# Patient Record
Sex: FEMALE | Race: WHITE | NOT HISPANIC OR LATINO | Employment: FULL TIME | URBAN - METROPOLITAN AREA
[De-identification: names, ages, dates, MRNs, and addresses within clinical notes are randomized per-mention and may not be internally consistent; named-entity substitution may affect disease eponyms.]

---

## 2017-02-03 ENCOUNTER — OFFICE VISIT (OUTPATIENT)
Dept: HEMATOLOGY/ONCOLOGY | Facility: CLINIC | Age: 67
End: 2017-02-03
Payer: COMMERCIAL

## 2017-02-03 VITALS
RESPIRATION RATE: 18 BRPM | SYSTOLIC BLOOD PRESSURE: 162 MMHG | HEART RATE: 84 BPM | WEIGHT: 180.31 LBS | HEIGHT: 62 IN | DIASTOLIC BLOOD PRESSURE: 84 MMHG | BODY MASS INDEX: 33.18 KG/M2

## 2017-02-03 DIAGNOSIS — C50.012 MALIGNANT NEOPLASM OF NIPPLE OF LEFT BREAST IN FEMALE: Primary | ICD-10-CM

## 2017-02-03 DIAGNOSIS — M94.9 BONE/CARTILAGE DISORDER: ICD-10-CM

## 2017-02-03 DIAGNOSIS — M89.9 BONE/CARTILAGE DISORDER: ICD-10-CM

## 2017-02-03 PROCEDURE — 99999 PR PBB SHADOW E&M-EST. PATIENT-LVL III: CPT | Mod: PBBFAC,,, | Performed by: INTERNAL MEDICINE

## 2017-02-03 PROCEDURE — 3077F SYST BP >= 140 MM HG: CPT | Mod: S$GLB,,, | Performed by: INTERNAL MEDICINE

## 2017-02-03 PROCEDURE — 3079F DIAST BP 80-89 MM HG: CPT | Mod: S$GLB,,, | Performed by: INTERNAL MEDICINE

## 2017-02-03 PROCEDURE — 99213 OFFICE O/P EST LOW 20 MIN: CPT | Mod: S$GLB,,, | Performed by: INTERNAL MEDICINE

## 2017-02-03 NOTE — MR AVS SNAPSHOT
[facility]  [address]  [facility] [address]  Phone: [phone]  Fax: [phone]                  Zayra Rodgers   2/3/2017 1:20 PM   Office Visit    Description:  Female : 1950   Provider:  Michael Bartlett MD   Department:  [facility]           Diagnoses this Visit        Comments    Malignant neoplasm of nipple of left breast in female    -  Primary     Bone/cartilage disorder                To Do List           Future Appointments        Provider Department Dept Phone    5/3/2017 12:45 PM LAB, [facility] [phone]    5/3/2017 1:40 PM Michael Bartlett MD [facility] [phone]    2017 11:00 AM LAB, [place] [facility] [phone]    2017 2:00 PM Michael Bartlett MD [facility] [phone]    2017 3:30 PM CHAIR 03, [facility] [phone]      Goals (5 Years of Data)     None      [facility] On Call     [facility] Nurse Care Line - 24/7 Assistance  Registered nurses in the [facility] provide clinical advisement, health education, appointment booking, and other advisory services.  Call for this free service at [phone].             Medications           Message regarding Medications     Verify the changes and/or additions to your medication regime listed below are the same as discussed with your clinician today.  If any of these changes or additions are incorrect, please notify your healthcare provider.        STOP taking these medications     pantoprazole (PROTONIX) 40 MG tablet Take 1 tablet (40 mg total) by mouth once daily.           Verify that the below list of medications is an accurate representation of the medications you are currently taking.  If none reported, the list may be blank. If incorrect, please contact your healthcare provider. Carry this list with you in case of emergency.          "  Current Medications     alprazolam (XANAX) 0.25 MG tablet Take 1 tablet (0.25 mg total) by mouth 3 (three) times daily as needed for Anxiety.    anastrozole (ARIMIDEX) 1 mg Tab Take 1 tablet (1 mg total) by mouth once daily.    B-complex with vitamin C (Z-BEC OR EQUIV) tablet Take 1 tablet by mouth once daily.    calcium carbonate-vitamin D3 (CALTRATE 600 + D) 600 mg (1,500 mg)-800 unit Chew Take 2 tablets by mouth once daily.    fish oil-omega-3 fatty acids 300-1,000 mg capsule Take 2 g by mouth once daily.    hydrochlorothiazide (HYDRODIURIL) 25 MG tablet Take 12.5 mg by mouth as needed.     multivitamin capsule Take 1 capsule by mouth once daily.    ranitidine (ZANTAC) 75 MG tablet Take 75 mg by mouth nightly.           Clinical Reference Information           Your Vitals Were     BP Pulse Resp Height Weight BMI    162/84 84 18 5' 2" (1.575 m) 81.8 kg (180 lb 5.4 oz) 32.98 kg/m2      Blood Pressure          Most Recent Value    BP  (!)  162/84      Allergies as of 2/3/2017     Sulfa (Sulfonamide Antibiotics)      Immunizations Administered on Date of Encounter - 2/3/2017     None      Orders Placed During Today's Visit     Future Labs/Procedures Expected by Expires    BMP  2/3/2017 4/4/2018      Language Assistance Services     ATTENTION: Language assistance services are available, free of charge. Please call [phone].      ATENCIÓN: Si habla español, tiene a beckwith disposición servicios gratuitos de asistencia lingüística. Llame al [phone].     [facility] Ý: N?u b?n nói Ti?ng Vi?t, có các d?ch v? h? tr? ngôn ng? mi?n phí dành cho b?n. G?i s? [phone].         [facility] complies with applicable Federal civil rights laws and does not discriminate on the basis of race, color, national origin, age, disability, or sex.        "

## 2017-02-03 NOTE — PROGRESS NOTES
HISTORY OF PRESENT ILLNESS:  The patient is a 66-year-old white female well   known to me for locally advanced left breast carcinoma, who completed   neoadjuvant therapy consisting of four cycles of Adriamycin/Cytoxan, 12 weekly   doses of Taxol.  She underwent lumpectomy, axillary lymph node sampling,   post-lumpectomy radiation.  She had a small residual focus of infiltrating   ductal carcinoma within the lumpectomy specimen.  One sentinel lymph node had   residual 0.3 cm focus of metastatic tumor.  The patient is currently receiving   adjuvant Arimidex 1 mg daily and biannual Prolia for prevention of aromatase   inhibitor-induced bone loss.  She is in clinic for three-month post-therapy   reevaluation.  She has minimal reddening of the skin overlying the left breast.    Otherwise, she has no complaints or pertinent findings on 10-point review of   systems.    PHYSICAL EXAMINATION:  GENERAL:  Well-developed, well-nourished white female in no acute distress.  VITAL SIGNS:  Weight 180.5 pounds (decreased by 1 pound).  Documented in EMR and   reviewed.  HEENT:  Normocephalic, atraumatic.  Oral mucosa pink and moist.  Lips without   lesions.  Tongue midline.  Oropharynx clear.  Nonicteric sclerae.   NECK:  Supple, no adenopathy.  No carotid bruits, thyromegaly or thyroid nodule.                                                                        HEART:  Regular rate and rhythm without murmur, gallop or rub.                LUNGS:  Clear to auscultation bilaterally.  Normal respiratory effort.       ABDOMEN:  Soft, nontender, nondistended with positive normoactive bowel sounds,   no hepatosplenomegaly.    EXTREMITIES:  No cyanosis, clubbing or edema.  Distal pulses are intact.                                              AXILLAE AND GROIN:  No palpable pathologic lymphadenopathy is appreciated.        SKIN:  Intact/turgor normal.  NEUROLOGIC:  Cranial nerves II-XII grossly intact.  Motor:  Good muscle bulk and    tone.  Strength/sensory 5/5 throughout.  Gait stable.  BREASTS:  The patient's right breast is free from masses, tenderness or nipple   discharge.  The patient's left breast has a well approximated and healed   lumpectomy and sentinel lymph node sampling scars.  There are no signs of local   recurrence.  There is minimal skin change consistent with recent radiation   therapy.    IMPRESSION:  Locally advanced left breast carcinoma with excellent response to   neoadjuvant therapy.    PLAN:  1.  Continue calcium with D.  2.  Continue Arimidex 1 mg daily.  3.  Return to clinic in three months with interval BMP for reexamination and   next dose of Prolia.      ALYSSIA/HN  dd: 02/03/2017 13:47:30 (CST)  td: 02/03/2017 19:10:26 (CST)  Doc ID   #[id number]  Job ID #[id number]    CC:

## 2017-04-24 ENCOUNTER — PATIENT OUTREACH (OUTPATIENT)
Dept: ADMINISTRATIVE | Facility: HOSPITAL | Age: 67
End: 2017-04-24

## 2017-05-09 ENCOUNTER — TELEPHONE (OUTPATIENT)
Dept: HEMATOLOGY/ONCOLOGY | Facility: CLINIC | Age: 67
End: 2017-05-09

## 2017-05-09 NOTE — TELEPHONE ENCOUNTER
----- Message from Diana Nogueira sent at 5/9/2017  4:16 PM CDT -----  Patient is calling to reschedule her appointment on 5/12/17. Please call back to reschedule at [phone].

## 2017-05-10 DIAGNOSIS — C50.919 MALIGNANT NEOPLASM OF FEMALE BREAST, UNSPECIFIED LATERALITY, UNSPECIFIED SITE OF BREAST: Primary | ICD-10-CM

## 2017-05-12 ENCOUNTER — TELEPHONE (OUTPATIENT)
Dept: HEMATOLOGY/ONCOLOGY | Facility: CLINIC | Age: 67
End: 2017-05-12

## 2017-05-12 NOTE — TELEPHONE ENCOUNTER
----- Message from Debi Marino sent at 5/12/2017  9:58 AM CDT -----  Please call patient to reschedule appt [phone] (home)

## 2017-05-12 NOTE — TELEPHONE ENCOUNTER
Returned call to patient who wanted to reschedule lab and follow up. Rescheduled lab to 5/15/17 and follow up to Friday 5/19/17 at 11:20. Patient voiced understanding and appreciation.

## 2017-05-15 ENCOUNTER — LAB VISIT (OUTPATIENT)
Dept: LAB | Facility: HOSPITAL | Age: 67
End: 2017-05-15
Attending: INTERNAL MEDICINE
Payer: COMMERCIAL

## 2017-05-15 DIAGNOSIS — C50.912 MALIGNANT NEOPLASM OF LEFT FEMALE BREAST, UNSPECIFIED SITE OF BREAST: ICD-10-CM

## 2017-05-15 DIAGNOSIS — C50.919 MALIGNANT NEOPLASM OF FEMALE BREAST, UNSPECIFIED LATERALITY, UNSPECIFIED SITE OF BREAST: ICD-10-CM

## 2017-05-15 DIAGNOSIS — C77.3 MALIGNANT NEOPLASM METASTATIC TO LYMPH NODE OF AXILLA: ICD-10-CM

## 2017-05-15 LAB
ALBUMIN SERPL BCP-MCNC: 4 G/DL
ALP SERPL-CCNC: 41 U/L
ALT SERPL W/O P-5'-P-CCNC: 16 U/L
ANION GAP SERPL CALC-SCNC: 12 MMOL/L
AST SERPL-CCNC: 15 U/L
BASOPHILS # BLD AUTO: 0.02 K/UL
BASOPHILS NFR BLD: 0.3 %
BILIRUB SERPL-MCNC: 0.3 MG/DL
BUN SERPL-MCNC: 20 MG/DL
CALCIUM SERPL-MCNC: 9.9 MG/DL
CHLORIDE SERPL-SCNC: 100 MMOL/L
CO2 SERPL-SCNC: 29 MMOL/L
CREAT SERPL-MCNC: 0.7 MG/DL
DIFFERENTIAL METHOD: NORMAL
EOSINOPHIL # BLD AUTO: 0.1 K/UL
EOSINOPHIL NFR BLD: 0.7 %
ERYTHROCYTE [DISTWIDTH] IN BLOOD BY AUTOMATED COUNT: 12.9 %
EST. GFR  (AFRICAN AMERICAN): >60 ML/MIN/1.73 M^2
EST. GFR  (NON AFRICAN AMERICAN): >60 ML/MIN/1.73 M^2
GLUCOSE SERPL-MCNC: 103 MG/DL
HCT VFR BLD AUTO: 41.6 %
HGB BLD-MCNC: 14.4 G/DL
LYMPHOCYTES # BLD AUTO: 1.7 K/UL
LYMPHOCYTES NFR BLD: 24.1 %
MAGNESIUM SERPL-MCNC: 2.4 MG/DL
MCH RBC QN AUTO: 29.9 PG
MCHC RBC AUTO-ENTMCNC: 34.6 %
MCV RBC AUTO: 87 FL
MONOCYTES # BLD AUTO: 0.5 K/UL
MONOCYTES NFR BLD: 6.8 %
NEUTROPHILS # BLD AUTO: 4.9 K/UL
NEUTROPHILS NFR BLD: 68.1 %
PLATELET # BLD AUTO: 247 K/UL
PMV BLD AUTO: 10.4 FL
POTASSIUM SERPL-SCNC: 4.1 MMOL/L
PROT SERPL-MCNC: 7.5 G/DL
RBC # BLD AUTO: 4.81 M/UL
SODIUM SERPL-SCNC: 141 MMOL/L
WBC # BLD AUTO: 7.22 K/UL

## 2017-05-15 PROCEDURE — 80053 COMPREHEN METABOLIC PANEL: CPT | Mod: PO

## 2017-05-15 PROCEDURE — 36415 COLL VENOUS BLD VENIPUNCTURE: CPT | Mod: PO

## 2017-05-15 PROCEDURE — 82465 ASSAY BLD/SERUM CHOLESTEROL: CPT

## 2017-05-15 PROCEDURE — 85025 COMPLETE CBC W/AUTO DIFF WBC: CPT | Mod: PO

## 2017-05-15 PROCEDURE — 83718 ASSAY OF LIPOPROTEIN: CPT

## 2017-05-15 PROCEDURE — 83735 ASSAY OF MAGNESIUM: CPT | Mod: PO

## 2017-05-15 PROCEDURE — 83701 LIPOPROTEIN BLD HR FRACTION: CPT

## 2017-05-16 LAB
HDLC SERPL-MCNC: 274 MG/DL
HDLC SERPL-MCNC: 52 MG/DL

## 2017-05-18 ENCOUNTER — PATIENT OUTREACH (OUTPATIENT)
Dept: ADMINISTRATIVE | Facility: HOSPITAL | Age: 67
End: 2017-05-18

## 2017-05-18 LAB — LDLC SERPL-MCNC: 197 MG/DL

## 2017-05-18 NOTE — LETTER
May 18, 2017    Zayra Rodgers  [address] [address]             [facility]  [address]  [facility] [address]  Phone: [phone] Dear Ms. Rodgers:    We have tried to reach you by [employer] unsuccessfully.      [facility] is committed to your overall health.  Our records indicate that you are due for an annual checkup with your primary care provider,  Dr. Barros.  Please call [phone] to schedule a routine physical exam. You may also be due for the following test and/or procedures:     One-time Hepatitis C Screening lab test(a viral condition that can harm the liver)   Cholesterol check (Lipid Panel) h   Tetanus immunization   colonoscopy   Shingles immunization   Pneumonia immunization     If you have had any of the above done at another facility, please let us know by calling [phone] so that we can update your record.  We will add these results to your chart if you fax them to the fax number listed below.  If you have any questions, please call [phone].     If you have any questions or concerns, please don't hesitate to call.    Sincerely,  Nancy Mckeon  Clinical Care Coordinator  [facility] [address] [facility] [address]  [place] [address]  Phone: [phone]   Fax: [phone]

## 2017-05-19 ENCOUNTER — OFFICE VISIT (OUTPATIENT)
Dept: HEMATOLOGY/ONCOLOGY | Facility: CLINIC | Age: 67
End: 2017-05-19
Payer: COMMERCIAL

## 2017-05-19 VITALS
DIASTOLIC BLOOD PRESSURE: 95 MMHG | BODY MASS INDEX: 31.36 KG/M2 | RESPIRATION RATE: 17 BRPM | SYSTOLIC BLOOD PRESSURE: 144 MMHG | WEIGHT: 170.44 LBS | HEIGHT: 62 IN | HEART RATE: 84 BPM

## 2017-05-19 DIAGNOSIS — M89.9 BONE/CARTILAGE DISORDER: ICD-10-CM

## 2017-05-19 DIAGNOSIS — C50.012 MALIGNANT NEOPLASM OF NIPPLE OF LEFT BREAST IN FEMALE: ICD-10-CM

## 2017-05-19 DIAGNOSIS — M94.9 BONE/CARTILAGE DISORDER: ICD-10-CM

## 2017-05-19 DIAGNOSIS — C50.919 MALIGNANT NEOPLASM OF FEMALE BREAST, UNSPECIFIED LATERALITY, UNSPECIFIED SITE OF BREAST: Primary | ICD-10-CM

## 2017-05-19 PROCEDURE — 99999 PR PBB SHADOW E&M-EST. PATIENT-LVL III: CPT | Mod: PBBFAC,,, | Performed by: INTERNAL MEDICINE

## 2017-05-19 PROCEDURE — 99214 OFFICE O/P EST MOD 30 MIN: CPT | Mod: S$GLB,,, | Performed by: INTERNAL MEDICINE

## 2017-05-19 PROCEDURE — 3080F DIAST BP >= 90 MM HG: CPT | Mod: S$GLB,,, | Performed by: INTERNAL MEDICINE

## 2017-05-19 PROCEDURE — 3077F SYST BP >= 140 MM HG: CPT | Mod: S$GLB,,, | Performed by: INTERNAL MEDICINE

## 2017-05-19 PROCEDURE — 1160F RVW MEDS BY RX/DR IN RCRD: CPT | Mod: S$GLB,,, | Performed by: INTERNAL MEDICINE

## 2017-05-19 RX ORDER — ANASTROZOLE 1 MG/1
1 TABLET ORAL DAILY
Qty: 90 TABLET | Refills: 3 | Status: SHIPPED | OUTPATIENT
Start: 2017-05-19 | End: 2018-02-17 | Stop reason: SDUPTHER

## 2017-05-19 NOTE — MR AVS SNAPSHOT
[place] [address] [facility] [address]  [facility] [address]  Phone: [phone]  Fax: [phone]                  Zayra Rodgers   2017 11:20 AM   Office Visit    Description:  Female : 1950   Provider:  Michael Bartlett MD   Department:  [facility]                To Do List           Future Appointments        Provider Department Dept Phone    2017 11:20 AM Michael Bartlett MD [facility] [phone]    2017 3:30 PM CHAIR 03, [facility] [phone]      Goals (5 Years of Data)     None      [facility] On Call     [facility] Nurse Care Line -  Assistance  Unless otherwise directed by your provider, please contact [facility], our nurse care line that is available for  assistance.     Registered nurses in the [facility] provide: appointment scheduling, clinical advisement, health education, and other advisory services.  Call: [phone] (toll free)               Medications           Message regarding Medications     Verify the changes and/or additions to your medication regime listed below are the same as discussed with your clinician today.  If any of these changes or additions are incorrect, please notify your healthcare provider.             Verify that the below list of medications is an accurate representation of the medications you are currently taking.  If none reported, the list may be blank. If incorrect, please contact your healthcare provider. Carry this list with you in case of emergency.           Current Medications     alprazolam (XANAX) 0.25 MG tablet Take 1 tablet (0.25 mg total) by mouth 3 (three) times daily as needed for Anxiety.    anastrozole (ARIMIDEX) 1 mg Tab Take 1 tablet (1 mg total) by mouth once daily.    B-complex with vitamin C (Z-BEC OR EQUIV) tablet Take 1 tablet by mouth once daily.    calcium carbonate-vitamin D3 (CALTRATE 600 + D) 600 mg (1,500  "mg)-800 unit Chew Take 2 tablets by mouth once daily.    fish oil-omega-3 fatty acids 300-1,000 mg capsule Take 2 g by mouth once daily.    hydrochlorothiazide (HYDRODIURIL) 25 MG tablet Take 12.5 mg by mouth as needed.     multivitamin capsule Take 1 capsule by mouth once daily.    ranitidine (ZANTAC) 75 MG tablet Take 75 mg by mouth nightly.           Clinical Reference Information           Your Vitals Were     BP Pulse Resp Height Weight BMI    144/95 84 17 5' 2" (1.575 m) 77.3 kg (170 lb 6.7 oz) 31.17 kg/m2      Blood Pressure          Most Recent Value    BP  (!)  144/95      Allergies as of 5/19/2017     Sulfa (Sulfonamide Antibiotics)      Immunizations Administered on Date of Encounter - 5/19/2017     None      Language Assistance Services     ATTENTION: Language assistance services are available, free of charge. Please call [phone].      ATENCIÓN: Si habla shereenañol, tiene a beckwith disposición servicios gratuitos de asistencia lingüística. Llame al [phone].     [facility] Ý: N?u b?n nói Ti?ng Vi?t, có các d?ch v? h? tr? ngôn ng? mi?n phí ashantih cho b?n. G?i s? [phone].         [facility] complies with applicable Federal civil rights laws and does not discriminate on the basis of race, color, national origin, age, disability, or sex.        "

## 2017-05-19 NOTE — PROGRESS NOTES
HISTORY OF PRESENT ILLNESS:  The patient is a 67-year-old white female well   known to me for locally advanced left breast carcinoma, completed neoadjuvant   therapy consisting of four cycles of Adriamycin/Cytoxan, 12 weekly doses of   Taxol.  Following this, she underwent lumpectomy, axillary lymph node sampling   and post-lumpectomy irradiation.  She had a small residual focus of infiltrating   ductal carcinoma in the lumpectomy specimen in a single sentinel lymph node,   which had a 0.3 cm focus of metastatic tumor.  Postoperatively, the patient   remains on adjuvant Arimidex 1 mg daily, as well as calcium supplementation with   vitamin D and biannual Prolia for prevention of aromatase inhibitor-induced   bone loss.  The patient has had some minimal soreness of the left breast with   physical exertion.  Otherwise, her review of systems is unremarkable.    PHYSICAL EXAMINATION:  GENERAL:  Well-developed, well-nourished white female in no acute distress.  VITAL SIGNS:  Weight 170-1/2 pounds (decreased by 10 pounds).  HEENT:  Normocephalic, atraumatic.  Oral mucosa pink and moist.  Lips without   lesions.  Tongue midline.  Oropharynx clear.  Nonicteric sclerae.  NECK:  Supple, no adenopathy.  No carotid bruits, thyromegaly or thyroid nodule.  HEART:  Regular rate and rhythm without murmur, gallop or rub.  LUNGS:  Clear to auscultation bilaterally.  Normal respiratory effort.  ABDOMEN:  Soft, nontender, nondistended with positive normoactive bowel sounds,   no hepatosplenomegaly.  EXTREMITIES:  No cyanosis, clubbing or edema.  Distal pulses are intact.  AXILLAE AND GROIN:  No palpable pathologic lymphadenopathy is appreciated.  SKIN:  Intact/turgor normal.  NEUROLOGIC:  Cranial nerves II-XII grossly intact.  Motor:  Good muscle bulk and   tone.  Strength/sensory 5/5 throughout.  Gait stable.  BREASTS:  The patient's right breast is free from masses, tenderness or nipple   discharge.  The patient's left breast has  healed lumpectomy and sentinel lymph   node sampling scars, which are well approximated and free from signs of local   recurrence.    LABORATORY:  White count 7.2, H and H 14.4 and 41.6, platelet count 247.    Chemistry:  Sodium 141, potassium 4.1, chloride 100, CO2 of 29, BUN 20,   creatinine 0.7, glucose 103, calcium 9.9, mag 2.4.  Liver function tests are   within normal limits.    IMPRESSION:  Locally advanced left breast carcinoma with excellent response to   neoadjuvant therapy with no evidence of disease now six months out.    PLAN:  1.  Continue calcium supplementation with vitamin D.  2.  Continue Arimidex 1 mg daily.  3.  Repeat Prolia 60 mg subq.  4.  The patient is cleared for routine dental cleaning.  5.  Return to clinic in three months without interval study.      ALYSSIA/MARTHA  dd: 05/19/2017 12:36:46 (CDT)  td: 05/19/2017 20:39:11 (CDT)  Doc ID   #[id number]  Job ID #[id number]    CC:

## 2017-05-22 ENCOUNTER — TELEPHONE (OUTPATIENT)
Dept: HEMATOLOGY/ONCOLOGY | Facility: CLINIC | Age: 67
End: 2017-05-22

## 2017-05-22 NOTE — TELEPHONE ENCOUNTER
Medication was sent to wrong pharmacy, per patient pharmacist at [facility] is transferring Arimidex to patients correct pharmacy.

## 2017-05-22 NOTE — TELEPHONE ENCOUNTER
----- Message from Tanika Flower sent at 5/22/2017 10:19 AM CDT -----  Contact: pt   States Rx called in wrong pharmacy   Call back [phone]  .  [facility] [address] [place] [address] [address] [address]  Phone: [phone] Fax: [phone]    No other informaiton

## 2017-05-26 ENCOUNTER — TELEPHONE (OUTPATIENT)
Dept: HEMATOLOGY/ONCOLOGY | Facility: CLINIC | Age: 67
End: 2017-05-26

## 2017-05-26 NOTE — TELEPHONE ENCOUNTER
----- Message from Debi Marino sent at 5/26/2017  9:26 AM CDT -----  Please call patient in regards to changing appt, please call [phone] (home)

## 2017-06-01 ENCOUNTER — INFUSION (OUTPATIENT)
Dept: INFUSION THERAPY | Facility: HOSPITAL | Age: 67
End: 2017-06-01
Attending: INTERNAL MEDICINE
Payer: COMMERCIAL

## 2017-06-01 VITALS
HEIGHT: 62 IN | HEART RATE: 81 BPM | TEMPERATURE: 99 F | DIASTOLIC BLOOD PRESSURE: 79 MMHG | RESPIRATION RATE: 17 BRPM | SYSTOLIC BLOOD PRESSURE: 129 MMHG | WEIGHT: 170.44 LBS | BODY MASS INDEX: 31.36 KG/M2

## 2017-06-01 DIAGNOSIS — M89.9 BONE/CARTILAGE DISORDER: Primary | ICD-10-CM

## 2017-06-01 DIAGNOSIS — M94.9 BONE/CARTILAGE DISORDER: Primary | ICD-10-CM

## 2017-06-01 PROCEDURE — 63600175 PHARM REV CODE 636 W HCPCS: Mod: PN | Performed by: INTERNAL MEDICINE

## 2017-06-01 PROCEDURE — 96401 CHEMO ANTI-NEOPL SQ/IM: CPT | Mod: PN

## 2017-06-01 RX ORDER — SODIUM CHLORIDE 0.9 % (FLUSH) 0.9 %
10 SYRINGE (ML) INJECTION
Status: CANCELLED | OUTPATIENT
Start: 2017-06-01

## 2017-06-01 RX ORDER — HEPARIN 100 UNIT/ML
500 SYRINGE INTRAVENOUS
Status: CANCELLED | OUTPATIENT
Start: 2017-06-01

## 2017-06-01 RX ADMIN — DENOSUMAB 60 MG: 60 INJECTION SUBCUTANEOUS at 08:06

## 2017-06-01 NOTE — PLAN OF CARE
Problem: Patient Care Overview  Goal: Plan of Care Review  Outcome: Ongoing (interventions implemented as appropriate)  Pt tolerated injection well without complaints. Pt teaching done and handouts given on Prolia. Pt verbalized understanding.

## 2017-06-01 NOTE — PATIENT INSTRUCTIONS
Denosumab injection  What is this medicine?  DENOSUMAB (den oh rashel mab) slows bone breakdown. Prolia is used to treat osteoporosis in women after menopause and in men. Xgeva is used to prevent bone fractures and other bone problems caused by cancer bone metastases. Xgeva is also used to treat giant cell tumor of the bone.  How should I use this medicine?  This medicine is for injection under the skin. It is given by a health care professional in a hospital or clinic setting.  If you are getting Prolia, a special MedGuide will be given to you by the pharmacist with each prescription and refill. Be sure to read this information carefully each time.  For Prolia, talk to your pediatrician regarding the use of this medicine in children. Special care may be needed. For Xgeva, talk to your pediatrician regarding the use of this medicine in children. While this drug may be prescribed for children as young as 13 years for selected conditions, precautions do apply.  What side effects may I notice from receiving this medicine?  Side effects that you should report to your doctor or health care professional as soon as possible:  · allergic reactions like skin rash, itching or hives, swelling of the face, lips, or tongue  · breathing problems  · chest pain  · fast, irregular heartbeat  · feeling faint or lightheaded, falls  · fever, chills, or any other sign of infection  · muscle spasms, tightening, or twitches  · numbness or tingling  · skin blisters or bumps, or is dry, peels, or red  · slow healing or unexplained pain in the mouth or jaw  · unusual bleeding or bruising  Side effects that usually do not require medical attention (Report these to your doctor or health care professional if they continue or are bothersome.):  · muscle pain  · stomach upset, gas  What may interact with this medicine?  Do not take this medicine with any of the following medications:  · other medicines containing denosumab  This medicine may also  interact with the following medications:  · medicines that suppress the immune system  · medicines that treat cancer  · steroid medicines like prednisone or cortisone  What if I miss a dose?  It is important not to miss your dose. Call your doctor or health care professional if you are unable to keep an appointment.  Where should I keep my medicine?  This medicine is only given in a clinic, doctor's office, or other health care setting and will not be stored at home.  What should I tell my health care provider before I take this medicine?  They need to know if you have any of these conditions:  · dental disease  · eczema  · infection or history of infections  · kidney disease or on dialysis  · low blood calcium or vitamin D  · malabsorption syndrome  · scheduled to have surgery or tooth extraction  · taking medicine that contains denosumab  · thyroid or parathyroid disease  · an unusual reaction to denosumab, other medicines, foods, dyes, or preservatives  · pregnant or trying to get pregnant  · breast-feeding  What should I watch for while using this medicine?  Visit your doctor or health care professional for regular checks on your progress. Your doctor or health care professional may order blood tests and other tests to see how you are doing.  Call your doctor or health care professional if you get a cold or other infection while receiving this medicine. Do not treat yourself. This medicine may decrease your body's ability to fight infection.  You should make sure you get enough calcium and vitamin D while you are taking this medicine, unless your doctor tells you not to. Discuss the foods you eat and the vitamins you take with your health care professional.  See your dentist regularly. Brush and floss your teeth as directed. Before you have any dental work done, tell your dentist you are receiving this medicine.  Do not become pregnant while taking this medicine or for 5 months after stopping it. Women should  inform their doctor if they wish to become pregnant or think they might be pregnant. There is a potential for serious side effects to an unborn child. Talk to your health care professional or pharmacist for more information.  Date Last Reviewed:   NOTE:This sheet is a summary. It may not cover all possible information. If you have questions about this medicine, talk to your doctor, pharmacist, or health care provider. Copyright© 2016 [employer]

## 2017-06-02 DIAGNOSIS — F41.9 ANXIETY: ICD-10-CM

## 2017-06-02 RX ORDER — ALPRAZOLAM 0.25 MG/1
0.25 TABLET ORAL 3 TIMES DAILY PRN
Qty: 60 TABLET | Refills: 0 | Status: SHIPPED | OUTPATIENT
Start: 2017-06-02 | End: 2018-02-08

## 2017-06-02 NOTE — TELEPHONE ENCOUNTER
----- Message from Debi Vences sent at 6/2/2017 11:46 AM CDT -----  Contact: self  Patient called regarding medication refill. Please contact [phone] (xwlf)

## 2017-08-17 ENCOUNTER — TELEPHONE (OUTPATIENT)
Dept: HEMATOLOGY/ONCOLOGY | Facility: CLINIC | Age: 67
End: 2017-08-17

## 2017-08-17 ENCOUNTER — PATIENT MESSAGE (OUTPATIENT)
Dept: HEMATOLOGY/ONCOLOGY | Facility: CLINIC | Age: 67
End: 2017-08-17

## 2017-08-17 NOTE — TELEPHONE ENCOUNTER
----- Message from Annemarie Davis sent at 8/17/2017  1:10 PM CDT -----  Contact: pt [phone]  Patient is calling and asking for some labs to be ordered for her appointment.    Please call back.        Spoke with Patient , informed per last note, no labs required for visit

## 2017-08-18 DIAGNOSIS — Z12.11 COLON CANCER SCREENING: ICD-10-CM

## 2017-08-28 ENCOUNTER — OFFICE VISIT (OUTPATIENT)
Dept: HEMATOLOGY/ONCOLOGY | Facility: CLINIC | Age: 67
End: 2017-08-28
Payer: COMMERCIAL

## 2017-08-28 VITALS
HEART RATE: 81 BPM | SYSTOLIC BLOOD PRESSURE: 136 MMHG | RESPIRATION RATE: 16 BRPM | DIASTOLIC BLOOD PRESSURE: 91 MMHG | WEIGHT: 172.38 LBS | BODY MASS INDEX: 31.72 KG/M2 | TEMPERATURE: 98 F | HEIGHT: 62 IN

## 2017-08-28 DIAGNOSIS — Z17.0 MALIGNANT NEOPLASM OF NIPPLE OF LEFT BREAST IN FEMALE, ESTROGEN RECEPTOR POSITIVE: Primary | ICD-10-CM

## 2017-08-28 DIAGNOSIS — M94.9 BONE/CARTILAGE DISORDER: ICD-10-CM

## 2017-08-28 DIAGNOSIS — C77.3 MALIGNANT NEOPLASM METASTATIC TO LYMPH NODE OF AXILLA: ICD-10-CM

## 2017-08-28 DIAGNOSIS — C50.012 MALIGNANT NEOPLASM OF NIPPLE OF LEFT BREAST IN FEMALE, ESTROGEN RECEPTOR POSITIVE: Primary | ICD-10-CM

## 2017-08-28 DIAGNOSIS — M89.9 BONE/CARTILAGE DISORDER: ICD-10-CM

## 2017-08-28 PROCEDURE — 99999 PR PBB SHADOW E&M-EST. PATIENT-LVL III: CPT | Mod: PBBFAC,,, | Performed by: INTERNAL MEDICINE

## 2017-08-28 PROCEDURE — 3008F BODY MASS INDEX DOCD: CPT | Mod: S$GLB,,, | Performed by: INTERNAL MEDICINE

## 2017-08-28 PROCEDURE — 3075F SYST BP GE 130 - 139MM HG: CPT | Mod: S$GLB,,, | Performed by: INTERNAL MEDICINE

## 2017-08-28 PROCEDURE — 3080F DIAST BP >= 90 MM HG: CPT | Mod: S$GLB,,, | Performed by: INTERNAL MEDICINE

## 2017-08-28 PROCEDURE — 99213 OFFICE O/P EST LOW 20 MIN: CPT | Mod: S$GLB,,, | Performed by: INTERNAL MEDICINE

## 2017-08-28 NOTE — PROGRESS NOTES
HISTORY OF PRESENT ILLNESS:  The patient is a 67-year-old white female well   known to me for diagnosis of locally advanced left breast carcinoma, who   underwent neoadjuvant therapy consisting of four cycles of Adriamycin/Cytoxan,   12 weekly doses of Taxol, lumpectomy with axillary lymph node sampling,   post-lumpectomy irradiation.  Surgical pathology reveals small residual focus of   infiltrating carcinoma of the lumpectomy specimen and a 0.3 cm focus of   metastatic tumor involving a single lymph node.  Postoperatively, the patient is   receiving adjuvant Arimidex and biannual Prolia for prevention of aromatase   inhibitor-induced bone loss.  The patient is in clinic today for nine-month   post-therapy reevaluation and has no new complaints or pertinent findings on a   10-point review of systems.    PHYSICAL EXAMINATION:  GENERAL:  Well-developed, well-nourished white female in no acute distress.  VITAL SIGNS:  Weight of 172-1/2 pounds (increased by 2 pounds).  HEENT:  Normocephalic, atraumatic.  Oral mucosa pink and moist.  Lips without   lesions.  Tongue midline.  Oropharynx clear.  Nonicteric sclerae.  NECK:  Supple.  No adenopathy.  HEART:  Regular rate and rhythm without murmur, gallop or rub.  LUNGS:  Clear to auscultation bilaterally.  ABDOMEN:  Soft, nontender and nondistended with positive normoactive bowel   sounds.  No hepatosplenomegaly.  EXTREMITIES:  No cyanosis, clubbing or edema.  Distal pulses are intact.  BREASTS:  The patient's right breast is free from masses, tenderness or nipple   discharge.  The patient's left breast has healed lumpectomy scars free from   signs of local recurrence.    IMPRESSION:  Locally advanced left breast carcinoma - VISHAL.    PLAN:  1.  Continue calcium supplementation with D.  2.  Continue Arimidex 1 mg daily.  3.  Return to clinic in three months with interval CBC, CMP, LDH, chest x-ray   and mammography.      ALYSSIA/PN  dd: 08/28/2017 12:09:05 (CDT)  td: 08/28/2017  13:58:06 (CDT)  Doc ID   #[id number]  Job ID #[id number]    CC:

## 2017-09-14 ENCOUNTER — TELEPHONE (OUTPATIENT)
Dept: HEMATOLOGY/ONCOLOGY | Facility: CLINIC | Age: 67
End: 2017-09-14

## 2017-09-14 NOTE — TELEPHONE ENCOUNTER
Called and spoke with patient she would like to reschedule to [facility]'s [place] to have mammogram done. Patient is going to schedule appointment and request results be sent to dr. Bartlett.

## 2017-09-14 NOTE — TELEPHONE ENCOUNTER
----- Message from Anel Gracia sent at 9/14/2017  9:20 AM CDT -----  Contact: self  Needs to speak to Beena regarding mammogram that is scheduled for tomorrow.  Wants to reschedule it to the [facility], but would like to discuss it with you first.  Please call back at  [phone].

## 2017-09-18 ENCOUNTER — PATIENT MESSAGE (OUTPATIENT)
Dept: HEMATOLOGY/ONCOLOGY | Facility: CLINIC | Age: 67
End: 2017-09-18

## 2017-10-13 ENCOUNTER — TELEPHONE (OUTPATIENT)
Dept: HEMATOLOGY/ONCOLOGY | Facility: CLINIC | Age: 67
End: 2017-10-13

## 2017-10-13 NOTE — TELEPHONE ENCOUNTER
----- Message from Noe Ying sent at 10/13/2017  1:30 PM CDT -----  Contact: same  Unsuccessful call placed to office.  Patient called in and stated she was returning a call to [facility] and patients call back number is [phone]

## 2017-10-13 NOTE — TELEPHONE ENCOUNTER
----- Message from Olga Winn sent at 10/13/2017 12:10 PM CDT -----  needs to know if she shoud take flu shot..[phone] (home)

## 2017-10-16 ENCOUNTER — TELEPHONE (OUTPATIENT)
Dept: HEMATOLOGY/ONCOLOGY | Facility: CLINIC | Age: 67
End: 2017-10-16

## 2017-10-16 NOTE — TELEPHONE ENCOUNTER
----- Message from Pravin Gordillo sent at 10/16/2017 12:00 PM CDT -----  Contact: Patient  Patient is calling to see if she can get a referral to see Dr. Wasserman because he is not accepting new patients.  Call Back#[phone]  Thanks

## 2017-10-16 NOTE — TELEPHONE ENCOUNTER
Called patient back she no longer needed assistance and was able to book appointment with dr. odonnell

## 2017-11-10 ENCOUNTER — TELEPHONE (OUTPATIENT)
Dept: HEMATOLOGY/ONCOLOGY | Facility: CLINIC | Age: 67
End: 2017-11-10

## 2017-11-10 NOTE — TELEPHONE ENCOUNTER
Spoke with patient will have dr. thakur sign off on dental clearance once back in office on the 15th

## 2017-11-10 NOTE — TELEPHONE ENCOUNTER
----- Message from Tanika Flower sent at 11/10/2017  8:31 AM CST -----  Contact: sagrario   Needs dental clearance, for cleaning   Call back     Will  at    Today if possible

## 2017-11-27 ENCOUNTER — HOSPITAL ENCOUNTER (OUTPATIENT)
Dept: RADIOLOGY | Facility: HOSPITAL | Age: 67
Discharge: HOME | End: 2017-11-27
Attending: INTERNAL MEDICINE
Payer: COMMERCIAL

## 2017-11-27 DIAGNOSIS — C50.012 MALIGNANT NEOPLASM OF NIPPLE OF LEFT BREAST IN FEMALE, ESTROGEN RECEPTOR POSITIVE: ICD-10-CM

## 2017-11-27 DIAGNOSIS — M94.9 BONE/CARTILAGE DISORDER: ICD-10-CM

## 2017-11-27 DIAGNOSIS — C77.3 MALIGNANT NEOPLASM METASTATIC TO LYMPH NODE OF AXILLA: ICD-10-CM

## 2017-11-27 DIAGNOSIS — Z17.0 MALIGNANT NEOPLASM OF NIPPLE OF LEFT BREAST IN FEMALE, ESTROGEN RECEPTOR POSITIVE: ICD-10-CM

## 2017-11-27 DIAGNOSIS — M89.9 BONE/CARTILAGE DISORDER: ICD-10-CM

## 2017-11-27 PROCEDURE — 71020 XR CHEST PA AND LATERAL: CPT | Mod: 26,,, | Performed by: RADIOLOGY

## 2017-11-27 PROCEDURE — 71020 XR CHEST PA AND LATERAL: CPT | Mod: TC,PO

## 2017-11-28 ENCOUNTER — OFFICE VISIT (OUTPATIENT)
Dept: HEMATOLOGY/ONCOLOGY | Facility: CLINIC | Age: 67
End: 2017-11-28
Payer: COMMERCIAL

## 2017-11-28 VITALS
HEIGHT: 62 IN | RESPIRATION RATE: 20 BRPM | HEART RATE: 81 BPM | DIASTOLIC BLOOD PRESSURE: 96 MMHG | SYSTOLIC BLOOD PRESSURE: 167 MMHG | BODY MASS INDEX: 32.05 KG/M2 | TEMPERATURE: 99 F | WEIGHT: 174.19 LBS

## 2017-11-28 DIAGNOSIS — C77.3 MALIGNANT NEOPLASM METASTATIC TO LYMPH NODE OF AXILLA: ICD-10-CM

## 2017-11-28 DIAGNOSIS — M89.9 BONE/CARTILAGE DISORDER: ICD-10-CM

## 2017-11-28 DIAGNOSIS — C50.012 MALIGNANT NEOPLASM OF NIPPLE OF LEFT BREAST IN FEMALE, ESTROGEN RECEPTOR POSITIVE: Primary | ICD-10-CM

## 2017-11-28 DIAGNOSIS — Z17.0 MALIGNANT NEOPLASM OF NIPPLE OF LEFT BREAST IN FEMALE, ESTROGEN RECEPTOR POSITIVE: Primary | ICD-10-CM

## 2017-11-28 DIAGNOSIS — M94.9 BONE/CARTILAGE DISORDER: ICD-10-CM

## 2017-11-28 PROCEDURE — 99999 PR PBB SHADOW E&M-EST. PATIENT-LVL III: CPT | Mod: PBBFAC,,, | Performed by: INTERNAL MEDICINE

## 2017-11-28 PROCEDURE — 99214 OFFICE O/P EST MOD 30 MIN: CPT | Mod: S$GLB,,, | Performed by: INTERNAL MEDICINE

## 2017-11-28 NOTE — PROGRESS NOTES
Date of Service: 11/28/2017  The patient is a 67-year-old white female well known to me for locally advanced   left breast carcinoma, underwent neoadjuvant therapy consisting of four cycles   of Adriamycin/Cytoxan and 12 weekly doses of Taxol.  She went on to have   lumpectomy with axillary lymph node sampling, post-lumpectomy irradiation, and   is now on adjuvant Arimidex/biannual Prolia for prevention of aromatase   inhibitor-induced bone loss.  The patient had 0.3 cm focus of metastatic   carcinoma residual involving a single lymph node at the time of surgery.  The   patient is doing well and has no complaints or pertinent findings on a 14-point   review of systems with the exception of some bone pain, which has been made   worse by change in weather.    PHYSICAL EXAMINATION:  GENERAL:  Well-developed, well-nourished white female in no acute distress.  VITAL SIGNS:  Weight 174 pounds.  HEENT:  Normocephalic, atraumatic.  Oral mucosa pink and moist.  Lips without   lesions.  Tongue midline.  Oropharynx clear.  Nonicteric sclerae.   NECK:  Supple, no adenopathy.  No carotid bruits, thyromegaly or thyroid nodule.                                                                        HEART:  Regular rate and rhythm without murmur, gallop or rub.                LUNGS:  Clear to auscultation bilaterally.  Normal respiratory effort.       ABDOMEN:  Soft, nontender, nondistended with positive normoactive bowel sounds,   no hepatosplenomegaly.    EXTREMITIES:  No cyanosis, clubbing or edema.  Distal pulses are intact.                                              AXILLAE AND GROIN:  No palpable pathologic lymphadenopathy is appreciated.        SKIN:  Intact/turgor normal                                                              NEUROLOGIC:  Cranial nerves II-XII grossly intact.  Motor:  Good muscle bulk and   tone.  Strength/sensory 5/5 throughout.  Gait stable.   BREASTS:  The patient's left breast has a healed  lumpectomy scar, is free from   signs of local recurrence.  Right breast is free from masses, tenderness, or   nipple discharge.    LABORATORY DATA:  Sodium 140, potassium 4.4, chloride 103, CO2 of 27, BUN 18,   creatinine 0.7, glucose 104, and calcium 9.8.  Liver function tests are within   normal limits.  LDH is 159.  CBC reveal white count of 6.8, H and H 13.9 and   40.7, and platelet count 239.    CHEST X-RAY:  No evidence of occult pulmonary metastatic disease.    MAMMOGRAPHY:  BI-RADS category 2 with annual followup recommended.    IMPRESSION:  Locally advanced left breast carcinoma with no evidence of active   disease.    PLAN:  1.  Continue calcium supplementation with D.  2.  Continue Arimidex 1 mg daily.  3.  Repeat Prolia 60 mg subQ.  4.  Reevaluate in six months with interval BMP.      ALYSSIA/HN  dd: 11/28/2017 09:50:16 (CST)  td: 11/28/2017 13:46:52 (CST)  Doc ID   #[id number]  Job ID #[id number]    CC:

## 2017-12-07 ENCOUNTER — INFUSION (OUTPATIENT)
Dept: INFUSION THERAPY | Facility: HOSPITAL | Age: 67
End: 2017-12-07
Attending: INTERNAL MEDICINE
Payer: COMMERCIAL

## 2017-12-07 VITALS
WEIGHT: 177.5 LBS | HEART RATE: 80 BPM | OXYGEN SATURATION: 99 % | TEMPERATURE: 99 F | BODY MASS INDEX: 32.66 KG/M2 | DIASTOLIC BLOOD PRESSURE: 95 MMHG | HEIGHT: 62 IN | SYSTOLIC BLOOD PRESSURE: 156 MMHG | RESPIRATION RATE: 18 BRPM

## 2017-12-07 DIAGNOSIS — M94.9 BONE/CARTILAGE DISORDER: Primary | ICD-10-CM

## 2017-12-07 DIAGNOSIS — C77.3 MALIGNANT NEOPLASM METASTATIC TO LYMPH NODE OF AXILLA: ICD-10-CM

## 2017-12-07 DIAGNOSIS — M89.9 BONE/CARTILAGE DISORDER: Primary | ICD-10-CM

## 2017-12-07 PROCEDURE — 63600175 PHARM REV CODE 636 W HCPCS: Mod: PN | Performed by: INTERNAL MEDICINE

## 2017-12-07 PROCEDURE — 96372 THER/PROPH/DIAG INJ SC/IM: CPT | Mod: PN

## 2017-12-07 RX ADMIN — DENOSUMAB 60 MG: 60 INJECTION SUBCUTANEOUS at 09:12

## 2017-12-07 NOTE — PLAN OF CARE
Problem: Patient Care Overview  Goal: Plan of Care Review  Outcome: Ongoing (interventions implemented as appropriate)  Pt tolerated treatment well

## 2018-01-19 ENCOUNTER — TELEPHONE (OUTPATIENT)
Dept: FAMILY MEDICINE | Facility: CLINIC | Age: 68
End: 2018-01-19

## 2018-01-19 ENCOUNTER — OFFICE VISIT (OUTPATIENT)
Dept: PRIMARY CARE CLINIC | Facility: CLINIC | Age: 68
End: 2018-01-19
Payer: COMMERCIAL

## 2018-01-19 VITALS
HEART RATE: 83 BPM | BODY MASS INDEX: 33.1 KG/M2 | DIASTOLIC BLOOD PRESSURE: 98 MMHG | OXYGEN SATURATION: 97 % | WEIGHT: 179.88 LBS | SYSTOLIC BLOOD PRESSURE: 146 MMHG | TEMPERATURE: 98 F | HEIGHT: 62 IN | RESPIRATION RATE: 18 BRPM

## 2018-01-19 DIAGNOSIS — R05.8 POST-VIRAL COUGH SYNDROME: Primary | ICD-10-CM

## 2018-01-19 PROCEDURE — 99213 OFFICE O/P EST LOW 20 MIN: CPT | Mod: 25,S$GLB,, | Performed by: NURSE PRACTITIONER

## 2018-01-19 PROCEDURE — 99999 PR PBB SHADOW E&M-EST. PATIENT-LVL IV: CPT | Mod: PBBFAC,,, | Performed by: NURSE PRACTITIONER

## 2018-01-19 PROCEDURE — 96372 THER/PROPH/DIAG INJ SC/IM: CPT | Mod: S$GLB,,, | Performed by: FAMILY MEDICINE

## 2018-01-19 RX ORDER — PROMETHAZINE HYDROCHLORIDE AND CODEINE PHOSPHATE 6.25; 1 MG/5ML; MG/5ML
5 SOLUTION ORAL EVERY 12 HOURS PRN
Qty: 120 ML | Refills: 0 | Status: SHIPPED | OUTPATIENT
Start: 2018-01-19 | End: 2018-01-29

## 2018-01-19 RX ORDER — ALBUTEROL SULFATE 90 UG/1
2 AEROSOL, METERED RESPIRATORY (INHALATION) EVERY 6 HOURS PRN
Qty: 18 G | Refills: 0 | Status: SHIPPED | OUTPATIENT
Start: 2018-01-19 | End: 2018-02-15 | Stop reason: SDUPTHER

## 2018-01-19 RX ORDER — PREDNISONE 10 MG/1
TABLET ORAL
Qty: 12 TABLET | Refills: 0 | Status: SHIPPED | OUTPATIENT
Start: 2018-01-19 | End: 2018-02-08

## 2018-01-19 NOTE — PROGRESS NOTES
Zayra Rodgers is a 67 y.o. female patient of Serg López Jr, MD who presents to the clinic today for   Chief Complaint   Patient presents with    Cough    Ear Fullness   .    HPI    Pt, who is not known to me, reports a new problem to me:   Coughing, ears itchy, no fever, no RN, zyrtec is helping nasal congestion.  Delsym helped the 1st night she took it but not last night.  Rare sputum production, clear.      These symptoms began 2 weeks ago and status is worsening. Started with sinusitis/cold sxs.    Symptoms are + acute exac.    Pt denies the following symptoms:  Fever, h/o chronic lung problems.    Aggravating factors include nothing--coughing all day long  now .    Relieving factors include Delsym helped 1 night but no more.  Has used an inhaler in the past for cough. .    OTC Medications tried are Delsym.    Prescription medications taken for symptoms are none.    Pertinent medical history:  Used to be under Dr. Potter's office.  Hasn't used her inhaler or steroids.  Had breast cancer in 2016.  Had segmental removal and chemo.  Now clear of cancer.    Smoking status:  never    ROS    Constitutional:   No  fever, no fatigue, no change in appetite.    Head:   + headache from coughing, on the top of the head  Ears:   No pain.  Eyes:  No sxs  Nose:   No sinus pain, at night congestion, some clear runny nose.    Throat:  No ST pain.    Heart:  No palpitations, chest pain.    Lungs:  No difficulty breathing, + coughing, rare clear sputum production, no wheezing.              Symptoms are community acquired.       GI/:  No sxs    MS:  No new bone, joint or muscle problems.    Skin:  No rashes, itching.      PAST MEDICAL HISTORY:  Past Medical History:   Diagnosis Date    Allergy     Breast cancer     Bronchitis     HLD (hyperlipidemia)     Hypertension     Skin cancer     resovled       PAST SURGICAL HISTORY:  Past Surgical History:   Procedure Laterality Date    ADENOIDECTOMY      BREAST BIOPSY       BREAST CYST EXCISION      BREAST LUMPECTOMY      CYSTOCELE REPAIR      EXCISIONAL HEMORRHOIDECTOMY      HYSTERECTOMY      ORBITAL RECONSTRUCTION      RHINOPLASTY TIP      SIGMOIDOSCOPY         SOCIAL HISTORY:  Social History     Social History    Marital status:      Spouse name: N/A    Number of children: N/A    Years of education: N/A     Occupational History    Not on file.     Social History Main Topics    Smoking status: Never Smoker    Smokeless tobacco: Never Used    Alcohol use 1.2 oz/week     2 Cans of beer per week      Comment: per  week    Drug use: No    Sexual activity: Yes     Partners: Male     Other Topics Concern    Not on file     Social History Narrative    No narrative on file       FAMILY HISTORY:  Family History   Problem Relation Age of Onset    Breast cancer Mother 42    COPD Father     Colon cancer Sister     Breast cancer Sister 40    Breast cancer Sister        ALLERGIES AND MEDICATIONS: updated and reviewed.  Review of patient's allergies indicates:   Allergen Reactions    Sulfa (sulfonamide antibiotics)      Current Outpatient Prescriptions   Medication Sig Dispense Refill    anastrozole (ARIMIDEX) 1 mg Tab Take 1 tablet (1 mg total) by mouth once daily. 90 tablet 3    calcium carbonate-vitamin D3 (CALTRATE 600 + D) 600 mg (1,500 mg)-800 unit Chew Take 2 tablets by mouth once daily. 180 tablet 3    fish oil-omega-3 fatty acids 300-1,000 mg capsule Take 2 g by mouth once daily.      multivitamin capsule Take 1 capsule by mouth once daily.      ranitidine (ZANTAC) 75 MG tablet Take 75 mg by mouth nightly.      alprazolam (XANAX) 0.25 MG tablet Take 1 tablet (0.25 mg total) by mouth 3 (three) times daily as needed for Anxiety. 60 tablet 0    B-complex with vitamin C (Z-BEC OR EQUIV) tablet Take 1 tablet by mouth once daily.      hydrochlorothiazide (HYDRODIURIL) 25 MG tablet Take 12.5 mg by mouth as needed.        No current facility-administered  medications for this visit.              PHYSICAL EXAM    Alert, coop 67 y.o. female patient in no acute distress.    Vitals:    01/19/18 1606   BP: (!) 146/98   Pulse: 83   Resp: 18   Temp: 98 °F (36.7 °C)     VS reviewed.  VS DBP elevated.  CC, nursing note, medications & PMH all reviewed today.    Head:  Normocephalic, atraumatic.    EENT:  EACs patent.  TMs no erythema, normal LR, no effusions, no TM perforation.                              Eye lids normal, no discharge present.       Sinus tenderness to palp--none.               Nares--minimal edema, no d/c present.    Pharynx not injected.                Tonsils not erythematous , not enlarged, no exudate present.    No anterior, no posterior cervical lymph nodes palpable.    No submental, submandibular or supraclavicular lymph nodes palp.             Resp:  Respirations even, unlabored with freq coughing, harsh-sounding   Lungs CTA bilat.  No wheezing.  No crackles.  Moves air to bases bilat.    Heart:  RRR, no MRG.                MS:  Ambulates normally.             NEURO:  Alert and oriented x 4.  Responds appropriately during interaction.    Skin:  Warm, dry, color good.    Post-viral cough syndrome  -     predniSONE (DELTASONE) 10 MG tablet; 3 tabs daily for 2 days, 2 tabs daily for 2 days then 1 tab daily for 2 days.  Dispense: 12 tablet; Refill: 0  -     promethazine-codeine 6.25-10 mg/5 ml (PHENERGAN WITH CODEINE) 6.25-10 mg/5 mL syrup; Take 5 mLs by mouth every 12 (twelve) hours as needed for Cough.  Dispense: 120 mL; Refill: 0  -     albuterol 90 mcg/actuation inhaler; Inhale 2 puffs into the lungs every 6 (six) hours as needed for Wheezing. Rescue  Dispense: 18 g; Refill: 0  -     methylPREDNISolone sod suc(PF) injection 125 mg; Inject 125 mg into the muscle one time.      Pt today presents with 2 weeks of sxs that started with resp cough/cold.  Then 1 day she seemed to get better before this harsh, nonproductive cough started.  Exam shows  coughing..    This is a new problem to me.  No work up is planned.        Pt advised to perform comfort measures recommended on patient instruction sheet .    If not better in 3-5 days, the patient is advised to call us.  If worse or concerns, the patient is advised to call us.  Explained exam findings, diagnosis and treatment plan to patient.  Questions answered and patient states understanding.

## 2018-01-19 NOTE — Clinical Note
Serg López Jr, MD,  I saw your patient today in the [facility].  If you have any questions, please do not hesitate to contact me.  Thank you!  PANFILO Abarca

## 2018-01-19 NOTE — TELEPHONE ENCOUNTER
----- Message from RT Marta sent at 1/19/2018 11:07 AM CST -----  Contact: pt    pt , requesting and appt to be worked in as soon as possible preferably [facility] location, for possible bronchitis, thanks.

## 2018-01-19 NOTE — PATIENT INSTRUCTIONS
The cough seems more due to post viral inflammation.    We'll give you a steroid injection.  Start the steroid tablets tomorrow.  Use the codeine cough medicine at night as needed for coughing.    Use the inhaler as directed, trying to breathe in the puff of medication, avoiding spraying the medication into the mouth.    Rest  Fluids  Vaporizer  Sleep on 2 or 3 pillows to reduce the urge to cough.    If you are not better in 3-5 days, if worse or you have concerns or questions, please do not hesitate to call.  You can reach us at [phone] Monday through Thursday (except holidays) 10 a.m. To 6 p.m.    Thank you for using the [facility]!    PARKER Abarca, CNP, FNP-BC    [facility]

## 2018-01-25 ENCOUNTER — OFFICE VISIT (OUTPATIENT)
Dept: PRIMARY CARE CLINIC | Facility: CLINIC | Age: 68
End: 2018-01-25
Payer: COMMERCIAL

## 2018-01-25 VITALS
HEIGHT: 62 IN | WEIGHT: 176.81 LBS | SYSTOLIC BLOOD PRESSURE: 148 MMHG | HEART RATE: 83 BPM | DIASTOLIC BLOOD PRESSURE: 88 MMHG | BODY MASS INDEX: 32.54 KG/M2 | TEMPERATURE: 98 F

## 2018-01-25 DIAGNOSIS — J01.90 ACUTE BACTERIAL SINUSITIS: Primary | ICD-10-CM

## 2018-01-25 DIAGNOSIS — B96.89 ACUTE BACTERIAL SINUSITIS: Primary | ICD-10-CM

## 2018-01-25 DIAGNOSIS — R05.9 COUGH: ICD-10-CM

## 2018-01-25 PROCEDURE — 99999 PR PBB SHADOW E&M-EST. PATIENT-LVL IV: CPT | Mod: PBBFAC,,, | Performed by: NURSE PRACTITIONER

## 2018-01-25 PROCEDURE — 99213 OFFICE O/P EST LOW 20 MIN: CPT | Mod: S$GLB,,, | Performed by: NURSE PRACTITIONER

## 2018-01-25 RX ORDER — BENZONATATE 200 MG/1
200 CAPSULE ORAL 3 TIMES DAILY PRN
Qty: 30 CAPSULE | Refills: 0 | Status: SHIPPED | OUTPATIENT
Start: 2018-01-25 | End: 2018-02-04

## 2018-01-25 RX ORDER — AZELASTINE 1 MG/ML
1 SPRAY, METERED NASAL 2 TIMES DAILY
Qty: 30 ML | Refills: 0 | Status: SHIPPED | OUTPATIENT
Start: 2018-01-25 | End: 2018-02-08

## 2018-01-25 RX ORDER — AMOXICILLIN AND CLAVULANATE POTASSIUM 875; 125 MG/1; MG/1
1 TABLET, FILM COATED ORAL 2 TIMES DAILY
Qty: 20 TABLET | Refills: 0 | Status: SHIPPED | OUTPATIENT
Start: 2018-01-25 | End: 2018-02-04

## 2018-01-25 NOTE — Clinical Note
Serg López Jr, MD,  I saw your patient today in the [facility]  If you have any questions, please do not hesitate to contact me.  Thank you!  PANFILO Abarca

## 2018-01-25 NOTE — PATIENT INSTRUCTIONS
Sinusitis (Antibiotic Treatment)    The sinuses are air-filled spaces within the bones of the face. They connect to the inside of the nose. Sinusitis is an inflammation of the tissue lining the sinus cavity. Sinus inflammation can occur during a cold. It can also be due to allergies to pollens and other particles in the air. Sinusitis can cause symptoms of sinus congestion and fullness. A sinus infection causes fever, headache and facial pain. There is often green or yellow drainage from the nose or into the back of the throat (post-nasal drip). You have been given antibiotics to treat this condition.  Home care:  · Take the full course of antibiotics as instructed. Do not stop taking them, even if you feel better.  · Drink plenty of water, hot tea, and other liquids. This may help thin mucus. It also may promote sinus drainage.  · Heat may help soothe painful areas of the face. Use a towel soaked in hot water. Or,  the shower and direct the hot spray onto your face. Using a vaporizer along with a menthol rub at night may also help.   · An expectorant containing guaifenesin may help thin the mucus and promote drainage from the sinuses.  · Over-the-counter decongestants may be used unless a similar medicine was prescribed. Nasal sprays work the fastest. Use one that contains phenylephrine or oxymetazoline. First blow the nose gently. Then use the spray. Do not use these medicines more often than directed on the label or symptoms may get worse. You may also use tablets containing pseudoephedrine. Avoid products that combine ingredients, because side effects may be increased. Read labels. You can also ask the pharmacist for help. (NOTE: Persons with high blood pressure should not use decongestants. They can raise blood pressure.)  · Over-the-counter antihistamines may help if allergies contributed to your sinusitis.    · Do not use nasal rinses or irrigation during an acute sinus infection, unless told to by  your health care provider. Rinsing may spread the infection to other sinuses.  · Use acetaminophen or ibuprofen to control pain, unless another pain medicine was prescribed. (If you have chronic liver or kidney disease or ever had a stomach ulcer, talk with your doctor before using these medicines. Aspirin should never be used in anyone under 18 years of age who is ill with a fever. It may cause severe liver damage.)  · Don't smoke. This can worsen symptoms.  Follow-up care  Follow up with your healthcare provider or our staff if you are not improving within the next week.  When to seek medical advice  Call your healthcare provider if any of these occur:  · Facial pain or headache becoming more severe  · Stiff neck  · Unusual drowsiness or confusion  · Swelling of the forehead or eyelids  · Vision problems, including blurred or double vision  · Fever of 100.4ºF (38ºC) or higher, or as directed by your healthcare provider  · Seizure  · Breathing problems  · Symptoms not resolving within 10 days  Date Last Reviewed: 4/13/2015 © 2000-2017 [employer]. [address] [place] [address]. All rights reserved. This information is not intended as a substitute for professional medical care. Always follow your healthcare professional's instructions.    Use the Astelin nasal spray to dry up the drip.  Benzonatate and the inhaler for the cough.  Augmentin for the infection.  Use with probiotic may prevent stomach problems like diarrhea.    If you are not better in 3-5 days, if worse or you have concerns or questions, please do not hesitate to call.  You can reach us at [phone] Monday through Thursday (except holidays) 10 a.m. To 6 p.m.    Thank you for using the [facility]!    Beth Fried, PARKER, CNP, FNP-BC  [facility]

## 2018-01-25 NOTE — PROGRESS NOTES
Zayra Rodgers is a 67 y.o. female patient of Serg López Jr, MD who presents to the clinic today for        Chief Complaint   Patient presents with    Cough       follow up   .     HPI     Pt, who is not known to me, reports continuous coughing, now green- yellow drainage when blows nose, feeling worn out, tightness in chest from cough, ear pressure at times.     These symptoms began monday evening started feeling bad again and status is active. Has not been able to tolerate promethazine/ codeine r/t side effects..     Symptoms are acute.     Pt denies the following symptoms:  SOB, fever     Aggravating factors include laying down, talking, taking a deep breath .     Relieving factors include Inhaler .     OTC Medications tried are Zyrtec which was working at a point in time, now not feeling relief as she once was.     Prescription medications taken for symptoms are Inhaler.     Pertinent medical history: On steroids for coughing.  No h/o lung dz or allergies.    Smoking status:  never     ROS     Constitutional:   no  fever, some fatigue (broken sleep), no change in appetite.     Head:   pressure headache  Ears:   slight pain.  Eyes:  No sxs  Nose:   some sinus pressure, yes congestion, yes runny nose greater amount than last week, no post nasal drip.  Throat:  some ST pain from coughing, no exudate, no difficulty swallowing.     Heart:  No palpitations, chest pain.     Lungs:  no difficulty breathing, yes coughing, yes minimal sputum production- now green yellow, wheezing when lies down at night.     GI/:  No sxs     MS:  No new bone, joint or muscle problems.     Skin:  No rashes, itching.        PAST MEDICAL HISTORY:       Past Medical History:   Diagnosis Date    Allergy      Breast cancer      Bronchitis      HLD (hyperlipidemia)      Hypertension      Skin cancer       resovled         PAST SURGICAL HISTORY:        Past Surgical History:   Procedure Laterality Date    ADENOIDECTOMY         BREAST BIOPSY        BREAST CYST EXCISION        BREAST LUMPECTOMY        CYSTOCELE REPAIR        EXCISIONAL HEMORRHOIDECTOMY        HYSTERECTOMY        ORBITAL RECONSTRUCTION        RHINOPLASTY TIP        SIGMOIDOSCOPY             SOCIAL HISTORY:  Social History   Social History            Social History    Marital status:        Spouse name: N/A    Number of children: N/A    Years of education: N/A          Occupational History    Not on file.             Social History Main Topics    Smoking status: Never Smoker    Smokeless tobacco: Never Used    Alcohol use 1.2 oz/week       2 Cans of beer per week         Comment: per  week    Drug use: No    Sexual activity: Yes       Partners: Male           Other Topics Concern    Not on file          Social History Narrative    No narrative on file            FAMILY HISTORY:        Family History   Problem Relation Age of Onset    Breast cancer Mother 42    COPD Father      Colon cancer Sister      Breast cancer Sister 40    Breast cancer Sister           ALLERGIES AND MEDICATIONS: updated and reviewed.       Review of patient's allergies indicates:   Allergen Reactions    Sulfa (sulfonamide antibiotics)        Current Medications          Current Outpatient Prescriptions   Medication Sig Dispense Refill    albuterol 90 mcg/actuation inhaler Inhale 2 puffs into the lungs every 6 (six) hours as needed for Wheezing. Rescue 18 g 0    anastrozole (ARIMIDEX) 1 mg Tab Take 1 tablet (1 mg total) by mouth once daily. 90 tablet 3    calcium carbonate-vitamin D3 (CALTRATE 600 + D) 600 mg (1,500 mg)-800 unit Chew Take 2 tablets by mouth once daily. 180 tablet 3    fish oil-omega-3 fatty acids 300-1,000 mg capsule Take 2 g by mouth once daily.        hydrochlorothiazide (HYDRODIURIL) 25 MG tablet Take 12.5 mg by mouth as needed.         multivitamin capsule Take 1 capsule by mouth once daily.        predniSONE (DELTASONE) 10 MG tablet 3 tabs  daily for 2 days, 2 tabs daily for 2 days then 1 tab daily for 2 days. 12 tablet 0    ranitidine (ZANTAC) 75 MG tablet Take 75 mg by mouth nightly.        alprazolam (XANAX) 0.25 MG tablet Take 1 tablet (0.25 mg total) by mouth 3 (three) times daily as needed for Anxiety. 60 tablet 0    B-complex with vitamin C (Z-BEC OR EQUIV) tablet Take 1 tablet by mouth once daily.        promethazine-codeine 6.25-10 mg/5 ml (PHENERGAN WITH CODEINE) 6.25-10 mg/5 mL syrup Take 5 mLs by mouth every 12 (twelve) hours as needed for Cough. 120 mL 0      No current facility-administered medications for this visit.              PHYSICAL EXAM    Alert, coop 67 y.o. female patient in no acute distress with frequent coughig.         Vitals:     01/25/18 1120   BP: (!) 148/88   Pulse: 83   Temp: 97.8 °F (36.6 °C)      VS reviewed.  VS stable.  CC, nursing note, medications & PMH all reviewed today.     Head:  Normocephalic, atraumatic.     EENT:  EACs clear.  TMs visible landmarks, no erythema, yes LR, no effusions (suppurative; nonsupurative), no TM perforation.                              Eye lids normal, no discharge present.                 Sinus tenderness to palp--none.               Nares--no edema, no d/c present.               Pharynx not injected.                Tonsils present, not erythematous , not enlarged, no exudate present.               No anterior, no posterior cervical lymph nodes palpable.               No submental, submandibular or supraclavicular lymph nodes palp.             Resp:  Respirations even, unlabored. Coughs often during the exam.              Lungs clear bilat.  No wheezing.  No crackles.  Auscultated air to bases bilat.     Heart:  RRR, no MRG.        MS:  Ambulates easily, without assistance.              NEURO:  Alert and oriented x 4.  Responds appropriately during interaction.     Skin:  Warm, dry, color good.     Acute bacterial sinusitis  -     amoxicillin-clavulanate 875-125mg (AUGMENTIN)  875-125 mg per tablet; Take 1 tablet by mouth 2 (two) times daily.  Dispense: 20 tablet; Refill: 0     Cough  Comments:  with post nasal drip  Orders:  -     benzonatate (TESSALON) 200 MG capsule; Take 1 capsule (200 mg total) by mouth 3 (three) times daily as needed.  Dispense: 30 capsule; Refill: 0  -     azelastine (ASTELIN) 137 mcg (0.1 %) nasal spray; 1 spray (137 mcg total) by Nasal route 2 (two) times daily.  Dispense: 30 mL; Refill: 0        Begin Augmentin twice a day for 10 days  Continue inhaler for cough, wheeze  Begin Astelin for congestion twice a day  Begin benzonatate 1 capsule as needed for cough  Continue with increasing fluids  Call office with any questions or concerns.    Explained exam findings, diagnosis and treatment plan to patient.  Questions answered and patient states understanding.

## 2018-01-25 NOTE — MEDICAL/APP STUDENT
Zayra Rodgers is a 67 y.o. female patient of Serg López Jr, MD who presents to the clinic today for   Chief Complaint   Patient presents with    Cough     follow up   .    HPI    Pt, who is not known to me, reports continuous coughing, now green- yellow drainage when blows nose, feeling worn out, tightness in chest from cough, ear pressure at times.    These symptoms began monday evening started feeling bad again and status is active. Has not been able to tolerate promethazine/ codeine as prescribed.    Symptoms are acute.    Pt denies the following symptoms:  SOB, fever    Aggravating factors include laying down, talking, taking a deep breath .    Relieving factors include Inhaler .    OTC Medications tried are Zyrtec which was working at a point in time, now not feeling relief as she once was.    Prescription medications taken for symptoms are Inhaler.    Pertinent medical history:Finish steroids today  Smoking status:  never    ROS    Constitutional:   no  fever, some fatigue (broken sleep), no change in appetite.    Head:   pressure headache  Ears:   slight pain.  Eyes:  No sxs  Nose:   some sinus pressure, yes congestion, yes runny nose greater amount than last week, no post nasal drip.  Throat:  some ST pain from coughing, no exudate, no difficulty swallowing.    Heart:  No palpitations, chest pain.    Lungs:  no difficulty breathing, yes coughing, yes minimal sputum production- now green yellow, wheezing when lies down at night.      GI/:  No sxs    MS:  No new bone, joint or muscle problems.    Skin:  No rashes, itching.      PAST MEDICAL HISTORY:  Past Medical History:   Diagnosis Date    Allergy     Breast cancer     Bronchitis     HLD (hyperlipidemia)     Hypertension     Skin cancer     resovled       PAST SURGICAL HISTORY:  Past Surgical History:   Procedure Laterality Date    ADENOIDECTOMY      BREAST BIOPSY      BREAST CYST EXCISION      BREAST LUMPECTOMY      CYSTOCELE REPAIR       EXCISIONAL HEMORRHOIDECTOMY      HYSTERECTOMY      ORBITAL RECONSTRUCTION      RHINOPLASTY TIP      SIGMOIDOSCOPY         SOCIAL HISTORY:  Social History     Social History    Marital status:      Spouse name: N/A    Number of children: N/A    Years of education: N/A     Occupational History    Not on file.     Social History Main Topics    Smoking status: Never Smoker    Smokeless tobacco: Never Used    Alcohol use 1.2 oz/week     2 Cans of beer per week      Comment: per  week    Drug use: No    Sexual activity: Yes     Partners: Male     Other Topics Concern    Not on file     Social History Narrative    No narrative on file       FAMILY HISTORY:  Family History   Problem Relation Age of Onset    Breast cancer Mother 42    COPD Father     Colon cancer Sister     Breast cancer Sister 40    Breast cancer Sister        ALLERGIES AND MEDICATIONS: updated and reviewed.  Review of patient's allergies indicates:   Allergen Reactions    Sulfa (sulfonamide antibiotics)      Current Outpatient Prescriptions   Medication Sig Dispense Refill    albuterol 90 mcg/actuation inhaler Inhale 2 puffs into the lungs every 6 (six) hours as needed for Wheezing. Rescue 18 g 0    anastrozole (ARIMIDEX) 1 mg Tab Take 1 tablet (1 mg total) by mouth once daily. 90 tablet 3    calcium carbonate-vitamin D3 (CALTRATE 600 + D) 600 mg (1,500 mg)-800 unit Chew Take 2 tablets by mouth once daily. 180 tablet 3    fish oil-omega-3 fatty acids 300-1,000 mg capsule Take 2 g by mouth once daily.      hydrochlorothiazide (HYDRODIURIL) 25 MG tablet Take 12.5 mg by mouth as needed.       multivitamin capsule Take 1 capsule by mouth once daily.      predniSONE (DELTASONE) 10 MG tablet 3 tabs daily for 2 days, 2 tabs daily for 2 days then 1 tab daily for 2 days. 12 tablet 0    ranitidine (ZANTAC) 75 MG tablet Take 75 mg by mouth nightly.      alprazolam (XANAX) 0.25 MG tablet Take 1 tablet (0.25 mg total) by mouth 3  (three) times daily as needed for Anxiety. 60 tablet 0    B-complex with vitamin C (Z-BEC OR EQUIV) tablet Take 1 tablet by mouth once daily.      promethazine-codeine 6.25-10 mg/5 ml (PHENERGAN WITH CODEINE) 6.25-10 mg/5 mL syrup Take 5 mLs by mouth every 12 (twelve) hours as needed for Cough. 120 mL 0     No current facility-administered medications for this visit.              PHYSICAL EXAM  2  Alert, coop 67 y.o. female patient in no acute distress with frequent coughig.    Vitals:    01/25/18 1120   BP: (!) 148/88   Pulse: 83   Temp: 97.8 °F (36.6 °C)     VS reviewed.  VS stable.  CC, nursing note, medications & PMH all reviewed today.    Head:  Normocephalic, atraumatic.    EENT:  EACs clear.  TMs visible landmarks, no erythema, yes LR, no effusions, no TM perforation.                              Eye lids normal, no discharge present.       Sinus tenderness to palp--no.               Nares--no edema, no d/c present.    Pharynx not injected.                Tonsils present, not erythematous , not enlarged, no exudate present.    no anterior, no posterior cervical lymph nodes palpable.    no submental, submandibular or supraclavicular lymph nodes palp.             Resp:  Respirations even, unlabored   Lungs clear bilat.  no wheezing.  no crackles.  auscultated air to bases bilat.    Heart:  RRR, no MRG.      MS:  Ambulates easily, without assistance.             NEURO:  Alert and oriented x 4.  Responds appropriately during interaction.    Skin:  Warm, dry, color good.    Acute bacterial sinusitis  -     amoxicillin-clavulanate 875-125mg (AUGMENTIN) 875-125 mg per tablet; Take 1 tablet by mouth 2 (two) times daily.  Dispense: 20 tablet; Refill: 0    Cough  Comments:  with post nasal drip  Orders:  -     benzonatate (TESSALON) 200 MG capsule; Take 1 capsule (200 mg total) by mouth 3 (three) times daily as needed.  Dispense: 30 capsule; Refill: 0  -     azelastine (ASTELIN) 137 mcg (0.1 %) nasal spray; 1 spray  (137 mcg total) by Nasal route 2 (two) times daily.  Dispense: 30 mL; Refill: 0      Begin Augmentin twice a day for 10 days  Continue inhaler for cough, wheeze  Begin Astelin for congestion twice a day  Begin benzonatate 1 capsule as needed for cough  Continue with increasing fluids  Call office with any questions or concerns.

## 2018-02-08 ENCOUNTER — OFFICE VISIT (OUTPATIENT)
Dept: FAMILY MEDICINE | Facility: CLINIC | Age: 68
End: 2018-02-08
Payer: COMMERCIAL

## 2018-02-08 VITALS
RESPIRATION RATE: 18 BRPM | HEART RATE: 92 BPM | TEMPERATURE: 99 F | BODY MASS INDEX: 32.92 KG/M2 | DIASTOLIC BLOOD PRESSURE: 92 MMHG | OXYGEN SATURATION: 98 % | WEIGHT: 178.88 LBS | HEIGHT: 62 IN | SYSTOLIC BLOOD PRESSURE: 136 MMHG

## 2018-02-08 DIAGNOSIS — I10 HYPERTENSION, UNSPECIFIED TYPE: ICD-10-CM

## 2018-02-08 DIAGNOSIS — J40 BRONCHITIS: ICD-10-CM

## 2018-02-08 DIAGNOSIS — Z00.00 ROUTINE GENERAL MEDICAL EXAMINATION AT A HEALTH CARE FACILITY: Primary | ICD-10-CM

## 2018-02-08 DIAGNOSIS — Z12.11 SPECIAL SCREENING FOR MALIGNANT NEOPLASMS, COLON: ICD-10-CM

## 2018-02-08 PROCEDURE — 3008F BODY MASS INDEX DOCD: CPT | Mod: S$GLB,,, | Performed by: FAMILY MEDICINE

## 2018-02-08 PROCEDURE — 99214 OFFICE O/P EST MOD 30 MIN: CPT | Mod: PN | Performed by: FAMILY MEDICINE

## 2018-02-08 PROCEDURE — 99215 OFFICE O/P EST HI 40 MIN: CPT | Mod: 25,S$GLB,, | Performed by: FAMILY MEDICINE

## 2018-02-08 PROCEDURE — 99999 PR PBB SHADOW E&M-EST. PATIENT-LVL IV: CPT | Mod: PBBFAC,,, | Performed by: FAMILY MEDICINE

## 2018-02-08 PROCEDURE — 94640 AIRWAY INHALATION TREATMENT: CPT | Mod: S$GLB,,, | Performed by: FAMILY MEDICINE

## 2018-02-08 RX ORDER — GUAIFENESIN 1200 MG/1
TABLET, EXTENDED RELEASE ORAL DAILY PRN
COMMUNITY
End: 2022-04-18 | Stop reason: CLARIF

## 2018-02-08 RX ORDER — HYDROCODONE POLISTIREX AND CHLORPHENIRAMINE POLISTIREX 10; 8 MG/5ML; MG/5ML
5 SUSPENSION, EXTENDED RELEASE ORAL EVERY 12 HOURS PRN
Qty: 115 ML | Refills: 0 | Status: SHIPPED | OUTPATIENT
Start: 2018-02-08 | End: 2018-09-27

## 2018-02-08 RX ORDER — CETIRIZINE HYDROCHLORIDE 10 MG/1
10 TABLET ORAL DAILY PRN
COMMUNITY

## 2018-02-08 RX ORDER — HYDROCHLOROTHIAZIDE 25 MG/1
25 TABLET ORAL DAILY PRN
Qty: 90 TABLET | Refills: 1 | Status: SHIPPED | OUTPATIENT
Start: 2018-02-08 | End: 2018-08-08 | Stop reason: SDUPTHER

## 2018-02-08 RX ORDER — DOXYCYCLINE 100 MG/1
100 CAPSULE ORAL EVERY 12 HOURS
Qty: 20 CAPSULE | Refills: 0 | Status: SHIPPED | OUTPATIENT
Start: 2018-02-08 | End: 2018-02-19

## 2018-02-08 RX ORDER — FLUTICASONE PROPIONATE 50 MCG
1 SPRAY, SUSPENSION (ML) NASAL DAILY PRN
COMMUNITY
End: 2021-07-09 | Stop reason: SDUPTHER

## 2018-02-08 RX ORDER — ALBUTEROL SULFATE 0.83 MG/ML
2.5 SOLUTION RESPIRATORY (INHALATION)
Status: COMPLETED | OUTPATIENT
Start: 2018-02-08 | End: 2018-02-08

## 2018-02-08 RX ORDER — FLUTICASONE PROPIONATE 110 UG/1
1 AEROSOL, METERED RESPIRATORY (INHALATION) 2 TIMES DAILY
Qty: 12 G | Refills: 1 | Status: SHIPPED | OUTPATIENT
Start: 2018-02-08 | End: 2018-11-08 | Stop reason: SDUPTHER

## 2018-02-08 RX ADMIN — ALBUTEROL SULFATE 2.5 MG: 0.83 SOLUTION RESPIRATORY (INHALATION) at 02:02

## 2018-02-08 NOTE — PROGRESS NOTES
Subjective:       Patient ID: Zayra Rodgers is a 67 y.o. female.    Chief Complaint: Bronchitis (x 4 weeks) and Establish Care    HPI   Patient in the office to establish care.  PMH sig for malignant breast ca, chemo then completed radtx 1/2017 (stage 3). mmg up to date. Currently f/u with onc/[facility] q6mos. On arimidex and prolia.   HTN, HLD. BP variable, currently on hctz 12.5. Previously has been able to adjust dose as needed.   BRCA neg.  The 10-year ASCVD risk score (Riley DC Jr., et al., 2013) is: 10.1%. Discussed need to update lab, add low-dose asa.    Current bronchial sx for several weeks. Recently completed augmentin, steroids without relief. Added flonase, albuterol.     Review of Systems   Constitutional: Positive for activity change. Negative for fatigue and unexpected weight change.   HENT: Positive for congestion, postnasal drip, rhinorrhea, sinus pressure and sore throat. Negative for ear pain, hearing loss, sinus pain, sneezing and trouble swallowing.    Eyes: Negative for discharge and visual disturbance.   Respiratory: Positive for cough (productive with light-green/yellow), chest tightness, shortness of breath and wheezing.    Cardiovascular: Negative for chest pain, palpitations and leg swelling.   Gastrointestinal: Negative for blood in stool, constipation, diarrhea and vomiting.        Occ gerd, using zantac.   Endocrine: Negative for polydipsia and polyuria.   Genitourinary: Negative for difficulty urinating, dysuria, hematuria and menstrual problem.   Musculoskeletal: Negative for arthralgias, joint swelling and neck pain.        Hx of lower R buttock discomfort at arimidex onset, resolved.   Skin: Negative for color change and rash.   Neurological: Negative for dizziness, weakness, light-headedness and headaches.   Psychiatric/Behavioral: Negative for confusion, dysphoric mood and sleep disturbance. The patient is not nervous/anxious.        Objective:      Physical Exam    Constitutional: She is oriented to person, place, and time. She appears well-developed and well-nourished. No distress.   HENT:   Head: Normocephalic and atraumatic.   Right Ear: External ear normal. Tympanic membrane is not erythematous. A middle ear effusion is present.   Left Ear: External ear normal. Tympanic membrane is not erythematous. A middle ear effusion is present.   Nose: Nose normal.   Mouth/Throat: Oropharynx is clear and moist. No oropharyngeal exudate.   Eyes: Conjunctivae and EOM are normal. Pupils are equal, round, and reactive to light. Right eye exhibits no discharge. Left eye exhibits no discharge.   Neck: Normal range of motion. Neck supple. No thyromegaly present.   Cardiovascular: Normal rate and regular rhythm.    Pulmonary/Chest: Effort normal. No respiratory distress. She has decreased breath sounds. She has no wheezes. She has no rhonchi. She has no rales. She exhibits no tenderness.   Bronchial breath sounds   Abdominal: Soft. Bowel sounds are normal. She exhibits no distension and no mass. There is no tenderness. There is no rebound and no guarding.   Musculoskeletal: Normal range of motion. She exhibits no edema.   Lymphadenopathy:     She has no cervical adenopathy.   Neurological: She is alert and oriented to person, place, and time.   Skin: Skin is warm and dry.   Psychiatric: She has a normal mood and affect. Her behavior is normal.   Nursing note and vitals reviewed.         Improvement in ease of respiration, decrease in cough, noted post-neb.     Routine general medical examination at a health care facility  -     Ambulatory referral to Obstetrics / Gynecology  -     Basic metabolic panel; Future; Expected date: 02/08/2018  -     Hemoglobin A1c; Future; Expected date: 02/08/2018  -     Hepatitis C antibody; Future; Expected date: 02/08/2018  -     Lipid panel; Future; Expected date: 02/08/2018  -     TSH; Future; Expected date: 02/08/2018  -     Vitamin D; Future; Expected  date: 02/08/2018  Anticipatory guidance reviewed. Update routine.  Special screening for malignant neoplasms, colon  -     Case request GI: COLONOSCOPY  Hypertension, unspecified type  -     hydroCHLOROthiazide (HYDRODIURIL) 25 MG tablet; Take 1 tablet (25 mg total) by mouth daily as needed.  Dispense: 90 tablet; Refill: 1  -     IN OFFICE EKG 12-LEAD (to Muse)  Dose incr to 25mg daily. Recommend home BP checks to goal <140/<90. Brief discussion encouraging lifestyle modifications to improve.  Bronchitis  -     POCT Influenza A/B  -     POCT Rapid Strep A  Side effects and precautions of medication use reviewed with patient, expressed understanding. No questions or concerns. Expected course of illness and sx tx incl otc med use reviewed. Notify MD if sx persist or worsen.  Add probiotic, mucinex, flonase.  -     doxycycline (VIBRAMYCIN) 100 MG Cap; Take 1 capsule (100 mg total) by mouth every 12 (twelve) hours.  Dispense: 20 capsule; Refill: 0  -     albuterol nebulizer solution 2.5 mg; Take 3 mLs (2.5 mg total) by nebulization one time.  -     fluticasone (FLOVENT HFA) 110 mcg/actuation inhaler; Inhale 1 puff into the lungs 2 (two) times daily. Controller  Dispense: 12 g; Refill: 1  -     hydrocodone-chlorpheniramine (TUSSIONEX) 10-8 mg/5 mL suspension; Take 5 mLs by mouth every 12 (twelve) hours as needed.  Dispense: 115 mL; Refill: 0 do not mix with etoh or other sedating medications.    Time spent in room on counseling, coordination of care 45mins, >50% counseling.

## 2018-02-09 ENCOUNTER — TELEPHONE (OUTPATIENT)
Dept: FAMILY MEDICINE | Facility: CLINIC | Age: 68
End: 2018-02-09

## 2018-02-09 DIAGNOSIS — C50.012 MALIGNANT NEOPLASM OF NIPPLE OF LEFT BREAST IN FEMALE: ICD-10-CM

## 2018-02-09 DIAGNOSIS — M89.9 BONE/CARTILAGE DISORDER: ICD-10-CM

## 2018-02-09 DIAGNOSIS — M94.9 BONE/CARTILAGE DISORDER: ICD-10-CM

## 2018-02-09 RX ORDER — CALCIUM CARBONATE/VITAMIN D3 600 MG-20
2 TABLET ORAL DAILY
Qty: 200 TABLET | Refills: 0 | Status: SHIPPED | OUTPATIENT
Start: 2018-02-09 | End: 2018-05-15 | Stop reason: SDUPTHER

## 2018-02-09 NOTE — TELEPHONE ENCOUNTER
----- Message from Nell Mcdonough MD sent at 2/8/2018  4:39 PM CST -----  Please let pt know to rinse mouth after using flovent.

## 2018-02-09 NOTE — TELEPHONE ENCOUNTER
----- Message from Chandra Davis sent at 2/9/2018 11:45 AM CST -----  Contact: patient  Patient returning call. Requesting to speak with christina call back at [phone]. Thanks,

## 2018-02-09 NOTE — TELEPHONE ENCOUNTER
Spoke to pt and advised of recommendations to rinse mouth after flovent. Pt verbalized understanding.

## 2018-02-15 DIAGNOSIS — R05.8 POST-VIRAL COUGH SYNDROME: ICD-10-CM

## 2018-02-15 NOTE — TELEPHONE ENCOUNTER
Please ask Dr. Mcdonough to consider this request for Albuterol RF.  She has no documented h/o asthma and has used an albuterol inhaler in 1 month. Thank you!

## 2018-02-16 ENCOUNTER — TELEPHONE (OUTPATIENT)
Dept: FAMILY MEDICINE | Facility: CLINIC | Age: 68
End: 2018-02-16

## 2018-02-16 RX ORDER — ALBUTEROL SULFATE 90 UG/1
2 AEROSOL, METERED RESPIRATORY (INHALATION) EVERY 6 HOURS PRN
Qty: 18 G | Refills: 0 | Status: SHIPPED | OUTPATIENT
Start: 2018-02-16 | End: 2018-11-08 | Stop reason: SDUPTHER

## 2018-02-16 NOTE — TELEPHONE ENCOUNTER
----- Message from Tracey Ferguson sent at 2/16/2018 12:37 PM CST -----  Contact: self  Patient needs to spek to the nurse   Patient states she has specific  instructions to see Dr Mcdonough in 2 weeks if she was not feeling better   No appts till March   Please call back [phone]

## 2018-02-16 NOTE — TELEPHONE ENCOUNTER
Pt continues with cough and fatigue. Denies fever and SOB/chest pains.  Would like to come back in to see Dr Mcdonough.  Appt scheduled 02/19/2018 at 9am.

## 2018-02-17 DIAGNOSIS — M89.9 BONE/CARTILAGE DISORDER: ICD-10-CM

## 2018-02-17 DIAGNOSIS — M94.9 BONE/CARTILAGE DISORDER: ICD-10-CM

## 2018-02-17 DIAGNOSIS — C50.012 MALIGNANT NEOPLASM OF NIPPLE OF LEFT BREAST IN FEMALE: ICD-10-CM

## 2018-02-17 RX ORDER — ANASTROZOLE 1 MG/1
TABLET ORAL
Qty: 90 TABLET | Refills: 0 | Status: SHIPPED | OUTPATIENT
Start: 2018-02-17 | End: 2018-05-29 | Stop reason: SDUPTHER

## 2018-02-19 ENCOUNTER — OFFICE VISIT (OUTPATIENT)
Dept: FAMILY MEDICINE | Facility: CLINIC | Age: 68
End: 2018-02-19
Payer: COMMERCIAL

## 2018-02-19 VITALS
DIASTOLIC BLOOD PRESSURE: 86 MMHG | HEIGHT: 62 IN | RESPIRATION RATE: 16 BRPM | BODY MASS INDEX: 32.57 KG/M2 | TEMPERATURE: 99 F | OXYGEN SATURATION: 97 % | HEART RATE: 79 BPM | WEIGHT: 177 LBS | SYSTOLIC BLOOD PRESSURE: 128 MMHG

## 2018-02-19 DIAGNOSIS — J40 BRONCHITIS: Primary | ICD-10-CM

## 2018-02-19 DIAGNOSIS — I10 HYPERTENSION, UNSPECIFIED TYPE: ICD-10-CM

## 2018-02-19 PROCEDURE — 99213 OFFICE O/P EST LOW 20 MIN: CPT | Mod: S$GLB,,, | Performed by: FAMILY MEDICINE

## 2018-02-19 PROCEDURE — 3008F BODY MASS INDEX DOCD: CPT | Mod: S$GLB,,, | Performed by: FAMILY MEDICINE

## 2018-02-19 PROCEDURE — 99999 PR PBB SHADOW E&M-EST. PATIENT-LVL III: CPT | Mod: PBBFAC,,, | Performed by: FAMILY MEDICINE

## 2018-02-19 PROCEDURE — 1126F AMNT PAIN NOTED NONE PRSNT: CPT | Mod: S$GLB,,, | Performed by: FAMILY MEDICINE

## 2018-02-19 PROCEDURE — 1159F MED LIST DOCD IN RCRD: CPT | Mod: S$GLB,,, | Performed by: FAMILY MEDICINE

## 2018-02-19 NOTE — PROGRESS NOTES
Subjective:       Patient ID: Zayra Rodgers is a 68 y.o. female.    Chief Complaint: Cough (continues with cough, productive clear mucous); Fatigue (pt continues with fatigue); and Follow-up (believes her tongue is irritated from inhaler )    HPI   Patient in the office for cough f/u.  Completed doxy rx over the weekend. Still having some fatigue, wiped out after activity.   No night sweats. Current cough is slightly productive, clear mucous. Still taking mucinex.   +sinus production, post-nasal drip.   Using the flovent, but noticing some gum irritation despite rinsing.     Review of Systems   Constitutional: Negative for activity change and unexpected weight change.   HENT: Positive for rhinorrhea. Negative for hearing loss and trouble swallowing.    Eyes: Negative for discharge and visual disturbance.   Respiratory: Positive for cough and wheezing. Negative for chest tightness and shortness of breath.    Cardiovascular: Negative for chest pain and palpitations.   Gastrointestinal: Negative for blood in stool, constipation, diarrhea and vomiting.   Endocrine: Negative for polydipsia and polyuria.   Genitourinary: Negative for difficulty urinating, dysuria, hematuria and menstrual problem.   Musculoskeletal: Negative for arthralgias, joint swelling and neck pain.   Neurological: Negative for weakness and headaches.   Psychiatric/Behavioral: Negative for confusion and dysphoric mood.       Objective:      Physical Exam   Constitutional: She is oriented to person, place, and time. She appears well-developed and well-nourished. No distress.   HENT:   Head: Normocephalic and atraumatic.   Mouth/Throat: Oropharynx is clear and moist. No oropharyngeal exudate.   Eyes: Conjunctivae are normal. Right eye exhibits no discharge. Left eye exhibits no discharge. No scleral icterus.   Neck: Normal range of motion. Neck supple.   Cardiovascular: Normal rate and regular rhythm.    Pulmonary/Chest: Effort normal and breath  sounds normal. No respiratory distress. She has no wheezes. She has no rales.   Clear on exam.   Abdominal: Soft. She exhibits no distension.   Musculoskeletal: Normal range of motion. She exhibits no edema.   Neurological: She is alert and oriented to person, place, and time. No cranial nerve deficit.   Skin: Skin is warm and dry. No rash noted.   Psychiatric: She has a normal mood and affect. Her behavior is normal.   Nursing note and vitals reviewed.        Bronchitis  Improving. Cont zyrtec, flonase, flovent (once daily), albuterol prn.   Hypertension, unspecified type  Controlled, cont regimen.

## 2018-03-01 ENCOUNTER — OFFICE VISIT (OUTPATIENT)
Dept: OBSTETRICS AND GYNECOLOGY | Facility: CLINIC | Age: 68
End: 2018-03-01
Payer: COMMERCIAL

## 2018-03-01 VITALS
DIASTOLIC BLOOD PRESSURE: 88 MMHG | SYSTOLIC BLOOD PRESSURE: 126 MMHG | BODY MASS INDEX: 32.34 KG/M2 | WEIGHT: 176.81 LBS

## 2018-03-01 DIAGNOSIS — C50.919 MALIGNANT NEOPLASM OF FEMALE BREAST, UNSPECIFIED ESTROGEN RECEPTOR STATUS, UNSPECIFIED LATERALITY, UNSPECIFIED SITE OF BREAST: ICD-10-CM

## 2018-03-01 DIAGNOSIS — Z01.419 ENCOUNTER FOR GYNECOLOGICAL EXAMINATION WITHOUT ABNORMAL FINDING: Primary | ICD-10-CM

## 2018-03-01 DIAGNOSIS — R35.0 URINARY FREQUENCY: ICD-10-CM

## 2018-03-01 DIAGNOSIS — Z90.710 S/P HYSTERECTOMY: ICD-10-CM

## 2018-03-01 PROCEDURE — 99999 PR PBB SHADOW E&M-EST. PATIENT-LVL III: CPT | Mod: PBBFAC,,, | Performed by: OBSTETRICS & GYNECOLOGY

## 2018-03-01 PROCEDURE — 99387 INIT PM E/M NEW PAT 65+ YRS: CPT | Mod: S$GLB,,, | Performed by: OBSTETRICS & GYNECOLOGY

## 2018-03-01 NOTE — PROGRESS NOTES
Chief Complaint   Patient presents with    Well Woman     History of Present Illness: Zayra Rodgers is a 68 y.o. female that presents today 3/1/2018 for well gyn visit.    Past Medical History:   Diagnosis Date    Allergy     Breast cancer     Bronchitis     HLD (hyperlipidemia)     Hypertension     Skin cancer     resovled       Past Surgical History:   Procedure Laterality Date    ADENOIDECTOMY      BREAST BIOPSY      BREAST CYST EXCISION      BREAST LUMPECTOMY      CYSTOCELE REPAIR      EXCISIONAL HEMORRHOIDECTOMY      HYSTERECTOMY      partial, due to prolapse    ORBITAL RECONSTRUCTION      RHINOPLASTY TIP      SIGMOIDOSCOPY         Current Outpatient Prescriptions   Medication Sig Dispense Refill    anastrozole (ARIMIDEX) 1 mg Tab TAKE ONE TABLET BY MOUTH EVERY DAY 90 tablet 0    B-complex with vitamin C (Z-BEC OR EQUIV) tablet Take 1 tablet by mouth once daily.      CALTRATE WITH VITAMIN D3 600 mg(1,500mg) -800 unit Tab TAKE 2 TABLETS BY MOUTH ONCE DAILY 200 tablet 0    fish oil-omega-3 fatty acids 300-1,000 mg capsule Take 2 g by mouth once daily.      hydroCHLOROthiazide (HYDRODIURIL) 25 MG tablet Take 1 tablet (25 mg total) by mouth daily as needed. 90 tablet 1    multivitamin capsule Take 1 capsule by mouth once daily.      ranitidine (ZANTAC) 75 MG tablet Take 75 mg by mouth nightly.      albuterol 90 mcg/actuation inhaler Inhale 2 puffs into the lungs every 6 (six) hours as needed for Wheezing. Rescue 18 g 0    cetirizine (ZYRTEC) 10 MG tablet Take 10 mg by mouth once daily.      fluticasone (FLONASE) 50 mcg/actuation nasal spray 1 spray by Each Nare route once daily.      fluticasone (FLOVENT HFA) 110 mcg/actuation inhaler Inhale 1 puff into the lungs 2 (two) times daily. Controller 12 g 1    guaiFENesin (MUCINEX) 1,200 mg Ta12 Take by mouth.      hydrocodone-chlorpheniramine (TUSSIONEX) 10-8 mg/5 mL suspension Take 5 mLs by mouth every 12 (twelve) hours as needed. 115  mL 0     No current facility-administered medications for this visit.        Review of patient's allergies indicates:   Allergen Reactions    Sulfa (sulfonamide antibiotics)        Family History   Problem Relation Age of Onset    Breast cancer Mother 42    COPD Father     Colon cancer Sister     Breast cancer Sister 40    Breast cancer Sister        Social History     Social History    Marital status:      Spouse name: N/A    Number of children: N/A    Years of education: N/A     Social History Main Topics    Smoking status: Never Smoker    Smokeless tobacco: Never Used    Alcohol use 1.2 oz/week     2 Cans of beer per week      Comment: per  week    Drug use: No    Sexual activity: Yes     Partners: Male     Other Topics Concern    None     Social History Narrative    None       OB History    Para Term  AB Living   1 1       1   SAB TAB Ectopic Multiple Live Births                  # Outcome Date GA Lbr Félix/2nd Weight Sex Delivery Anes PTL Lv   1 Para      Vag-Spont             Review of Symptoms:  GENERAL: Denies weight gain or weight loss. Feeling well overall.   SKIN: Denies rash or lesions.   HEAD: Denies head injury or headache.   NODES: Denies enlarged lymph nodes.   CHEST: Denies chest pain or shortness of breath.   CARDIOVASCULAR: Denies palpitations or left sided chest pain.   ABDOMEN: No abdominal pain, constipation, diarrhea, nausea, vomiting or rectal bleeding.   URINARY: No frequency, dysuria, hematuria, or burning on urination.  HEMATOLOGIC: No easy bruisability or excessive bleeding.   MUSCULOSKELETAL: Denies joint pain or swelling.     /88   Wt 80.2 kg (176 lb 12.9 oz)   Physical Exam:  APPEARANCE: Well nourished, well developed, in no acute distress.  SKIN: Normal skin turgor, no lesions.  NECK: Neck symmetric without masses   RESPIRATORY: Normal respiratory effort with no retractions or use of accessory muscles  CARDIOVASCULAR: Peripheral vascular  system with no swelling no varicosities and palpation of pulses normal  LYMPHATIC: No enlargements of the lymph nodes noted in the neck, axillae, or groin  ABDOMEN: Soft. No tenderness or masses. No hepatosplenomegaly. No hernias.  BREASTS: Symmetrical, no skin changes or visible lesions. No palpable masses, nipple discharge or adenopathy bilaterally.  PELVIC: Normal external female genitalia without lesions. Normal hair distribution. Adequate perineal body, normal urethral meatus. Urethra with no masses.  Bladder nontender. Vagina moist and well rugated without lesions or discharge. No significant cystocele or rectocele.  Adnexa without masses or tenderness. Urethra and bladder normal.   EXTREMITIES: No clubbing cyanosis or edema.    ASSESSMENT/PLAN:  Encounter for gynecological examination without abnormal finding    S/P hysterectomy    Malignant neoplasm of female breast, unspecified estrogen receptor status, unspecified laterality, unspecified site of breast    Urinary frequency      Mammogram 9/17  Colonoscopy with Wenceslao 2018  No pap needed  Pelvic every 24 months.     Patient was counseled today on Pap guidelines, recommendation for yearly exams, mammograms every other year after the age of 40 and annually after the age of 50, Colonoscopy after the age of 50, Dexa Bone Scan and calcium and vitamin D supplementation in menopause and to see her PCP for other health maintenance.   FOLLOW-UP:prn

## 2018-03-01 NOTE — LETTER
March 1, 2018      Nell Mcdonough MD  [address]  [facility] [address]           [facility] at [address], [address]  [facility] [address]  Phone: [phone]  Fax: [phone]          Patient: Zayra Rodgers   MR Number: [id number]   YOB: 1950   Date of Visit: 3/1/2018       Dear Dr. Nell Mcdonough:    Thank you for referring Zayra Rodgers to me for evaluation. Attached you will find relevant portions of my assessment and plan of care.    If you have questions, please do not hesitate to call me. I look forward to following Zayra Rodgers along with you.    Sincerely,    Celia Calderon MD    Enclosure  CC:  No Recipients    If you would like to receive this communication electronically, please contact [email or web address] or [phone] to request more information on [email or web address] access.    For providers and/or their staff who would like to refer a patient to [facility], please contact us through our one-stop-shop provider referral line, [facility], at [phone].    If you feel you have received this communication in error or would no longer like to receive these types of communications, please e-mail [email or web address]

## 2018-04-16 ENCOUNTER — TELEPHONE (OUTPATIENT)
Dept: GASTROENTEROLOGY | Facility: CLINIC | Age: 68
End: 2018-04-16

## 2018-04-16 ENCOUNTER — PATIENT MESSAGE (OUTPATIENT)
Dept: GASTROENTEROLOGY | Facility: CLINIC | Age: 68
End: 2018-04-16

## 2018-04-16 NOTE — TELEPHONE ENCOUNTER
----- Message from Samantha Bryan sent at 4/16/2018  9:45 AM CDT -----  Contact: Pt  Pt Zayra Rodgers calling to reschedule her Colonoscopy appt that was set for 4/26/18. Please call back and advise.    Call back# [phone]  Thanks

## 2018-04-17 NOTE — TELEPHONE ENCOUNTER
Spoke with pt. Informed that she can have colonoscopy after receiving prolia injection per Francisco Johnson NP. Pt verbalized understanding. Informed that Dr. Billy's office would be notified of this as well, pt verbalized understanding.

## 2018-04-17 NOTE — TELEPHONE ENCOUNTER
----- Message from Radha Malhotra sent at 4/16/2018  4:17 PM CDT -----  Contact: self   Patient want to speak with a nurse regarding questions scheduling a colonoscopy during the week of prolia injection please call back at [phone]

## 2018-05-03 ENCOUNTER — LAB VISIT (OUTPATIENT)
Dept: LAB | Facility: HOSPITAL | Age: 68
End: 2018-05-03
Attending: FAMILY MEDICINE
Payer: COMMERCIAL

## 2018-05-03 ENCOUNTER — OFFICE VISIT (OUTPATIENT)
Dept: FAMILY MEDICINE | Facility: CLINIC | Age: 68
End: 2018-05-03
Payer: COMMERCIAL

## 2018-05-03 VITALS
BODY MASS INDEX: 32.54 KG/M2 | TEMPERATURE: 99 F | RESPIRATION RATE: 16 BRPM | DIASTOLIC BLOOD PRESSURE: 80 MMHG | HEIGHT: 62 IN | OXYGEN SATURATION: 97 % | SYSTOLIC BLOOD PRESSURE: 124 MMHG | HEART RATE: 85 BPM | WEIGHT: 176.81 LBS

## 2018-05-03 DIAGNOSIS — Z00.00 ROUTINE GENERAL MEDICAL EXAMINATION AT A HEALTH CARE FACILITY: ICD-10-CM

## 2018-05-03 DIAGNOSIS — J40 BRONCHITIS: Primary | ICD-10-CM

## 2018-05-03 LAB
25(OH)D3+25(OH)D2 SERPL-MCNC: 35 NG/ML
ANION GAP SERPL CALC-SCNC: 9 MMOL/L
BUN SERPL-MCNC: 26 MG/DL
CALCIUM SERPL-MCNC: 9.8 MG/DL
CHLORIDE SERPL-SCNC: 104 MMOL/L
CHOLEST SERPL-MCNC: 257 MG/DL
CHOLEST/HDLC SERPL: 4.6 {RATIO}
CO2 SERPL-SCNC: 27 MMOL/L
CREAT SERPL-MCNC: 0.8 MG/DL
EST. GFR  (AFRICAN AMERICAN): >60 ML/MIN/1.73 M^2
EST. GFR  (NON AFRICAN AMERICAN): >60 ML/MIN/1.73 M^2
ESTIMATED AVG GLUCOSE: 108 MG/DL
GLUCOSE SERPL-MCNC: 98 MG/DL
HBA1C MFR BLD HPLC: 5.4 %
HCV AB SERPL QL IA: NEGATIVE
HDLC SERPL-MCNC: 56 MG/DL
HDLC SERPL: 21.8 %
LDLC SERPL CALC-MCNC: 184 MG/DL
NONHDLC SERPL-MCNC: 201 MG/DL
POTASSIUM SERPL-SCNC: 4 MMOL/L
SODIUM SERPL-SCNC: 140 MMOL/L
TRIGL SERPL-MCNC: 85 MG/DL
TSH SERPL DL<=0.005 MIU/L-ACNC: 2.19 UIU/ML

## 2018-05-03 PROCEDURE — 83036 HEMOGLOBIN GLYCOSYLATED A1C: CPT

## 2018-05-03 PROCEDURE — 80061 LIPID PANEL: CPT

## 2018-05-03 PROCEDURE — 99213 OFFICE O/P EST LOW 20 MIN: CPT | Mod: S$GLB,,, | Performed by: FAMILY MEDICINE

## 2018-05-03 PROCEDURE — 36415 COLL VENOUS BLD VENIPUNCTURE: CPT | Mod: PN

## 2018-05-03 PROCEDURE — 84443 ASSAY THYROID STIM HORMONE: CPT

## 2018-05-03 PROCEDURE — 82306 VITAMIN D 25 HYDROXY: CPT

## 2018-05-03 PROCEDURE — 86803 HEPATITIS C AB TEST: CPT

## 2018-05-03 PROCEDURE — 99999 PR PBB SHADOW E&M-EST. PATIENT-LVL III: CPT | Mod: PBBFAC,,, | Performed by: FAMILY MEDICINE

## 2018-05-03 PROCEDURE — 80048 BASIC METABOLIC PNL TOTAL CA: CPT

## 2018-05-03 NOTE — PROGRESS NOTES
Subjective:       Patient ID: Zayra Rodgers is a 68 y.o. female.    Chief Complaint: Follow-up (bronchitis)    HPI  Patient in the office for f/u bronchitis.   Seen in 2/2018 for bronchitis, tx with doxy, flovent, albuterol.  Reports sx fully resolved. Was able to stop flovent, particularly due to gum irritation.   No cough or chest congestion. Physically active without resp sx. Has not needed AR regimen yet this season as well.     c-scope in June. Gyn up to date.    Review of Systems   Constitutional: Negative for activity change and unexpected weight change.   HENT: Negative for hearing loss, rhinorrhea and trouble swallowing.    Eyes: Negative for discharge and visual disturbance.   Respiratory: Negative for cough, chest tightness, shortness of breath and wheezing.    Cardiovascular: Negative for chest pain and palpitations.   Gastrointestinal: Negative for blood in stool, constipation, diarrhea and vomiting.   Endocrine: Negative for polydipsia and polyuria.   Genitourinary: Negative for difficulty urinating, dysuria, hematuria and menstrual problem.   Musculoskeletal: Negative for arthralgias, joint swelling and neck pain.   Neurological: Negative for weakness and headaches.   Psychiatric/Behavioral: Negative for confusion and dysphoric mood.       Objective:      Physical Exam   Constitutional: She is oriented to person, place, and time. She appears well-developed and well-nourished. No distress.   HENT:   Head: Normocephalic and atraumatic.   Mouth/Throat: Oropharynx is clear and moist.   Eyes: Conjunctivae are normal. Right eye exhibits no discharge. Left eye exhibits no discharge. No scleral icterus.   Neck: Normal range of motion. Neck supple.   Cardiovascular: Normal rate and regular rhythm.    Pulmonary/Chest: Effort normal and breath sounds normal. No respiratory distress.   Abdominal: Soft. She exhibits no distension.   Musculoskeletal: Normal range of motion. She exhibits no edema.    Neurological: She is alert and oriented to person, place, and time.   Skin: Skin is warm and dry. No rash noted.   Psychiatric: She has a normal mood and affect. Her behavior is normal.   Nursing note and vitals reviewed.        Bronchitis  Comments:  resolved, now off all associated medications; asymptomatic with activity

## 2018-05-08 ENCOUNTER — TELEPHONE (OUTPATIENT)
Dept: HEMATOLOGY/ONCOLOGY | Facility: CLINIC | Age: 68
End: 2018-05-08

## 2018-05-08 NOTE — TELEPHONE ENCOUNTER
Rescheduled appt to am as per pt request.  Labs and infusion visit to remain as originally scheduled.  Pt verbalized understanding.

## 2018-05-15 DIAGNOSIS — M89.9 BONE/CARTILAGE DISORDER: ICD-10-CM

## 2018-05-15 DIAGNOSIS — C50.012 MALIGNANT NEOPLASM OF NIPPLE OF LEFT BREAST IN FEMALE: ICD-10-CM

## 2018-05-15 DIAGNOSIS — M94.9 BONE/CARTILAGE DISORDER: ICD-10-CM

## 2018-05-15 RX ORDER — CALCIUM CARBONATE/VITAMIN D3 600 MG-20
2 TABLET ORAL DAILY
Qty: 200 TABLET | Refills: 0 | Status: SHIPPED | OUTPATIENT
Start: 2018-05-15 | End: 2018-11-18 | Stop reason: SDUPTHER

## 2018-05-29 DIAGNOSIS — M94.9 BONE/CARTILAGE DISORDER: ICD-10-CM

## 2018-05-29 DIAGNOSIS — M89.9 BONE/CARTILAGE DISORDER: ICD-10-CM

## 2018-05-29 RX ORDER — ANASTROZOLE 1 MG/1
1 TABLET ORAL DAILY
Qty: 90 TABLET | Refills: 3 | OUTPATIENT
Start: 2018-05-29 | End: 2019-06-13 | Stop reason: SDUPTHER

## 2018-05-30 ENCOUNTER — TELEPHONE (OUTPATIENT)
Dept: HEMATOLOGY/ONCOLOGY | Facility: CLINIC | Age: 68
End: 2018-05-30

## 2018-05-30 NOTE — TELEPHONE ENCOUNTER
Spoke with Kathy from [facility] pharmacy. Pt's medication was called in for her Armidex 1 mg po daily/disp 90 with 3 refills. Kathy verbalized understanding.

## 2018-06-04 ENCOUNTER — LAB VISIT (OUTPATIENT)
Dept: LAB | Facility: HOSPITAL | Age: 68
End: 2018-06-04
Attending: INTERNAL MEDICINE
Payer: COMMERCIAL

## 2018-06-04 DIAGNOSIS — C50.012 MALIGNANT NEOPLASM OF NIPPLE OF LEFT BREAST IN FEMALE, ESTROGEN RECEPTOR POSITIVE: ICD-10-CM

## 2018-06-04 DIAGNOSIS — Z17.0 MALIGNANT NEOPLASM OF NIPPLE OF LEFT BREAST IN FEMALE, ESTROGEN RECEPTOR POSITIVE: ICD-10-CM

## 2018-06-04 DIAGNOSIS — M89.9 BONE/CARTILAGE DISORDER: ICD-10-CM

## 2018-06-04 DIAGNOSIS — M94.9 BONE/CARTILAGE DISORDER: ICD-10-CM

## 2018-06-04 DIAGNOSIS — C77.3 MALIGNANT NEOPLASM METASTATIC TO LYMPH NODE OF AXILLA: ICD-10-CM

## 2018-06-04 LAB
ANION GAP SERPL CALC-SCNC: 8 MMOL/L
BUN SERPL-MCNC: 25 MG/DL
CALCIUM SERPL-MCNC: 9.5 MG/DL
CHLORIDE SERPL-SCNC: 101 MMOL/L
CO2 SERPL-SCNC: 30 MMOL/L
CREAT SERPL-MCNC: 0.61 MG/DL
EST. GFR  (AFRICAN AMERICAN): >60 ML/MIN/1.73 M^2
EST. GFR  (NON AFRICAN AMERICAN): >60 ML/MIN/1.73 M^2
GLUCOSE SERPL-MCNC: 107 MG/DL
POTASSIUM SERPL-SCNC: 4.3 MMOL/L
SODIUM SERPL-SCNC: 139 MMOL/L

## 2018-06-04 PROCEDURE — 36415 COLL VENOUS BLD VENIPUNCTURE: CPT | Mod: PN

## 2018-06-04 PROCEDURE — 80048 BASIC METABOLIC PNL TOTAL CA: CPT

## 2018-06-04 PROCEDURE — 80048 BASIC METABOLIC PNL TOTAL CA: CPT | Mod: PN

## 2018-06-06 ENCOUNTER — TELEPHONE (OUTPATIENT)
Dept: INFUSION THERAPY | Facility: HOSPITAL | Age: 68
End: 2018-06-06

## 2018-06-06 ENCOUNTER — OFFICE VISIT (OUTPATIENT)
Dept: HEMATOLOGY/ONCOLOGY | Facility: CLINIC | Age: 68
End: 2018-06-06
Payer: COMMERCIAL

## 2018-06-06 ENCOUNTER — INFUSION (OUTPATIENT)
Dept: INFUSION THERAPY | Facility: HOSPITAL | Age: 68
End: 2018-06-06
Attending: INTERNAL MEDICINE
Payer: COMMERCIAL

## 2018-06-06 VITALS
TEMPERATURE: 98 F | WEIGHT: 177 LBS | HEIGHT: 62 IN | BODY MASS INDEX: 32.57 KG/M2 | DIASTOLIC BLOOD PRESSURE: 85 MMHG | SYSTOLIC BLOOD PRESSURE: 140 MMHG | HEART RATE: 80 BPM | RESPIRATION RATE: 20 BRPM

## 2018-06-06 VITALS
TEMPERATURE: 98 F | RESPIRATION RATE: 20 BRPM | DIASTOLIC BLOOD PRESSURE: 85 MMHG | HEART RATE: 80 BPM | HEIGHT: 62 IN | WEIGHT: 177 LBS | SYSTOLIC BLOOD PRESSURE: 140 MMHG | BODY MASS INDEX: 32.57 KG/M2

## 2018-06-06 DIAGNOSIS — M89.9 BONE/CARTILAGE DISORDER: Primary | ICD-10-CM

## 2018-06-06 DIAGNOSIS — Z79.811 AROMATASE INHIBITOR USE: ICD-10-CM

## 2018-06-06 DIAGNOSIS — Z17.0 MALIGNANT NEOPLASM OF NIPPLE OF LEFT BREAST IN FEMALE, ESTROGEN RECEPTOR POSITIVE: Primary | ICD-10-CM

## 2018-06-06 DIAGNOSIS — M94.9 BONE/CARTILAGE DISORDER: ICD-10-CM

## 2018-06-06 DIAGNOSIS — M94.9 BONE/CARTILAGE DISORDER: Primary | ICD-10-CM

## 2018-06-06 DIAGNOSIS — C77.3 MALIGNANT NEOPLASM METASTATIC TO LYMPH NODE OF AXILLA: ICD-10-CM

## 2018-06-06 DIAGNOSIS — M89.9 BONE/CARTILAGE DISORDER: ICD-10-CM

## 2018-06-06 DIAGNOSIS — C50.012 MALIGNANT NEOPLASM OF NIPPLE OF LEFT BREAST IN FEMALE, ESTROGEN RECEPTOR POSITIVE: Primary | ICD-10-CM

## 2018-06-06 PROCEDURE — 96372 THER/PROPH/DIAG INJ SC/IM: CPT | Mod: PN

## 2018-06-06 PROCEDURE — 63600175 PHARM REV CODE 636 W HCPCS: Mod: TB,PN | Performed by: INTERNAL MEDICINE

## 2018-06-06 PROCEDURE — 99999 PR PBB SHADOW E&M-EST. PATIENT-LVL III: CPT | Mod: PBBFAC,,, | Performed by: INTERNAL MEDICINE

## 2018-06-06 PROCEDURE — 99214 OFFICE O/P EST MOD 30 MIN: CPT | Mod: S$GLB,,, | Performed by: INTERNAL MEDICINE

## 2018-06-06 RX ADMIN — DENOSUMAB 60 MG: 60 INJECTION SUBCUTANEOUS at 02:06

## 2018-06-06 NOTE — PATIENT INSTRUCTIONS
"  Preventing Falls: Are You At Risk of Falling?     Ask for help to reduce risk of falling in your home.     As you get older, you're not as steady on your feet as you once were. And you may have health problems you didn't have when you were younger. So, it's not surprising that older people are more likely to trip and fall. Falling can be very serious. It can change your overall health and quality of life. That's why it's important to be aware of your own risk of falling.  The dangers of falling  Falls are one of the main causes of injury in people over age 65. An older person who falls may take longer to get better than a younger person. And, after a fall, an older person is more likely to have problems that don't go away. So, preventing falls can help you avoid serious health problems.  Are you at risk of falling?  Answer these questions to rate your level of risk.  · Are you a woman?  · Have you fallen or stumbled in the last year?  · Are you over age 65?  · Are you ever dizzy or lightheaded with standing?  · Do you have a hard time getting in and out of the bathtub or on and off the toilet?  · Do you lean on objects to help you get around? Or do you use a cane or walker?  · Do you have vision or hearing problems? For example, do you need new glasses or hearing aids?  · Do you have 2 or more long-lasting (chronic) medical conditions?  · Do you take 3 or more medicines?  · Have you felt depressed recently?  · Have you had more trouble with your memory in recent months?  · Are there hazards in your home that might cause you to fall, such as loose rugs or poor lighting?  · Do you have a pet that jumps on you or might trip you?  · Have you stopped getting regular exercise?  · Do you have diabetes?   · Do you have a neurologic disease, such as Parkinson or Alzheimer disease?   · Do you drink alcohol?  · Do you wear athletic shoes or slippers, or go barefoot at home?  You can help prevent falls  If you answered "yes" " to any of the above questions, you should take steps to reduce your risk of a fall. Monitoring health conditions and keeping walkways in your home free of clutter are just 2 ways. Changing is sometimes easier said than done. But keep in mind that even small changes can make you less likely to fall.  The fear of falling  It's normal to be scared of falling, especially if you've fallen before. But being afraid can actually make you more likely to fall. This is because:  · Fear might cause you to become less active. Being less active can lead to a loss of strength and balance.  · Fear can lead to isolation from others, depression, or the use of more medicines or alcohol. And all these things make falling even more likely.  To break the cycle, learn more about ways to avoid falling. As you take control, you may find yourself feeling less afraid.   Date Last Reviewed: 6/12/2015  © 4663-5478 [employer]. [address] [place] [address]. All rights reserved. This information is not intended as a substitute for professional medical care. Always follow your healthcare professional's instructions.        Denosumab injection  What is this medicine?  DENOSUMAB (den oh rashel mab) slows bone breakdown. Prolia is used to treat osteoporosis in women after menopause and in men. Xgeva is used to prevent bone fractures and other bone problems caused by cancer bone metastases. Xgeva is also used to treat giant cell tumor of the bone.  How should I use this medicine?  This medicine is for injection under the skin. It is given by a health care professional in a hospital or clinic setting.  If you are getting Prolia, a special MedGuide will be given to you by the pharmacist with each prescription and refill. Be sure to read this information carefully each time.  For Prolia, talk to your pediatrician regarding the use of this medicine in children. Special care may be needed. For Xgeva, talk to your pediatrician  regarding the use of this medicine in children. While this drug may be prescribed for children as young as 13 years for selected conditions, precautions do apply.  What side effects may I notice from receiving this medicine?  Side effects that you should report to your doctor or health care professional as soon as possible:  · allergic reactions like skin rash, itching or hives, swelling of the face, lips, or tongue  · breathing problems  · chest pain  · fast, irregular heartbeat  · feeling faint or lightheaded, falls  · fever, chills, or any other sign of infection  · muscle spasms, tightening, or twitches  · numbness or tingling  · skin blisters or bumps, or is dry, peels, or red  · slow healing or unexplained pain in the mouth or jaw  · unusual bleeding or bruising  Side effects that usually do not require medical attention (Report these to your doctor or health care professional if they continue or are bothersome.):  · muscle pain  · stomach upset, gas  What may interact with this medicine?  Do not take this medicine with any of the following medications:  · other medicines containing denosumab  This medicine may also interact with the following medications:  · medicines that suppress the immune system  · medicines that treat cancer  · steroid medicines like prednisone or cortisone  What if I miss a dose?  It is important not to miss your dose. Call your doctor or health care professional if you are unable to keep an appointment.  Where should I keep my medicine?  This medicine is only given in a clinic, doctor's office, or other health care setting and will not be stored at home.  What should I tell my health care provider before I take this medicine?  They need to know if you have any of these conditions:  · dental disease  · eczema  · infection or history of infections  · kidney disease or on dialysis  · low blood calcium or vitamin D  · malabsorption syndrome  · scheduled to have surgery or tooth  extraction  · taking medicine that contains denosumab  · thyroid or parathyroid disease  · an unusual reaction to denosumab, other medicines, foods, dyes, or preservatives  · pregnant or trying to get pregnant  · breast-feeding  What should I watch for while using this medicine?  Visit your doctor or health care professional for regular checks on your progress. Your doctor or health care professional may order blood tests and other tests to see how you are doing.  Call your doctor or health care professional if you get a cold or other infection while receiving this medicine. Do not treat yourself. This medicine may decrease your body's ability to fight infection.  You should make sure you get enough calcium and vitamin D while you are taking this medicine, unless your doctor tells you not to. Discuss the foods you eat and the vitamins you take with your health care professional.  See your dentist regularly. Brush and floss your teeth as directed. Before you have any dental work done, tell your dentist you are receiving this medicine.  Do not become pregnant while taking this medicine or for 5 months after stopping it. Women should inform their doctor if they wish to become pregnant or think they might be pregnant. There is a potential for serious side effects to an unborn child. Talk to your health care professional or pharmacist for more information.  NOTE:This sheet is a summary. It may not cover all possible information. If you have questions about this medicine, talk to your doctor, pharmacist, or health care provider. Copyright© 2017 [employer]

## 2018-06-06 NOTE — TELEPHONE ENCOUNTER
[6/6/2018 11:29 AM] Magaly Burgos:   sagrario vallejo mrn [phone] Was she suspose to get her prolia today she never came down??  [6/6/2018 11:30 AM] Nani May:   well she was supposed to  do you want to call her or me?  [6/6/2018 11:31 AM] Magaly Burgos:   I will call her I was just making sure he didn't hold it for any reason   [6/6/2018 11:32 AM] Nani May:   ok, let me know  [6/6/2018 11:33 AM] Magaly Burgos:   Just left her a message to call us

## 2018-06-06 NOTE — TELEPHONE ENCOUNTER
Spoke with kylah with Dr Bartlett's office due to patient didn't show for her prolia injection per kylah she was to get injection today I left message on patient's phone to call us. Kylah is aware of above    Yes

## 2018-06-06 NOTE — PROGRESS NOTES
HISTORY OF PRESENT ILLNESS:  A 68-year-old white female well known to me for   locally advanced left breast carcinoma, underwent neoadjuvant therapy consisting   four cycles Adriamycin/Cytoxan followed by 12 weekly dose Taxol.  Following   this, she underwent lumpectomy and post-lumpectomy irradiation.  She had a   residual 0.3 cm focus of metastatic carcinoma in one lymph node.  She is on   adjuvant Arimidex/biannual Prolia for prevention of aromatase inhibitor-induced   bone loss and returns for biannual followup.  No new breast complaints, chest   pain, abdominal pain, bone pain, fevers, chills, postmenopausal bleeding,   unexplained weight loss, etc.  No other pertinent findings on a 14-point review   of systems.    PHYSICAL EXAMINATION:  GENERAL:  Well-developed, obese white female in no acute distress.  VITAL SIGNS:  Weight 177 pounds (increased by 3 pounds).  Documented in EMR and   reviewed.  HEENT:  Normocephalic, atraumatic.  Oral mucosa pink and moist.  Lips without   lesions.  Tongue midline.  Oropharynx clear.  Nonicteric sclerae.   NECK:  Supple, no adenopathy.  No carotid bruits, thyromegaly or thyroid nodule.  HEART:  Regular rate and rhythm without murmur, gallop or rub.                LUNGS:  Clear to auscultation bilaterally.  Normal respiratory effort.       ABDOMEN:  Soft, nontender, nondistended with positive normoactive bowel sounds,   no hepatosplenomegaly.    EXTREMITIES:  No cyanosis, clubbing or edema.  Distal pulses are intact.                                              AXILLAE AND GROIN:  No palpable pathologic lymphadenopathy is appreciated.        SKIN:  Intact/turgor normal.  NEUROLOGIC:  Cranial nerves II-XII grossly intact.  Motor:  Good muscle bulk and   tone.  Strength/sensory 5/5 throughout.  Gait stable.  BREASTS:  The patient's left breast has healed lumpectomy scar.  It is free from   signs of local recurrence.  The patient's right breast is without masses,   tenderness or  nipple discharge.    LABORATORY:  Sodium 139, potassium 4.3, chloride 101, CO2 30, BUN 25, creatinine   0.6, glucose 107, calcium 9.5.  GFR greater than 60.    IMPRESSION:  Locally advanced left breast carcinoma with no evidence of active   disease.    PLAN:  1.  Continue calcium supplementation with vitamin D.  2.  Continue Arimidex 1 mg daily.  3.  Repeat Prolia 60 mg subQ.  4.  Reevaluate in six months from now with interval CBC, CMP, LDH, chest x-ray,   mammography and bone mineral density.      ALYSSIA/MORTEZA  dd: 06/06/2018 09:12:02 (CDT)  td: 06/07/2018 00:46:01 (CDT)  Doc ID   #[id number]  Job ID #[id number]    CC:

## 2018-06-12 ENCOUNTER — TELEPHONE (OUTPATIENT)
Dept: GASTROENTEROLOGY | Facility: CLINIC | Age: 68
End: 2018-06-12

## 2018-06-12 NOTE — TELEPHONE ENCOUNTER
----- Message from Maki Lubin sent at 6/12/2018  1:19 PM CDT -----  Contact: Self  Pt calling to speak to cleo regarding health. [phone].

## 2018-06-18 ENCOUNTER — TELEPHONE (OUTPATIENT)
Dept: GASTROENTEROLOGY | Facility: CLINIC | Age: 68
End: 2018-06-18

## 2018-06-18 ENCOUNTER — PATIENT MESSAGE (OUTPATIENT)
Dept: GASTROENTEROLOGY | Facility: CLINIC | Age: 68
End: 2018-06-18

## 2018-06-18 NOTE — TELEPHONE ENCOUNTER
----- Message from Ting Hagen sent at 6/18/2018  8:09 AM CDT -----  Type: Needs Medical Advice    Who Called:  Patient   Symptoms (please be specific):  n/a  How long has patient had these symptoms:  n/a  Pharmacy name and phone #:  n/a  Best Call Back Number: [phone]  Additional Information: contact patient regarding scheduling a colonoscopy

## 2018-06-18 NOTE — TELEPHONE ENCOUNTER
----- Message from Mindy Anderson sent at 6/18/2018 12:59 PM CDT -----  Contact: Patient  Patient is calling to speak with Sasha regarding her colonoscopy scheduled for this Thursday.  Call Back#[phone]  Thanks

## 2018-06-21 ENCOUNTER — HOSPITAL ENCOUNTER (OUTPATIENT)
Facility: HOSPITAL | Age: 68
Discharge: HOME | End: 2018-06-21
Attending: INTERNAL MEDICINE | Admitting: INTERNAL MEDICINE
Payer: COMMERCIAL

## 2018-06-21 ENCOUNTER — ANESTHESIA (OUTPATIENT)
Dept: ENDOSCOPY | Facility: HOSPITAL | Age: 68
End: 2018-06-21
Payer: COMMERCIAL

## 2018-06-21 ENCOUNTER — SURGERY (OUTPATIENT)
Age: 68
End: 2018-06-21

## 2018-06-21 ENCOUNTER — ANESTHESIA EVENT (OUTPATIENT)
Dept: ENDOSCOPY | Facility: HOSPITAL | Age: 68
End: 2018-06-21
Payer: COMMERCIAL

## 2018-06-21 VITALS
TEMPERATURE: 98 F | RESPIRATION RATE: 16 BRPM | HEART RATE: 72 BPM | DIASTOLIC BLOOD PRESSURE: 75 MMHG | OXYGEN SATURATION: 100 % | WEIGHT: 170 LBS | BODY MASS INDEX: 31.28 KG/M2 | HEIGHT: 62 IN | SYSTOLIC BLOOD PRESSURE: 130 MMHG

## 2018-06-21 DIAGNOSIS — Z12.11 COLON CANCER SCREENING: ICD-10-CM

## 2018-06-21 PROCEDURE — 27201012 HC FORCEPS, HOT/COLD, DISP: Mod: PO | Performed by: INTERNAL MEDICINE

## 2018-06-21 PROCEDURE — 63600175 PHARM REV CODE 636 W HCPCS: Mod: PO | Performed by: NURSE ANESTHETIST, CERTIFIED REGISTERED

## 2018-06-21 PROCEDURE — 37000009 HC ANESTHESIA EA ADD 15 MINS: Mod: PO | Performed by: INTERNAL MEDICINE

## 2018-06-21 PROCEDURE — 45380 COLONOSCOPY AND BIOPSY: CPT | Mod: 33,,, | Performed by: INTERNAL MEDICINE

## 2018-06-21 PROCEDURE — D9220A PRA ANESTHESIA: Mod: 33,ANES,, | Performed by: ANESTHESIOLOGY

## 2018-06-21 PROCEDURE — 45380 COLONOSCOPY AND BIOPSY: CPT | Mod: PO | Performed by: INTERNAL MEDICINE

## 2018-06-21 PROCEDURE — D9220A PRA ANESTHESIA: Mod: 33,CRNA,, | Performed by: NURSE ANESTHETIST, CERTIFIED REGISTERED

## 2018-06-21 PROCEDURE — 88305 TISSUE EXAM BY PATHOLOGIST: CPT | Mod: 26,,, | Performed by: PATHOLOGY

## 2018-06-21 PROCEDURE — 88305 TISSUE EXAM BY PATHOLOGIST: CPT | Performed by: PATHOLOGY

## 2018-06-21 PROCEDURE — 25000003 PHARM REV CODE 250: Mod: PO | Performed by: INTERNAL MEDICINE

## 2018-06-21 PROCEDURE — 37000008 HC ANESTHESIA 1ST 15 MINUTES: Mod: PO | Performed by: INTERNAL MEDICINE

## 2018-06-21 RX ORDER — SODIUM CHLORIDE, SODIUM LACTATE, POTASSIUM CHLORIDE, CALCIUM CHLORIDE 600; 310; 30; 20 MG/100ML; MG/100ML; MG/100ML; MG/100ML
INJECTION, SOLUTION INTRAVENOUS CONTINUOUS
Status: DISCONTINUED | OUTPATIENT
Start: 2018-06-22 | End: 2018-06-21 | Stop reason: HOSPADM

## 2018-06-21 RX ORDER — LIDOCAINE HCL/PF 100 MG/5ML
SYRINGE (ML) INTRAVENOUS
Status: DISCONTINUED | OUTPATIENT
Start: 2018-06-21 | End: 2018-06-21

## 2018-06-21 RX ORDER — PROPOFOL 10 MG/ML
VIAL (ML) INTRAVENOUS
Status: DISCONTINUED | OUTPATIENT
Start: 2018-06-21 | End: 2018-06-21

## 2018-06-21 RX ADMIN — PROPOFOL 100 MG: 10 INJECTION, EMULSION INTRAVENOUS at 12:06

## 2018-06-21 RX ADMIN — PROPOFOL 50 MG: 10 INJECTION, EMULSION INTRAVENOUS at 12:06

## 2018-06-21 RX ADMIN — PROPOFOL 20 MG: 10 INJECTION, EMULSION INTRAVENOUS at 12:06

## 2018-06-21 RX ADMIN — SODIUM CHLORIDE, SODIUM LACTATE, POTASSIUM CHLORIDE, AND CALCIUM CHLORIDE: .6; .31; .03; .02 INJECTION, SOLUTION INTRAVENOUS at 11:06

## 2018-06-21 RX ADMIN — LIDOCAINE HYDROCHLORIDE 100 MG: 20 INJECTION, SOLUTION INTRAVENOUS at 12:06

## 2018-06-21 RX ADMIN — PROPOFOL 30 MG: 10 INJECTION, EMULSION INTRAVENOUS at 12:06

## 2018-06-21 NOTE — DISCHARGE INSTRUCTIONS
Recovery After Procedural Sedation (Adult)  You have been given medicine by vein to make you sleep during your surgery. This may have included both a pain medicine and sleeping medicine. Most of the effects have worn off. But you may still have some drowsiness for the next 6 to 8 hours.  Home care  Follow these guidelines when you get home:  · For the next 8 hours, you should be watched by a responsible adult. This person should make sure your condition is not getting worse.  · Don't drink any alcohol for the next 24 hours.  · Don't drive, operate dangerous machinery, or make important business or personal decisions during the next 24 hours.  Note: Your healthcare provider may tell you not to take any medicine by mouth for pain or sleep in the next 4 hours. These medicines may react with the medicines you were given in the hospital. This could cause a much stronger response than usual.  Follow-up care  Follow up with your healthcare provider if you are not alert and back to your usual level of activity within 12 hours.  When to seek medical advice  Call your healthcare provider right away if any of these occur:  · Drowsiness gets worse  · Weakness or dizziness gets worse  · Repeated vomiting  · You can't be awakened   Date Last Reviewed: 10/18/2016  © 0029-2705 [employer]. [address] [place] [address], [place] [address]. All rights reserved. This information is not intended as a substitute for professional medical care. Always follow your healthcare professional's instructions.      PROBIOTICS:  Now that your colon is so cleaned out, now is a good time for a round of PROBIOTICS.  Eat a container of Greek Yogurt, such as OIKOS or CHOBANI,  Or Activia or Dannon    Greek Yogurt.    Or Take a similar Probiotic product such as Align or Culturelle or Aislinn-Q, every day for a month.                  (The products listed are non-prescription, but you may need to ask the pharmacist for their location.)  Repeat  this at least 3-4 times a year.        High-Fiber Diet  Fiber is in fruits, vegetables, cereals, and grains. Fiber passes through your body undigested. A high-fiber diet helps food move through your intestinal tract. The added bulk is helpful in preventing constipation. In people with diverticulosis, fiber helps clean out the pouches along the colon wall. It also prevents new pouches from forming. A high-fiber diet reduces the risk of colon cancer. It also lowers blood cholesterol and prevents high blood sugar in people with diabetes.    The fiber-rich foods listed below should be part of your diet. If you are not used to high-fiber foods, start with 1 or 2 foods from this list. Every 3 to 4 days add a new one to your diet. Do this until you are eating 4 high-fiber foods per day. This should give you 20 to 35 grams of fiber a day. It is also important to drink a lot of water when you are on this diet. You should have 6 to 8 glasses of water a day. Water makes the fiber swell and increases the benefit.  Foods high in dietary fiber  The following foods are high in dietary fiber:  · Breads. Breads made with 100% whole-wheat flour; wan, wheat, or rye crackers; whole-grain tortillas, bran muffins.  · Cereals. Whole-grain and bran cereals with bran (shredded wheat, wheat flakes, raisin bran, corn bran); oatmeal, rolled oats, granola, and brown rice.  · Fruits. Fresh fruits and their edible skins (pears, prunes, raisins, berries, apples, and apricots); bananas, citrus fruit, mangoes, pineapple; and prune juice.  · Nuts. Any nuts and seeds.  · Vegetables. Best served raw or lightly cooked. All types, especially: green peas, celery, eggplant, potatoes, spinach, broccoli, [place] sprouts, winter squash, carrots, cauliflower, soybeans, lentils, and fresh and dried beans of all kinds.  · Other. Popcorn, any spices.  Date Last Reviewed: 8/1/2016  © 5003-1692 [employer]. [address] [place] [address], [place]  [address]. All rights reserved. This information is not intended as a substitute for professional medical care. Always follow your healthcare professional's instructions.

## 2018-06-21 NOTE — ANESTHESIA PREPROCEDURE EVALUATION
06/21/2018  Zayra Rodgers is a 68 y.o., female.    Anesthesia Evaluation      I have reviewed the Medications.     Review of Systems  Anesthesia Hx:  No problems with previous Anesthesia   Social:  Non-Smoker, No Alcohol Use    Hematology/Oncology:         -- Cancer in past history (2017): Breast chemotherapy, radiation and surgery    Cardiovascular:   Hypertension hyperlipidemia    Pulmonary:  Pulmonary Normal    Renal/:  Renal/ Normal     Hepatic/GI:  Hepatic/GI Normal    Neurological:  Neurology Normal    Endocrine:  Endocrine Normal        Physical Exam  General:  Obesity    Airway/Jaw/Neck:  Airway Findings: Mouth Opening: Normal General Airway Assessment: Adult  Mallampati: II  Jaw/Neck Findings:  Neck ROM: Extension Decreased, Mild       Chest/Lungs:  Chest/Lungs Findings: Clear to auscultation, Normal Respiratory Rate     Heart/Vascular:  Heart Findings: Rate: Normal  Rhythm: Regular Rhythm  Sounds: Normal  Heart murmur: negative Vascular Findings: Normal (No carotid bruits.)       Mental Status:  Mental Status Findings:  Cooperative, Alert and Oriented         Anesthesia Plan  Type of Anesthesia, risks & benefits discussed:  Anesthesia Type:  general  Patient's Preference:   Intra-op Monitoring Plan:   Intra-op Monitoring Plan Comments:   Post Op Pain Control Plan:   Post Op Pain Control Plan Comments:   Induction:   IV  Beta Blocker:  Patient is not currently on a Beta-Blocker (No further documentation required).       Informed Consent: Patient understands risks and agrees with Anesthesia plan.  Questions answered. Anesthesia consent signed with patient.  ASA Score: 3     Day of Surgery Review of History & Physical:        Anesthesia Plan Notes: Propofol general.        Ready For Surgery From Anesthesia Perspective.

## 2018-06-21 NOTE — BRIEF OP NOTE
Discharge Note  Short Stay      SUMMARY     Admit Date: 6/21/2018    Attending Physician: Hiro Billy Jr., MD     Discharge Physician: Hiro Billy Jr., MD    Discharge Date: 6/21/2018 12:48 PM    Final Diagnosis: Special screening for malignant neoplasms, colon [Z12.11]  Impression:          - One 1 mm polyp in the rectum, removed with a cold                        biopsy forceps. Resected and retrieved.                       - One 1 mm polyp in the cecum, removed with a cold                        biopsy forceps. Resected and retrieved.                       - Diverticulosis in the sigmoid colon and in the                        distal descending colon.                       - Non-bleeding internal hemorrhoids.                       - Non-thrombosed external hemorrhoids and perianal                        skin tags found on perianal exam.                       - The examination was otherwise normal.                       - The examined portion of the ileum was normal.  Recommendation:      - Discharge patient to home.                       - Await pathology results.                       - High fiber diet.                       - Use fiber, for example Citrucel, Fibercon, Konsyl                        or Metamucil.                       - Take a PROBIOTIC, such as a carton of GREEK YOGURT                        (Chobani or Oikos, or Activia or Dannon); or tablets                        of ALIGN or CULTURELLE or BRIAN-Q (all                        non-prescription), every day for a month.                       - Preparation H cream: Apply externally as necessary.                       - Repeat colonoscopy in 5 years for screening                        purposes.                       - Call the [facility] clinic in 2 weeks for reports (if                        you haven't heard from us sooner) [phone].                       - Continue present medications.                       - Patient has a contact number  available for                        emergencies. The signs and symptoms of potential                        delayed complications were discussed with the                        patient. Return to normal activities tomorrow.                        Written discharge instructions were provided to the                        patient.                       - Return to normal activities tomorrow.  Hiro Billy MD  6/21/2018  Disposition: HOME OR SELF CARE    Patient Instructions:   Current Discharge Medication List      CONTINUE these medications which have NOT CHANGED    Details   anastrozole (ARIMIDEX) 1 mg Tab Take 1 tablet (1 mg total) by mouth once daily.  Qty: 90 tablet, Refills: 3    Associated Diagnoses: Bone/cartilage disorder      B-complex with vitamin C (Z-BEC OR EQUIV) tablet Take 1 tablet by mouth once daily.      CALTRATE WITH VITAMIN D3 600 mg(1,500mg) -800 unit Tab TAKE 2 TABLETS BY MOUTH ONCE DAILY  Qty: 200 tablet, Refills: 0    Associated Diagnoses: Malignant neoplasm of nipple of left breast in female; Bone/cartilage disorder      hydroCHLOROthiazide (HYDRODIURIL) 25 MG tablet Take 1 tablet (25 mg total) by mouth daily as needed.  Qty: 90 tablet, Refills: 1    Associated Diagnoses: Hypertension, unspecified type      multivitamin capsule Take 1 capsule by mouth once daily.      ranitidine (ZANTAC) 75 MG tablet Take 150 mg by mouth nightly.       sennosides (SENOKOT ORAL) Take by mouth.      albuterol 90 mcg/actuation inhaler Inhale 2 puffs into the lungs every 6 (six) hours as needed for Wheezing. Rescue  Qty: 18 g, Refills: 0    Associated Diagnoses: Post-viral cough syndrome      cetirizine (ZYRTEC) 10 MG tablet Take 10 mg by mouth once daily.      fish oil-omega-3 fatty acids 300-1,000 mg capsule Take 2 g by mouth once daily.      fluticasone (FLONASE) 50 mcg/actuation nasal spray 1 spray by Each Nare route once daily.      fluticasone (FLOVENT HFA) 110 mcg/actuation inhaler Inhale 1 puff  into the lungs 2 (two) times daily. Controller  Qty: 12 g, Refills: 1      guaiFENesin (MUCINEX) 1,200 mg Ta12 Take by mouth.      hydrocodone-chlorpheniramine (TUSSIONEX) 10-8 mg/5 mL suspension Take 5 mLs by mouth every 12 (twelve) hours as needed.  Qty: 115 mL, Refills: 0             Discharge Procedure Orders (must include Diet, Follow-up, Activity)    Follow Up:  Follow up with PCP as per your routine.  Please follow a high fiber diet.  Activity as tolerated.    No driving day of procedure.    PROBIOTICS:  Now that your colon is so cleaned out, now is a good time for a round of PROBIOTICS.  Eat a container of Greek Yogurt, such as OIKOS or CHOBANI,  Or Activia or Dannon    Greek Yogurt.    Or Take a similar Probiotic product such as Align or Culturelle or Aislinn-Q, every day for a month.                  (The products listed are non-prescription, but you may need to ask the pharmacist for their location.)  Repeat this at least 3-4 times a year.

## 2018-06-21 NOTE — [employer] PATIENT INSTRUCTIONS
Discharge Summary/Instructions for after Colonoscopy with   Biopsy/Polypectomy  Zayra Rodgers    Thursday, June 21, 2018  Hiro Billy MD  RESTRICTIONS ON ACTIVITY:  - Do not drive a car or operate machinery until the day after the procedure.      - The following day: return to full activity including work.  - For  3 days: No heavy lifting, straining or running.  - Diet: You can have solid foods, but no gassy foods (i.e. beans, broccoli,   cabbage, etc).  TREATMENT FOR COMMON SIDE EFFECTS:  - Mild abdominal pain and bloating or excessive gas: rest, eat lightly and   use a heating pad.  SYMPTOMS TO WATCH FOR AND REPORT TO YOUR PHYSICIAN:  1. Severe abdominal pain.  2. Fever within 24 hours after a procedure.  3. A large amount of rectal bleeding. (A small amount of blood from the   rectum is not serious, especially if hemorrhoids are present.  3.  Because air was put into your colon during the procedure, expelling   large amounts of air from your rectum is normal.  4.  You may not have a bowel movement for 1-3 days because of the   colonoscopy prep.  This is normal.  5.  Call immediately if you notice any of the following:   Chills and/or fever over 101   Persistent vomiting   Severe abdominal pain, other than gas cramps   Severe chest pain   Black, tarry stools   Any bleeding - exceeding one tablespoon  Your doctor recommends these additional instructions:  We are waiting for your pathology results.   Eat a high fiber diet.   Take a fiber supplement, for example Citrucel, Fibercon, Konsyl or   Metamucil.   Take a PROBIOTIC, such as a carton of GREEK YOGURT (Chobani or Oikos,  or   Activia or Dannon);  or tablets of ALIGN or CULTURELLE or BRIAN-Q (all   non-prescription), every day for a month.   And repeat this 3-4 times a   year.  Use Preparation H cream: Apply externally as necessary.   Your physician has recommended a repeat colonoscopy in five years for   screening purposes.  None  If you have any  questions or problems, please call your physician.  EMERGENCY PHONE NUMBER: [phone]  LAB RESULTS: Call in two (2) weeks for lab results, [phone]  ___________________________________________  Nurse Signature  ___________________________________________  Patient/Designated Responsible Party Signature  Hiro Billy MD  6/21/2018 12:47:04 PM  This report has been verified and signed electronically.  [employer]

## 2018-06-21 NOTE — ANESTHESIA POSTPROCEDURE EVALUATION
"Anesthesia Post Evaluation    Patient: Zayra Rodgers    Procedure(s) Performed: Procedure(s) (LRB):  COLONOSCOPY (N/A)    Final Anesthesia Type: general  Patient location during evaluation: PACU  Patient participation: Yes- Able to Participate  Level of consciousness: awake and alert  Post-procedure vital signs: reviewed and stable  Pain management: adequate  Airway patency: patent  PONV status at discharge: No PONV  Anesthetic complications: no      Cardiovascular status: hemodynamically stable and blood pressure returned to baseline  Respiratory status: unassisted, spontaneous ventilation and room air  Hydration status: euvolemic  Follow-up not needed.        Visit Vitals  /70 (BP Location: Left arm, Patient Position: Lying)   Pulse 75   Temp 36.6 °C (97.8 °F) (Skin)   Resp 14   Ht 5' 2" (1.575 m)   Wt 77.1 kg (170 lb)   SpO2 100%   Breastfeeding? No   BMI 31.09 kg/m²       Pain/Margarita Score: Pain Assessment Performed: Yes (6/21/2018 12:40 PM)  Presence of Pain: denies (6/21/2018 12:40 PM)  Margarita Score: 10 (6/21/2018 12:40 PM)      "

## 2018-06-21 NOTE — PLAN OF CARE
1310: Awake, tolerating po intake. Ambulated to bathroom without problems. Dr. Billy to bedside to discuss with pt.

## 2018-06-21 NOTE — H&P
History & Physical - Short Stay  Gastroenterology      SUBJECTIVE:     Procedure: Colonoscopy    Chief Complaint/Indication for Procedure: Screening    History of Present Illness:  Asymptomatic  Office Visit     2/8/2018  [facility]      Nell Mcdonough MD   Family Medicine   Routine general medical examination at a health care facility +3 more   Dx   Bronchitis , Establish Care; Referred by Aaareferral Self   Reason for Visit    Progress Notes        Subjective:       Patient ID: Zayra Rodgers is a 67 y.o. female.     Chief Complaint: Bronchitis (x 4 weeks) and Establish Care     HPI   Patient in the office to establish care.  PMH sig for malignant breast ca, chemo then completed radtx 1/2017 (stage 3). mmg up to date. Currently f/u with onc/B[facility] q6mos. On arimidex and prolia.   HTN, HLD. BP variable, currently on hctz 12.5. Previously has been able to adjust dose as needed.              BRCA neg.  The 10-year ASCVD risk score (Den[place] PIERRE Jr., et al., 2013) is: 10.1%. Discussed need to update lab, add low-dose asa.     Current bronchial sx for several weeks. Recently completed augmentin, steroids without relief. Added flonase, albuterol.      Improvement in ease of respiration, decrease in cough, noted post-neb.      Routine general medical examination at a health care facility  -     Ambulatory referral to Obstetrics / Gynecology  -     Basic metabolic panel; Future; Expected date: 02/08/2018  -     Hemoglobin A1c; Future; Expected date: 02/08/2018  -     Hepatitis C antibody; Future; Expected date: 02/08/2018  -     Lipid panel; Future; Expected date: 02/08/2018  -     TSH; Future; Expected date: 02/08/2018  -     Vitamin D; Future; Expected date: 02/08/2018  Anticipatory guidance reviewed. Update routine.    Special screening for malignant neoplasms, colon  -     Case request GI: COLONOSCOPY    Hypertension, unspecified type  -     hydroCHLOROthiazide (HYDRODIURIL) 25 MG  tablet; Take 1 tablet (25 mg total) by mouth daily as needed.  Dispense: 90 tablet; Refill: 1  -     IN OFFICE EKG 12-LEAD (to Muse)  Dose incr to 25mg daily. Recommend home BP checks to goal <140/<90. Brief discussion encouraging lifestyle modifications to improve.  Bronchitis  -     POCT Influenza A/B  -     POCT Rapid Strep A  Side effects and precautions of medication use reviewed with patient, expressed understanding. No questions or concerns. Expected course of illness and sx tx incl otc med use reviewed. Notify MD if sx persist or worsen.  Add probiotic, mucinex, flonase.  -     doxycycline (VIBRAMYCIN) 100 MG Cap; Take 1 capsule (100 mg total) by mouth every 12 (twelve) hours.  Dispense: 20 capsule; Refill: 0  -     albuterol nebulizer solution 2.5 mg; Take 3 mLs (2.5 mg total) by nebulization one time.  -     fluticasone (FLOVENT HFA) 110 mcg/actuation inhaler; Inhale 1 puff into the lungs 2 (two) times daily. Controller  Dispense: 12 g; Refill: 1  -     hydrocodone-chlorpheniramine (TUSSIONEX) 10-8 mg/5 mL suspension; Take 5 mLs by mouth every 12 (twelve) hours as needed.  Dispense: 115 mL; Refill: 0 do not mix with etoh or other sedating medications.          PTA Medications   Medication Sig    anastrozole (ARIMIDEX) 1 mg Tab Take 1 tablet (1 mg total) by mouth once daily.    B-complex with vitamin C (Z-BEC OR EQUIV) tablet Take 1 tablet by mouth once daily.    CALTRATE WITH VITAMIN D3 600 mg(1,500mg) -800 unit Tab TAKE 2 TABLETS BY MOUTH ONCE DAILY    hydroCHLOROthiazide (HYDRODIURIL) 25 MG tablet Take 1 tablet (25 mg total) by mouth daily as needed.    multivitamin capsule Take 1 capsule by mouth once daily.    ranitidine (ZANTAC) 75 MG tablet Take 150 mg by mouth nightly.     sennosides (SENOKOT ORAL) Take by mouth.    albuterol 90 mcg/actuation inhaler Inhale 2 puffs into the lungs every 6 (six) hours as needed for Wheezing. Rescue    cetirizine (ZYRTEC) 10 MG tablet Take 10 mg by mouth once  "daily.    fish oil-omega-3 fatty acids 300-1,000 mg capsule Take 2 g by mouth once daily.    fluticasone (FLONASE) 50 mcg/actuation nasal spray 1 spray by Each Nare route once daily.    fluticasone (FLOVENT HFA) 110 mcg/actuation inhaler Inhale 1 puff into the lungs 2 (two) times daily. Controller    guaiFENesin (MUCINEX) 1,200 mg Ta12 Take by mouth.    hydrocodone-chlorpheniramine (TUSSIONEX) 10-8 mg/5 mL suspension Take 5 mLs by mouth every 12 (twelve) hours as needed.       Review of patient's allergies indicates:   Allergen Reactions    Sulfa (sulfonamide antibiotics)         Past Medical History:   Diagnosis Date    Allergy     Breast cancer     Bronchitis     HLD (hyperlipidemia)     Hypertension     S/P chemotherapy, time since greater than 12 weeks     Skin cancer     resovled     Past Surgical History:   Procedure Laterality Date    ADENOIDECTOMY      BREAST BIOPSY      BREAST CYST EXCISION      BREAST LUMPECTOMY      CYSTOCELE REPAIR      EXCISIONAL HEMORRHOIDECTOMY      HYSTERECTOMY      partial, due to prolapse    ORBITAL RECONSTRUCTION      PORTACATH PLACEMENT      since removed    RHINOPLASTY TIP      SIGMOIDOSCOPY       Family History   Problem Relation Age of Onset    Breast cancer Mother 42    COPD Father     Colon cancer Sister     Breast cancer Sister 40    Breast cancer Sister      Social History   Substance Use Topics    Smoking status: Never Smoker    Smokeless tobacco: Never Used    Alcohol use 1.2 oz/week     2 Cans of beer per week      Comment: per  week         OBJECTIVE:     Vital Signs (Most Recent)  Temp: 98.4 °F (36.9 °C) (06/21/18 1130)  Pulse: 75 (06/21/18 1130)  Resp: 18 (06/21/18 1130)  BP: 121/74 (06/21/18 1130)  SpO2: 96 % (06/21/18 1130)    Physical Exam:          :  Ht 5' 2" (1.575 m)   Wt 81.1 kg (178 lb 14.5 oz)   BMI 32.72 kg/m²                          GENERAL:  Comfortable, in no acute distress.                                 HEENT EXAM:  " Nonicteric.  No adenopathy.  Oropharynx is clear.               NECK:  Supple.                                                               LUNGS:  Clear.                                                               CARDIAC:  Regular rate and rhythm.  S1, S2.  No murmur.                      ABDOMEN:  Soft, positive bowel sounds, nontender.  No hepatosplenomegaly or masses.  No rebound or guarding.                                             EXTREMITIES:  No edema.     MENTAL STATUS:  Alert and oriented.    ASSESSMENT/PLAN:     Assessment: Colorectal cancer screening    Plan: Colonoscopy    Anesthesia Plan:   MAC / General Anaesthesia    ASA Grade: ASA 2 - Patient with mild systemic disease with no functional limitations    MALLAMPATI SCORE: II (hard and soft palate, upper portion of tonsils anduvula visible)

## 2018-06-21 NOTE — TRANSFER OF CARE
"Anesthesia Transfer of Care Note    Patient: Zayra Rodgers    Procedure(s) Performed: Procedure(s) (LRB):  COLONOSCOPY (N/A)    Patient location: PACU    Anesthesia Type: general    Transport from OR: Transported from OR on room air with adequate spontaneous ventilation    Post pain: adequate analgesia    Post assessment: no apparent anesthetic complications    Post vital signs: stable    Level of consciousness: awake, alert and oriented    Nausea/Vomiting: no nausea/vomiting    Complications: none    Transfer of care protocol was followed      Last vitals:   Visit Vitals  /74 (BP Location: Right arm, Patient Position: Sitting)   Pulse 75   Temp 36.9 °C (98.4 °F) (Skin)   Resp 18   Ht 5' 2" (1.575 m)   Wt 77.1 kg (170 lb)   SpO2 96%   Breastfeeding? No   BMI 31.09 kg/m²     "

## 2018-06-21 NOTE — PLAN OF CARE
VSS. Questions answered to patient satisfaction. H&P to be completed by MD. Patient ready for procedure.

## 2018-08-08 DIAGNOSIS — I10 HYPERTENSION, UNSPECIFIED TYPE: ICD-10-CM

## 2018-08-09 RX ORDER — HYDROCHLOROTHIAZIDE 25 MG/1
25 TABLET ORAL DAILY PRN
Qty: 90 TABLET | Refills: 1 | Status: SHIPPED | OUTPATIENT
Start: 2018-08-09 | End: 2019-10-07

## 2018-08-20 ENCOUNTER — TELEPHONE (OUTPATIENT)
Dept: HEMATOLOGY/ONCOLOGY | Facility: CLINIC | Age: 68
End: 2018-08-20

## 2018-08-21 ENCOUNTER — PATIENT MESSAGE (OUTPATIENT)
Dept: HEMATOLOGY/ONCOLOGY | Facility: CLINIC | Age: 68
End: 2018-08-21

## 2018-08-22 ENCOUNTER — TELEPHONE (OUTPATIENT)
Dept: HEMATOLOGY/ONCOLOGY | Facility: CLINIC | Age: 68
End: 2018-08-22

## 2018-08-22 NOTE — TELEPHONE ENCOUNTER
Order scheduled at [facility] [[email or web address], dx mammo    ----- Message from Melly Quevedo sent at 8/21/2018  8:37 AM CDT -----  Contact: self  Patient need to speak with nurse regarding an order,patient stating she need to physical talk to nurse per patient       Please call to advice [phone] (home)

## 2018-09-24 ENCOUNTER — TELEPHONE (OUTPATIENT)
Dept: FAMILY MEDICINE | Facility: CLINIC | Age: 68
End: 2018-09-24

## 2018-09-24 DIAGNOSIS — R32 URINARY INCONTINENCE, UNSPECIFIED TYPE: Primary | ICD-10-CM

## 2018-09-24 NOTE — TELEPHONE ENCOUNTER
----- Message from Alan Haile sent at 9/24/2018 11:53 AM CDT -----  Contact: patient  Type:  Sooner Apoointment Request    Caller is requesting a sooner appointment.  Caller declined first available appointment listed below.  Caller will not accept being placed on the waitlist and is requesting a message be sent to doctor.    Name of Caller:  Zayra  When is the first available appointment?  10/30  Symptoms:  Discuss concerns  Best Call Back Number:  [phone]  Additional Information:  n/a

## 2018-09-24 NOTE — TELEPHONE ENCOUNTER
States that she is having some urinary incontinence. States that it is progressing and would like to see a urologist. Advised that she does not need a referral but wanted your opinion.

## 2018-09-27 ENCOUNTER — OFFICE VISIT (OUTPATIENT)
Dept: UROLOGY | Facility: CLINIC | Age: 68
End: 2018-09-27
Payer: COMMERCIAL

## 2018-09-27 VITALS
HEIGHT: 62 IN | WEIGHT: 175.25 LBS | HEART RATE: 78 BPM | SYSTOLIC BLOOD PRESSURE: 140 MMHG | BODY MASS INDEX: 32.25 KG/M2 | DIASTOLIC BLOOD PRESSURE: 72 MMHG

## 2018-09-27 DIAGNOSIS — N39.41 URGE INCONTINENCE: ICD-10-CM

## 2018-09-27 DIAGNOSIS — R39.89 BLADDER PAIN: ICD-10-CM

## 2018-09-27 DIAGNOSIS — R35.1 NOCTURIA: Primary | ICD-10-CM

## 2018-09-27 DIAGNOSIS — R35.0 INCREASED URINARY FREQUENCY: ICD-10-CM

## 2018-09-27 DIAGNOSIS — R39.15 URINARY URGENCY: ICD-10-CM

## 2018-09-27 LAB
BILIRUB SERPL-MCNC: NORMAL MG/DL
BLOOD URINE, POC: NORMAL
COLOR, POC UA: YELLOW
GLUCOSE UR QL STRIP: NORMAL
KETONES UR QL STRIP: NORMAL
LEUKOCYTE ESTERASE URINE, POC: NORMAL
NITRITE, POC UA: NORMAL
PH, POC UA: 7.5
PROTEIN, POC: NORMAL
SPECIFIC GRAVITY, POC UA: 1.01
UROBILINOGEN, POC UA: NORMAL

## 2018-09-27 PROCEDURE — 81002 URINALYSIS NONAUTO W/O SCOPE: CPT | Mod: S$GLB,,, | Performed by: UROLOGY

## 2018-09-27 PROCEDURE — 99204 OFFICE O/P NEW MOD 45 MIN: CPT | Mod: 25,S$GLB,, | Performed by: UROLOGY

## 2018-09-27 PROCEDURE — 99999 PR PBB SHADOW E&M-EST. PATIENT-LVL III: CPT | Mod: PBBFAC,,, | Performed by: UROLOGY

## 2018-09-27 RX ORDER — OXYBUTYNIN CHLORIDE 5 MG/1
5 TABLET, EXTENDED RELEASE ORAL DAILY
Qty: 30 TABLET | Refills: 11 | Status: SHIPPED | OUTPATIENT
Start: 2018-09-27 | End: 2019-06-13 | Stop reason: SDUPTHER

## 2018-09-27 RX ORDER — OXYBUTYNIN CHLORIDE 5 MG/1
5 TABLET, EXTENDED RELEASE ORAL DAILY
Qty: 30 TABLET | Refills: 11 | Status: SHIPPED | OUTPATIENT
Start: 2018-09-27 | End: 2018-09-27 | Stop reason: SDUPTHER

## 2018-09-27 NOTE — PROGRESS NOTES
Subjective:       Patient ID: Zayra Rodgers is a 68 y.o. female.    Chief Complaint: Urinary Frequency and Urinary Urgency    HPI     68 year old urinary frequency and urgency and urge incontinence.  She says her bladder is tender when full.   Symptoms have started several months ago.   She had similar problems years ago but they spontaneously resolved.  No previous OAB medications.  She denies hematuria and dysuria.  She gets rare urinary tract infections.   She was recently treated for Breast cancer.   She has normal BM.  She denies constipation.  Urine dipstick shows negative for nitrites, leukocytes, glucose, protein, positive for trace blood  PVR 66 ml      Past Medical History:   Diagnosis Date    Allergy     Breast cancer 02/2016    left breast, neoadjuvant chemo, lumpectomy, and radiation    Bronchitis     HLD (hyperlipidemia)     Hypertension     S/P chemotherapy, time since greater than 12 weeks     Skin cancer     resovled     Past Surgical History:   Procedure Laterality Date    ADENOIDECTOMY      BREAST BIOPSY Left 2016    BREAST CYST EXCISION      BREAST LUMPECTOMY Left 2016    COLONOSCOPY N/A 6/21/2018    Procedure: COLONOSCOPY;  Surgeon: Hiro Billy Jr., MD;  Location: [facility] ENDO;  Service: Endoscopy;  Laterality: N/A;    COLONOSCOPY N/A 6/21/2018    Performed by Hiro Billy Jr., MD at [facility] ENDO    CYSTOCELE REPAIR      EXCISIONAL HEMORRHOIDECTOMY      HYSTERECTOMY      due to prolapse    [id number]-I-CATH Right 3/14/2016    Performed by Serg Todd MD at [facility] OR    ORBITAL RECONSTRUCTION      PORTACATH PLACEMENT      since removed    RHINOPLASTY TIP      SIGMOIDOSCOPY         Current Outpatient Medications:     albuterol 90 mcg/actuation inhaler, Inhale 2 puffs into the lungs every 6 (six) hours as needed for Wheezing. Rescue, Disp: 18 g, Rfl: 0    anastrozole (ARIMIDEX) 1 mg Tab, Take 1 tablet (1 mg total) by mouth once daily., Disp: 90 tablet, Rfl:  3    B-complex with vitamin C (Z-BEC OR EQUIV) tablet, Take 1 tablet by mouth once daily., Disp: , Rfl:     CALTRATE WITH VITAMIN D3 600 mg(1,500mg) -800 unit Tab, TAKE 2 TABLETS BY MOUTH ONCE DAILY, Disp: 200 tablet, Rfl: 0    cetirizine (ZYRTEC) 10 MG tablet, Take 10 mg by mouth once daily., Disp: , Rfl:     fish oil-omega-3 fatty acids 300-1,000 mg capsule, Take 2 g by mouth once daily., Disp: , Rfl:     fluticasone (FLONASE) 50 mcg/actuation nasal spray, 1 spray by Each Nare route once daily., Disp: , Rfl:     fluticasone (FLOVENT HFA) 110 mcg/actuation inhaler, Inhale 1 puff into the lungs 2 (two) times daily. Controller, Disp: 12 g, Rfl: 1    guaiFENesin (MUCINEX) 1,200 mg Ta12, Take by mouth., Disp: , Rfl:     hydroCHLOROthiazide (HYDRODIURIL) 25 MG tablet, Take 1 tablet (25 mg total) by mouth daily as needed., Disp: 90 tablet, Rfl: 1    multivitamin capsule, Take 1 capsule by mouth once daily., Disp: , Rfl:     ranitidine (ZANTAC) 75 MG tablet, Take 150 mg by mouth nightly. , Disp: , Rfl:     sennosides (SENOKOT ORAL), Take by mouth., Disp: , Rfl:     oxybutynin (DITROPAN-XL) 5 MG TR24, Take 1 tablet (5 mg total) by mouth once daily., Disp: 30 tablet, Rfl: 11      Review of Systems   Constitutional: Negative for fever.   Eyes: Negative for visual disturbance.   Respiratory: Negative for shortness of breath.    Cardiovascular: Negative for chest pain.   Gastrointestinal: Negative for abdominal pain.   Genitourinary: Positive for frequency and urgency. Negative for dysuria, flank pain and hematuria.   Musculoskeletal: Negative for gait problem.   Skin: Negative for rash.   Neurological: Negative for seizures.   Psychiatric/Behavioral: Negative for confusion.       Objective:      Physical Exam   Constitutional: She is oriented to person, place, and time. She appears well-developed and well-nourished.   HENT:   Head: Normocephalic.   Eyes: Conjunctivae and EOM are normal.   Neck: Normal range of  motion.   Cardiovascular: Normal rate.   Pulmonary/Chest: Effort normal.   Abdominal: Soft. She exhibits no distension and no mass. There is no tenderness.   Genitourinary:   Genitourinary Comments: Bladder non-tender and nondistended  No CVA tenderness   Musculoskeletal: She exhibits no edema.   Neurological: She is alert and oriented to person, place, and time.   Skin: Skin is warm and dry. No rash noted. No erythema.   Psychiatric: She has a normal mood and affect. Her behavior is normal.   Vitals reviewed.      Assessment:       1. Nocturia    2. Increased urinary frequency    3. Urinary urgency    4. Urge incontinence    5. Bladder pain        Plan:       Nocturia  -     POCT URINE DIPSTICK WITHOUT MICROSCOPE    Increased urinary frequency    Urinary urgency    Urge incontinence    Bladder pain  -     Cystoscopy; Future  -     US Retroperitoneal Complete (Kidney and; Future; Expected date: 09/27/2018    Other orders  -     Discontinue: oxybutynin (DITROPAN-XL) 5 MG TR24; Take 1 tablet (5 mg total) by mouth once daily.  Dispense: 30 tablet; Refill: 11  -     oxybutynin (DITROPAN-XL) 5 MG TR24; Take 1 tablet (5 mg total) by mouth once daily.  Dispense: 30 tablet; Refill: 11      Rec trial of oxybutynin.  NEIDA and cystoscopy.

## 2018-10-01 ENCOUNTER — TELEPHONE (OUTPATIENT)
Dept: UROLOGY | Facility: CLINIC | Age: 68
End: 2018-10-01

## 2018-10-01 NOTE — TELEPHONE ENCOUNTER
----- Message from Mindy Anderson sent at 10/1/2018 11:20 AM CDT -----  Contact: Patient  Patient would like to speak with Ananya regarding her ultrasound scheduled for tomorrow morning.  Call Back#[phone]  Thanks

## 2018-10-01 NOTE — TELEPHONE ENCOUNTER
Spoke to pt and answered her question about being NPO but drinking a lot of water for to fill her bladder right before her ultrasound tomorrow morning.

## 2018-10-02 ENCOUNTER — HOSPITAL ENCOUNTER (OUTPATIENT)
Dept: RADIOLOGY | Facility: HOSPITAL | Age: 68
Discharge: HOME | End: 2018-10-02
Attending: UROLOGY
Payer: COMMERCIAL

## 2018-10-02 DIAGNOSIS — R39.89 BLADDER PAIN: ICD-10-CM

## 2018-10-02 PROCEDURE — 76770 US EXAM ABDO BACK WALL COMP: CPT | Mod: TC,PO

## 2018-10-02 PROCEDURE — 76770 US EXAM ABDO BACK WALL COMP: CPT | Mod: 26,,, | Performed by: RADIOLOGY

## 2018-10-24 ENCOUNTER — IMMUNIZATION (OUTPATIENT)
Dept: PHARMACY | Facility: CLINIC | Age: 68
End: 2018-10-24
Payer: COMMERCIAL

## 2018-10-29 ENCOUNTER — PROCEDURE VISIT (OUTPATIENT)
Dept: UROLOGY | Facility: CLINIC | Age: 68
End: 2018-10-29
Payer: COMMERCIAL

## 2018-10-29 VITALS
SYSTOLIC BLOOD PRESSURE: 147 MMHG | HEART RATE: 79 BPM | WEIGHT: 176.13 LBS | HEIGHT: 62 IN | BODY MASS INDEX: 32.41 KG/M2 | DIASTOLIC BLOOD PRESSURE: 90 MMHG

## 2018-10-29 DIAGNOSIS — R39.89 BLADDER PAIN: Primary | ICD-10-CM

## 2018-10-29 LAB
BILIRUB SERPL-MCNC: ABNORMAL MG/DL
BLOOD URINE, POC: ABNORMAL
COLOR, POC UA: ABNORMAL
GLUCOSE UR QL STRIP: ABNORMAL
KETONES UR QL STRIP: ABNORMAL
LEUKOCYTE ESTERASE URINE, POC: ABNORMAL
NITRITE, POC UA: ABNORMAL
PH, POC UA: 7
PROTEIN, POC: ABNORMAL
SPECIFIC GRAVITY, POC UA: 1.02
UROBILINOGEN, POC UA: 0.2

## 2018-10-29 PROCEDURE — 52000 CYSTOURETHROSCOPY: CPT | Mod: S$GLB,,, | Performed by: UROLOGY

## 2018-10-29 PROCEDURE — 81002 URINALYSIS NONAUTO W/O SCOPE: CPT | Mod: S$GLB,,, | Performed by: UROLOGY

## 2018-10-29 NOTE — PROCEDURES
"Cystoscopy  Date/Time: 10/29/2018 2:24 PM  Performed by: NICK Rendon MD  Authorized by: NICK Rendon MD     Consent Done?:  Yes (Written)  Time out: Immediately prior to procedure a "time out" was called to verify the correct patient, procedure, equipment, support staff and site/side marked as required.    Indications: dysuria and hematuria    Position:  Other  Patient sedated?: No    Preparation: Patient was prepped and draped in usual sterile fashion      Scope type:  Flexible cystoscope    Blood Loss:  None    The patients clinic history and physical were reviewed and there are no changes.    The patient was placed in the frog-leg position.  The genitals were prepped and draped in a sterile fashion.  Using a flexible scope, a careful cystoscopic exam was then performed.  The entire bladder mucosa was systematically visualized.  The mucosa appeared normal.  There were no lesions, masses foreign bodies or stones.   Each ureteral orifices were visualized and both had clear efflux of urine.  I filled the bladder with approximately 300 cc of sterile water.   The cystoscope was then removed and I examined the entire length of the urethra.  The urethra appeared normal.  She tolerated the procedure well.  There were no complications.  On pelvic exam there was adequate pelvic floor support.  No urethral mass or tenderness.      Impression:  Normal cystoscopy.    Symptoms improved on oxybutynin.    RTC 6 months      "

## 2018-11-08 ENCOUNTER — OFFICE VISIT (OUTPATIENT)
Dept: FAMILY MEDICINE | Facility: CLINIC | Age: 68
End: 2018-11-08
Payer: COMMERCIAL

## 2018-11-08 VITALS
OXYGEN SATURATION: 98 % | SYSTOLIC BLOOD PRESSURE: 126 MMHG | HEART RATE: 81 BPM | BODY MASS INDEX: 32.3 KG/M2 | WEIGHT: 175.5 LBS | TEMPERATURE: 99 F | DIASTOLIC BLOOD PRESSURE: 78 MMHG | HEIGHT: 62 IN | RESPIRATION RATE: 18 BRPM

## 2018-11-08 DIAGNOSIS — E78.5 HYPERLIPIDEMIA, UNSPECIFIED HYPERLIPIDEMIA TYPE: ICD-10-CM

## 2018-11-08 DIAGNOSIS — R05.8 POST-VIRAL COUGH SYNDROME: ICD-10-CM

## 2018-11-08 DIAGNOSIS — R01.1 HEART MURMUR: ICD-10-CM

## 2018-11-08 DIAGNOSIS — Z00.00 ROUTINE HEALTH MAINTENANCE: Primary | ICD-10-CM

## 2018-11-08 PROCEDURE — 99397 PER PM REEVAL EST PAT 65+ YR: CPT | Mod: S$GLB,,, | Performed by: FAMILY MEDICINE

## 2018-11-08 PROCEDURE — 99999 PR PBB SHADOW E&M-EST. PATIENT-LVL III: CPT | Mod: PBBFAC,,, | Performed by: FAMILY MEDICINE

## 2018-11-08 RX ORDER — ALBUTEROL SULFATE 90 UG/1
2 AEROSOL, METERED RESPIRATORY (INHALATION) EVERY 6 HOURS PRN
Qty: 18 G | Refills: 2 | Status: SHIPPED | OUTPATIENT
Start: 2018-11-08 | End: 2018-11-09

## 2018-11-08 RX ORDER — ASPIRIN 81 MG/1
81 TABLET ORAL DAILY
COMMUNITY
End: 2022-09-16 | Stop reason: ALTCHOICE

## 2018-11-08 RX ORDER — FLUTICASONE PROPIONATE 110 UG/1
1 AEROSOL, METERED RESPIRATORY (INHALATION) 2 TIMES DAILY
Qty: 12 G | Refills: 2 | Status: SHIPPED | OUTPATIENT
Start: 2018-11-08 | End: 2019-11-08

## 2018-11-08 NOTE — PROGRESS NOTES
Subjective:       Patient ID: Zayra Rodgers is a 68 y.o. female.    Chief Complaint: Annual Exam      Zayra Rodgers is in the office for annual exam.    HPI  No updates to medical hx.  Past Medical History:   Diagnosis Date    Allergy     Breast cancer 02/2016    left breast, neoadjuvant chemo, lumpectomy, and radiation    Bronchitis     HLD (hyperlipidemia)     Hypertension     S/P chemotherapy, time since greater than 12 weeks     Skin cancer     resovled     Had gyn update, urology eval with cystoscope.  Notes that she had the start of a croupy cough. Has not been on flovent or albuterol for a while.    Current Outpatient Medications:     albuterol 90 mcg/actuation inhaler, Inhale 2 puffs into the lungs every 6 (six) hours as needed for Wheezing. Rescue, Disp: 18 g, Rfl: 0    anastrozole (ARIMIDEX) 1 mg Tab, Take 1 tablet (1 mg total) by mouth once daily., Disp: 90 tablet, Rfl: 3    B-complex with vitamin C (Z-BEC OR EQUIV) tablet, Take 1 tablet by mouth once daily., Disp: , Rfl:     CALTRATE WITH VITAMIN D3 600 mg(1,500mg) -800 unit Tab, TAKE 2 TABLETS BY MOUTH ONCE DAILY, Disp: 200 tablet, Rfl: 0    cetirizine (ZYRTEC) 10 MG tablet, Take 10 mg by mouth once daily., Disp: , Rfl:     fish oil-omega-3 fatty acids 300-1,000 mg capsule, Take 2 g by mouth once daily., Disp: , Rfl:     fluticasone (FLONASE) 50 mcg/actuation nasal spray, 1 spray by Each Nare route once daily., Disp: , Rfl:     fluticasone (FLOVENT HFA) 110 mcg/actuation inhaler, Inhale 1 puff into the lungs 2 (two) times daily. Controller, Disp: 12 g, Rfl: 1    guaiFENesin (MUCINEX) 1,200 mg Ta12, Take by mouth., Disp: , Rfl:     hydroCHLOROthiazide (HYDRODIURIL) 25 MG tablet, Take 1 tablet (25 mg total) by mouth daily as needed., Disp: 90 tablet, Rfl: 1    multivitamin capsule, Take 1 capsule by mouth once daily., Disp: , Rfl:     oxybutynin (DITROPAN-XL) 5 MG TR24, Take 1 tablet (5 mg total) by mouth once daily., Disp: 30  tablet, Rfl: 11    ranitidine (ZANTAC) 75 MG tablet, Take 150 mg by mouth nightly. , Disp: , Rfl:     sennosides (SENOKOT ORAL), Take by mouth., Disp: , Rfl:     The 10-year ASCVD risk score (Toriejuanito JAY Jr., et al., 2013) is: 9%    Values used to calculate the score:      Age: 68 years      Sex: Female      Is Non- : No      Diabetic: No      Tobacco smoker: No      Systolic Blood Pressure: 126 mmHg      Is BP treated: Yes      HDL Cholesterol: 56 mg/dL      Total Cholesterol: 257 mg/dL   Aspirin 81/day reviewed.    Lab Results   Component Value Date    HGBA1C 5.4 05/03/2018     Lab Results   Component Value Date    LDLCALC 184.0 (H) 05/03/2018    CREATININE 0.61 06/04/2018   labs 2018 rev.    Review of Systems   Constitutional: Positive for fatigue. Negative for activity change and unexpected weight change.   HENT: Negative for congestion, ear pain, hearing loss, rhinorrhea, sore throat and trouble swallowing.    Eyes: Negative for discharge and visual disturbance.   Respiratory: Positive for cough. Negative for apnea (snoring), shortness of breath and wheezing.    Cardiovascular: Negative for chest pain, palpitations and leg swelling.   Gastrointestinal: Negative for blood in stool, constipation and diarrhea.   Endocrine: Negative for polydipsia and polyuria.   Genitourinary: Negative for difficulty urinating, dysuria, hematuria and menstrual problem.   Musculoskeletal: Negative for arthralgias, joint swelling and neck pain.        No current exercise.   Neurological: Negative for dizziness, weakness and headaches.   Psychiatric/Behavioral: Positive for sleep disturbance (urinary frequency, new puppy). Negative for confusion and dysphoric mood.       Objective:      Physical Exam   Constitutional: She is oriented to person, place, and time. She appears well-developed and well-nourished. No distress.   HENT:   Head: Normocephalic and atraumatic.   Right Ear: External ear normal.   Left Ear:  External ear normal.   Nose: Nose normal.   Mouth/Throat: Oropharynx is clear and moist. No oropharyngeal exudate.   Eyes: Conjunctivae and EOM are normal. Pupils are equal, round, and reactive to light.   Neck: Normal range of motion. Neck supple. No thyromegaly present.   Cardiovascular: Normal rate and regular rhythm.   Murmur heard.  Pulmonary/Chest: Effort normal and breath sounds normal. No respiratory distress. She has no wheezes.   Abdominal: Soft. Bowel sounds are normal. She exhibits no distension and no mass. There is no tenderness. There is no rebound and no guarding.   Musculoskeletal: Normal range of motion. She exhibits no edema.   Lymphadenopathy:     She has no cervical adenopathy.   Neurological: She is alert and oriented to person, place, and time.   Skin: Skin is warm and dry.   Psychiatric: She has a normal mood and affect. Her behavior is normal.   Nursing note and vitals reviewed.      Screening recommendations appropriate to age and health status were reviewed.    Assessment & Plan:    Routine health maintenance  -     Lipid panel; Future; Expected date: 11/08/2018  Anticipatory guidance reviewed.  Hyperlipidemia, unspecified hyperlipidemia type  Asa 81/day. Discussed ascvd score with re: to statin. Recheck with lab draw.  Heart murmur  Prev echo in 2015 with some regurg, will update. asymp.  Post-viral cough syndrome  -     albuterol (PROVENTIL/VENTOLIN HFA) 90 mcg/actuation inhaler; Inhale 2 puffs into the lungs every 6 (six) hours as needed for Wheezing. Rescue  Dispense: 18 g; Refill: 2  -     fluticasone (FLOVENT HFA) 110 mcg/actuation inhaler; Inhale 1 puff into the lungs 2 (two) times daily. Controller  Dispense: 12 g; Refill: 2  Side effects and precautions of medication use reviewed with patient, expressed understanding. No questions or concerns. Expected course of illness and sx tx incl otc med use reviewed. Notify MD if sx persist or worsen. Add mucinex, zyrtec.

## 2018-11-09 ENCOUNTER — TELEPHONE (OUTPATIENT)
Dept: FAMILY MEDICINE | Facility: CLINIC | Age: 68
End: 2018-11-09

## 2018-11-09 RX ORDER — ALBUTEROL SULFATE 90 UG/1
2 AEROSOL, METERED RESPIRATORY (INHALATION) EVERY 6 HOURS PRN
Qty: 18 G | Refills: 11 | Status: SHIPPED | OUTPATIENT
Start: 2018-11-09 | End: 2020-08-06 | Stop reason: SDUPTHER

## 2018-11-09 NOTE — TELEPHONE ENCOUNTER
Received drug change request from [facility]. Insurance will no longer cover Albuterol (Proventil/Ventolin Hfa) as per pharm alternatives are Levalbuterolaeract ,Proairhfaaer, proairrespiaer.  Please review and advise.      Thanks

## 2018-11-18 DIAGNOSIS — M89.9 BONE/CARTILAGE DISORDER: ICD-10-CM

## 2018-11-18 DIAGNOSIS — C50.012 MALIGNANT NEOPLASM OF NIPPLE OF LEFT BREAST IN FEMALE: ICD-10-CM

## 2018-11-18 DIAGNOSIS — M94.9 BONE/CARTILAGE DISORDER: ICD-10-CM

## 2018-11-20 RX ORDER — ACETAMINOPHEN 500 MG
2 TABLET ORAL DAILY
Qty: 200 TABLET | Refills: 0 | Status: SHIPPED | OUTPATIENT
Start: 2018-11-20 | End: 2019-05-27

## 2018-12-03 ENCOUNTER — HOSPITAL ENCOUNTER (OUTPATIENT)
Dept: RADIOLOGY | Facility: HOSPITAL | Age: 68
Discharge: HOME | End: 2018-12-03
Attending: INTERNAL MEDICINE
Payer: COMMERCIAL

## 2018-12-03 DIAGNOSIS — M94.9 BONE/CARTILAGE DISORDER: ICD-10-CM

## 2018-12-03 DIAGNOSIS — Z79.811 AROMATASE INHIBITOR USE: ICD-10-CM

## 2018-12-03 DIAGNOSIS — Z17.0 MALIGNANT NEOPLASM OF NIPPLE OF LEFT BREAST IN FEMALE, ESTROGEN RECEPTOR POSITIVE: ICD-10-CM

## 2018-12-03 DIAGNOSIS — M89.9 BONE/CARTILAGE DISORDER: ICD-10-CM

## 2018-12-03 DIAGNOSIS — C50.012 MALIGNANT NEOPLASM OF NIPPLE OF LEFT BREAST IN FEMALE, ESTROGEN RECEPTOR POSITIVE: ICD-10-CM

## 2018-12-03 PROCEDURE — 71046 X-RAY EXAM CHEST 2 VIEWS: CPT | Mod: 26,,, | Performed by: RADIOLOGY

## 2018-12-03 PROCEDURE — 77080 DXA BONE DENSITY AXIAL: CPT | Mod: 26,,, | Performed by: RADIOLOGY

## 2018-12-03 PROCEDURE — 77080 DXA BONE DENSITY AXIAL: CPT | Mod: TC,PO

## 2018-12-03 PROCEDURE — 71046 X-RAY EXAM CHEST 2 VIEWS: CPT | Mod: TC,FY,PO

## 2018-12-07 ENCOUNTER — INFUSION (OUTPATIENT)
Dept: INFUSION THERAPY | Facility: HOSPITAL | Age: 68
End: 2018-12-07
Attending: INTERNAL MEDICINE
Payer: COMMERCIAL

## 2018-12-07 ENCOUNTER — OFFICE VISIT (OUTPATIENT)
Dept: HEMATOLOGY/ONCOLOGY | Facility: CLINIC | Age: 68
End: 2018-12-07
Payer: COMMERCIAL

## 2018-12-07 VITALS
BODY MASS INDEX: 32.37 KG/M2 | DIASTOLIC BLOOD PRESSURE: 85 MMHG | HEART RATE: 77 BPM | HEIGHT: 62 IN | SYSTOLIC BLOOD PRESSURE: 162 MMHG | TEMPERATURE: 99 F | WEIGHT: 175.94 LBS | RESPIRATION RATE: 16 BRPM

## 2018-12-07 DIAGNOSIS — C77.3 MALIGNANT NEOPLASM METASTATIC TO LYMPH NODE OF AXILLA: ICD-10-CM

## 2018-12-07 DIAGNOSIS — M89.9 BONE/CARTILAGE DISORDER: ICD-10-CM

## 2018-12-07 DIAGNOSIS — M94.9 BONE/CARTILAGE DISORDER: Primary | ICD-10-CM

## 2018-12-07 DIAGNOSIS — M89.9 BONE/CARTILAGE DISORDER: Primary | ICD-10-CM

## 2018-12-07 DIAGNOSIS — M94.9 BONE/CARTILAGE DISORDER: ICD-10-CM

## 2018-12-07 DIAGNOSIS — C50.012 MALIGNANT NEOPLASM OF NIPPLE OF LEFT BREAST IN FEMALE, UNSPECIFIED ESTROGEN RECEPTOR STATUS: Primary | ICD-10-CM

## 2018-12-07 DIAGNOSIS — C50.012 MALIGNANT NEOPLASM OF NIPPLE OF LEFT BREAST IN FEMALE: ICD-10-CM

## 2018-12-07 PROCEDURE — 63600175 PHARM REV CODE 636 W HCPCS: Mod: TB,PN | Performed by: INTERNAL MEDICINE

## 2018-12-07 PROCEDURE — 99214 OFFICE O/P EST MOD 30 MIN: CPT | Mod: S$GLB,,, | Performed by: INTERNAL MEDICINE

## 2018-12-07 PROCEDURE — 96372 THER/PROPH/DIAG INJ SC/IM: CPT | Mod: PN

## 2018-12-07 PROCEDURE — 99999 PR PBB SHADOW E&M-EST. PATIENT-LVL III: CPT | Mod: PBBFAC,,, | Performed by: INTERNAL MEDICINE

## 2018-12-07 RX ORDER — ACETAMINOPHEN 500 MG
TABLET ORAL
Qty: 200 TABLET | Refills: 0 | Status: SHIPPED | OUTPATIENT
Start: 2018-12-07 | End: 2019-06-13

## 2018-12-07 RX ADMIN — DENOSUMAB 60 MG: 60 INJECTION SUBCUTANEOUS at 10:12

## 2018-12-07 NOTE — PROGRESS NOTES
HISTORY OF PRESENT ILLNESS:  The patient is a 68-year-old white female well   known to me for locally advanced left breast carcinoma, who underwent   neoadjuvant therapy consisting of four cycles of Adriamycin/Cytoxan followed by   12 weekly doses of Taxol.  She had a residual 0.3 mm focus of metastatic   carcinoma in a single lymph node.  She is currently managed with adjuvant   Arimidex/biannual Prolia and returns to clinic for 30-month post-therapy   reevaluation.  No new breast complaints, chest pain, abdominal pain, bone pain,   postmenopausal bleeding, unexplained weight loss, etc.  No other complaints or   pertinent findings on a 14-point review of system.    PHYSICAL EXAMINATION:  GENERAL:  Well-developed, well-nourished white female in no acute distress, with   a weight of 175-1/2 pounds (decreased by 1-1/2 pounds).  VITAL SIGNS:  Documented in EMR and reviewed.  HEENT:  Normocephalic, atraumatic.  Oral mucosa pink and moist.  Lips without   lesions.  Tongue midline.  Oropharynx clear.  Nonicteric sclerae.   NECK:  Supple, no adenopathy.  No carotid bruits, thyromegaly or thyroid nodule.  HEART:  Regular rate and rhythm without murmur, gallop or rub.                LUNGS:  Clear to auscultation bilaterally.  Normal respiratory effort.       ABDOMEN:  Soft, nontender, nondistended with positive normoactive bowel sounds,   no hepatosplenomegaly.    EXTREMITIES:  No cyanosis, clubbing or edema.  Distal pulses are intact.                                              AXILLAE AND GROIN:  No palpable pathologic lymphadenopathy is appreciated.        SKIN:  Intact/turgor normal.  NEUROLOGIC:  Cranial nerves II-XII grossly intact.  Motor:  Good muscle bulk and   tone.  Strength/sensory 5/5 throughout.  Gait stable.  BREASTS:  The patient's right breast is free from masses, tenderness or nipple   discharge.  The patient's left breast has healed lumpectomy scar and is free   from signs of local  recurrence.    LABORATORY:  White count 7.8, H and H 13.8 and 41.0, platelet count of 230.    Sodium 141, potassium 4.7, chloride 104, CO2 28, BUN 24, creatinine 0.7, glucose   92, calcium 10.4.  Liver function tests are within normal limits.  GFR is   greater than 60.  .    CHEST X-RAY:  No acute cardiopulmonary process is appreciated.  There is no   evidence of occult metastatic disease.    Bone mineral density is remarkable for only mild osteopenia.    IMPRESSION:  Locally advanced left breast carcinoma without evidence of   recurrent disease.    PLAN:  1.  Continue calcium supplementation with vitamin D.  2.  Continue Arimidex 1 mg daily.  3.  Repeat Prolia 60 mg subQ.  4.  Return to clinic in six months from now with interval CBC, CMP, LDH with   mammography.      ALYSSIA/HN  dd: 12/07/2018 10:05:48 (CST)  td: 12/08/2018 02:34:56 (CST)  Doc ID   #[id number]  Job ID #[id number]    CC:

## 2019-05-27 ENCOUNTER — PATIENT MESSAGE (OUTPATIENT)
Dept: HEMATOLOGY/ONCOLOGY | Facility: CLINIC | Age: 69
End: 2019-05-27

## 2019-05-27 DIAGNOSIS — M89.9 BONE/CARTILAGE DISORDER: ICD-10-CM

## 2019-05-27 DIAGNOSIS — C50.012 MALIGNANT NEOPLASM OF NIPPLE OF LEFT BREAST IN FEMALE: ICD-10-CM

## 2019-05-27 DIAGNOSIS — M94.9 BONE/CARTILAGE DISORDER: ICD-10-CM

## 2019-05-28 ENCOUNTER — PATIENT MESSAGE (OUTPATIENT)
Dept: HEMATOLOGY/ONCOLOGY | Facility: CLINIC | Age: 69
End: 2019-05-28

## 2019-05-28 DIAGNOSIS — E78.00 HYPERCHOLESTEROLEMIA: Primary | ICD-10-CM

## 2019-05-28 RX ORDER — ANASTROZOLE 1 MG/1
1 TABLET ORAL DAILY
Qty: 90 TABLET | Refills: 1 | Status: SHIPPED | OUTPATIENT
Start: 2019-05-28 | End: 2019-11-21

## 2019-05-28 RX ORDER — ACETAMINOPHEN 500 MG
2 TABLET ORAL DAILY
Qty: 200 TABLET | Refills: 0 | Status: SHIPPED | OUTPATIENT
Start: 2019-05-28 | End: 2019-08-23

## 2019-05-30 ENCOUNTER — PATIENT MESSAGE (OUTPATIENT)
Dept: UROLOGY | Facility: CLINIC | Age: 69
End: 2019-05-30

## 2019-05-30 ENCOUNTER — CLINICAL SUPPORT (OUTPATIENT)
Dept: UROLOGY | Facility: CLINIC | Age: 69
End: 2019-05-30
Payer: COMMERCIAL

## 2019-05-30 DIAGNOSIS — N39.41 URGE INCONTINENCE: Primary | ICD-10-CM

## 2019-05-30 NOTE — TELEPHONE ENCOUNTER
Spoke to patient, advised that she is on the lowest dose oxybutynin, to increase her dosage to 10mg/day and let us know if that works for her. Pt expressed understanding.

## 2019-06-03 ENCOUNTER — LAB VISIT (OUTPATIENT)
Dept: LAB | Facility: HOSPITAL | Age: 69
End: 2019-06-03
Attending: INTERNAL MEDICINE
Payer: COMMERCIAL

## 2019-06-03 DIAGNOSIS — E78.00 HYPERCHOLESTEROLEMIA: ICD-10-CM

## 2019-06-03 DIAGNOSIS — C50.012 MALIGNANT NEOPLASM OF NIPPLE OF LEFT BREAST IN FEMALE, UNSPECIFIED ESTROGEN RECEPTOR STATUS: ICD-10-CM

## 2019-06-03 LAB
ALBUMIN SERPL BCP-MCNC: 4.3 G/DL (ref 3.5–5.2)
ALP SERPL-CCNC: 37 U/L (ref 38–145)
ALT SERPL W/O P-5'-P-CCNC: 42 U/L (ref 10–44)
ANION GAP SERPL CALC-SCNC: 10 MMOL/L (ref 8–16)
AST SERPL-CCNC: 26 U/L (ref 14–36)
BASOPHILS # BLD AUTO: 0.02 K/UL (ref 0–0.2)
BASOPHILS NFR BLD: 0.3 % (ref 0–1.9)
BILIRUB SERPL-MCNC: 0.4 MG/DL (ref 0.2–1.3)
BUN SERPL-MCNC: 19 MG/DL (ref 7–18)
CALCIUM SERPL-MCNC: 10 MG/DL (ref 8.4–10.2)
CHLORIDE SERPL-SCNC: 101 MMOL/L (ref 95–110)
CHOLEST SERPL-MCNC: 282 MG/DL (ref 120–199)
CHOLEST/HDLC SERPL: 5.1 {RATIO} (ref 2–5)
CO2 SERPL-SCNC: 30 MMOL/L (ref 22–31)
CREAT SERPL-MCNC: 0.67 MG/DL (ref 0.5–1.4)
DIFFERENTIAL METHOD: ABNORMAL
EOSINOPHIL # BLD AUTO: 0 K/UL (ref 0–0.5)
EOSINOPHIL NFR BLD: 0.6 % (ref 0–8)
ERYTHROCYTE [DISTWIDTH] IN BLOOD BY AUTOMATED COUNT: 13.2 % (ref 11.5–14.5)
EST. GFR  (AFRICAN AMERICAN): >60 ML/MIN/1.73 M^2
EST. GFR  (NON AFRICAN AMERICAN): >60 ML/MIN/1.73 M^2
GLUCOSE SERPL-MCNC: 96 MG/DL (ref 70–110)
HCT VFR BLD AUTO: 41.7 % (ref 37–48.5)
HDLC SERPL-MCNC: 55 MG/DL (ref 40–75)
HDLC SERPL: 19.5 % (ref 20–50)
HGB BLD-MCNC: 13.8 G/DL (ref 12–16)
IMM GRANULOCYTES # BLD AUTO: 0.01 K/UL (ref 0–0.04)
IMM GRANULOCYTES NFR BLD AUTO: 0.1 % (ref 0–0.5)
LDH SERPL L TO P-CCNC: 416 U/L (ref 313–618)
LDLC SERPL CALC-MCNC: 212.6 MG/DL (ref 63–159)
LYMPHOCYTES # BLD AUTO: 1.1 K/UL (ref 1–4.8)
LYMPHOCYTES NFR BLD: 16.1 % (ref 18–48)
MAGNESIUM SERPL-MCNC: 2.3 MG/DL (ref 1.6–2.6)
MCH RBC QN AUTO: 30.4 PG (ref 27–31)
MCHC RBC AUTO-ENTMCNC: 33.1 G/DL (ref 32–36)
MCV RBC AUTO: 92 FL (ref 82–98)
MONOCYTES # BLD AUTO: 0.4 K/UL (ref 0.3–1)
MONOCYTES NFR BLD: 6.3 % (ref 4–15)
NEUTROPHILS # BLD AUTO: 5.1 K/UL (ref 1.8–7.7)
NEUTROPHILS NFR BLD: 76.6 % (ref 38–73)
NONHDLC SERPL-MCNC: 227 MG/DL
NRBC BLD-RTO: 0 /100 WBC
PLATELET # BLD AUTO: 187 K/UL (ref 150–350)
PMV BLD AUTO: 11 FL (ref 9.2–12.9)
POTASSIUM SERPL-SCNC: 4.9 MMOL/L (ref 3.5–5.1)
PROT SERPL-MCNC: 7.3 G/DL (ref 6–8.4)
RBC # BLD AUTO: 4.54 M/UL (ref 4–5.4)
SODIUM SERPL-SCNC: 141 MMOL/L (ref 136–145)
TRIGL SERPL-MCNC: 72 MG/DL (ref 30–150)
WBC # BLD AUTO: 6.69 K/UL (ref 3.9–12.7)

## 2019-06-03 PROCEDURE — 83735 ASSAY OF MAGNESIUM: CPT | Mod: PN

## 2019-06-03 PROCEDURE — 83615 LACTATE (LD) (LDH) ENZYME: CPT

## 2019-06-03 PROCEDURE — 85025 COMPLETE CBC W/AUTO DIFF WBC: CPT | Mod: PN

## 2019-06-03 PROCEDURE — 83615 LACTATE (LD) (LDH) ENZYME: CPT | Mod: PN

## 2019-06-03 PROCEDURE — 80061 LIPID PANEL: CPT | Mod: PN

## 2019-06-03 PROCEDURE — 83735 ASSAY OF MAGNESIUM: CPT

## 2019-06-03 PROCEDURE — 80053 COMPREHEN METABOLIC PANEL: CPT

## 2019-06-03 PROCEDURE — 80061 LIPID PANEL: CPT

## 2019-06-03 PROCEDURE — 80053 COMPREHEN METABOLIC PANEL: CPT | Mod: PN

## 2019-06-03 PROCEDURE — 36415 COLL VENOUS BLD VENIPUNCTURE: CPT | Mod: PN

## 2019-06-03 PROCEDURE — 85025 COMPLETE CBC W/AUTO DIFF WBC: CPT

## 2019-06-07 ENCOUNTER — INFUSION (OUTPATIENT)
Dept: INFUSION THERAPY | Facility: HOSPITAL | Age: 69
End: 2019-06-07
Attending: INTERNAL MEDICINE
Payer: COMMERCIAL

## 2019-06-07 ENCOUNTER — OFFICE VISIT (OUTPATIENT)
Dept: HEMATOLOGY/ONCOLOGY | Facility: CLINIC | Age: 69
End: 2019-06-07
Payer: COMMERCIAL

## 2019-06-07 VITALS
DIASTOLIC BLOOD PRESSURE: 72 MMHG | SYSTOLIC BLOOD PRESSURE: 118 MMHG | WEIGHT: 138.44 LBS | HEART RATE: 78 BPM | TEMPERATURE: 98 F | HEIGHT: 62 IN | RESPIRATION RATE: 18 BRPM | BODY MASS INDEX: 25.48 KG/M2

## 2019-06-07 VITALS
HEIGHT: 62 IN | TEMPERATURE: 98 F | BODY MASS INDEX: 25.48 KG/M2 | HEART RATE: 78 BPM | SYSTOLIC BLOOD PRESSURE: 118 MMHG | WEIGHT: 138.44 LBS | RESPIRATION RATE: 18 BRPM | DIASTOLIC BLOOD PRESSURE: 72 MMHG

## 2019-06-07 DIAGNOSIS — C50.012 MALIGNANT NEOPLASM OF NIPPLE OF LEFT BREAST IN FEMALE, UNSPECIFIED ESTROGEN RECEPTOR STATUS: Primary | ICD-10-CM

## 2019-06-07 DIAGNOSIS — M94.9 BONE/CARTILAGE DISORDER: Primary | ICD-10-CM

## 2019-06-07 DIAGNOSIS — M89.9 BONE/CARTILAGE DISORDER: Primary | ICD-10-CM

## 2019-06-07 DIAGNOSIS — C77.3 MALIGNANT NEOPLASM METASTATIC TO LYMPH NODE OF AXILLA: ICD-10-CM

## 2019-06-07 DIAGNOSIS — Z79.811 AROMATASE INHIBITOR USE: ICD-10-CM

## 2019-06-07 PROCEDURE — 3078F PR MOST RECENT DIASTOLIC BLOOD PRESSURE < 80 MM HG: ICD-10-PCS | Mod: CPTII,S$GLB,, | Performed by: INTERNAL MEDICINE

## 2019-06-07 PROCEDURE — 63600175 PHARM REV CODE 636 W HCPCS: Mod: JG,PN | Performed by: INTERNAL MEDICINE

## 2019-06-07 PROCEDURE — 99214 OFFICE O/P EST MOD 30 MIN: CPT | Mod: S$GLB,,, | Performed by: INTERNAL MEDICINE

## 2019-06-07 PROCEDURE — 3074F SYST BP LT 130 MM HG: CPT | Mod: CPTII,S$GLB,, | Performed by: INTERNAL MEDICINE

## 2019-06-07 PROCEDURE — 1101F PR PT FALLS ASSESS DOC 0-1 FALLS W/OUT INJ PAST YR: ICD-10-PCS | Mod: CPTII,S$GLB,, | Performed by: INTERNAL MEDICINE

## 2019-06-07 PROCEDURE — 3074F PR MOST RECENT SYSTOLIC BLOOD PRESSURE < 130 MM HG: ICD-10-PCS | Mod: CPTII,S$GLB,, | Performed by: INTERNAL MEDICINE

## 2019-06-07 PROCEDURE — 99999 PR PBB SHADOW E&M-EST. PATIENT-LVL III: CPT | Mod: PBBFAC,,, | Performed by: INTERNAL MEDICINE

## 2019-06-07 PROCEDURE — 1101F PT FALLS ASSESS-DOCD LE1/YR: CPT | Mod: CPTII,S$GLB,, | Performed by: INTERNAL MEDICINE

## 2019-06-07 PROCEDURE — 99999 PR PBB SHADOW E&M-EST. PATIENT-LVL III: ICD-10-PCS | Mod: PBBFAC,,, | Performed by: INTERNAL MEDICINE

## 2019-06-07 PROCEDURE — 3078F DIAST BP <80 MM HG: CPT | Mod: CPTII,S$GLB,, | Performed by: INTERNAL MEDICINE

## 2019-06-07 PROCEDURE — 96372 THER/PROPH/DIAG INJ SC/IM: CPT | Mod: PN

## 2019-06-07 PROCEDURE — 99214 PR OFFICE/OUTPT VISIT, EST, LEVL IV, 30-39 MIN: ICD-10-PCS | Mod: S$GLB,,, | Performed by: INTERNAL MEDICINE

## 2019-06-07 RX ADMIN — DENOSUMAB 60 MG: 60 INJECTION SUBCUTANEOUS at 10:06

## 2019-06-07 NOTE — PROGRESS NOTES
HISTORY OF PRESENT ILLNESS:  The patient is a 68-year-old white female well   known to me for locally advanced left breast carcinoma, who underwent   neoadjuvant therapy consisting of four cycles of Adriamycin/Cytoxan followed by   12 weekly doses of Taxol.  She had a residual 0.3 mm focus of metastatic   carcinoma in a single lymph node.  She is currently managed with adjuvant   Arimidex/biannual Prolia and returns to clinic for 36-month post-therapy   reevaluation.  No new breast complaints, chest pain, abdominal pain, bone pain,   postmenopausal bleeding, unexplained weight loss, etc.  Patient has been actively dieting and attempting to lose weight.  She is down to 138 lb.  She is frustrated by her persistent elevation in total cholesterol and plans to follow with Dr. Mcdonough in regard to this.  No other complaints or   pertinent findings on a 14-point review of system.    PHYSICAL EXAMINATION:  GENERAL:  Well-developed, well-nourished white female in no acute distress, with   a weight of 138 pounds (decreased by 37-1/2 pounds).  VITAL SIGNS:  Documented in EMR and reviewed.  HEENT:  Normocephalic, atraumatic.  Oral mucosa pink and moist.  Lips without   lesions.  Tongue midline.  Oropharynx clear.  Nonicteric sclerae.   NECK:  Supple, no adenopathy.  No carotid bruits, thyromegaly or thyroid nodule.  HEART:  Regular rate and rhythm without murmur, gallop or rub.                LUNGS:  Clear to auscultation bilaterally.  Normal respiratory effort.       ABDOMEN:  Soft, nontender, nondistended with positive normoactive bowel sounds,   no hepatosplenomegaly.    EXTREMITIES:  No cyanosis, clubbing or edema.  Distal pulses are intact.                                              AXILLAE AND GROIN:  No palpable pathologic lymphadenopathy is appreciated.        SKIN:  Intact/turgor normal.  NEUROLOGIC:  Cranial nerves II-XII grossly intact.  Motor:  Good muscle bulk and   tone.  Strength/sensory 5/5 throughout.  Gait  stable.  BREASTS:  The patient's right breast is free from masses, tenderness or nipple   discharge.  The patient's left breast has healed lumpectomy scar and is free   from signs of local recurrence.    LABORATORY:    White count 6.7, hemoglobin 13.8, hematocrit 41.7, platelets 187, absolute neutrophil count 5100.  Sodium 141, potassium 4.9, chloride 101, CO2 30, BUN 18, creatinine 0.7, glucose 96, calcium 10, magnesium 2.3, liver function tests are within normal limits, GFR is greater than 60.  .  Total cholesterol 282, HDL 55, .6, triglyceride 72.    IMPRESSION:    1.  Locally advanced left breast carcinoma without evidence of recurrent disease.    PLAN:  1.  Continue calcium supplementation with vitamin D.  2.  Continue Arimidex 1 mg daily.  3.  Repeat Prolia 60 mg subQ.  4.  Review results of mammogram which is scheduled for September.  5.  Return to clinic in six months from now with interval CBC, CMP, LDH and CXR.      THIS NOTE WAS CREATED USING VOICE RECOGNITION SOFTWARE AND MAY CONTAIN GRAMMATICAL ERRORS.

## 2019-06-07 NOTE — PLAN OF CARE
Problem: Adult Inpatient Plan of Care  Goal: Plan of Care Review  Outcome: Ongoing (interventions implemented as appropriate)  Pt educated to continue calcium and vit D supplement. Pt also educated to refrain from invasive oral procedures for 3 months. Pt verbalized understanding

## 2019-06-12 ENCOUNTER — OFFICE VISIT (OUTPATIENT)
Dept: FAMILY MEDICINE | Facility: CLINIC | Age: 69
End: 2019-06-12
Payer: COMMERCIAL

## 2019-06-12 VITALS
RESPIRATION RATE: 18 BRPM | DIASTOLIC BLOOD PRESSURE: 72 MMHG | SYSTOLIC BLOOD PRESSURE: 124 MMHG | TEMPERATURE: 98 F | BODY MASS INDEX: 27.71 KG/M2 | HEART RATE: 63 BPM | OXYGEN SATURATION: 99 % | HEIGHT: 59 IN | WEIGHT: 137.44 LBS

## 2019-06-12 DIAGNOSIS — Z23 IMMUNIZATION DUE: ICD-10-CM

## 2019-06-12 DIAGNOSIS — E78.5 HYPERLIPIDEMIA, UNSPECIFIED HYPERLIPIDEMIA TYPE: Primary | ICD-10-CM

## 2019-06-12 DIAGNOSIS — E66.3 OVERWEIGHT: ICD-10-CM

## 2019-06-12 PROCEDURE — 3074F PR MOST RECENT SYSTOLIC BLOOD PRESSURE < 130 MM HG: ICD-10-PCS | Mod: CPTII,S$GLB,, | Performed by: FAMILY MEDICINE

## 2019-06-12 PROCEDURE — 99214 PR OFFICE/OUTPT VISIT, EST, LEVL IV, 30-39 MIN: ICD-10-PCS | Mod: 25,S$GLB,, | Performed by: FAMILY MEDICINE

## 2019-06-12 PROCEDURE — 99999 PR PBB SHADOW E&M-EST. PATIENT-LVL III: CPT | Mod: PBBFAC,,, | Performed by: FAMILY MEDICINE

## 2019-06-12 PROCEDURE — 90471 PNEUMOCOCCAL POLYSACCHARIDE VACCINE 23-VALENT =>2YO SQ IM: ICD-10-PCS | Mod: S$GLB,,, | Performed by: FAMILY MEDICINE

## 2019-06-12 PROCEDURE — 1101F PR PT FALLS ASSESS DOC 0-1 FALLS W/OUT INJ PAST YR: ICD-10-PCS | Mod: CPTII,S$GLB,, | Performed by: FAMILY MEDICINE

## 2019-06-12 PROCEDURE — 90471 IMMUNIZATION ADMIN: CPT | Mod: S$GLB,,, | Performed by: FAMILY MEDICINE

## 2019-06-12 PROCEDURE — 3074F SYST BP LT 130 MM HG: CPT | Mod: CPTII,S$GLB,, | Performed by: FAMILY MEDICINE

## 2019-06-12 PROCEDURE — 1101F PT FALLS ASSESS-DOCD LE1/YR: CPT | Mod: CPTII,S$GLB,, | Performed by: FAMILY MEDICINE

## 2019-06-12 PROCEDURE — 99214 OFFICE O/P EST MOD 30 MIN: CPT | Mod: 25,S$GLB,, | Performed by: FAMILY MEDICINE

## 2019-06-12 PROCEDURE — 3078F PR MOST RECENT DIASTOLIC BLOOD PRESSURE < 80 MM HG: ICD-10-PCS | Mod: CPTII,S$GLB,, | Performed by: FAMILY MEDICINE

## 2019-06-12 PROCEDURE — 90732 PNEUMOCOCCAL POLYSACCHARIDE VACCINE 23-VALENT =>2YO SQ IM: ICD-10-PCS | Mod: S$GLB,,, | Performed by: FAMILY MEDICINE

## 2019-06-12 PROCEDURE — 3078F DIAST BP <80 MM HG: CPT | Mod: CPTII,S$GLB,, | Performed by: FAMILY MEDICINE

## 2019-06-12 PROCEDURE — 99999 PR PBB SHADOW E&M-EST. PATIENT-LVL III: ICD-10-PCS | Mod: PBBFAC,,, | Performed by: FAMILY MEDICINE

## 2019-06-12 PROCEDURE — 90732 PPSV23 VACC 2 YRS+ SUBQ/IM: CPT | Mod: S$GLB,,, | Performed by: FAMILY MEDICINE

## 2019-06-12 RX ORDER — ATORVASTATIN CALCIUM 40 MG/1
40 TABLET, FILM COATED ORAL DAILY
Qty: 90 TABLET | Refills: 0 | Status: SHIPPED | OUTPATIENT
Start: 2019-06-12 | End: 2019-09-12 | Stop reason: SDUPTHER

## 2019-06-12 NOTE — PROGRESS NOTES
Subjective:       Patient ID: Zayra Rodgers is a 69 y.o. female.    Chief Complaint: Follow-up    HPI  Patient in the office for f/u and review.  She has lost 40# since LOV with [employer]. She find that she feels much better. Has been walking for exercise and recently joined [employer]. Planning to do yoga and pilates.   She is up to date on routine care.   Has seen uro/cazayoux re: leakage, urgency. They are working with medication, and she has f/u tomorrow.     The 10-year ASCVD risk score (Friendlyjuanito JAY Jr., et al., 2013) is: 10%    Values used to calculate the score:      Age: 69 years      Sex: Female      Is Non- : No      Diabetic: No      Tobacco smoker: No      Systolic Blood Pressure: 124 mmHg      Is BP treated: Yes      HDL Cholesterol: 55 mg/dL      Total Cholesterol: 282 mg/dL   On asa daily. She doesn't know why she stopped previous coq10 and fish oil.   Review of Systems:  Review of Systems   Constitutional: Negative for activity change and unexpected weight change.   HENT: Negative for hearing loss, rhinorrhea and trouble swallowing.    Eyes: Negative for discharge and visual disturbance.   Respiratory: Negative for chest tightness and wheezing.    Cardiovascular: Negative for chest pain and palpitations.   Gastrointestinal: Negative for blood in stool, constipation, diarrhea and vomiting.   Endocrine: Negative for polydipsia and polyuria.   Genitourinary: Negative for difficulty urinating, dysuria, hematuria and menstrual problem.   Musculoskeletal: Negative for arthralgias, joint swelling and neck pain.   Neurological: Negative for weakness and headaches.   Psychiatric/Behavioral: Negative for confusion and dysphoric mood.       Objective:     Vitals:    06/12/19 1127   BP: 124/72   BP Location: Right arm   Patient Position: Sitting   BP Method: Large (Manual)   Pulse: 63   Resp: 18   Temp: 98.4 °F (36.9 °C)   TempSrc: Oral   SpO2: 99%   Weight: 62.4 kg (137 lb 9.1 oz)  "  Height: 5' 3" (1.6 m)          Physical Exam   Constitutional: She is oriented to person, place, and time. She appears well-developed and well-nourished. No distress.   HENT:   Head: Normocephalic and atraumatic.   Eyes: Conjunctivae are normal. Right eye exhibits no discharge. Left eye exhibits no discharge. No scleral icterus.   Neck: Normal range of motion. Neck supple.   Cardiovascular: Normal rate and regular rhythm.   Pulmonary/Chest: Effort normal and breath sounds normal. No respiratory distress.   Abdominal: Soft. She exhibits no distension.   Musculoskeletal: Normal range of motion. She exhibits no edema.   Neurological: She is alert and oriented to person, place, and time.   Skin: Skin is warm and dry. No rash noted.   Psychiatric: She has a normal mood and affect. Her behavior is normal.   Nursing note and vitals reviewed.        Assessment & Plan:  Hyperlipidemia, unspecified hyperlipidemia type  -     atorvastatin (LIPITOR) 40 MG tablet; Take 1 tablet (40 mg total) by mouth once daily.  Dispense: 90 tablet; Refill: 0  -     Comprehensive metabolic panel; Future; Expected date: 06/12/2019  -     Lipid panel; Future; Expected date: 06/12/2019  -     CK; Future; Expected date: 06/12/2019  Side effects and precautions of medication use reviewed with patient, expressed understanding. No questions or concerns.   Cont asa, resume coq10. Repeat lab 2-3mos for monitoring.   Immunization due  -     Pneumococcal Polysaccharide Vaccine (23 Valent) (SQ/IM)  Overweight  Goal weight reviewed as well as need for lifestyle modifications to incl caloric restriction, high protein/low carb, increased water intake, muscle-building exercises as well as cardio.    Congratulated her on weight loss to date.  "

## 2019-06-13 ENCOUNTER — OFFICE VISIT (OUTPATIENT)
Dept: UROLOGY | Facility: CLINIC | Age: 69
End: 2019-06-13
Payer: COMMERCIAL

## 2019-06-13 VITALS
HEIGHT: 59 IN | SYSTOLIC BLOOD PRESSURE: 111 MMHG | WEIGHT: 138.69 LBS | BODY MASS INDEX: 27.96 KG/M2 | DIASTOLIC BLOOD PRESSURE: 74 MMHG | HEART RATE: 71 BPM

## 2019-06-13 DIAGNOSIS — N39.41 URGE INCONTINENCE: Primary | ICD-10-CM

## 2019-06-13 DIAGNOSIS — R35.0 INCREASED URINARY FREQUENCY: ICD-10-CM

## 2019-06-13 DIAGNOSIS — R39.15 URINARY URGENCY: ICD-10-CM

## 2019-06-13 LAB
BILIRUB SERPL-MCNC: NORMAL MG/DL
BLOOD URINE, POC: NORMAL
COLOR, POC UA: YELLOW
GLUCOSE UR QL STRIP: NORMAL
KETONES UR QL STRIP: NORMAL
LEUKOCYTE ESTERASE URINE, POC: NORMAL
NITRITE, POC UA: NORMAL
PH, POC UA: 6
PROTEIN, POC: NORMAL
SPECIFIC GRAVITY, POC UA: 1.01
UROBILINOGEN, POC UA: NORMAL

## 2019-06-13 PROCEDURE — 3078F DIAST BP <80 MM HG: CPT | Mod: CPTII,S$GLB,, | Performed by: UROLOGY

## 2019-06-13 PROCEDURE — 81002 URINALYSIS NONAUTO W/O SCOPE: CPT | Mod: S$GLB,,, | Performed by: UROLOGY

## 2019-06-13 PROCEDURE — 81002 POCT URINE DIPSTICK WITHOUT MICROSCOPE: ICD-10-PCS | Mod: S$GLB,,, | Performed by: UROLOGY

## 2019-06-13 PROCEDURE — 99213 OFFICE O/P EST LOW 20 MIN: CPT | Mod: 25,S$GLB,, | Performed by: UROLOGY

## 2019-06-13 PROCEDURE — 1101F PR PT FALLS ASSESS DOC 0-1 FALLS W/OUT INJ PAST YR: ICD-10-PCS | Mod: CPTII,S$GLB,, | Performed by: UROLOGY

## 2019-06-13 PROCEDURE — 99999 PR PBB SHADOW E&M-EST. PATIENT-LVL III: CPT | Mod: PBBFAC,,, | Performed by: UROLOGY

## 2019-06-13 PROCEDURE — 99213 PR OFFICE/OUTPT VISIT, EST, LEVL III, 20-29 MIN: ICD-10-PCS | Mod: 25,S$GLB,, | Performed by: UROLOGY

## 2019-06-13 PROCEDURE — 99999 PR PBB SHADOW E&M-EST. PATIENT-LVL III: ICD-10-PCS | Mod: PBBFAC,,, | Performed by: UROLOGY

## 2019-06-13 PROCEDURE — 1101F PT FALLS ASSESS-DOCD LE1/YR: CPT | Mod: CPTII,S$GLB,, | Performed by: UROLOGY

## 2019-06-13 PROCEDURE — 3074F SYST BP LT 130 MM HG: CPT | Mod: CPTII,S$GLB,, | Performed by: UROLOGY

## 2019-06-13 PROCEDURE — 3074F PR MOST RECENT SYSTOLIC BLOOD PRESSURE < 130 MM HG: ICD-10-PCS | Mod: CPTII,S$GLB,, | Performed by: UROLOGY

## 2019-06-13 PROCEDURE — 3078F PR MOST RECENT DIASTOLIC BLOOD PRESSURE < 80 MM HG: ICD-10-PCS | Mod: CPTII,S$GLB,, | Performed by: UROLOGY

## 2019-06-13 NOTE — PROGRESS NOTES
Subjective:       Patient ID: Zayra Rodgers is a 69 y.o. female.    Chief Complaint: Urinary Incontinence and Urinary Frequency    HPI     69 year old urinary frequency and urgency and urge incontinence.  She says her bladder is tender when full.  She was given oxybutynin last year and her symptoms improved.  She now feels like her urinary frequency and nocturia have returned and that the oxybutynin is no longer effective.  She denies constipation.  She denies hematuria and dysuria.  Cystoscopy last year was unremarkable as was kidney ultrasound.     Urine dipstick shows negative for nitrites, leukocytes, glucose, protein, positive for 2+ blood    Review of Systems   Constitutional: Negative for fever.   Genitourinary: Negative for dysuria and hematuria.       Objective:      Physical Exam   Constitutional: She is oriented to person, place, and time. She appears well-developed and well-nourished.   Pulmonary/Chest: Effort normal. No respiratory distress.   Abdominal: Soft. Normal appearance. There is no tenderness. There is no CVA tenderness.   Neurological: She is alert and oriented to person, place, and time.   Skin: Skin is warm.   Psychiatric: She has a normal mood and affect. Her behavior is normal.   Vitals reviewed.      Assessment:       1. Urge incontinence    2. Increased urinary frequency    3. Urinary urgency        Plan:       Urge incontinence  -     POCT URINE DIPSTICK WITHOUT MICROSCOPE    Increased urinary frequency    Urinary urgency    Other orders  -     mirabegron (MYRBETRIQ) 50 mg Tb24; Take 1 tablet (50 mg total) by mouth once daily.  Dispense: 30 tablet; Refill: 11      recommend trial Myrbetriq.

## 2019-08-23 DIAGNOSIS — M94.9 BONE/CARTILAGE DISORDER: ICD-10-CM

## 2019-08-23 DIAGNOSIS — M89.9 BONE/CARTILAGE DISORDER: ICD-10-CM

## 2019-08-23 DIAGNOSIS — C50.012 MALIGNANT NEOPLASM OF NIPPLE OF LEFT BREAST IN FEMALE: ICD-10-CM

## 2019-08-25 RX ORDER — ACETAMINOPHEN 500 MG
2 TABLET ORAL DAILY
Qty: 200 TABLET | Refills: 0 | Status: SHIPPED | OUTPATIENT
Start: 2019-08-25 | End: 2020-01-02 | Stop reason: SDUPTHER

## 2019-09-12 DIAGNOSIS — E78.5 HYPERLIPIDEMIA, UNSPECIFIED HYPERLIPIDEMIA TYPE: ICD-10-CM

## 2019-09-12 RX ORDER — ATORVASTATIN CALCIUM 40 MG/1
40 TABLET, FILM COATED ORAL DAILY
Qty: 90 TABLET | Refills: 1 | Status: SHIPPED | OUTPATIENT
Start: 2019-09-12 | End: 2020-01-02 | Stop reason: SDUPTHER

## 2019-09-13 ENCOUNTER — LAB VISIT (OUTPATIENT)
Dept: LAB | Facility: HOSPITAL | Age: 69
End: 2019-09-13
Attending: FAMILY MEDICINE
Payer: COMMERCIAL

## 2019-09-13 DIAGNOSIS — E78.5 HYPERLIPIDEMIA, UNSPECIFIED HYPERLIPIDEMIA TYPE: ICD-10-CM

## 2019-09-13 LAB
ALBUMIN SERPL BCP-MCNC: 4.1 G/DL (ref 3.5–5.2)
ALP SERPL-CCNC: 35 U/L (ref 55–135)
ALT SERPL W/O P-5'-P-CCNC: 12 U/L (ref 10–44)
ANION GAP SERPL CALC-SCNC: 8 MMOL/L (ref 8–16)
AST SERPL-CCNC: 19 U/L (ref 10–40)
BILIRUB SERPL-MCNC: 0.3 MG/DL (ref 0.1–1)
BUN SERPL-MCNC: 23 MG/DL (ref 8–23)
CALCIUM SERPL-MCNC: 10 MG/DL (ref 8.7–10.5)
CHLORIDE SERPL-SCNC: 100 MMOL/L (ref 95–110)
CHOLEST SERPL-MCNC: 187 MG/DL (ref 120–199)
CHOLEST/HDLC SERPL: 2.8 {RATIO} (ref 2–5)
CK SERPL-CCNC: 119 U/L (ref 20–180)
CO2 SERPL-SCNC: 31 MMOL/L (ref 23–29)
CREAT SERPL-MCNC: 0.8 MG/DL (ref 0.5–1.4)
EST. GFR  (AFRICAN AMERICAN): >60 ML/MIN/1.73 M^2
EST. GFR  (NON AFRICAN AMERICAN): >60 ML/MIN/1.73 M^2
GLUCOSE SERPL-MCNC: 82 MG/DL (ref 70–110)
HDLC SERPL-MCNC: 68 MG/DL (ref 40–75)
HDLC SERPL: 36.4 % (ref 20–50)
LDLC SERPL CALC-MCNC: 108.6 MG/DL (ref 63–159)
NONHDLC SERPL-MCNC: 119 MG/DL
POTASSIUM SERPL-SCNC: 4.9 MMOL/L (ref 3.5–5.1)
PROT SERPL-MCNC: 7.3 G/DL (ref 6–8.4)
SODIUM SERPL-SCNC: 139 MMOL/L (ref 136–145)
TRIGL SERPL-MCNC: 52 MG/DL (ref 30–150)

## 2019-09-13 PROCEDURE — 82550 ASSAY OF CK (CPK): CPT

## 2019-09-13 PROCEDURE — 36415 COLL VENOUS BLD VENIPUNCTURE: CPT | Mod: PN

## 2019-09-13 PROCEDURE — 80061 LIPID PANEL: CPT

## 2019-09-13 PROCEDURE — 80053 COMPREHEN METABOLIC PANEL: CPT

## 2019-09-16 ENCOUNTER — TELEPHONE (OUTPATIENT)
Dept: HEMATOLOGY/ONCOLOGY | Facility: CLINIC | Age: 69
End: 2019-09-16

## 2019-09-16 NOTE — TELEPHONE ENCOUNTER
----- Message from Chandra Davis sent at 9/16/2019  3:13 PM CDT -----  Contact: patient  Type: Needs Medical Advice    Who Called:  patient  Best Call Back Number: [phone]  Additional Information: requesting a call back regarding upcoming tests

## 2019-10-07 ENCOUNTER — HOSPITAL ENCOUNTER (OUTPATIENT)
Dept: RADIOLOGY | Facility: HOSPITAL | Age: 69
Discharge: HOME | End: 2019-10-07
Attending: INTERNAL MEDICINE
Payer: COMMERCIAL

## 2019-10-07 DIAGNOSIS — I10 HYPERTENSION, UNSPECIFIED TYPE: ICD-10-CM

## 2019-10-07 DIAGNOSIS — C50.012 MALIGNANT NEOPLASM OF NIPPLE OF LEFT BREAST IN FEMALE, UNSPECIFIED ESTROGEN RECEPTOR STATUS: ICD-10-CM

## 2019-10-07 DIAGNOSIS — Z79.811 AROMATASE INHIBITOR USE: ICD-10-CM

## 2019-10-07 PROCEDURE — 77066 DX MAMMO INCL CAD BI: CPT | Mod: 26,,, | Performed by: RADIOLOGY

## 2019-10-07 PROCEDURE — 71046 X-RAY EXAM CHEST 2 VIEWS: CPT | Mod: TC,FY,PO

## 2019-10-07 PROCEDURE — 77062 MAMMO DIGITAL DIAGNOSTIC BILAT WITH TOMOSYNTHESIS_CAD: ICD-10-PCS | Mod: 26,,, | Performed by: RADIOLOGY

## 2019-10-07 PROCEDURE — 77066 DX MAMMO INCL CAD BI: CPT | Mod: TC,PO

## 2019-10-07 PROCEDURE — 71046 X-RAY EXAM CHEST 2 VIEWS: CPT | Mod: 26,,, | Performed by: RADIOLOGY

## 2019-10-07 PROCEDURE — 77066 MAMMO DIGITAL DIAGNOSTIC BILAT WITH TOMOSYNTHESIS_CAD: ICD-10-PCS | Mod: 26,,, | Performed by: RADIOLOGY

## 2019-10-07 PROCEDURE — 71046 XR CHEST PA AND LATERAL: ICD-10-PCS | Mod: 26,,, | Performed by: RADIOLOGY

## 2019-10-07 PROCEDURE — 77062 BREAST TOMOSYNTHESIS BI: CPT | Mod: 26,,, | Performed by: RADIOLOGY

## 2019-10-07 RX ORDER — HYDROCHLOROTHIAZIDE 25 MG/1
25 TABLET ORAL DAILY PRN
Qty: 90 TABLET | Refills: 1 | Status: CANCELLED | OUTPATIENT
Start: 2019-10-07

## 2019-10-08 RX ORDER — HYDROCHLOROTHIAZIDE 25 MG/1
25 TABLET ORAL DAILY PRN
Qty: 90 TABLET | Refills: 1 | Status: SHIPPED | OUTPATIENT
Start: 2019-10-08 | End: 2019-12-06 | Stop reason: SDUPTHER

## 2019-11-21 DIAGNOSIS — M89.9 BONE/CARTILAGE DISORDER: ICD-10-CM

## 2019-11-21 DIAGNOSIS — C50.012 MALIGNANT NEOPLASM OF NIPPLE OF LEFT BREAST IN FEMALE: ICD-10-CM

## 2019-11-21 DIAGNOSIS — M94.9 BONE/CARTILAGE DISORDER: ICD-10-CM

## 2019-11-21 RX ORDER — ANASTROZOLE 1 MG/1
1 TABLET ORAL DAILY
Qty: 90 TABLET | Refills: 1 | Status: SHIPPED | OUTPATIENT
Start: 2019-11-21 | End: 2020-05-24 | Stop reason: SDUPTHER

## 2019-11-25 ENCOUNTER — PATIENT MESSAGE (OUTPATIENT)
Dept: HEMATOLOGY/ONCOLOGY | Facility: CLINIC | Age: 69
End: 2019-11-25

## 2019-11-25 DIAGNOSIS — E78.00 HYPERCHOLESTEROLEMIA: Primary | ICD-10-CM

## 2019-12-02 ENCOUNTER — LAB VISIT (OUTPATIENT)
Dept: LAB | Facility: HOSPITAL | Age: 69
End: 2019-12-02
Attending: INTERNAL MEDICINE
Payer: COMMERCIAL

## 2019-12-02 DIAGNOSIS — E78.00 HYPERCHOLESTEROLEMIA: ICD-10-CM

## 2019-12-02 DIAGNOSIS — Z79.811 AROMATASE INHIBITOR USE: ICD-10-CM

## 2019-12-02 DIAGNOSIS — C50.012 MALIGNANT NEOPLASM OF NIPPLE OF LEFT BREAST IN FEMALE, UNSPECIFIED ESTROGEN RECEPTOR STATUS: ICD-10-CM

## 2019-12-02 LAB
ANION GAP SERPL CALC-SCNC: 9 MMOL/L (ref 8–16)
BUN SERPL-MCNC: 18 MG/DL (ref 7–18)
CALCIUM SERPL-MCNC: 9.2 MG/DL (ref 8.4–10.2)
CHLORIDE SERPL-SCNC: 101 MMOL/L (ref 95–110)
CHOLEST SERPL-MCNC: 186 MG/DL (ref 120–199)
CHOLEST/HDLC SERPL: 3.2 {RATIO} (ref 2–5)
CO2 SERPL-SCNC: 30 MMOL/L (ref 22–31)
CREAT SERPL-MCNC: 0.62 MG/DL (ref 0.5–1.4)
EST. GFR  (AFRICAN AMERICAN): >60 ML/MIN/1.73 M^2
EST. GFR  (NON AFRICAN AMERICAN): >60 ML/MIN/1.73 M^2
GLUCOSE SERPL-MCNC: 95 MG/DL (ref 70–110)
HDLC SERPL-MCNC: 58 MG/DL (ref 40–75)
HDLC SERPL: 31.2 % (ref 20–50)
LDLC SERPL CALC-MCNC: 106 MG/DL (ref 63–159)
NONHDLC SERPL-MCNC: 128 MG/DL
POTASSIUM SERPL-SCNC: 4.3 MMOL/L (ref 3.5–5.1)
SODIUM SERPL-SCNC: 140 MMOL/L (ref 136–145)
TRIGL SERPL-MCNC: 110 MG/DL (ref 30–150)

## 2019-12-02 PROCEDURE — 80061 LIPID PANEL: CPT

## 2019-12-02 PROCEDURE — 80048 BASIC METABOLIC PNL TOTAL CA: CPT | Mod: PN

## 2019-12-02 PROCEDURE — 80061 LIPID PANEL: CPT | Mod: PN

## 2019-12-02 PROCEDURE — 36415 COLL VENOUS BLD VENIPUNCTURE: CPT | Mod: PN

## 2019-12-02 PROCEDURE — 80048 BASIC METABOLIC PNL TOTAL CA: CPT

## 2019-12-05 DIAGNOSIS — C50.012 MALIGNANT NEOPLASM OF NIPPLE OF LEFT BREAST IN FEMALE, UNSPECIFIED ESTROGEN RECEPTOR STATUS: Primary | ICD-10-CM

## 2019-12-06 ENCOUNTER — OFFICE VISIT (OUTPATIENT)
Dept: HEMATOLOGY/ONCOLOGY | Facility: CLINIC | Age: 69
End: 2019-12-06
Payer: COMMERCIAL

## 2019-12-06 ENCOUNTER — INFUSION (OUTPATIENT)
Dept: INFUSION THERAPY | Facility: HOSPITAL | Age: 69
End: 2019-12-06
Attending: INTERNAL MEDICINE
Payer: COMMERCIAL

## 2019-12-06 VITALS
HEART RATE: 76 BPM | WEIGHT: 145.94 LBS | SYSTOLIC BLOOD PRESSURE: 167 MMHG | DIASTOLIC BLOOD PRESSURE: 95 MMHG | BODY MASS INDEX: 29.42 KG/M2 | RESPIRATION RATE: 16 BRPM | TEMPERATURE: 98 F | HEIGHT: 59 IN

## 2019-12-06 VITALS
TEMPERATURE: 98 F | RESPIRATION RATE: 16 BRPM | SYSTOLIC BLOOD PRESSURE: 167 MMHG | DIASTOLIC BLOOD PRESSURE: 95 MMHG | HEART RATE: 76 BPM

## 2019-12-06 DIAGNOSIS — M94.9 BONE/CARTILAGE DISORDER: Primary | ICD-10-CM

## 2019-12-06 DIAGNOSIS — M89.9 BONE/CARTILAGE DISORDER: Primary | ICD-10-CM

## 2019-12-06 DIAGNOSIS — C50.012 MALIGNANT NEOPLASM OF NIPPLE OF LEFT BREAST IN FEMALE, UNSPECIFIED ESTROGEN RECEPTOR STATUS: Primary | ICD-10-CM

## 2019-12-06 DIAGNOSIS — C77.3 MALIGNANT NEOPLASM METASTATIC TO LYMPH NODE OF AXILLA: ICD-10-CM

## 2019-12-06 DIAGNOSIS — I10 HYPERTENSION, UNSPECIFIED TYPE: ICD-10-CM

## 2019-12-06 DIAGNOSIS — Z79.811 AROMATASE INHIBITOR USE: ICD-10-CM

## 2019-12-06 PROCEDURE — 3080F DIAST BP >= 90 MM HG: CPT | Mod: CPTII,S$GLB,, | Performed by: INTERNAL MEDICINE

## 2019-12-06 PROCEDURE — 99999 PR PBB SHADOW E&M-EST. PATIENT-LVL IV: ICD-10-PCS | Mod: PBBFAC,,, | Performed by: INTERNAL MEDICINE

## 2019-12-06 PROCEDURE — 1126F AMNT PAIN NOTED NONE PRSNT: CPT | Mod: S$GLB,,, | Performed by: INTERNAL MEDICINE

## 2019-12-06 PROCEDURE — 1159F MED LIST DOCD IN RCRD: CPT | Mod: S$GLB,,, | Performed by: INTERNAL MEDICINE

## 2019-12-06 PROCEDURE — 63600175 PHARM REV CODE 636 W HCPCS: Mod: JG,PN | Performed by: INTERNAL MEDICINE

## 2019-12-06 PROCEDURE — 96372 THER/PROPH/DIAG INJ SC/IM: CPT | Mod: PN

## 2019-12-06 PROCEDURE — 99999 PR PBB SHADOW E&M-EST. PATIENT-LVL IV: CPT | Mod: PBBFAC,,, | Performed by: INTERNAL MEDICINE

## 2019-12-06 PROCEDURE — 1159F PR MEDICATION LIST DOCUMENTED IN MEDICAL RECORD: ICD-10-PCS | Mod: S$GLB,,, | Performed by: INTERNAL MEDICINE

## 2019-12-06 PROCEDURE — 1126F PR PAIN SEVERITY QUANTIFIED, NO PAIN PRESENT: ICD-10-PCS | Mod: S$GLB,,, | Performed by: INTERNAL MEDICINE

## 2019-12-06 PROCEDURE — 3080F PR MOST RECENT DIASTOLIC BLOOD PRESSURE >= 90 MM HG: ICD-10-PCS | Mod: CPTII,S$GLB,, | Performed by: INTERNAL MEDICINE

## 2019-12-06 PROCEDURE — 1101F PT FALLS ASSESS-DOCD LE1/YR: CPT | Mod: CPTII,S$GLB,, | Performed by: INTERNAL MEDICINE

## 2019-12-06 PROCEDURE — 3077F PR MOST RECENT SYSTOLIC BLOOD PRESSURE >= 140 MM HG: ICD-10-PCS | Mod: CPTII,S$GLB,, | Performed by: INTERNAL MEDICINE

## 2019-12-06 PROCEDURE — 99214 OFFICE O/P EST MOD 30 MIN: CPT | Mod: S$GLB,,, | Performed by: INTERNAL MEDICINE

## 2019-12-06 PROCEDURE — 3077F SYST BP >= 140 MM HG: CPT | Mod: CPTII,S$GLB,, | Performed by: INTERNAL MEDICINE

## 2019-12-06 PROCEDURE — 1101F PR PT FALLS ASSESS DOC 0-1 FALLS W/OUT INJ PAST YR: ICD-10-PCS | Mod: CPTII,S$GLB,, | Performed by: INTERNAL MEDICINE

## 2019-12-06 PROCEDURE — 99214 PR OFFICE/OUTPT VISIT, EST, LEVL IV, 30-39 MIN: ICD-10-PCS | Mod: S$GLB,,, | Performed by: INTERNAL MEDICINE

## 2019-12-06 RX ORDER — HYDROCHLOROTHIAZIDE 25 MG/1
25 TABLET ORAL DAILY PRN
Qty: 90 TABLET | Refills: 1 | Status: SHIPPED | OUTPATIENT
Start: 2019-12-06 | End: 2021-11-09 | Stop reason: SDUPTHER

## 2019-12-06 RX ADMIN — DENOSUMAB 60 MG: 60 INJECTION SUBCUTANEOUS at 01:12

## 2019-12-06 NOTE — PROGRESS NOTES
HISTORY OF PRESENT ILLNESS:  The patient is a 68-year-old white female well   known to me for locally advanced left breast carcinoma, who underwent   neoadjuvant therapy consisting of four cycles of Adriamycin/Cytoxan followed by   12 weekly doses of Taxol.  She had a residual 0.3 mm focus of metastatic   carcinoma in a single lymph node.  She is currently managed with adjuvant   Arimidex/biannual Prolia and returns to clinic for 42-month post-therapy   reevaluation.  No new breast complaints, chest pain, abdominal pain, bone pain,   postmenopausal bleeding, unexplained weight loss, etc.  Patient is doing well with the exception of occasional calf cramps which she attributes to use of statins.  No other complaints or   pertinent findings on a 14-point review of system.    PHYSICAL EXAMINATION:  GENERAL:  Well-developed, well-nourished white female in no acute distress, with   a weight of 146 pounds (increased by 8 pounds).  VITAL SIGNS:  Documented in EMR and reviewed.  HEENT:  Normocephalic, atraumatic.  Oral mucosa pink and moist.  Lips without   lesions.  Tongue midline.  Oropharynx clear.  Nonicteric sclerae.   NECK:  Supple, no adenopathy.  No carotid bruits, thyromegaly or thyroid nodule.  HEART:  Regular rate and rhythm without murmur, gallop or rub.                LUNGS:  Clear to auscultation bilaterally.  Normal respiratory effort.       ABDOMEN:  Soft, nontender, nondistended with positive normoactive bowel sounds,   no hepatosplenomegaly.    EXTREMITIES:  No cyanosis, clubbing or edema.  Distal pulses are intact.                                              AXILLAE AND GROIN:  No palpable pathologic lymphadenopathy is appreciated.        SKIN:  Intact/turgor normal.  NEUROLOGIC:  Cranial nerves II-XII grossly intact.  Motor:  Good muscle bulk and   tone.  Strength/sensory 5/5 throughout.  Gait stable.  BREASTS:  The patient's right breast is free from masses, tenderness or nipple   discharge.  The  patient's left breast has healed lumpectomy scar and is free   from signs of local recurrence.    LABORATORY:    White count 6.3, hemoglobin 13.2, hematocrit 40.6, platelets 212, absolute neutrophil count is 4300.  Sodium 140, potassium 4.5, chloride 103, CO2 30, BUN 21, creatinine 0.6, glucose 93, calcium 9.5, liver function test within normal limits, GFR is greater than 60.    Mammography:  BI-RADS category 1 with annual follow-up recommended.    Chest x-ray:  No acute cardiopulmonary process is appreciated.    IMPRESSION:    1.  Locally advanced left breast carcinoma without evidence of recurrent disease.    PLAN:  1.  Continue calcium supplementation with vitamin D.  2.  Continue Arimidex 1 mg daily.  3.  Repeat Prolia 60 mg subQ.  4.  Return to clinic in six months from now with interval BMP in anticipation of next dose Prolia.    THIS NOTE WAS CREATED USING VOICE RECOGNITION SOFTWARE AND MAY CONTAIN GRAMMATICAL ERRORS.

## 2019-12-09 ENCOUNTER — OFFICE VISIT (OUTPATIENT)
Dept: FAMILY MEDICINE | Facility: CLINIC | Age: 69
End: 2019-12-09
Payer: COMMERCIAL

## 2019-12-09 ENCOUNTER — LAB VISIT (OUTPATIENT)
Dept: LAB | Facility: HOSPITAL | Age: 69
End: 2019-12-09
Attending: FAMILY MEDICINE
Payer: COMMERCIAL

## 2019-12-09 VITALS
SYSTOLIC BLOOD PRESSURE: 126 MMHG | BODY MASS INDEX: 29.36 KG/M2 | WEIGHT: 145.63 LBS | OXYGEN SATURATION: 97 % | TEMPERATURE: 99 F | HEART RATE: 77 BPM | HEIGHT: 59 IN | DIASTOLIC BLOOD PRESSURE: 82 MMHG

## 2019-12-09 DIAGNOSIS — R53.83 FATIGUE, UNSPECIFIED TYPE: Primary | ICD-10-CM

## 2019-12-09 DIAGNOSIS — R53.83 FATIGUE, UNSPECIFIED TYPE: ICD-10-CM

## 2019-12-09 PROCEDURE — 99999 PR PBB SHADOW E&M-EST. PATIENT-LVL III: CPT | Mod: PBBFAC,,, | Performed by: FAMILY MEDICINE

## 2019-12-09 PROCEDURE — 3074F PR MOST RECENT SYSTOLIC BLOOD PRESSURE < 130 MM HG: ICD-10-PCS | Mod: CPTII,S$GLB,, | Performed by: FAMILY MEDICINE

## 2019-12-09 PROCEDURE — 99213 OFFICE O/P EST LOW 20 MIN: CPT | Mod: S$GLB,,, | Performed by: FAMILY MEDICINE

## 2019-12-09 PROCEDURE — 3074F SYST BP LT 130 MM HG: CPT | Mod: CPTII,S$GLB,, | Performed by: FAMILY MEDICINE

## 2019-12-09 PROCEDURE — 99213 PR OFFICE/OUTPT VISIT, EST, LEVL III, 20-29 MIN: ICD-10-PCS | Mod: S$GLB,,, | Performed by: FAMILY MEDICINE

## 2019-12-09 PROCEDURE — 84443 ASSAY THYROID STIM HORMONE: CPT

## 2019-12-09 PROCEDURE — 36415 COLL VENOUS BLD VENIPUNCTURE: CPT | Mod: PN

## 2019-12-09 PROCEDURE — 3079F DIAST BP 80-89 MM HG: CPT | Mod: CPTII,S$GLB,, | Performed by: FAMILY MEDICINE

## 2019-12-09 PROCEDURE — 3079F PR MOST RECENT DIASTOLIC BLOOD PRESSURE 80-89 MM HG: ICD-10-PCS | Mod: CPTII,S$GLB,, | Performed by: FAMILY MEDICINE

## 2019-12-09 PROCEDURE — 1101F PR PT FALLS ASSESS DOC 0-1 FALLS W/OUT INJ PAST YR: ICD-10-PCS | Mod: CPTII,S$GLB,, | Performed by: FAMILY MEDICINE

## 2019-12-09 PROCEDURE — 82306 VITAMIN D 25 HYDROXY: CPT

## 2019-12-09 PROCEDURE — 82746 ASSAY OF FOLIC ACID SERUM: CPT

## 2019-12-09 PROCEDURE — 1159F MED LIST DOCD IN RCRD: CPT | Mod: S$GLB,,, | Performed by: FAMILY MEDICINE

## 2019-12-09 PROCEDURE — 1101F PT FALLS ASSESS-DOCD LE1/YR: CPT | Mod: CPTII,S$GLB,, | Performed by: FAMILY MEDICINE

## 2019-12-09 PROCEDURE — 82607 VITAMIN B-12: CPT

## 2019-12-09 PROCEDURE — 84439 ASSAY OF FREE THYROXINE: CPT

## 2019-12-09 PROCEDURE — 99999 PR PBB SHADOW E&M-EST. PATIENT-LVL III: ICD-10-PCS | Mod: PBBFAC,,, | Performed by: FAMILY MEDICINE

## 2019-12-09 PROCEDURE — 1159F PR MEDICATION LIST DOCUMENTED IN MEDICAL RECORD: ICD-10-PCS | Mod: S$GLB,,, | Performed by: FAMILY MEDICINE

## 2019-12-09 NOTE — PROGRESS NOTES
Subjective:       Patient ID: Zayra Rodgers is a 69 y.o. female.    Chief Complaint: Follow-up (on statin therapy)    HPI  Patient in the office for f/u and review.  Since LOV, working with a  at her gym, particularly on core and strengthening.   She does note that she is having a slight increase in leg cramps at night. Not nightly. Does not occur with exertional activities.  +fatigue. At least in part due to broken sleep (bladder, puppy). A lot of stress related to her 's health. Discussed benefit of seeing a counselor or therapist for review.    The 10-year ASCVD risk score (Perrysburgjuanito JAY Jr., et al., 2013) is: 8.5%    Values used to calculate the score:      Age: 69 years      Sex: Female      Is Non- : No      Diabetic: No      Tobacco smoker: No      Systolic Blood Pressure: 126 mmHg      Is BP treated: Yes      HDL Cholesterol: 58 mg/dL      Total Cholesterol: 186 mg/dL     Review of Systems:  Review of Systems   Constitutional: Positive for fatigue. Negative for activity change and unexpected weight change.   HENT: Negative for hearing loss, rhinorrhea and trouble swallowing.    Eyes: Negative for discharge and visual disturbance.   Respiratory: Negative for chest tightness and wheezing.    Cardiovascular: Negative for chest pain and palpitations.   Gastrointestinal: Negative for blood in stool, constipation, diarrhea and vomiting.   Endocrine: Negative for polydipsia and polyuria.   Genitourinary: Negative for difficulty urinating, dysuria, hematuria and menstrual problem.   Musculoskeletal: Negative for arthralgias, joint swelling and neck pain.   Neurological: Negative for weakness and headaches.   Psychiatric/Behavioral: Positive for sleep disturbance. Negative for confusion and dysphoric mood.       Objective:     Vitals:    12/09/19 1316   BP: 126/82   BP Location: Right arm   Patient Position: Sitting   BP Method: Medium (Manual)   Pulse: 77   Temp: 98.6 °F (37 °C)  "  TempSrc: Oral   SpO2: 97%   Weight: 66 kg (145 lb 9.8 oz)   Height: 4' 11" (1.499 m)          Physical Exam   Constitutional: She is oriented to person, place, and time. She appears well-developed and well-nourished. No distress.   HENT:   Head: Normocephalic and atraumatic.   Mouth/Throat: Oropharynx is clear and moist.   Eyes: Conjunctivae are normal. Right eye exhibits no discharge. Left eye exhibits no discharge. No scleral icterus.   Neck: Normal range of motion. Neck supple.   Cardiovascular: Normal rate and regular rhythm.   Pulmonary/Chest: Effort normal and breath sounds normal. No respiratory distress.   Abdominal: Soft. She exhibits no distension.   Musculoskeletal: Normal range of motion. She exhibits no edema.   Neurological: She is alert and oriented to person, place, and time.   Skin: Skin is warm and dry. No rash noted.   Psychiatric: She has a normal mood and affect. Her behavior is normal.   Nursing note and vitals reviewed.        Assessment & Plan:  Fatigue, unspecified type  -     TSH; Future; Expected date: 12/09/2019  -     Vitamin D; Future; Expected date: 12/09/2019  -     Vitamin B12; Future; Expected date: 12/09/2019  -     Folate; Future; Expected date: 12/09/2019  -     T4, free; Future; Expected date: 12/09/2019    Discussed need to continue efforts of self-care and sx monitoring considering probable stress component to fatigue. Pt has outside resource for psych and will let me know if additional rec/referral is needed.  update lab today for metabolic contributors to fatigue.   No current concerns for sleep d/o.  "

## 2019-12-10 LAB
25(OH)D3+25(OH)D2 SERPL-MCNC: 59 NG/ML (ref 30–96)
FOLATE SERPL-MCNC: 14.7 NG/ML (ref 4–24)
T4 FREE SERPL-MCNC: 1.03 NG/DL (ref 0.71–1.51)
TSH SERPL DL<=0.005 MIU/L-ACNC: 1.53 UIU/ML (ref 0.4–4)
VIT B12 SERPL-MCNC: 858 PG/ML (ref 210–950)

## 2019-12-24 DIAGNOSIS — E78.5 HYPERLIPIDEMIA, UNSPECIFIED HYPERLIPIDEMIA TYPE: ICD-10-CM

## 2019-12-31 DIAGNOSIS — M94.9 BONE/CARTILAGE DISORDER: ICD-10-CM

## 2019-12-31 DIAGNOSIS — M89.9 BONE/CARTILAGE DISORDER: ICD-10-CM

## 2019-12-31 DIAGNOSIS — C50.012 MALIGNANT NEOPLASM OF NIPPLE OF LEFT BREAST IN FEMALE: ICD-10-CM

## 2019-12-31 RX ORDER — ACETAMINOPHEN 500 MG
2 TABLET ORAL DAILY
Qty: 200 TABLET | Refills: 0 | Status: CANCELLED | OUTPATIENT
Start: 2019-12-31

## 2020-01-02 ENCOUNTER — TELEPHONE (OUTPATIENT)
Dept: HEMATOLOGY/ONCOLOGY | Facility: CLINIC | Age: 70
End: 2020-01-02

## 2020-01-02 RX ORDER — ACETAMINOPHEN 500 MG
2 TABLET ORAL DAILY
Qty: 200 TABLET | Refills: 3 | OUTPATIENT
Start: 2020-01-02 | End: 2021-04-26 | Stop reason: SDUPTHER

## 2020-01-02 RX ORDER — ATORVASTATIN CALCIUM 40 MG/1
40 TABLET, FILM COATED ORAL DAILY
Qty: 90 TABLET | Refills: 1 | Status: SHIPPED | OUTPATIENT
Start: 2020-01-02 | End: 2022-09-16 | Stop reason: ALTCHOICE

## 2020-01-02 NOTE — TELEPHONE ENCOUNTER
----- Message from Lacey J Mondor sent at 1/2/2020 12:15 PM CST -----  Patient has been waiting for her medication to be refilled to [facility] in [place]. Dr. Rodgers is here in the pharmacy and has been told that it is available OTC but he wants it to go through her insurance.

## 2020-01-02 NOTE — TELEPHONE ENCOUNTER
Pt Of Nell Mcdonough MD    Last seen on: 12/9/19    Next appt: n/a    Last refill: 9/12/19    Allergies:   Review of patient's allergies indicates:   Allergen Reactions    Sulfa (sulfonamide antibiotics)        Pharmacy:     [facility] [address] [facility] [address] [place] [address]  Phone: [phone] Fax: [phone]        Please review! Thank you!

## 2020-02-06 ENCOUNTER — PATIENT MESSAGE (OUTPATIENT)
Dept: PHARMACY | Facility: CLINIC | Age: 70
End: 2020-02-06

## 2020-04-30 ENCOUNTER — PATIENT MESSAGE (OUTPATIENT)
Dept: FAMILY MEDICINE | Facility: CLINIC | Age: 70
End: 2020-04-30

## 2020-05-01 ENCOUNTER — OFFICE VISIT (OUTPATIENT)
Dept: FAMILY MEDICINE | Facility: CLINIC | Age: 70
End: 2020-05-01
Payer: COMMERCIAL

## 2020-05-01 VITALS — DIASTOLIC BLOOD PRESSURE: 68 MMHG | TEMPERATURE: 98 F | SYSTOLIC BLOOD PRESSURE: 130 MMHG

## 2020-05-01 DIAGNOSIS — N30.00 ACUTE CYSTITIS WITHOUT HEMATURIA: Primary | ICD-10-CM

## 2020-05-01 PROCEDURE — 3075F SYST BP GE 130 - 139MM HG: CPT | Mod: CPTII,,, | Performed by: INTERNAL MEDICINE

## 2020-05-01 PROCEDURE — 3078F PR MOST RECENT DIASTOLIC BLOOD PRESSURE < 80 MM HG: ICD-10-PCS | Mod: CPTII,,, | Performed by: INTERNAL MEDICINE

## 2020-05-01 PROCEDURE — 1159F MED LIST DOCD IN RCRD: CPT | Mod: ,,, | Performed by: INTERNAL MEDICINE

## 2020-05-01 PROCEDURE — 3078F DIAST BP <80 MM HG: CPT | Mod: CPTII,,, | Performed by: INTERNAL MEDICINE

## 2020-05-01 PROCEDURE — 1101F PT FALLS ASSESS-DOCD LE1/YR: CPT | Mod: CPTII,,, | Performed by: INTERNAL MEDICINE

## 2020-05-01 PROCEDURE — 3075F PR MOST RECENT SYSTOLIC BLOOD PRESS GE 130-139MM HG: ICD-10-PCS | Mod: CPTII,,, | Performed by: INTERNAL MEDICINE

## 2020-05-01 PROCEDURE — 99214 OFFICE O/P EST MOD 30 MIN: CPT | Mod: 95,,, | Performed by: INTERNAL MEDICINE

## 2020-05-01 PROCEDURE — 1159F PR MEDICATION LIST DOCUMENTED IN MEDICAL RECORD: ICD-10-PCS | Mod: ,,, | Performed by: INTERNAL MEDICINE

## 2020-05-01 PROCEDURE — 99214 PR OFFICE/OUTPT VISIT, EST, LEVL IV, 30-39 MIN: ICD-10-PCS | Mod: 95,,, | Performed by: INTERNAL MEDICINE

## 2020-05-01 PROCEDURE — 1101F PR PT FALLS ASSESS DOC 0-1 FALLS W/OUT INJ PAST YR: ICD-10-PCS | Mod: CPTII,,, | Performed by: INTERNAL MEDICINE

## 2020-05-01 RX ORDER — PHENAZOPYRIDINE HYDROCHLORIDE 100 MG/1
100 TABLET, FILM COATED ORAL 3 TIMES DAILY PRN
Qty: 21 TABLET | Refills: 0 | Status: SHIPPED | OUTPATIENT
Start: 2020-05-01 | End: 2020-05-08

## 2020-05-01 RX ORDER — NITROFURANTOIN (MACROCRYSTALS) 100 MG/1
100 CAPSULE ORAL EVERY 12 HOURS
Qty: 10 CAPSULE | Refills: 0 | Status: SHIPPED | OUTPATIENT
Start: 2020-05-01 | End: 2020-05-06

## 2020-05-01 NOTE — PROGRESS NOTES
Assessment and Plan:    1. Acute cystitis without hematuria  Symptoms consistent with UTI. Will treat empirically. Discussed urine culture on Monday if not significantly improved by then.   - nitrofurantoin (MACRODANTIN) 100 MG capsule; Take 1 capsule (100 mg total) by mouth every 12 (twelve) hours. for 5 days  Dispense: 10 capsule; Refill: 0  - phenazopyridine (PYRIDIUM) 100 MG tablet; Take 1 tablet (100 mg total) by mouth 3 (three) times daily as needed for Pain.  Dispense: 21 tablet; Refill: 0    ______________________________________________________________________  Subjective:    Chief Complaint:  UTI symptoms    HPI:  Zayra is a 70 y.o. year old female with telemedicine visit today as consultation for UTI symptoms    The patient location is: Home  The chief complaint leading to consultation is: as above  Visit type: Virtual visit with synchronous audio and video  Total time spent with patient: 20 minutes  Each patient to whom he or she provides medical services by telemedicine is:  (1) informed of the relationship between the physician and patient and the respective role of any other health care provider with respect to management of the patient; and (2) notified that he or she may decline to receive medical services by telemedicine and may withdraw from such care at any time.    She has notably been treated in the past by Urology for urgency and incontinence, and takes myrbetriq for this. She does not have a history of regular UTIs, but states she will get one every couple of years. Usually happens after soaking in the tub, and she had recently soaked in the tub.    She reports that this started with bladder area discomfort and urinary frequency 2 days ago. Woke her up from sleep at 3 this morning. Early this morning she noticed that it became more painful including constant bladder pressure and pain with urination. No blood in her urine. No fever or chills. No flank pain. No nausea or  vomiting      Medications:  Current Outpatient Medications on File Prior to Visit   Medication Sig Dispense Refill    albuterol (PROAIR HFA) 90 mcg/actuation inhaler Inhale 2 puffs into the lungs every 6 (six) hours as needed for Wheezing. Rescue 18 g 11    anastrozole (ARIMIDEX) 1 mg Tab Take 1 tablet (1 mg total) by mouth once daily. 90 tablet 1    aspirin (ECOTRIN) 81 MG EC tablet Take 81 mg by mouth once daily.      atorvastatin (LIPITOR) 40 MG tablet Take 1 tablet (40 mg total) by mouth once daily. 90 tablet 1    B-complex with vitamin C (Z-BEC OR EQUIV) tablet Take 1 tablet by mouth once daily.      calcium carbonate-vitamin D3 600 mg(1,500mg) -800 unit Tab Take 2 tablets by mouth once daily. 200 tablet 3    cetirizine (ZYRTEC) 10 MG tablet Take 10 mg by mouth once daily.      fish oil-omega-3 fatty acids 300-1,000 mg capsule Take 2 g by mouth once daily.      fluticasone (FLONASE) 50 mcg/actuation nasal spray 1 spray by Each Nare route daily as needed.       guaiFENesin (MUCINEX) 1,200 mg Ta12 Take by mouth daily as needed.       hydroCHLOROthiazide (HYDRODIURIL) 25 MG tablet Take 1 tablet (25 mg total) by mouth daily as needed. 90 tablet 1    mirabegron (MYRBETRIQ) 50 mg Tb24 Take 1 tablet (50 mg total) by mouth once daily. 30 tablet 11    multivitamin capsule Take 1 capsule by mouth once daily.      ranitidine (ZANTAC) 75 MG tablet Take 150 mg by mouth nightly as needed.       sennosides (SENOKOT ORAL) Take by mouth daily as needed.        No current facility-administered medications on file prior to visit.        Review of Systems:  Review of Systems   Constitutional: Negative for chills and fever.   Gastrointestinal: Negative for nausea and vomiting.   Genitourinary: Positive for dysuria, frequency, pelvic pain and urgency. Negative for flank pain and hematuria.       Past Medical History:  Past Medical History:   Diagnosis Date    Allergy     Breast cancer 02/2016    left breast,  neoadjuvant chemo, lumpectomy, and radiation    Bronchitis     HLD (hyperlipidemia)     Hypertension     S/P chemotherapy, time since greater than 12 weeks     Skin cancer     resovled       Objective:    Vitals:  Vitals:    05/01/20 1010   BP: 130/68   Temp: 98.4 °F (36.9 °C)       Physical Exam   Constitutional: She is oriented to person, place, and time. She appears well-developed and well-nourished. No distress.   HENT:   Head: Normocephalic and atraumatic.   Pulmonary/Chest: Effort normal. No respiratory distress.   Neurological: She is alert and oriented to person, place, and time.   Psychiatric: She has a normal mood and affect. Her behavior is normal. Judgment and thought content normal.       Data:  Previous micro reviewed and pertinent for no previous urine cultures.      Gladis Kitchen MD  Internal Medicine

## 2020-05-06 ENCOUNTER — PATIENT MESSAGE (OUTPATIENT)
Dept: ADMINISTRATIVE | Facility: HOSPITAL | Age: 70
End: 2020-05-06

## 2020-05-24 DIAGNOSIS — M89.9 BONE/CARTILAGE DISORDER: ICD-10-CM

## 2020-05-24 DIAGNOSIS — M94.9 BONE/CARTILAGE DISORDER: ICD-10-CM

## 2020-05-24 DIAGNOSIS — C50.012 MALIGNANT NEOPLASM OF NIPPLE OF LEFT BREAST IN FEMALE: ICD-10-CM

## 2020-05-24 RX ORDER — ANASTROZOLE 1 MG/1
1 TABLET ORAL DAILY
Qty: 90 TABLET | Refills: 1 | Status: CANCELLED | OUTPATIENT
Start: 2020-05-24

## 2020-05-25 DIAGNOSIS — M89.9 BONE/CARTILAGE DISORDER: ICD-10-CM

## 2020-05-25 DIAGNOSIS — C50.012 MALIGNANT NEOPLASM OF NIPPLE OF LEFT BREAST IN FEMALE: ICD-10-CM

## 2020-05-25 DIAGNOSIS — M94.9 BONE/CARTILAGE DISORDER: ICD-10-CM

## 2020-05-25 RX ORDER — ANASTROZOLE 1 MG/1
1 TABLET ORAL DAILY
Qty: 90 TABLET | Refills: 1 | Status: SHIPPED | OUTPATIENT
Start: 2020-05-25 | End: 2020-11-16 | Stop reason: SDUPTHER

## 2020-06-03 ENCOUNTER — DOCUMENTATION ONLY (OUTPATIENT)
Dept: INFUSION THERAPY | Facility: HOSPITAL | Age: 70
End: 2020-06-03

## 2020-06-03 DIAGNOSIS — C50.012 MALIGNANT NEOPLASM OF NIPPLE OF LEFT BREAST IN FEMALE, UNSPECIFIED ESTROGEN RECEPTOR STATUS: Primary | ICD-10-CM

## 2020-06-03 DIAGNOSIS — Z03.818 ENCNTR FOR OBS FOR SUSP EXPSR TO OTH BIOLG AGENTS RULED OUT: ICD-10-CM

## 2020-06-03 NOTE — PROGRESS NOTES
[2:27 PM] Edel Al      can you move Reinbold to Wed after her dr. LOZADA apt for prolia [id number]  ?[2:29 PM] Yaz Gomes      done thanks   ?[2:29 PM] Edel Al      ty

## 2020-06-04 ENCOUNTER — LAB VISIT (OUTPATIENT)
Dept: LAB | Facility: HOSPITAL | Age: 70
End: 2020-06-04
Attending: INTERNAL MEDICINE
Payer: COMMERCIAL

## 2020-06-04 ENCOUNTER — LAB VISIT (OUTPATIENT)
Dept: FAMILY MEDICINE | Facility: CLINIC | Age: 70
End: 2020-06-04
Payer: COMMERCIAL

## 2020-06-04 DIAGNOSIS — Z79.811 AROMATASE INHIBITOR USE: ICD-10-CM

## 2020-06-04 DIAGNOSIS — C50.012 MALIGNANT NEOPLASM OF NIPPLE OF LEFT BREAST IN FEMALE, UNSPECIFIED ESTROGEN RECEPTOR STATUS: ICD-10-CM

## 2020-06-04 DIAGNOSIS — Z03.818 ENCNTR FOR OBS FOR SUSP EXPSR TO OTH BIOLG AGENTS RULED OUT: ICD-10-CM

## 2020-06-04 LAB
ANION GAP SERPL CALC-SCNC: 6 MMOL/L (ref 8–16)
BUN SERPL-MCNC: 19 MG/DL (ref 8–23)
CALCIUM SERPL-MCNC: 9.8 MG/DL (ref 8.7–10.5)
CHLORIDE SERPL-SCNC: 105 MMOL/L (ref 95–110)
CO2 SERPL-SCNC: 28 MMOL/L (ref 23–29)
CREAT SERPL-MCNC: 0.9 MG/DL (ref 0.5–1.4)
EST. GFR  (AFRICAN AMERICAN): >60 ML/MIN/1.73 M^2
EST. GFR  (NON AFRICAN AMERICAN): >60 ML/MIN/1.73 M^2
GLUCOSE SERPL-MCNC: 100 MG/DL (ref 70–110)
POTASSIUM SERPL-SCNC: 4.2 MMOL/L (ref 3.5–5.1)
SARS-COV-2 IGG SERPLBLD QL IA.RAPID: NEGATIVE
SODIUM SERPL-SCNC: 139 MMOL/L (ref 136–145)

## 2020-06-04 PROCEDURE — U0003 INFECTIOUS AGENT DETECTION BY NUCLEIC ACID (DNA OR RNA); SEVERE ACUTE RESPIRATORY SYNDROME CORONAVIRUS 2 (SARS-COV-2) (CORONAVIRUS DISEASE [COVID-19]), AMPLIFIED PROBE TECHNIQUE, MAKING USE OF HIGH THROUGHPUT TECHNOLOGIES AS DESCRIBED BY CMS-2020-01-R: HCPCS

## 2020-06-04 PROCEDURE — 80048 BASIC METABOLIC PNL TOTAL CA: CPT

## 2020-06-04 PROCEDURE — 86769 SARS-COV-2 COVID-19 ANTIBODY: CPT

## 2020-06-04 PROCEDURE — 36415 COLL VENOUS BLD VENIPUNCTURE: CPT | Mod: PN

## 2020-06-05 LAB — SARS-COV-2 RNA RESP QL NAA+PROBE: NOT DETECTED

## 2020-06-06 ENCOUNTER — PATIENT MESSAGE (OUTPATIENT)
Dept: HEMATOLOGY/ONCOLOGY | Facility: CLINIC | Age: 70
End: 2020-06-06

## 2020-06-08 ENCOUNTER — TELEPHONE (OUTPATIENT)
Dept: HEMATOLOGY/ONCOLOGY | Facility: CLINIC | Age: 70
End: 2020-06-08

## 2020-06-08 NOTE — TELEPHONE ENCOUNTER
----- Message from Annemarie Davis sent at 6/8/2020  8:34 AM CDT -----  Contact: pt [phone]  Patient called and asked for a call back she is concerned that her labs she will need for her visit this week were not drawn .  Please call back [phone]

## 2020-06-10 ENCOUNTER — INFUSION (OUTPATIENT)
Dept: INFUSION THERAPY | Facility: HOSPITAL | Age: 70
End: 2020-06-10
Attending: INTERNAL MEDICINE
Payer: COMMERCIAL

## 2020-06-10 ENCOUNTER — OFFICE VISIT (OUTPATIENT)
Dept: HEMATOLOGY/ONCOLOGY | Facility: CLINIC | Age: 70
End: 2020-06-10
Payer: COMMERCIAL

## 2020-06-10 VITALS
WEIGHT: 155 LBS | TEMPERATURE: 97 F | DIASTOLIC BLOOD PRESSURE: 96 MMHG | BODY MASS INDEX: 31.3 KG/M2 | HEART RATE: 75 BPM | OXYGEN SATURATION: 98 % | RESPIRATION RATE: 20 BRPM | SYSTOLIC BLOOD PRESSURE: 159 MMHG

## 2020-06-10 VITALS
SYSTOLIC BLOOD PRESSURE: 145 MMHG | TEMPERATURE: 98 F | DIASTOLIC BLOOD PRESSURE: 90 MMHG | RESPIRATION RATE: 20 BRPM | HEART RATE: 76 BPM

## 2020-06-10 DIAGNOSIS — M94.9 BONE/CARTILAGE DISORDER: Primary | ICD-10-CM

## 2020-06-10 DIAGNOSIS — M89.9 BONE/CARTILAGE DISORDER: Primary | ICD-10-CM

## 2020-06-10 DIAGNOSIS — Z79.811 AROMATASE INHIBITOR USE: ICD-10-CM

## 2020-06-10 DIAGNOSIS — C77.3 MALIGNANT NEOPLASM METASTATIC TO LYMPH NODE OF AXILLA: ICD-10-CM

## 2020-06-10 DIAGNOSIS — C50.012 MALIGNANT NEOPLASM OF NIPPLE OF LEFT BREAST IN FEMALE, UNSPECIFIED ESTROGEN RECEPTOR STATUS: Primary | ICD-10-CM

## 2020-06-10 PROCEDURE — 1126F AMNT PAIN NOTED NONE PRSNT: CPT | Mod: S$GLB,,, | Performed by: INTERNAL MEDICINE

## 2020-06-10 PROCEDURE — 99214 PR OFFICE/OUTPT VISIT, EST, LEVL IV, 30-39 MIN: ICD-10-PCS | Mod: S$GLB,,, | Performed by: INTERNAL MEDICINE

## 2020-06-10 PROCEDURE — 3080F PR MOST RECENT DIASTOLIC BLOOD PRESSURE >= 90 MM HG: ICD-10-PCS | Mod: CPTII,S$GLB,, | Performed by: INTERNAL MEDICINE

## 2020-06-10 PROCEDURE — 3077F SYST BP >= 140 MM HG: CPT | Mod: CPTII,S$GLB,, | Performed by: INTERNAL MEDICINE

## 2020-06-10 PROCEDURE — 3077F PR MOST RECENT SYSTOLIC BLOOD PRESSURE >= 140 MM HG: ICD-10-PCS | Mod: CPTII,S$GLB,, | Performed by: INTERNAL MEDICINE

## 2020-06-10 PROCEDURE — 63600175 PHARM REV CODE 636 W HCPCS: Mod: JG,PN | Performed by: INTERNAL MEDICINE

## 2020-06-10 PROCEDURE — 3080F DIAST BP >= 90 MM HG: CPT | Mod: CPTII,S$GLB,, | Performed by: INTERNAL MEDICINE

## 2020-06-10 PROCEDURE — 99999 PR PBB SHADOW E&M-EST. PATIENT-LVL IV: CPT | Mod: PBBFAC,,, | Performed by: INTERNAL MEDICINE

## 2020-06-10 PROCEDURE — 96372 THER/PROPH/DIAG INJ SC/IM: CPT | Mod: PN

## 2020-06-10 PROCEDURE — 1101F PR PT FALLS ASSESS DOC 0-1 FALLS W/OUT INJ PAST YR: ICD-10-PCS | Mod: CPTII,S$GLB,, | Performed by: INTERNAL MEDICINE

## 2020-06-10 PROCEDURE — 1159F MED LIST DOCD IN RCRD: CPT | Mod: S$GLB,,, | Performed by: INTERNAL MEDICINE

## 2020-06-10 PROCEDURE — 99999 PR PBB SHADOW E&M-EST. PATIENT-LVL IV: ICD-10-PCS | Mod: PBBFAC,,, | Performed by: INTERNAL MEDICINE

## 2020-06-10 PROCEDURE — 99214 OFFICE O/P EST MOD 30 MIN: CPT | Mod: S$GLB,,, | Performed by: INTERNAL MEDICINE

## 2020-06-10 PROCEDURE — 1126F PR PAIN SEVERITY QUANTIFIED, NO PAIN PRESENT: ICD-10-PCS | Mod: S$GLB,,, | Performed by: INTERNAL MEDICINE

## 2020-06-10 PROCEDURE — 1159F PR MEDICATION LIST DOCUMENTED IN MEDICAL RECORD: ICD-10-PCS | Mod: S$GLB,,, | Performed by: INTERNAL MEDICINE

## 2020-06-10 PROCEDURE — 1101F PT FALLS ASSESS-DOCD LE1/YR: CPT | Mod: CPTII,S$GLB,, | Performed by: INTERNAL MEDICINE

## 2020-06-10 RX ADMIN — DENOSUMAB 60 MG: 60 INJECTION SUBCUTANEOUS at 10:06

## 2020-06-10 NOTE — PROGRESS NOTES
HISTORY OF PRESENT ILLNESS:  The patient is a 68-year-old white female well   known to me for locally advanced left breast carcinoma, who underwent   neoadjuvant therapy consisting of four cycles of Adriamycin/Cytoxan followed by   12 weekly doses of Taxol.  She had a residual 0.3 mm focus of metastatic   carcinoma in a single lymph node.  She is currently managed with adjuvant   Arimidex/biannual Prolia and returns to clinic for 48-month post-therapy   reevaluation.  No new breast complaints, chest pain, abdominal pain, bone pain,   postmenopausal bleeding, unexplained weight loss, etc.  Patient is doing well with the exception of occasional calf cramps which she attributes to use of statins.  No other complaints or   pertinent findings on a 14-point review of system.    PHYSICAL EXAMINATION:  GENERAL:  Well-developed, well-nourished white female in no acute distress, with   a weight of 155 pounds (increased by 9 pounds).  VITAL SIGNS:  Documented in EMR and reviewed.  HEENT:  Normocephalic, atraumatic.  Oral mucosa pink and moist.  Lips without   lesions.  Tongue midline.  Oropharynx clear.  Nonicteric sclerae.   NECK:  Supple, no adenopathy.  No carotid bruits, thyromegaly or thyroid nodule.  HEART:  Regular rate and rhythm without murmur, gallop or rub.                LUNGS:  Clear to auscultation bilaterally.  Normal respiratory effort.       ABDOMEN:  Soft, nontender, nondistended with positive normoactive bowel sounds,   no hepatosplenomegaly.    EXTREMITIES:  No cyanosis, clubbing or edema.  Distal pulses are intact.                                              AXILLAE AND GROIN:  No palpable pathologic lymphadenopathy is appreciated.        SKIN:  Intact/turgor normal.  NEUROLOGIC:  Cranial nerves II-XII grossly intact.  Motor:  Good muscle bulk and   tone.  Strength/sensory 5/5 throughout.  Gait stable.  BREASTS:  The patient's right breast is free from masses, tenderness or nipple   discharge.  The  patient's left breast has healed lumpectomy scar and is free   from signs of local recurrence.    LABORATORY:    Sodium 139, potassium 4.2, chloride 105, CO2 28, BUN 19, creatinine 0.9, glucose 100, calcium 9.8, GFR is greater than 60.    IMPRESSION:    1.  Locally advanced left breast carcinoma without evidence of recurrent disease.    PLAN:  1.  Continue calcium supplementation with vitamin D.  2.  Continue Arimidex 1 mg daily.  3.  Repeat Prolia 60 mg subQ.  4.  Return to clinic in six months from now with interval CBC, CMP, LDH, vitamin-D, chest x-ray, mammography, and bone mineral density.    THIS NOTE WAS CREATED USING VOICE RECOGNITION SOFTWARE AND MAY CONTAIN GRAMMATICAL ERRORS.

## 2020-06-10 NOTE — Clinical Note
Repeat Prolia.Continue calcium with vitamin-D/daily Arimidex.Return to clinic in 6 months with interval CBC, CMP, LDH, vitamin-D, chest x-ray, mammography, bone mineral density

## 2020-07-07 RX ORDER — MIRABEGRON 50 MG/1
1 TABLET, FILM COATED, EXTENDED RELEASE ORAL DAILY
Qty: 30 TABLET | Refills: 11 | Status: SHIPPED | OUTPATIENT
Start: 2020-07-07 | End: 2021-07-21 | Stop reason: SDUPTHER

## 2020-08-06 ENCOUNTER — TELEPHONE (OUTPATIENT)
Dept: FAMILY MEDICINE | Facility: CLINIC | Age: 70
End: 2020-08-06

## 2020-08-06 ENCOUNTER — LAB VISIT (OUTPATIENT)
Dept: URGENT CARE | Facility: CLINIC | Age: 70
End: 2020-08-06
Payer: COMMERCIAL

## 2020-08-06 VITALS
WEIGHT: 154 LBS | TEMPERATURE: 98 F | BODY MASS INDEX: 31.04 KG/M2 | RESPIRATION RATE: 16 BRPM | OXYGEN SATURATION: 97 % | HEIGHT: 59 IN

## 2020-08-06 DIAGNOSIS — R05.9 COUGH: Primary | ICD-10-CM

## 2020-08-06 DIAGNOSIS — R05.9 COUGH: ICD-10-CM

## 2020-08-06 DIAGNOSIS — J02.9 SORE THROAT: ICD-10-CM

## 2020-08-06 PROCEDURE — U0003 INFECTIOUS AGENT DETECTION BY NUCLEIC ACID (DNA OR RNA); SEVERE ACUTE RESPIRATORY SYNDROME CORONAVIRUS 2 (SARS-COV-2) (CORONAVIRUS DISEASE [COVID-19]), AMPLIFIED PROBE TECHNIQUE, MAKING USE OF HIGH THROUGHPUT TECHNOLOGIES AS DESCRIBED BY CMS-2020-01-R: HCPCS

## 2020-08-06 RX ORDER — ALBUTEROL SULFATE 90 UG/1
2 AEROSOL, METERED RESPIRATORY (INHALATION) EVERY 6 HOURS PRN
Qty: 18 G | Refills: 11 | Status: SHIPPED | OUTPATIENT
Start: 2020-08-06 | End: 2022-12-20 | Stop reason: SDUPTHER

## 2020-08-06 NOTE — PROGRESS NOTES

## 2020-08-06 NOTE — TELEPHONE ENCOUNTER
----- Message from Laura Richter sent at 8/6/2020  8:04 AM CDT -----  Contact: self  Patient is asking for you to put orders in for her to be Covid tested.  Her son and daughter in law have tested positive and she has cough, scratchy throat, eyes runny and thought that it was just her sinuses. Please call back to advise at [phone] (home).  Thank you

## 2020-08-07 DIAGNOSIS — U07.1 COVID-19 VIRUS DETECTED: ICD-10-CM

## 2020-08-07 LAB — SARS-COV-2 RNA RESP QL NAA+PROBE: DETECTED

## 2020-08-09 ENCOUNTER — PATIENT MESSAGE (OUTPATIENT)
Dept: FAMILY MEDICINE | Facility: CLINIC | Age: 70
End: 2020-08-09

## 2020-08-10 NOTE — TELEPHONE ENCOUNTER
Vv for sx/med review if anything is worsening. For now, rec resting at home, staying hydrated, tylenol prn.

## 2020-08-10 NOTE — TELEPHONE ENCOUNTER
----- Message from Grecia Gan sent at 8/10/2020  2:19 PM CDT -----  Regarding: regarding her meds  Contact: Zayra frazier  Type:  Patient Returning Call    Who Called:  Zayra frazier  Who Left Message for Patient:  Keerthi  Does the patient know what this is regarding?:  regarding her meds  Best Call Back Number:  [phone]  Additional Information:  pls call pt back. She said keerthi was supposed to call her back w/ in an hour and it's been over an hour

## 2020-08-10 NOTE — TELEPHONE ENCOUNTER
----- Message from Jana Cody sent at 8/10/2020  9:33 AM CDT -----  Pt called to speak with the office today about her lab results please reach out to pt at [phone]

## 2020-08-10 NOTE — TELEPHONE ENCOUNTER
She is aware of COVID results. She is c/o HA and sore throat and fatigue. Asking if she should start abx or something. Her  is also COVID+ and in ICU

## 2020-08-11 ENCOUNTER — TELEPHONE (OUTPATIENT)
Dept: FAMILY MEDICINE | Facility: CLINIC | Age: 70
End: 2020-08-11

## 2020-08-11 RX ORDER — AZITHROMYCIN 250 MG/1
TABLET, FILM COATED ORAL
Qty: 6 TABLET | Refills: 0 | Status: SHIPPED | OUTPATIENT
Start: 2020-08-11 | End: 2020-08-16

## 2020-08-11 RX ORDER — PREDNISONE 20 MG/1
20 TABLET ORAL DAILY
Qty: 7 TABLET | Refills: 0 | Status: SHIPPED | OUTPATIENT
Start: 2020-08-11 | End: 2021-05-27

## 2020-08-11 NOTE — TELEPHONE ENCOUNTER
Reviewed with pt, she expressed understanding. She states her cough is more productive and she is asking if she can have cxr. No fever. No SOB. Pulse is 97-98%.

## 2020-08-11 NOTE — TELEPHONE ENCOUNTER
i'd rather treat her presumptively than have her get out at this time if the cough is productive.  Sent zpak and prednisone. Continue monitoring pOx at home.

## 2020-08-11 NOTE — TELEPHONE ENCOUNTER
----- Message from [facility] sent at 8/11/2020  9:13 AM CDT -----  Pt called to speak with Beverly Vogt please reach out to pt at [phone]

## 2020-08-12 NOTE — TELEPHONE ENCOUNTER
----- Message from Roshan Pineda sent at 8/12/2020 10:18 AM CDT -----  Contact: pt  Type:  Patient Returning Call    Who Called:  pt  Who Left Message for Patient:  Beverly  Does the patient know what this is regarding?:    Best Call Back Number:  [phone] (home)     Additional Information:

## 2020-10-13 ENCOUNTER — HOSPITAL ENCOUNTER (OUTPATIENT)
Dept: RADIOLOGY | Facility: HOSPITAL | Age: 70
Discharge: HOME | End: 2020-10-13
Attending: INTERNAL MEDICINE
Payer: COMMERCIAL

## 2020-10-13 ENCOUNTER — IMMUNIZATION (OUTPATIENT)
Dept: PHARMACY | Facility: CLINIC | Age: 70
End: 2020-10-13
Payer: COMMERCIAL

## 2020-10-13 DIAGNOSIS — Z12.31 SCREENING MAMMOGRAM, ENCOUNTER FOR: ICD-10-CM

## 2020-10-13 PROCEDURE — 77063 BREAST TOMOSYNTHESIS BI: CPT | Mod: 26,,, | Performed by: RADIOLOGY

## 2020-10-13 PROCEDURE — 77067 SCR MAMMO BI INCL CAD: CPT | Mod: 26,,, | Performed by: RADIOLOGY

## 2020-10-13 PROCEDURE — 77067 SCR MAMMO BI INCL CAD: CPT | Mod: TC,PO

## 2020-10-13 PROCEDURE — 77063 MAMMO DIGITAL SCREENING BILAT WITH TOMO: ICD-10-PCS | Mod: 26,,, | Performed by: RADIOLOGY

## 2020-10-13 PROCEDURE — 77067 MAMMO DIGITAL SCREENING BILAT WITH TOMO: ICD-10-PCS | Mod: 26,,, | Performed by: RADIOLOGY

## 2020-11-16 DIAGNOSIS — M94.9 BONE/CARTILAGE DISORDER: ICD-10-CM

## 2020-11-16 DIAGNOSIS — C50.012 MALIGNANT NEOPLASM OF NIPPLE OF LEFT BREAST IN FEMALE: ICD-10-CM

## 2020-11-16 DIAGNOSIS — M89.9 BONE/CARTILAGE DISORDER: ICD-10-CM

## 2020-11-16 RX ORDER — ANASTROZOLE 1 MG/1
1 TABLET ORAL DAILY
Qty: 90 TABLET | Refills: 1 | Status: CANCELLED | OUTPATIENT
Start: 2020-11-16

## 2020-11-17 RX ORDER — ANASTROZOLE 1 MG/1
1 TABLET ORAL DAILY
Qty: 90 TABLET | Refills: 1 | Status: SHIPPED | OUTPATIENT
Start: 2020-11-17 | End: 2021-05-20 | Stop reason: SDUPTHER

## 2020-12-04 ENCOUNTER — HOSPITAL ENCOUNTER (OUTPATIENT)
Dept: RADIOLOGY | Facility: HOSPITAL | Age: 70
Discharge: HOME | End: 2020-12-04
Attending: INTERNAL MEDICINE
Payer: COMMERCIAL

## 2020-12-04 DIAGNOSIS — Z79.811 AROMATASE INHIBITOR USE: ICD-10-CM

## 2020-12-04 DIAGNOSIS — C50.012 MALIGNANT NEOPLASM OF NIPPLE OF LEFT BREAST IN FEMALE, UNSPECIFIED ESTROGEN RECEPTOR STATUS: ICD-10-CM

## 2020-12-04 PROCEDURE — 77080 DXA BONE DENSITY AXIAL: CPT | Mod: TC,PO

## 2020-12-04 PROCEDURE — 77080 DXA BONE DENSITY AXIAL: CPT | Mod: 26,,, | Performed by: RADIOLOGY

## 2020-12-04 PROCEDURE — 71046 X-RAY EXAM CHEST 2 VIEWS: CPT | Mod: 26,,, | Performed by: RADIOLOGY

## 2020-12-04 PROCEDURE — 71046 X-RAY EXAM CHEST 2 VIEWS: CPT | Mod: TC,FY,PO

## 2020-12-04 PROCEDURE — 77080 DEXA BONE DENSITY SPINE HIP: ICD-10-PCS | Mod: 26,,, | Performed by: RADIOLOGY

## 2020-12-04 PROCEDURE — 71046 XR CHEST PA AND LATERAL: ICD-10-PCS | Mod: 26,,, | Performed by: RADIOLOGY

## 2020-12-07 ENCOUNTER — PATIENT MESSAGE (OUTPATIENT)
Dept: HEMATOLOGY/ONCOLOGY | Facility: CLINIC | Age: 70
End: 2020-12-07

## 2020-12-08 ENCOUNTER — PATIENT MESSAGE (OUTPATIENT)
Dept: HEMATOLOGY/ONCOLOGY | Facility: CLINIC | Age: 70
End: 2020-12-08

## 2020-12-11 ENCOUNTER — OFFICE VISIT (OUTPATIENT)
Dept: HEMATOLOGY/ONCOLOGY | Facility: CLINIC | Age: 70
End: 2020-12-11
Payer: COMMERCIAL

## 2020-12-11 ENCOUNTER — INFUSION (OUTPATIENT)
Dept: INFUSION THERAPY | Facility: HOSPITAL | Age: 70
End: 2020-12-11
Attending: INTERNAL MEDICINE
Payer: COMMERCIAL

## 2020-12-11 VITALS
RESPIRATION RATE: 18 BRPM | HEIGHT: 59 IN | SYSTOLIC BLOOD PRESSURE: 147 MMHG | WEIGHT: 156.06 LBS | TEMPERATURE: 99 F | OXYGEN SATURATION: 99 % | DIASTOLIC BLOOD PRESSURE: 83 MMHG | BODY MASS INDEX: 31.46 KG/M2 | HEART RATE: 73 BPM

## 2020-12-11 VITALS
RESPIRATION RATE: 18 BRPM | DIASTOLIC BLOOD PRESSURE: 83 MMHG | HEART RATE: 73 BPM | TEMPERATURE: 99 F | SYSTOLIC BLOOD PRESSURE: 147 MMHG

## 2020-12-11 DIAGNOSIS — U07.1 COVID-19: ICD-10-CM

## 2020-12-11 DIAGNOSIS — C77.3 MALIGNANT NEOPLASM METASTATIC TO LYMPH NODE OF AXILLA: ICD-10-CM

## 2020-12-11 DIAGNOSIS — C50.012 MALIGNANT NEOPLASM OF NIPPLE OF LEFT BREAST IN FEMALE, UNSPECIFIED ESTROGEN RECEPTOR STATUS: Primary | ICD-10-CM

## 2020-12-11 DIAGNOSIS — M89.9 BONE/CARTILAGE DISORDER: Primary | ICD-10-CM

## 2020-12-11 DIAGNOSIS — Z79.811 AROMATASE INHIBITOR USE: ICD-10-CM

## 2020-12-11 DIAGNOSIS — J98.11 ATELECTASIS OF LEFT LUNG: ICD-10-CM

## 2020-12-11 DIAGNOSIS — M94.9 BONE/CARTILAGE DISORDER: Primary | ICD-10-CM

## 2020-12-11 PROCEDURE — 3077F SYST BP >= 140 MM HG: CPT | Mod: CPTII,S$GLB,, | Performed by: INTERNAL MEDICINE

## 2020-12-11 PROCEDURE — 99999 PR PBB SHADOW E&M-EST. PATIENT-LVL IV: CPT | Mod: PBBFAC,,, | Performed by: INTERNAL MEDICINE

## 2020-12-11 PROCEDURE — 1159F MED LIST DOCD IN RCRD: CPT | Mod: S$GLB,,, | Performed by: INTERNAL MEDICINE

## 2020-12-11 PROCEDURE — 3008F PR BODY MASS INDEX (BMI) DOCUMENTED: ICD-10-PCS | Mod: CPTII,S$GLB,, | Performed by: INTERNAL MEDICINE

## 2020-12-11 PROCEDURE — 3077F PR MOST RECENT SYSTOLIC BLOOD PRESSURE >= 140 MM HG: ICD-10-PCS | Mod: CPTII,S$GLB,, | Performed by: INTERNAL MEDICINE

## 2020-12-11 PROCEDURE — 1126F AMNT PAIN NOTED NONE PRSNT: CPT | Mod: S$GLB,,, | Performed by: INTERNAL MEDICINE

## 2020-12-11 PROCEDURE — 3079F DIAST BP 80-89 MM HG: CPT | Mod: CPTII,S$GLB,, | Performed by: INTERNAL MEDICINE

## 2020-12-11 PROCEDURE — 99214 OFFICE O/P EST MOD 30 MIN: CPT | Mod: S$GLB,,, | Performed by: INTERNAL MEDICINE

## 2020-12-11 PROCEDURE — 3008F BODY MASS INDEX DOCD: CPT | Mod: CPTII,S$GLB,, | Performed by: INTERNAL MEDICINE

## 2020-12-11 PROCEDURE — 1159F PR MEDICATION LIST DOCUMENTED IN MEDICAL RECORD: ICD-10-PCS | Mod: S$GLB,,, | Performed by: INTERNAL MEDICINE

## 2020-12-11 PROCEDURE — 1101F PR PT FALLS ASSESS DOC 0-1 FALLS W/OUT INJ PAST YR: ICD-10-PCS | Mod: CPTII,S$GLB,, | Performed by: INTERNAL MEDICINE

## 2020-12-11 PROCEDURE — 3288F PR FALLS RISK ASSESSMENT DOCUMENTED: ICD-10-PCS | Mod: CPTII,S$GLB,, | Performed by: INTERNAL MEDICINE

## 2020-12-11 PROCEDURE — 1126F PR PAIN SEVERITY QUANTIFIED, NO PAIN PRESENT: ICD-10-PCS | Mod: S$GLB,,, | Performed by: INTERNAL MEDICINE

## 2020-12-11 PROCEDURE — 99214 PR OFFICE/OUTPT VISIT, EST, LEVL IV, 30-39 MIN: ICD-10-PCS | Mod: S$GLB,,, | Performed by: INTERNAL MEDICINE

## 2020-12-11 PROCEDURE — 1101F PT FALLS ASSESS-DOCD LE1/YR: CPT | Mod: CPTII,S$GLB,, | Performed by: INTERNAL MEDICINE

## 2020-12-11 PROCEDURE — 99999 PR PBB SHADOW E&M-EST. PATIENT-LVL IV: ICD-10-PCS | Mod: PBBFAC,,, | Performed by: INTERNAL MEDICINE

## 2020-12-11 PROCEDURE — 96372 THER/PROPH/DIAG INJ SC/IM: CPT | Mod: PN

## 2020-12-11 PROCEDURE — 63600175 PHARM REV CODE 636 W HCPCS: Mod: JG,PN | Performed by: INTERNAL MEDICINE

## 2020-12-11 PROCEDURE — 3288F FALL RISK ASSESSMENT DOCD: CPT | Mod: CPTII,S$GLB,, | Performed by: INTERNAL MEDICINE

## 2020-12-11 PROCEDURE — 3079F PR MOST RECENT DIASTOLIC BLOOD PRESSURE 80-89 MM HG: ICD-10-PCS | Mod: CPTII,S$GLB,, | Performed by: INTERNAL MEDICINE

## 2020-12-11 RX ADMIN — DENOSUMAB 60 MG: 60 INJECTION SUBCUTANEOUS at 10:12

## 2020-12-11 NOTE — PROGRESS NOTES
HISTORY OF PRESENT ILLNESS:  The patient is a 68-year-old white female well   known to me for locally advanced left breast carcinoma, who underwent   neoadjuvant therapy consisting of four cycles of Adriamycin/Cytoxan followed by   12 weekly doses of Taxol.  She had a residual 0.3 mm focus of metastatic   carcinoma in a single lymph node.  She is currently managed with adjuvant   Arimidex/biannual Prolia and returns to clinic for 54-month post-therapy   reevaluation.  No new breast complaints, chest pain, abdominal pain, bone pain,   postmenopausal bleeding, unexplained weight loss, etc.  Patient and her  contracted COVID-19.  He had a prolonged hospitalization following his infection.  He continues to rehab at home.  No other complaints or   pertinent findings on a 14-point review of system.    PHYSICAL EXAMINATION:  GENERAL:  Well-developed, well-nourished white female in no acute distress, with   a weight of 156 pounds (increased by 1 pounds).  VITAL SIGNS:  Documented in EMR and reviewed.  HEENT:  Normocephalic, atraumatic.  Oral mucosa pink and moist.  Lips without   lesions.  Tongue midline.  Oropharynx clear.  Nonicteric sclerae.   NECK:  Supple, no adenopathy.  No carotid bruits, thyromegaly or thyroid nodule.  HEART:  Regular rate and rhythm without murmur, gallop or rub.                LUNGS:  Clear to auscultation bilaterally.  Normal respiratory effort.       ABDOMEN:  Soft, nontender, nondistended with positive normoactive bowel sounds,   no hepatosplenomegaly.    EXTREMITIES:  No cyanosis, clubbing or edema.  Distal pulses are intact.                                              AXILLAE AND GROIN:  No palpable pathologic lymphadenopathy is appreciated.        SKIN:  Intact/turgor normal.  NEUROLOGIC:  Cranial nerves II-XII grossly intact.  Motor:  Good muscle bulk and   tone.  Strength/sensory 5/5 throughout.  Gait stable.  BREASTS:  The patient's right breast is free from masses, tenderness or  nipple   discharge.  The patient's left breast has healed lumpectomy scar and is free   from signs of local recurrence.    LABORATORY:    White count 8, hemoglobin 13.4, hematocrit 41.3, platelets 237, absolute neutrophil count is 5700.  Sodium 142, potassium 4.5, chloride 103, CO2 29, BUN 19, creatinine 0.9, glucose 88, calcium 9 6, liver function test within normal limits, LDH is 177, GFR greater than 60.  Vitamin-D 42.     Chest x-ray:  There is atelectasis within the left lower lung zone.    Bone mineral density:  The L1 to L4 vertebral bone mineral density is equal to 1.100 g/cm squared with a T score of 0.5.  There has been a 4.5% statistically significant interval increase in bone mineral density of the lumbar spine relative to the prior study.  The left femoral neck bone mineral density is equal to 0.736 g/cm squared with a T score of -1.0.  There has been  a 2.9% non statistically significant interval increase in bone mineral density of the left femoral neck relative to the prior study.  There is a 8.3% risk of a major osteoporotic fracture and a 0.8% risk of hip fracture in the next 10 years (FRAX).    Mammography:  No mammographic evidence of malignancy.  BI-RADS Category 1: Negative    IMPRESSION:    1.  Locally advanced left breast carcinoma without evidence of recurrent disease.    PLAN:  1.  Continue calcium supplementation with vitamin D.  2.  Continue Arimidex 1 mg daily.  3.  Repeat Prolia 60 mg subQ.  4.  Return to clinic in six months from now with BMP and repeat chest x-ray.    THIS NOTE WAS CREATED USING VOICE RECOGNITION SOFTWARE AND MAY CONTAIN GRAMMATICAL ERRORS.

## 2020-12-18 ENCOUNTER — LAB VISIT (OUTPATIENT)
Dept: LAB | Facility: HOSPITAL | Age: 70
End: 2020-12-18
Attending: FAMILY MEDICINE
Payer: COMMERCIAL

## 2020-12-18 ENCOUNTER — OFFICE VISIT (OUTPATIENT)
Dept: FAMILY MEDICINE | Facility: CLINIC | Age: 70
End: 2020-12-18
Payer: COMMERCIAL

## 2020-12-18 VITALS
BODY MASS INDEX: 30.98 KG/M2 | SYSTOLIC BLOOD PRESSURE: 120 MMHG | WEIGHT: 153.69 LBS | HEIGHT: 59 IN | OXYGEN SATURATION: 99 % | HEART RATE: 78 BPM | DIASTOLIC BLOOD PRESSURE: 76 MMHG | TEMPERATURE: 98 F

## 2020-12-18 DIAGNOSIS — Z01.84 ENCOUNTER FOR ANTIBODY RESPONSE EXAMINATION: ICD-10-CM

## 2020-12-18 DIAGNOSIS — E78.5 HYPERLIPIDEMIA, UNSPECIFIED HYPERLIPIDEMIA TYPE: ICD-10-CM

## 2020-12-18 DIAGNOSIS — Z00.00 ROUTINE GENERAL MEDICAL EXAMINATION AT A HEALTH CARE FACILITY: Primary | ICD-10-CM

## 2020-12-18 LAB
CHOLEST SERPL-MCNC: 293 MG/DL (ref 120–199)
CHOLEST/HDLC SERPL: 4.2 {RATIO} (ref 2–5)
HDLC SERPL-MCNC: 70 MG/DL (ref 40–75)
HDLC SERPL: 23.9 % (ref 20–50)
LDLC SERPL CALC-MCNC: 202.6 MG/DL (ref 63–159)
NONHDLC SERPL-MCNC: 223 MG/DL
SARS-COV-2 IGG SERPLBLD QL IA.RAPID: POSITIVE
TRIGL SERPL-MCNC: 102 MG/DL (ref 30–150)
TSH SERPL DL<=0.005 MIU/L-ACNC: 1.28 UIU/ML (ref 0.4–4)

## 2020-12-18 PROCEDURE — 3078F PR MOST RECENT DIASTOLIC BLOOD PRESSURE < 80 MM HG: ICD-10-PCS | Mod: CPTII,S$GLB,, | Performed by: FAMILY MEDICINE

## 2020-12-18 PROCEDURE — 3078F DIAST BP <80 MM HG: CPT | Mod: CPTII,S$GLB,, | Performed by: FAMILY MEDICINE

## 2020-12-18 PROCEDURE — 1126F PR PAIN SEVERITY QUANTIFIED, NO PAIN PRESENT: ICD-10-PCS | Mod: S$GLB,,, | Performed by: FAMILY MEDICINE

## 2020-12-18 PROCEDURE — 3288F FALL RISK ASSESSMENT DOCD: CPT | Mod: CPTII,S$GLB,, | Performed by: FAMILY MEDICINE

## 2020-12-18 PROCEDURE — 3008F BODY MASS INDEX DOCD: CPT | Mod: CPTII,S$GLB,, | Performed by: FAMILY MEDICINE

## 2020-12-18 PROCEDURE — 36415 COLL VENOUS BLD VENIPUNCTURE: CPT | Mod: PN

## 2020-12-18 PROCEDURE — 3008F PR BODY MASS INDEX (BMI) DOCUMENTED: ICD-10-PCS | Mod: CPTII,S$GLB,, | Performed by: FAMILY MEDICINE

## 2020-12-18 PROCEDURE — 3288F PR FALLS RISK ASSESSMENT DOCUMENTED: ICD-10-PCS | Mod: CPTII,S$GLB,, | Performed by: FAMILY MEDICINE

## 2020-12-18 PROCEDURE — 1101F PR PT FALLS ASSESS DOC 0-1 FALLS W/OUT INJ PAST YR: ICD-10-PCS | Mod: CPTII,S$GLB,, | Performed by: FAMILY MEDICINE

## 2020-12-18 PROCEDURE — 1126F AMNT PAIN NOTED NONE PRSNT: CPT | Mod: S$GLB,,, | Performed by: FAMILY MEDICINE

## 2020-12-18 PROCEDURE — 84443 ASSAY THYROID STIM HORMONE: CPT

## 2020-12-18 PROCEDURE — 1101F PT FALLS ASSESS-DOCD LE1/YR: CPT | Mod: CPTII,S$GLB,, | Performed by: FAMILY MEDICINE

## 2020-12-18 PROCEDURE — 3074F PR MOST RECENT SYSTOLIC BLOOD PRESSURE < 130 MM HG: ICD-10-PCS | Mod: CPTII,S$GLB,, | Performed by: FAMILY MEDICINE

## 2020-12-18 PROCEDURE — 86769 SARS-COV-2 COVID-19 ANTIBODY: CPT

## 2020-12-18 PROCEDURE — 80061 LIPID PANEL: CPT

## 2020-12-18 PROCEDURE — 99999 PR PBB SHADOW E&M-EST. PATIENT-LVL IV: ICD-10-PCS | Mod: PBBFAC,,, | Performed by: FAMILY MEDICINE

## 2020-12-18 PROCEDURE — 99999 PR PBB SHADOW E&M-EST. PATIENT-LVL IV: CPT | Mod: PBBFAC,,, | Performed by: FAMILY MEDICINE

## 2020-12-18 PROCEDURE — 99397 PR PREVENTIVE VISIT,EST,65 & OVER: ICD-10-PCS | Mod: S$GLB,,, | Performed by: FAMILY MEDICINE

## 2020-12-18 PROCEDURE — 3074F SYST BP LT 130 MM HG: CPT | Mod: CPTII,S$GLB,, | Performed by: FAMILY MEDICINE

## 2020-12-18 PROCEDURE — 99397 PER PM REEVAL EST PAT 65+ YR: CPT | Mod: S$GLB,,, | Performed by: FAMILY MEDICINE

## 2020-12-18 NOTE — PROGRESS NOTES
Subjective:       Patient ID: Zayra Rodgers is a 70 y.o. female.    Chief Complaint: Hypertension (routine follow up and assessment; pt would also like to be rechecked for covid) and Hyperlipidemia      Zayra Rodgers is in the office for recheck and f/u.    HPI  Tested positive for covid in Aug. She had some sinus congestion and change in sense of taste.    is covid-negative, but is going to be discharged from the ICU this weekend for CHF exacerbation.   Past Medical History:   Diagnosis Date    Allergy     Breast cancer 02/2016    left breast, neoadjuvant chemo, lumpectomy, and radiation    Bronchitis     HLD (hyperlipidemia)     Hypertension     S/P chemotherapy, time since greater than 12 weeks     Skin cancer     resovled     She has ensured that she has round-the-clock help to help in her 's care.     Current Outpatient Medications:     albuterol (PROAIR HFA) 90 mcg/actuation inhaler, Inhale 2 puffs into the lungs every 6 (six) hours as needed for Wheezing. Rescue, Disp: 18 g, Rfl: 11    anastrozole (ARIMIDEX) 1 mg Tab, Take 1 tablet (1 mg total) by mouth once daily., Disp: 90 tablet, Rfl: 1    aspirin (ECOTRIN) 81 MG EC tablet, Take 81 mg by mouth once daily., Disp: , Rfl:     atorvastatin (LIPITOR) 40 MG tablet, Take 1 tablet (40 mg total) by mouth once daily., Disp: 90 tablet, Rfl: 1    B-complex with vitamin C (Z-BEC OR EQUIV) tablet, Take 1 tablet by mouth once daily., Disp: , Rfl:     calcium carbonate-vitamin D3 600 mg(1,500mg) -800 unit Tab, Take 2 tablets by mouth once daily., Disp: 200 tablet, Rfl: 3    cetirizine (ZYRTEC) 10 MG tablet, Take 10 mg by mouth once daily., Disp: , Rfl:     fish oil-omega-3 fatty acids 300-1,000 mg capsule, Take 2 g by mouth once daily., Disp: , Rfl:     fluticasone (FLONASE) 50 mcg/actuation nasal spray, 1 spray by Each Nare route daily as needed. , Disp: , Rfl:     guaiFENesin (MUCINEX) 1,200 mg Ta12, Take by mouth daily as needed.  , Disp: , Rfl:     hydroCHLOROthiazide (HYDRODIURIL) 25 MG tablet, Take 1 tablet (25 mg total) by mouth daily as needed., Disp: 90 tablet, Rfl: 1    mirabegron (MYRBETRIQ) 50 mg Tb24, Take 1 tablet (50 mg total) by mouth once daily., Disp: 30 tablet, Rfl: 11    multivitamin capsule, Take 1 capsule by mouth once daily., Disp: , Rfl:     predniSONE (DELTASONE) 20 MG tablet, Take 1 tablet (20 mg total) by mouth once daily., Disp: 7 tablet, Rfl: 0    sennosides (SENOKOT ORAL), Take by mouth daily as needed. , Disp: , Rfl:     The 10-year ASCVD risk score (Toriejuanito JAY Jr., et al., 2013) is: 8.7%    Values used to calculate the score:      Age: 70 years      Sex: Female      Is Non- : No      Diabetic: No      Tobacco smoker: No      Systolic Blood Pressure: 120 mmHg      Is BP treated: Yes      HDL Cholesterol: 58 mg/dL      Total Cholesterol: 186 mg/dL     Lab Results   Component Value Date    HGBA1C 5.4 05/03/2018     Lab Results   Component Value Date    LDLCALC 106.0 12/02/2019    CREATININE 0.9 12/04/2020   labs 2020 rev.    Review of Systems   Constitutional: Negative for activity change, fatigue and unexpected weight change.   HENT: Positive for congestion and sore throat. Negative for hearing loss, rhinorrhea and trouble swallowing.    Eyes: Negative for discharge and visual disturbance.   Respiratory: Negative for chest tightness, shortness of breath and wheezing.    Cardiovascular: Negative for chest pain, palpitations and leg swelling (has not required a diuretic in quite some time).   Gastrointestinal: Negative for blood in stool, constipation, diarrhea, nausea and vomiting.        No gerd c/o   Endocrine: Negative for polydipsia and polyuria.   Genitourinary: Negative for difficulty urinating, dysuria, frequency (myrbetriq), hematuria and menstrual problem.   Musculoskeletal: Negative for arthralgias, joint swelling and neck pain.        Can experience some muscle spasms after prolia  inj   Neurological: Negative for dizziness, weakness, light-headedness and headaches.   Psychiatric/Behavioral: Negative for confusion, dysphoric mood and sleep disturbance.       Objective:      Physical Exam  Vitals signs and nursing note reviewed.   Constitutional:       General: She is not in acute distress.     Appearance: She is well-developed and normal weight.   HENT:      Head: Normocephalic and atraumatic.      Right Ear: Tympanic membrane and external ear normal.      Left Ear: Tympanic membrane and external ear normal.      Nose: Nose normal.      Mouth/Throat:      Pharynx: No oropharyngeal exudate.   Eyes:      Conjunctiva/sclera: Conjunctivae normal.      Pupils: Pupils are equal, round, and reactive to light.   Neck:      Musculoskeletal: Normal range of motion and neck supple.      Thyroid: No thyromegaly.   Cardiovascular:      Rate and Rhythm: Normal rate and regular rhythm.   Pulmonary:      Effort: Pulmonary effort is normal. No respiratory distress.      Breath sounds: Normal breath sounds. No wheezing.   Abdominal:      General: Bowel sounds are normal. There is no distension.      Palpations: Abdomen is soft. There is no mass.      Tenderness: There is no abdominal tenderness. There is no guarding or rebound.   Musculoskeletal:         General: No deformity.      Right lower leg: No edema.      Left lower leg: No edema.   Lymphadenopathy:      Cervical: No cervical adenopathy.   Skin:     General: Skin is warm and dry.   Neurological:      General: No focal deficit present.      Mental Status: She is alert and oriented to person, place, and time.      Cranial Nerves: No cranial nerve deficit.   Psychiatric:         Mood and Affect: Mood normal.         Behavior: Behavior normal.             Screening recommendations appropriate to age and health status were reviewed.    Assessment & Plan:    Routine general medical examination at a health care facility  Comments:  anticipatory guidance  reviewed  Orders:  -     Urinalysis, Reflex to Urine Culture Urine, Clean Catch; Future    Encounter for antibody response examination  -     COVID-19 (SARS CoV-2) IgG Antibody; Future; Expected date: 12/18/2020    Hyperlipidemia, unspecified hyperlipidemia type  -     TSH; Future; Expected date: 12/18/2020  -     Lipid Panel; Future; Expected date: 12/18/2020

## 2020-12-21 DIAGNOSIS — E78.5 HYPERLIPIDEMIA, UNSPECIFIED HYPERLIPIDEMIA TYPE: Primary | ICD-10-CM

## 2021-04-14 ENCOUNTER — PATIENT MESSAGE (OUTPATIENT)
Dept: HEMATOLOGY/ONCOLOGY | Facility: CLINIC | Age: 71
End: 2021-04-14

## 2021-04-26 DIAGNOSIS — C50.012 MALIGNANT NEOPLASM OF NIPPLE OF LEFT BREAST IN FEMALE: ICD-10-CM

## 2021-04-26 DIAGNOSIS — M89.9 BONE/CARTILAGE DISORDER: ICD-10-CM

## 2021-04-26 DIAGNOSIS — M94.9 BONE/CARTILAGE DISORDER: ICD-10-CM

## 2021-04-26 RX ORDER — ACETAMINOPHEN 500 MG
2 TABLET ORAL DAILY
Qty: 200 TABLET | Refills: 3 | Status: SHIPPED | OUTPATIENT
Start: 2021-04-26 | End: 2022-07-13 | Stop reason: SDUPTHER

## 2021-05-20 DIAGNOSIS — M89.9 BONE/CARTILAGE DISORDER: ICD-10-CM

## 2021-05-20 DIAGNOSIS — C50.012 MALIGNANT NEOPLASM OF NIPPLE OF LEFT BREAST IN FEMALE: ICD-10-CM

## 2021-05-20 DIAGNOSIS — M94.9 BONE/CARTILAGE DISORDER: ICD-10-CM

## 2021-05-20 RX ORDER — ANASTROZOLE 1 MG/1
1 TABLET ORAL DAILY
Qty: 90 TABLET | Refills: 1 | Status: CANCELLED | OUTPATIENT
Start: 2021-05-20

## 2021-05-20 RX ORDER — ANASTROZOLE 1 MG/1
1 TABLET ORAL DAILY
Qty: 90 TABLET | Refills: 1 | Status: SHIPPED | OUTPATIENT
Start: 2021-05-20 | End: 2022-02-25

## 2021-05-24 ENCOUNTER — LAB VISIT (OUTPATIENT)
Dept: LAB | Facility: HOSPITAL | Age: 71
End: 2021-05-24
Attending: FAMILY MEDICINE
Payer: MEDICARE

## 2021-05-24 ENCOUNTER — OFFICE VISIT (OUTPATIENT)
Dept: FAMILY MEDICINE | Facility: CLINIC | Age: 71
End: 2021-05-24
Payer: MEDICARE

## 2021-05-24 VITALS
DIASTOLIC BLOOD PRESSURE: 86 MMHG | WEIGHT: 146.38 LBS | SYSTOLIC BLOOD PRESSURE: 134 MMHG | BODY MASS INDEX: 29.51 KG/M2 | RESPIRATION RATE: 14 BRPM | HEIGHT: 59 IN | TEMPERATURE: 99 F | HEART RATE: 72 BPM

## 2021-05-24 DIAGNOSIS — E78.5 HYPERLIPIDEMIA, UNSPECIFIED HYPERLIPIDEMIA TYPE: ICD-10-CM

## 2021-05-24 DIAGNOSIS — I10 ESSENTIAL HYPERTENSION: ICD-10-CM

## 2021-05-24 DIAGNOSIS — R79.9 ABNORMAL FINDING OF BLOOD CHEMISTRY, UNSPECIFIED: ICD-10-CM

## 2021-05-24 DIAGNOSIS — R53.83 FATIGUE, UNSPECIFIED TYPE: ICD-10-CM

## 2021-05-24 DIAGNOSIS — E53.8 DEFICIENCY OF OTHER SPECIFIED B GROUP VITAMINS: ICD-10-CM

## 2021-05-24 DIAGNOSIS — E78.5 HYPERLIPIDEMIA, UNSPECIFIED HYPERLIPIDEMIA TYPE: Primary | ICD-10-CM

## 2021-05-24 DIAGNOSIS — E55.9 VITAMIN D DEFICIENCY: ICD-10-CM

## 2021-05-24 LAB
25(OH)D3+25(OH)D2 SERPL-MCNC: 55 NG/ML (ref 30–96)
ALBUMIN SERPL BCP-MCNC: 4.1 G/DL (ref 3.5–5.2)
ALP SERPL-CCNC: 26 U/L (ref 55–135)
ALT SERPL W/O P-5'-P-CCNC: 15 U/L (ref 10–44)
ANION GAP SERPL CALC-SCNC: 13 MMOL/L (ref 8–16)
AST SERPL-CCNC: 25 U/L (ref 10–40)
BILIRUB SERPL-MCNC: 0.4 MG/DL (ref 0.1–1)
BUN SERPL-MCNC: 20 MG/DL (ref 8–23)
CALCIUM SERPL-MCNC: 10 MG/DL (ref 8.7–10.5)
CHLORIDE SERPL-SCNC: 101 MMOL/L (ref 95–110)
CHOLEST SERPL-MCNC: 263 MG/DL (ref 120–199)
CHOLEST/HDLC SERPL: 3.9 {RATIO} (ref 2–5)
CO2 SERPL-SCNC: 25 MMOL/L (ref 23–29)
CREAT SERPL-MCNC: 0.7 MG/DL (ref 0.5–1.4)
ERYTHROCYTE [DISTWIDTH] IN BLOOD BY AUTOMATED COUNT: 13.5 % (ref 11.5–14.5)
EST. GFR  (AFRICAN AMERICAN): >60 ML/MIN/1.73 M^2
EST. GFR  (NON AFRICAN AMERICAN): >60 ML/MIN/1.73 M^2
GLUCOSE SERPL-MCNC: 79 MG/DL (ref 70–110)
HCT VFR BLD AUTO: 39.4 % (ref 37–48.5)
HDLC SERPL-MCNC: 68 MG/DL (ref 40–75)
HDLC SERPL: 25.9 % (ref 20–50)
HGB BLD-MCNC: 12.8 G/DL (ref 12–16)
IRON SERPL-MCNC: 98 UG/DL (ref 30–160)
LDLC SERPL CALC-MCNC: 176.4 MG/DL (ref 63–159)
MCH RBC QN AUTO: 30.7 PG (ref 27–31)
MCHC RBC AUTO-ENTMCNC: 32.5 G/DL (ref 32–36)
MCV RBC AUTO: 95 FL (ref 82–98)
NONHDLC SERPL-MCNC: 195 MG/DL
PLATELET # BLD AUTO: 207 K/UL (ref 150–450)
PMV BLD AUTO: 12 FL (ref 9.2–12.9)
POTASSIUM SERPL-SCNC: 3.8 MMOL/L (ref 3.5–5.1)
PROT SERPL-MCNC: 7.2 G/DL (ref 6–8.4)
RBC # BLD AUTO: 4.17 M/UL (ref 4–5.4)
SODIUM SERPL-SCNC: 139 MMOL/L (ref 136–145)
T4 FREE SERPL-MCNC: 1.09 NG/DL (ref 0.71–1.51)
TRIGL SERPL-MCNC: 93 MG/DL (ref 30–150)
TSH SERPL DL<=0.005 MIU/L-ACNC: 1.51 UIU/ML (ref 0.4–4)
VIT B12 SERPL-MCNC: 540 PG/ML (ref 210–950)
WBC # BLD AUTO: 7.28 K/UL (ref 3.9–12.7)

## 2021-05-24 PROCEDURE — 82306 VITAMIN D 25 HYDROXY: CPT | Performed by: FAMILY MEDICINE

## 2021-05-24 PROCEDURE — 1159F MED LIST DOCD IN RCRD: CPT | Mod: S$GLB,,, | Performed by: FAMILY MEDICINE

## 2021-05-24 PROCEDURE — 80061 LIPID PANEL: CPT | Performed by: FAMILY MEDICINE

## 2021-05-24 PROCEDURE — 99214 PR OFFICE/OUTPT VISIT, EST, LEVL IV, 30-39 MIN: ICD-10-PCS | Mod: S$GLB,,, | Performed by: FAMILY MEDICINE

## 2021-05-24 PROCEDURE — 3288F FALL RISK ASSESSMENT DOCD: CPT | Mod: CPTII,S$GLB,, | Performed by: FAMILY MEDICINE

## 2021-05-24 PROCEDURE — 83540 ASSAY OF IRON: CPT | Performed by: FAMILY MEDICINE

## 2021-05-24 PROCEDURE — 84439 ASSAY OF FREE THYROXINE: CPT | Performed by: FAMILY MEDICINE

## 2021-05-24 PROCEDURE — 85027 COMPLETE CBC AUTOMATED: CPT | Performed by: FAMILY MEDICINE

## 2021-05-24 PROCEDURE — 1101F PT FALLS ASSESS-DOCD LE1/YR: CPT | Mod: CPTII,S$GLB,, | Performed by: FAMILY MEDICINE

## 2021-05-24 PROCEDURE — 99214 OFFICE O/P EST MOD 30 MIN: CPT | Mod: S$GLB,,, | Performed by: FAMILY MEDICINE

## 2021-05-24 PROCEDURE — 3288F PR FALLS RISK ASSESSMENT DOCUMENTED: ICD-10-PCS | Mod: CPTII,S$GLB,, | Performed by: FAMILY MEDICINE

## 2021-05-24 PROCEDURE — 1101F PR PT FALLS ASSESS DOC 0-1 FALLS W/OUT INJ PAST YR: ICD-10-PCS | Mod: CPTII,S$GLB,, | Performed by: FAMILY MEDICINE

## 2021-05-24 PROCEDURE — 82607 VITAMIN B-12: CPT | Performed by: FAMILY MEDICINE

## 2021-05-24 PROCEDURE — 3008F BODY MASS INDEX DOCD: CPT | Mod: CPTII,S$GLB,, | Performed by: FAMILY MEDICINE

## 2021-05-24 PROCEDURE — 84443 ASSAY THYROID STIM HORMONE: CPT | Performed by: FAMILY MEDICINE

## 2021-05-24 PROCEDURE — 1159F PR MEDICATION LIST DOCUMENTED IN MEDICAL RECORD: ICD-10-PCS | Mod: S$GLB,,, | Performed by: FAMILY MEDICINE

## 2021-05-24 PROCEDURE — 99999 PR PBB SHADOW E&M-EST. PATIENT-LVL V: ICD-10-PCS | Mod: PBBFAC,,, | Performed by: FAMILY MEDICINE

## 2021-05-24 PROCEDURE — 99999 PR PBB SHADOW E&M-EST. PATIENT-LVL V: CPT | Mod: PBBFAC,,, | Performed by: FAMILY MEDICINE

## 2021-05-24 PROCEDURE — 3008F PR BODY MASS INDEX (BMI) DOCUMENTED: ICD-10-PCS | Mod: CPTII,S$GLB,, | Performed by: FAMILY MEDICINE

## 2021-05-24 PROCEDURE — 1126F AMNT PAIN NOTED NONE PRSNT: CPT | Mod: S$GLB,,, | Performed by: FAMILY MEDICINE

## 2021-05-24 PROCEDURE — 1126F PR PAIN SEVERITY QUANTIFIED, NO PAIN PRESENT: ICD-10-PCS | Mod: S$GLB,,, | Performed by: FAMILY MEDICINE

## 2021-05-24 PROCEDURE — 80053 COMPREHEN METABOLIC PANEL: CPT | Performed by: FAMILY MEDICINE

## 2021-05-24 PROCEDURE — 36415 COLL VENOUS BLD VENIPUNCTURE: CPT | Mod: PN | Performed by: FAMILY MEDICINE

## 2021-05-27 ENCOUNTER — PATIENT MESSAGE (OUTPATIENT)
Dept: FAMILY MEDICINE | Facility: CLINIC | Age: 71
End: 2021-05-27

## 2021-06-10 ENCOUNTER — HOSPITAL ENCOUNTER (OUTPATIENT)
Dept: RADIOLOGY | Facility: HOSPITAL | Age: 71
Discharge: HOME | End: 2021-06-10
Attending: INTERNAL MEDICINE
Payer: MEDICARE

## 2021-06-10 DIAGNOSIS — J98.11 ATELECTASIS OF LEFT LUNG: ICD-10-CM

## 2021-06-10 DIAGNOSIS — C50.012 MALIGNANT NEOPLASM OF NIPPLE OF LEFT BREAST IN FEMALE, UNSPECIFIED ESTROGEN RECEPTOR STATUS: ICD-10-CM

## 2021-06-10 DIAGNOSIS — U07.1 COVID-19: ICD-10-CM

## 2021-06-10 PROCEDURE — 71046 X-RAY EXAM CHEST 2 VIEWS: CPT | Mod: 26,,, | Performed by: RADIOLOGY

## 2021-06-10 PROCEDURE — 71046 X-RAY EXAM CHEST 2 VIEWS: CPT | Mod: TC,FY,PO

## 2021-06-10 PROCEDURE — 71046 XR CHEST PA AND LATERAL: ICD-10-PCS | Mod: 26,,, | Performed by: RADIOLOGY

## 2021-06-11 ENCOUNTER — PATIENT MESSAGE (OUTPATIENT)
Dept: FAMILY MEDICINE | Facility: CLINIC | Age: 71
End: 2021-06-11

## 2021-06-14 ENCOUNTER — OFFICE VISIT (OUTPATIENT)
Dept: HEMATOLOGY/ONCOLOGY | Facility: CLINIC | Age: 71
End: 2021-06-14
Payer: MEDICARE

## 2021-06-14 ENCOUNTER — INFUSION (OUTPATIENT)
Dept: INFUSION THERAPY | Facility: HOSPITAL | Age: 71
End: 2021-06-14
Attending: INTERNAL MEDICINE
Payer: MEDICARE

## 2021-06-14 VITALS
OXYGEN SATURATION: 98 % | SYSTOLIC BLOOD PRESSURE: 128 MMHG | TEMPERATURE: 99 F | WEIGHT: 145.31 LBS | HEART RATE: 80 BPM | DIASTOLIC BLOOD PRESSURE: 80 MMHG | HEIGHT: 59 IN | BODY MASS INDEX: 29.29 KG/M2 | RESPIRATION RATE: 18 BRPM

## 2021-06-14 VITALS
RESPIRATION RATE: 18 BRPM | HEART RATE: 80 BPM | BODY MASS INDEX: 29.34 KG/M2 | SYSTOLIC BLOOD PRESSURE: 128 MMHG | OXYGEN SATURATION: 98 % | DIASTOLIC BLOOD PRESSURE: 80 MMHG | WEIGHT: 145.31 LBS

## 2021-06-14 DIAGNOSIS — C77.3 MALIGNANT NEOPLASM METASTATIC TO LYMPH NODE OF AXILLA: ICD-10-CM

## 2021-06-14 DIAGNOSIS — Z79.811 AROMATASE INHIBITOR USE: ICD-10-CM

## 2021-06-14 DIAGNOSIS — M94.9 BONE/CARTILAGE DISORDER: Primary | ICD-10-CM

## 2021-06-14 DIAGNOSIS — M89.9 BONE/CARTILAGE DISORDER: Primary | ICD-10-CM

## 2021-06-14 DIAGNOSIS — C50.012 MALIGNANT NEOPLASM OF NIPPLE OF LEFT BREAST IN FEMALE, UNSPECIFIED ESTROGEN RECEPTOR STATUS: Primary | ICD-10-CM

## 2021-06-14 PROCEDURE — 1126F PR PAIN SEVERITY QUANTIFIED, NO PAIN PRESENT: ICD-10-PCS | Mod: ,,, | Performed by: INTERNAL MEDICINE

## 2021-06-14 PROCEDURE — 3288F PR FALLS RISK ASSESSMENT DOCUMENTED: ICD-10-PCS | Mod: CPTII,,, | Performed by: INTERNAL MEDICINE

## 2021-06-14 PROCEDURE — 99214 PR OFFICE/OUTPT VISIT, EST, LEVL IV, 30-39 MIN: ICD-10-PCS | Mod: ,,, | Performed by: INTERNAL MEDICINE

## 2021-06-14 PROCEDURE — 3288F FALL RISK ASSESSMENT DOCD: CPT | Mod: CPTII,,, | Performed by: INTERNAL MEDICINE

## 2021-06-14 PROCEDURE — 63600175 PHARM REV CODE 636 W HCPCS: Mod: PN | Performed by: INTERNAL MEDICINE

## 2021-06-14 PROCEDURE — 1159F MED LIST DOCD IN RCRD: CPT | Mod: ,,, | Performed by: INTERNAL MEDICINE

## 2021-06-14 PROCEDURE — 3008F PR BODY MASS INDEX (BMI) DOCUMENTED: ICD-10-PCS | Mod: CPTII,,, | Performed by: INTERNAL MEDICINE

## 2021-06-14 PROCEDURE — 1126F AMNT PAIN NOTED NONE PRSNT: CPT | Mod: ,,, | Performed by: INTERNAL MEDICINE

## 2021-06-14 PROCEDURE — 96372 THER/PROPH/DIAG INJ SC/IM: CPT | Mod: PN

## 2021-06-14 PROCEDURE — 1101F PR PT FALLS ASSESS DOC 0-1 FALLS W/OUT INJ PAST YR: ICD-10-PCS | Mod: CPTII,,, | Performed by: INTERNAL MEDICINE

## 2021-06-14 PROCEDURE — 99214 OFFICE O/P EST MOD 30 MIN: CPT | Mod: PN | Performed by: INTERNAL MEDICINE

## 2021-06-14 PROCEDURE — 99214 OFFICE O/P EST MOD 30 MIN: CPT | Mod: ,,, | Performed by: INTERNAL MEDICINE

## 2021-06-14 PROCEDURE — 1159F PR MEDICATION LIST DOCUMENTED IN MEDICAL RECORD: ICD-10-PCS | Mod: ,,, | Performed by: INTERNAL MEDICINE

## 2021-06-14 PROCEDURE — 3008F BODY MASS INDEX DOCD: CPT | Mod: CPTII,,, | Performed by: INTERNAL MEDICINE

## 2021-06-14 PROCEDURE — 1101F PT FALLS ASSESS-DOCD LE1/YR: CPT | Mod: CPTII,,, | Performed by: INTERNAL MEDICINE

## 2021-06-14 RX ADMIN — DENOSUMAB 60 MG: 60 INJECTION SUBCUTANEOUS at 02:06

## 2021-07-09 ENCOUNTER — OFFICE VISIT (OUTPATIENT)
Dept: FAMILY MEDICINE | Facility: CLINIC | Age: 71
End: 2021-07-09
Payer: MEDICARE

## 2021-07-09 VITALS
HEART RATE: 72 BPM | BODY MASS INDEX: 29.53 KG/M2 | SYSTOLIC BLOOD PRESSURE: 118 MMHG | RESPIRATION RATE: 14 BRPM | DIASTOLIC BLOOD PRESSURE: 76 MMHG | HEIGHT: 59 IN | WEIGHT: 146.5 LBS | TEMPERATURE: 99 F

## 2021-07-09 DIAGNOSIS — H92.09 OTALGIA, UNSPECIFIED LATERALITY: ICD-10-CM

## 2021-07-09 DIAGNOSIS — G47.00 INSOMNIA, UNSPECIFIED TYPE: ICD-10-CM

## 2021-07-09 DIAGNOSIS — F43.0 STRESS REACTION: ICD-10-CM

## 2021-07-09 DIAGNOSIS — I10 ESSENTIAL HYPERTENSION: ICD-10-CM

## 2021-07-09 DIAGNOSIS — M40.03 POSTURAL KYPHOSIS OF CERVICOTHORACIC REGION: Primary | ICD-10-CM

## 2021-07-09 PROCEDURE — 3078F PR MOST RECENT DIASTOLIC BLOOD PRESSURE < 80 MM HG: ICD-10-PCS | Mod: CPTII,S$GLB,, | Performed by: FAMILY MEDICINE

## 2021-07-09 PROCEDURE — 1159F PR MEDICATION LIST DOCUMENTED IN MEDICAL RECORD: ICD-10-PCS | Mod: S$GLB,,, | Performed by: FAMILY MEDICINE

## 2021-07-09 PROCEDURE — 99499 UNLISTED E&M SERVICE: CPT | Mod: S$GLB,,, | Performed by: FAMILY MEDICINE

## 2021-07-09 PROCEDURE — 99999 PR PBB SHADOW E&M-EST. PATIENT-LVL IV: ICD-10-PCS | Mod: PBBFAC,,, | Performed by: FAMILY MEDICINE

## 2021-07-09 PROCEDURE — 1126F PR PAIN SEVERITY QUANTIFIED, NO PAIN PRESENT: ICD-10-PCS | Mod: S$GLB,,, | Performed by: FAMILY MEDICINE

## 2021-07-09 PROCEDURE — 3008F BODY MASS INDEX DOCD: CPT | Mod: CPTII,S$GLB,, | Performed by: FAMILY MEDICINE

## 2021-07-09 PROCEDURE — 1101F PR PT FALLS ASSESS DOC 0-1 FALLS W/OUT INJ PAST YR: ICD-10-PCS | Mod: CPTII,S$GLB,, | Performed by: FAMILY MEDICINE

## 2021-07-09 PROCEDURE — 3074F SYST BP LT 130 MM HG: CPT | Mod: CPTII,S$GLB,, | Performed by: FAMILY MEDICINE

## 2021-07-09 PROCEDURE — 99999 PR PBB SHADOW E&M-EST. PATIENT-LVL IV: CPT | Mod: PBBFAC,,, | Performed by: FAMILY MEDICINE

## 2021-07-09 PROCEDURE — 99499 RISK ADDL DX/OHS AUDIT: ICD-10-PCS | Mod: S$GLB,,, | Performed by: FAMILY MEDICINE

## 2021-07-09 PROCEDURE — 3288F FALL RISK ASSESSMENT DOCD: CPT | Mod: CPTII,S$GLB,, | Performed by: FAMILY MEDICINE

## 2021-07-09 PROCEDURE — 1126F AMNT PAIN NOTED NONE PRSNT: CPT | Mod: S$GLB,,, | Performed by: FAMILY MEDICINE

## 2021-07-09 PROCEDURE — 1159F MED LIST DOCD IN RCRD: CPT | Mod: S$GLB,,, | Performed by: FAMILY MEDICINE

## 2021-07-09 PROCEDURE — 3074F PR MOST RECENT SYSTOLIC BLOOD PRESSURE < 130 MM HG: ICD-10-PCS | Mod: CPTII,S$GLB,, | Performed by: FAMILY MEDICINE

## 2021-07-09 PROCEDURE — 3008F PR BODY MASS INDEX (BMI) DOCUMENTED: ICD-10-PCS | Mod: CPTII,S$GLB,, | Performed by: FAMILY MEDICINE

## 2021-07-09 PROCEDURE — 99214 OFFICE O/P EST MOD 30 MIN: CPT | Mod: S$GLB,,, | Performed by: FAMILY MEDICINE

## 2021-07-09 PROCEDURE — 99214 PR OFFICE/OUTPT VISIT, EST, LEVL IV, 30-39 MIN: ICD-10-PCS | Mod: S$GLB,,, | Performed by: FAMILY MEDICINE

## 2021-07-09 PROCEDURE — 3078F DIAST BP <80 MM HG: CPT | Mod: CPTII,S$GLB,, | Performed by: FAMILY MEDICINE

## 2021-07-09 PROCEDURE — 1101F PT FALLS ASSESS-DOCD LE1/YR: CPT | Mod: CPTII,S$GLB,, | Performed by: FAMILY MEDICINE

## 2021-07-09 PROCEDURE — 3288F PR FALLS RISK ASSESSMENT DOCUMENTED: ICD-10-PCS | Mod: CPTII,S$GLB,, | Performed by: FAMILY MEDICINE

## 2021-07-09 RX ORDER — FLUTICASONE PROPIONATE 50 MCG
1 SPRAY, SUSPENSION (ML) NASAL DAILY PRN
Qty: 16 G | Refills: 11 | Status: SHIPPED | OUTPATIENT
Start: 2021-07-09

## 2021-07-14 ENCOUNTER — CLINICAL SUPPORT (OUTPATIENT)
Dept: REHABILITATION | Facility: HOSPITAL | Age: 71
End: 2021-07-14
Attending: FAMILY MEDICINE
Payer: MEDICARE

## 2021-07-14 DIAGNOSIS — M40.03 POSTURAL KYPHOSIS OF CERVICOTHORACIC REGION: ICD-10-CM

## 2021-07-14 PROCEDURE — 97110 THERAPEUTIC EXERCISES: CPT | Mod: PO | Performed by: PHYSICAL THERAPIST

## 2021-07-14 PROCEDURE — 97161 PT EVAL LOW COMPLEX 20 MIN: CPT | Mod: PO | Performed by: PHYSICAL THERAPIST

## 2021-07-21 RX ORDER — MIRABEGRON 50 MG/1
1 TABLET, FILM COATED, EXTENDED RELEASE ORAL DAILY
Qty: 30 TABLET | Refills: 11 | Status: SHIPPED | OUTPATIENT
Start: 2021-07-21 | End: 2022-03-10 | Stop reason: SDUPTHER

## 2021-08-30 ENCOUNTER — PATIENT MESSAGE (OUTPATIENT)
Dept: INFUSION THERAPY | Facility: HOSPITAL | Age: 71
End: 2021-08-30

## 2021-10-14 ENCOUNTER — HOSPITAL ENCOUNTER (OUTPATIENT)
Dept: RADIOLOGY | Facility: HOSPITAL | Age: 71
Discharge: HOME | End: 2021-10-14
Attending: INTERNAL MEDICINE
Payer: MEDICARE

## 2021-10-14 DIAGNOSIS — C50.012 MALIGNANT NEOPLASM OF NIPPLE OF LEFT BREAST IN FEMALE, UNSPECIFIED ESTROGEN RECEPTOR STATUS: ICD-10-CM

## 2021-10-14 DIAGNOSIS — Z79.811 AROMATASE INHIBITOR USE: ICD-10-CM

## 2021-10-14 DIAGNOSIS — Z12.31 BREAST CANCER SCREENING BY MAMMOGRAM: ICD-10-CM

## 2021-10-14 PROCEDURE — 77067 SCR MAMMO BI INCL CAD: CPT | Mod: TC,PO

## 2021-10-14 PROCEDURE — 77067 SCR MAMMO BI INCL CAD: CPT | Mod: 26,,, | Performed by: RADIOLOGY

## 2021-10-14 PROCEDURE — 77063 BREAST TOMOSYNTHESIS BI: CPT | Mod: 26,,, | Performed by: RADIOLOGY

## 2021-10-14 PROCEDURE — 77067 MAMMO DIGITAL SCREENING BILAT WITH TOMO: ICD-10-PCS | Mod: 26,,, | Performed by: RADIOLOGY

## 2021-10-14 PROCEDURE — 77063 MAMMO DIGITAL SCREENING BILAT WITH TOMO: ICD-10-PCS | Mod: 26,,, | Performed by: RADIOLOGY

## 2021-10-18 ENCOUNTER — IMMUNIZATION (OUTPATIENT)
Dept: FAMILY MEDICINE | Facility: CLINIC | Age: 71
End: 2021-10-18
Payer: MEDICARE

## 2021-10-18 PROCEDURE — 90694 VACC AIIV4 NO PRSRV 0.5ML IM: CPT | Mod: S$GLB,,, | Performed by: FAMILY MEDICINE

## 2021-10-18 PROCEDURE — G0008 ADMIN INFLUENZA VIRUS VAC: HCPCS | Mod: S$GLB,,, | Performed by: FAMILY MEDICINE

## 2021-10-18 PROCEDURE — G0008 FLU VACCINE - QUADRIVALENT - ADJUVANTED: ICD-10-PCS | Mod: S$GLB,,, | Performed by: FAMILY MEDICINE

## 2021-10-18 PROCEDURE — 90694 FLU VACCINE - QUADRIVALENT - ADJUVANTED: ICD-10-PCS | Mod: S$GLB,,, | Performed by: FAMILY MEDICINE

## 2021-10-22 ENCOUNTER — TELEPHONE (OUTPATIENT)
Dept: HEMATOLOGY/ONCOLOGY | Facility: CLINIC | Age: 71
End: 2021-10-22

## 2021-10-22 DIAGNOSIS — C50.012 MALIGNANT NEOPLASM OF NIPPLE OF LEFT BREAST IN FEMALE, UNSPECIFIED ESTROGEN RECEPTOR STATUS: Primary | ICD-10-CM

## 2021-10-25 ENCOUNTER — HOSPITAL ENCOUNTER (OUTPATIENT)
Dept: RADIOLOGY | Facility: HOSPITAL | Age: 71
Discharge: HOME | End: 2021-10-25
Attending: INTERNAL MEDICINE
Payer: MEDICARE

## 2021-10-25 DIAGNOSIS — C50.012 MALIGNANT NEOPLASM OF NIPPLE OF LEFT BREAST IN FEMALE, UNSPECIFIED ESTROGEN RECEPTOR STATUS: ICD-10-CM

## 2021-10-25 PROCEDURE — 71046 XR CHEST PA AND LATERAL: ICD-10-PCS | Mod: 26,,, | Performed by: RADIOLOGY

## 2021-10-25 PROCEDURE — 71046 X-RAY EXAM CHEST 2 VIEWS: CPT | Mod: TC,FY,PO

## 2021-10-25 PROCEDURE — 71046 X-RAY EXAM CHEST 2 VIEWS: CPT | Mod: 26,,, | Performed by: RADIOLOGY

## 2021-10-26 ENCOUNTER — TELEPHONE (OUTPATIENT)
Dept: HEMATOLOGY/ONCOLOGY | Facility: CLINIC | Age: 71
End: 2021-10-26
Payer: MEDICARE

## 2021-10-27 ENCOUNTER — TELEPHONE (OUTPATIENT)
Dept: HEMATOLOGY/ONCOLOGY | Facility: CLINIC | Age: 71
End: 2021-10-27
Payer: MEDICARE

## 2021-11-09 DIAGNOSIS — I10 HYPERTENSION, UNSPECIFIED TYPE: ICD-10-CM

## 2021-11-09 RX ORDER — HYDROCHLOROTHIAZIDE 25 MG/1
25 TABLET ORAL DAILY PRN
Qty: 90 TABLET | Refills: 1 | Status: SHIPPED | OUTPATIENT
Start: 2021-11-09 | End: 2024-02-02 | Stop reason: SDUPTHER

## 2021-12-01 ENCOUNTER — OFFICE VISIT (OUTPATIENT)
Dept: HEMATOLOGY/ONCOLOGY | Facility: CLINIC | Age: 71
End: 2021-12-01
Payer: MEDICARE

## 2021-12-01 VITALS
BODY MASS INDEX: 30.76 KG/M2 | HEIGHT: 59 IN | HEART RATE: 67 BPM | TEMPERATURE: 98 F | WEIGHT: 152.56 LBS | RESPIRATION RATE: 18 BRPM | OXYGEN SATURATION: 97 % | SYSTOLIC BLOOD PRESSURE: 145 MMHG | DIASTOLIC BLOOD PRESSURE: 75 MMHG

## 2021-12-01 DIAGNOSIS — F43.21 SITUATIONAL DEPRESSION: ICD-10-CM

## 2021-12-01 DIAGNOSIS — C50.012 MALIGNANT NEOPLASM OF NIPPLE OF LEFT BREAST IN FEMALE, UNSPECIFIED ESTROGEN RECEPTOR STATUS: Primary | ICD-10-CM

## 2021-12-01 PROCEDURE — 99214 PR OFFICE/OUTPT VISIT, EST, LEVL IV, 30-39 MIN: ICD-10-PCS | Mod: S$GLB,,, | Performed by: INTERNAL MEDICINE

## 2021-12-01 PROCEDURE — 99214 OFFICE O/P EST MOD 30 MIN: CPT | Mod: S$GLB,,, | Performed by: INTERNAL MEDICINE

## 2021-12-01 PROCEDURE — 99999 PR PBB SHADOW E&M-EST. PATIENT-LVL IV: ICD-10-PCS | Mod: PBBFAC,,, | Performed by: INTERNAL MEDICINE

## 2021-12-01 PROCEDURE — 99999 PR PBB SHADOW E&M-EST. PATIENT-LVL IV: CPT | Mod: PBBFAC,,, | Performed by: INTERNAL MEDICINE

## 2021-12-07 ENCOUNTER — OFFICE VISIT (OUTPATIENT)
Dept: PSYCHIATRY | Facility: CLINIC | Age: 71
End: 2021-12-07
Payer: COMMERCIAL

## 2021-12-07 DIAGNOSIS — F43.23 ADJUSTMENT DISORDER WITH MIXED ANXIETY AND DEPRESSED MOOD: Primary | ICD-10-CM

## 2021-12-07 DIAGNOSIS — Z91.49 PSYCHOLOGICAL TRAUMA HISTORY: ICD-10-CM

## 2021-12-07 PROCEDURE — 90791 PSYCH DIAGNOSTIC EVALUATION: CPT | Mod: S$GLB,,, | Performed by: PSYCHOLOGIST

## 2021-12-07 PROCEDURE — 90791 PR PSYCHIATRIC DIAGNOSTIC EVALUATION: ICD-10-PCS | Mod: S$GLB,,, | Performed by: PSYCHOLOGIST

## 2021-12-13 ENCOUNTER — PATIENT MESSAGE (OUTPATIENT)
Dept: FAMILY MEDICINE | Facility: CLINIC | Age: 71
End: 2021-12-13
Payer: MEDICARE

## 2021-12-13 ENCOUNTER — PATIENT MESSAGE (OUTPATIENT)
Dept: HEMATOLOGY/ONCOLOGY | Facility: CLINIC | Age: 71
End: 2021-12-13
Payer: MEDICARE

## 2021-12-13 ENCOUNTER — TELEPHONE (OUTPATIENT)
Dept: INFUSION THERAPY | Facility: HOSPITAL | Age: 71
End: 2021-12-13
Payer: MEDICARE

## 2021-12-14 ENCOUNTER — IMMUNIZATION (OUTPATIENT)
Dept: PHARMACY | Facility: CLINIC | Age: 71
End: 2021-12-14
Payer: MEDICARE

## 2021-12-16 ENCOUNTER — OFFICE VISIT (OUTPATIENT)
Dept: PSYCHIATRY | Facility: CLINIC | Age: 71
End: 2021-12-16
Payer: COMMERCIAL

## 2021-12-16 DIAGNOSIS — F43.23 ADJUSTMENT DISORDER WITH MIXED ANXIETY AND DEPRESSED MOOD: Primary | ICD-10-CM

## 2021-12-16 DIAGNOSIS — Z91.49 PSYCHOLOGICAL TRAUMA HISTORY: ICD-10-CM

## 2021-12-16 PROCEDURE — 90837 PR PSYCHOTHERAPY W/PATIENT, 60 MIN: ICD-10-PCS | Mod: S$GLB,,, | Performed by: PSYCHOLOGIST

## 2021-12-16 PROCEDURE — 90837 PSYTX W PT 60 MINUTES: CPT | Mod: S$GLB,,, | Performed by: PSYCHOLOGIST

## 2021-12-21 ENCOUNTER — PES CALL (OUTPATIENT)
Dept: ADMINISTRATIVE | Facility: CLINIC | Age: 71
End: 2021-12-21
Payer: MEDICARE

## 2022-02-15 ENCOUNTER — IMMUNIZATION (OUTPATIENT)
Dept: PHARMACY | Facility: CLINIC | Age: 72
End: 2022-02-15
Payer: MEDICARE

## 2022-02-16 ENCOUNTER — OFFICE VISIT (OUTPATIENT)
Dept: UROLOGY | Facility: CLINIC | Age: 72
End: 2022-02-16
Payer: MEDICARE

## 2022-02-16 VITALS — BODY MASS INDEX: 30.76 KG/M2 | WEIGHT: 152.56 LBS | HEIGHT: 59 IN

## 2022-02-16 DIAGNOSIS — R35.1 NOCTURIA: Primary | ICD-10-CM

## 2022-02-16 LAB
BILIRUB SERPL-MCNC: NORMAL MG/DL
BLOOD URINE, POC: NORMAL
CLARITY, POC UA: CLEAR
COLOR, POC UA: YELLOW
GLUCOSE UR QL STRIP: NORMAL
KETONES UR QL STRIP: NORMAL
LEUKOCYTE ESTERASE URINE, POC: NORMAL
NITRITE, POC UA: NORMAL
PH, POC UA: 6.5
PROTEIN, POC: NORMAL
SPECIFIC GRAVITY, POC UA: 1.02
UROBILINOGEN, POC UA: NORMAL

## 2022-02-16 PROCEDURE — 99999 PR PBB SHADOW E&M-EST. PATIENT-LVL III: CPT | Mod: PBBFAC,,, | Performed by: UROLOGY

## 2022-02-16 PROCEDURE — 81002 URINALYSIS NONAUTO W/O SCOPE: CPT | Mod: S$GLB,,, | Performed by: UROLOGY

## 2022-02-16 PROCEDURE — 99213 PR OFFICE/OUTPT VISIT, EST, LEVL III, 20-29 MIN: ICD-10-PCS | Mod: S$GLB,,, | Performed by: UROLOGY

## 2022-02-16 PROCEDURE — 87086 URINE CULTURE/COLONY COUNT: CPT | Performed by: UROLOGY

## 2022-02-16 PROCEDURE — 99999 PR PBB SHADOW E&M-EST. PATIENT-LVL III: ICD-10-PCS | Mod: PBBFAC,,, | Performed by: UROLOGY

## 2022-02-16 PROCEDURE — 81002 POCT URINE DIPSTICK WITHOUT MICROSCOPE: ICD-10-PCS | Mod: S$GLB,,, | Performed by: UROLOGY

## 2022-02-16 PROCEDURE — 99213 OFFICE O/P EST LOW 20 MIN: CPT | Mod: S$GLB,,, | Performed by: UROLOGY

## 2022-02-16 NOTE — PROGRESS NOTES
Subjective:       Patient ID: Zayra Rodgers is a 72 y.o. female.    Chief Complaint: Urinary Urgency (Wakes up at 3am and makes her lower abdomen tender when she tries holing in her urine. Pt denies burning and states she does not go to the restroom often during the day but taking her myrbetriq later in the and cutting out soda has helped her per pt.)    HPI     72 year old urinary frequency and urgency and urge incontinence.  She has been taking Myrbetriq for the last 2-3 years and it has been effective.  Recently she has noted worsening nocturia.  She says she wakes up at 3:00 a.m. every night with urines to void and cannot go back to sleep.  She denies hematuria and dysuria.  She does have microscopic hematuria and this was evaluated with cystoscopy several years ago and was unremarkable.  She has no daytime symptoms.  Urine dipstick shows positive for 2+ blood.  Micro exam: 3-5 RBC's per HPF.    Review of Systems   Constitutional: Negative for fever.   Genitourinary: Negative for dysuria and hematuria.       Objective:      Physical Exam  Vitals reviewed.   Constitutional:       Appearance: She is well-developed and well-nourished.   Pulmonary:      Effort: Pulmonary effort is normal. No respiratory distress.   Skin:     General: Skin is warm.   Neurological:      Mental Status: She is alert and oriented to person, place, and time.   Psychiatric:         Mood and Affect: Mood and affect normal.         Behavior: Behavior normal.         Assessment:       1. Nocturia        Plan:       Nocturia  -     POCT URINE DIPSTICK WITHOUT MICROSCOPE  -     Urine culture      eliminate all caffeine.  Continue Myrbetriq.  Follow-up urine culture.  If her symptoms persist we will change medications.

## 2022-02-17 LAB — BACTERIA UR CULT: NO GROWTH

## 2022-02-18 ENCOUNTER — TELEPHONE (OUTPATIENT)
Dept: UROLOGY | Facility: CLINIC | Age: 72
End: 2022-02-18
Payer: MEDICARE

## 2022-02-18 NOTE — TELEPHONE ENCOUNTER
Spoke to the pt and she verbally understood that Dr Rendon said that her urine culture was negative.      She wanted me to send Dr Rendon a message to ask him that since the culture was negative and the Myrbetriq 50 mg was not working that he would change her medication.  She understood that the office will call her back on Monday with Dr Rendon's recommendations.

## 2022-02-22 DIAGNOSIS — D84.9 IMMUNOSUPPRESSED STATUS: ICD-10-CM

## 2022-02-22 RX ORDER — OXYBUTYNIN CHLORIDE 10 MG/1
10 TABLET, EXTENDED RELEASE ORAL DAILY
Qty: 30 TABLET | Refills: 11 | Status: SHIPPED | OUTPATIENT
Start: 2022-02-22 | End: 2022-03-10

## 2022-02-22 NOTE — TELEPHONE ENCOUNTER
Spoke with patient , advised her oxybutynin has been sent to her pharmacy. Patient would like pharmacy to be updated to [facility]. Patient expressed understanding.

## 2022-02-25 ENCOUNTER — OFFICE VISIT (OUTPATIENT)
Dept: FAMILY MEDICINE | Facility: CLINIC | Age: 72
End: 2022-02-25
Payer: MEDICARE

## 2022-02-25 VITALS
HEART RATE: 76 BPM | HEIGHT: 59 IN | WEIGHT: 149.25 LBS | BODY MASS INDEX: 30.09 KG/M2 | SYSTOLIC BLOOD PRESSURE: 118 MMHG | DIASTOLIC BLOOD PRESSURE: 80 MMHG

## 2022-02-25 DIAGNOSIS — G89.29 CHRONIC BILATERAL LOW BACK PAIN WITHOUT SCIATICA: ICD-10-CM

## 2022-02-25 DIAGNOSIS — R79.9 ABNORMAL FINDING OF BLOOD CHEMISTRY, UNSPECIFIED: ICD-10-CM

## 2022-02-25 DIAGNOSIS — Z00.00 ROUTINE GENERAL MEDICAL EXAMINATION AT A HEALTH CARE FACILITY: Primary | ICD-10-CM

## 2022-02-25 DIAGNOSIS — E78.5 HYPERLIPIDEMIA, UNSPECIFIED HYPERLIPIDEMIA TYPE: ICD-10-CM

## 2022-02-25 DIAGNOSIS — M54.50 CHRONIC BILATERAL LOW BACK PAIN WITHOUT SCIATICA: ICD-10-CM

## 2022-02-25 DIAGNOSIS — M40.03 POSTURAL KYPHOSIS OF CERVICOTHORACIC REGION: ICD-10-CM

## 2022-02-25 PROCEDURE — 99397 PER PM REEVAL EST PAT 65+ YR: CPT | Mod: S$GLB,,, | Performed by: FAMILY MEDICINE

## 2022-02-25 PROCEDURE — 99999 PR PBB SHADOW E&M-EST. PATIENT-LVL IV: CPT | Mod: PBBFAC,,, | Performed by: FAMILY MEDICINE

## 2022-02-25 PROCEDURE — 99999 PR PBB SHADOW E&M-EST. PATIENT-LVL IV: ICD-10-PCS | Mod: PBBFAC,,, | Performed by: FAMILY MEDICINE

## 2022-02-25 PROCEDURE — 99397 PR PREVENTIVE VISIT,EST,65 & OVER: ICD-10-PCS | Mod: S$GLB,,, | Performed by: FAMILY MEDICINE

## 2022-02-25 NOTE — PROGRESS NOTES
Subjective:       Patient ID: Zayra Rodgers is a 72 y.o. female.    Chief Complaint: Follow-up (Wants to discuss joint pain)    HPI  Patient in clinic for f/u, annual.  Recent change to oxybutynin rather than myrbetriq.   Recalls significant joint ache that she noticed after prolia inj. Discussed with hemonc/blaze.   Was previously in PT for her back. Would like to restart with PT at [facility].  Has hctz for prn.   Not currently taking the atorvastatin, but plans to restart at 1/2 tablet.   She did a visit with psych/tworek and found it really helpful. Planning to do another f/u with him.     Past Medical History:   Diagnosis Date    Allergy     Breast cancer 02/2016    left breast, neoadjuvant chemo, lumpectomy, and radiation    Bronchitis     HLD (hyperlipidemia)     Hypertension     S/P chemotherapy, time since greater than 12 weeks     Skin cancer     resovled     The 10-year ASCVD risk score (Torie JAY Jr., et al., 2013) is: 11.2%    Values used to calculate the score:      Age: 72 years      Sex: Female      Is Non- : No      Diabetic: No      Tobacco smoker: No      Systolic Blood Pressure: 118 mmHg      Is BP treated: Yes      HDL Cholesterol: 68 mg/dL      Total Cholesterol: 263 mg/dL     Review of Systems:  Review of Systems   Constitutional: Negative for activity change, fatigue and unexpected weight change.   HENT: Negative for hearing loss, rhinorrhea and trouble swallowing.    Eyes: Negative for discharge and visual disturbance.   Respiratory: Negative for chest tightness and wheezing.    Cardiovascular: Negative for chest pain and palpitations.   Gastrointestinal: Negative for blood in stool, constipation, diarrhea and vomiting.   Endocrine: Negative for polydipsia and polyuria.   Genitourinary: Negative for difficulty urinating, dysuria, hematuria and menstrual problem.   Musculoskeletal: Positive for arthralgias. Negative for joint swelling and neck pain.  "  Neurological: Negative for weakness and headaches.   Psychiatric/Behavioral: Negative for confusion, dysphoric mood and sleep disturbance (improved with activity, weight loss).       Objective:     Vitals:    02/25/22 1303   BP: 118/80   Pulse: 76   Weight: 67.7 kg (149 lb 4 oz)   Height: 4' 11" (1.499 m)          Physical Exam  Vitals and nursing note reviewed.   Constitutional:       General: She is not in acute distress.     Appearance: Normal appearance. She is well-developed. She is obese.   HENT:      Head: Normocephalic and atraumatic.   Eyes:      General: No scleral icterus.        Right eye: No discharge.         Left eye: No discharge.      Conjunctiva/sclera: Conjunctivae normal.   Cardiovascular:      Rate and Rhythm: Normal rate and regular rhythm.   Pulmonary:      Effort: Pulmonary effort is normal. No respiratory distress.      Breath sounds: Normal breath sounds.   Abdominal:      General: There is no distension.      Palpations: Abdomen is soft.   Musculoskeletal:         General: No signs of injury.      Cervical back: Neck supple.      Right lower leg: No edema.      Left lower leg: No edema.   Lymphadenopathy:      Cervical: No cervical adenopathy.   Skin:     General: Skin is warm and dry.      Findings: No rash.   Neurological:      General: No focal deficit present.      Mental Status: She is alert and oriented to person, place, and time.   Psychiatric:         Mood and Affect: Mood normal.         Behavior: Behavior normal.           Assessment & Plan:  Routine general medical examination at a health care facility  Comments:  anticipatory guidance reviewed    Chronic bilateral low back pain without sciatica  -     Ambulatory referral/consult to Physical/Occupational Therapy; Future; Expected date: 03/04/2022    Postural kyphosis of cervicothoracic region  -     Ambulatory referral/consult to Physical/Occupational Therapy; Future; Expected date: 03/04/2022    Hyperlipidemia, unspecified " hyperlipidemia type  Comments:  restart lipid 1/2 tablet  Orders:  -     TSH; Future; Expected date: 02/25/2022  -     Lipid Panel; Future; Expected date: 02/25/2022    Abnormal finding of blood chemistry, unspecified  -     Iron; Future; Expected date: 02/25/2022    PT orders updated.

## 2022-03-08 ENCOUNTER — PATIENT MESSAGE (OUTPATIENT)
Dept: UROLOGY | Facility: CLINIC | Age: 72
End: 2022-03-08
Payer: MEDICARE

## 2022-03-10 RX ORDER — MIRABEGRON 50 MG/1
1 TABLET, FILM COATED, EXTENDED RELEASE ORAL DAILY
Qty: 30 TABLET | Refills: 11 | Status: SHIPPED | OUTPATIENT
Start: 2022-03-10 | End: 2023-03-14 | Stop reason: SDUPTHER

## 2022-03-29 ENCOUNTER — PES CALL (OUTPATIENT)
Dept: ADMINISTRATIVE | Facility: CLINIC | Age: 72
End: 2022-03-29
Payer: MEDICARE

## 2022-04-04 ENCOUNTER — PATIENT MESSAGE (OUTPATIENT)
Dept: HEMATOLOGY/ONCOLOGY | Facility: CLINIC | Age: 72
End: 2022-04-04
Payer: MEDICARE

## 2022-04-05 ENCOUNTER — OFFICE VISIT (OUTPATIENT)
Dept: HEMATOLOGY/ONCOLOGY | Facility: CLINIC | Age: 72
End: 2022-04-05
Payer: MEDICARE

## 2022-04-05 ENCOUNTER — TELEPHONE (OUTPATIENT)
Dept: RADIOLOGY | Facility: HOSPITAL | Age: 72
End: 2022-04-05

## 2022-04-05 ENCOUNTER — HOSPITAL ENCOUNTER (OUTPATIENT)
Dept: RADIOLOGY | Facility: HOSPITAL | Age: 72
Discharge: HOME | End: 2022-04-05
Payer: MEDICARE

## 2022-04-05 ENCOUNTER — HOSPITAL ENCOUNTER (OUTPATIENT)
Dept: RADIOLOGY | Facility: HOSPITAL | Age: 72
Discharge: HOME | End: 2022-04-05
Attending: INTERNAL MEDICINE
Payer: MEDICARE

## 2022-04-05 VITALS
DIASTOLIC BLOOD PRESSURE: 86 MMHG | HEART RATE: 75 BPM | BODY MASS INDEX: 28.67 KG/M2 | TEMPERATURE: 98 F | SYSTOLIC BLOOD PRESSURE: 124 MMHG | HEIGHT: 59 IN | WEIGHT: 142.19 LBS | OXYGEN SATURATION: 98 %

## 2022-04-05 DIAGNOSIS — N63.20 LEFT BREAST LUMP: ICD-10-CM

## 2022-04-05 DIAGNOSIS — C50.012 MALIGNANT NEOPLASM OF NIPPLE OF LEFT BREAST IN FEMALE, UNSPECIFIED ESTROGEN RECEPTOR STATUS: Primary | ICD-10-CM

## 2022-04-05 DIAGNOSIS — N63.20 LUMP OF LEFT BREAST: ICD-10-CM

## 2022-04-05 DIAGNOSIS — F43.21 SITUATIONAL DEPRESSION: ICD-10-CM

## 2022-04-05 DIAGNOSIS — C50.012 MALIGNANT NEOPLASM OF NIPPLE OF LEFT BREAST IN FEMALE, UNSPECIFIED ESTROGEN RECEPTOR STATUS: ICD-10-CM

## 2022-04-05 PROCEDURE — 77061 BREAST TOMOSYNTHESIS UNI: CPT | Mod: 26,LT,, | Performed by: RADIOLOGY

## 2022-04-05 PROCEDURE — 77065 DX MAMMO INCL CAD UNI: CPT | Mod: TC,PO,LT

## 2022-04-05 PROCEDURE — 76642 ULTRASOUND BREAST LIMITED: CPT | Mod: TC,PO,LT

## 2022-04-05 PROCEDURE — 99999 PR PBB SHADOW E&M-EST. PATIENT-LVL IV: CPT | Mod: PBBFAC,,,

## 2022-04-05 PROCEDURE — 77065 DX MAMMO INCL CAD UNI: CPT | Mod: 26,LT,, | Performed by: RADIOLOGY

## 2022-04-05 PROCEDURE — 99999 PR PBB SHADOW E&M-EST. PATIENT-LVL IV: ICD-10-PCS | Mod: PBBFAC,,,

## 2022-04-05 PROCEDURE — 76642 ULTRASOUND BREAST LIMITED: CPT | Mod: 26,LT,, | Performed by: RADIOLOGY

## 2022-04-05 PROCEDURE — 99213 PR OFFICE/OUTPT VISIT, EST, LEVL III, 20-29 MIN: ICD-10-PCS | Mod: S$GLB,,,

## 2022-04-05 PROCEDURE — 76642 US BREAST LEFT LIMITED: ICD-10-PCS | Mod: 26,LT,, | Performed by: RADIOLOGY

## 2022-04-05 PROCEDURE — 77065 MAMMO DIGITAL DIAGNOSTIC LEFT WITH TOMO: ICD-10-PCS | Mod: 26,LT,, | Performed by: RADIOLOGY

## 2022-04-05 PROCEDURE — 99213 OFFICE O/P EST LOW 20 MIN: CPT | Mod: S$GLB,,,

## 2022-04-05 PROCEDURE — 77061 MAMMO DIGITAL DIAGNOSTIC LEFT WITH TOMO: ICD-10-PCS | Mod: 26,LT,, | Performed by: RADIOLOGY

## 2022-04-05 NOTE — TELEPHONE ENCOUNTER
Patient in today for diag mammo and u/s, left breast. Per Radiologist Dr. Silva - scheduled ultrasound guided left breast biopsy tomorrow 4/6/22 at 8 am at [address] [facility] [address] radiology. Discussed procedure in detail with patient. Discussed and gave to patient the Breast Biopsy patient information booklet. Answered all questions for patient, urged patient to call back with any further questions/concerns, private line phone number provided in booklet.    Dr. Bartlett and NP Letty Bennett informed via this note

## 2022-04-05 NOTE — PROGRESS NOTES
PATIENT: Zayra Rodgers  MRN: [id number]  DATE: 4/5/2022      Diagnosis:   1. 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 Situational depression    3. Lump of left breast        Chief Complaint: Breast Cancer (Pt recently discovered a lump on the left side of her breast on 4/1/2022, pt c/o slight tenderness )      Subjective:    Interval History: Ms. Rodgers is a 72 y.o. female known to Dr. Bartlett for locally advanced carcinoma of the left breast who is here today for exam after finding a lump near the incision on her left breast.     Has been placed on annual follow-up by Dr. Bartlett but has some anxiety regarding the possibility of recurrent disease and has been in twice prior to her planned follow-up in June 2022.    Patient has been able to successfully lose 10 lbs in the past 2 months. 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 On Friday 4/1/22, she noticed some tenderness under both axilla and under bilateral breasts. Upon exam she noticed a small lump near her lumpectomy incision site in the left breast. She has not felt this lump prior to Friday.   Otherwise, she denies HA, vision changes, CP, SOB, cough, abd pain, changes in bowel habits, swelling. No lymphadenopathy, fevers, chills. Denies pain, arthralgias or myalgias.      Prior History:  3/22/2016-5/24/2016: Completed 4 cycles AC  6/15/2016-8/31/2016: Completed 12 cycles weekly Taxol   10/20/2016: Lumpectomy that revealed residual 0.3 mm focus of metastatic carcinoma in a single lymph node  Completed postlumpectomy radiation   11/2016: Began Arimidex 1 mg po daily--> completed 60 month course of AI and biannual Prolia for prevention of AI induced bone loss.     Past Medical History:   Past Medical History:   Diagnosis Date    Allergy     Breast cancer 02/2016    left breast, neoadjuvant  chemo, lumpectomy, and radiation    Bronchitis     HLD (hyperlipidemia)     Hypertension     S/P chemotherapy, time since greater than 12 weeks     Skin cancer     resovled       Past Surgical HIstory:   Past Surgical History:   Procedure Laterality Date    ADENOIDECTOMY      BREAST BIOPSY Left 2016    BREAST CYST EXCISION      BREAST LUMPECTOMY Left 2016    COLONOSCOPY N/A 6/21/2018    Procedure: COLONOSCOPY;  Surgeon: Hiro Billy Jr., MD;  Location: [facility];  Service: Endoscopy;  Laterality: N/A;    COSMETIC SURGERY  1985    CYSTOCELE REPAIR      EXCISIONAL HEMORRHOIDECTOMY      HYSTERECTOMY      due to prolapse    ORBITAL RECONSTRUCTION      PORTACATH PLACEMENT      since removed    RHINOPLASTY TIP      SIGMOIDOSCOPY         Family History:   Family History   Problem Relation Age of Onset    Breast cancer Mother 42    Cancer Mother     COPD Father     Colon cancer Sister     Breast cancer Sister 40       Social History:  reports that she has never smoked. She has never used smokeless tobacco. She reports current alcohol use of about 2.0 standard drinks of alcohol per week. She reports that she does not use drugs.    Allergies:  Review of patient's allergies indicates:  No Known Allergies    Medications:  Current Outpatient Medications   Medication Sig Dispense Refill    albuterol (PROAIR HFA) 90 mcg/actuation inhaler Inhale 2 puffs into the lungs every 6 (six) hours as needed for Wheezing. Rescue 18 g 11    aspirin (ECOTRIN) 81 MG EC tablet Take 81 mg by mouth once daily.      B-complex with vitamin C (Z-BEC OR EQUIV) tablet Take 1 tablet by mouth once daily.      calcium carbonate-vitamin D3 600 mg-20 mcg (800 unit) Tab Take 2 tablets by mouth once daily. 200 tablet 3    cetirizine (ZYRTEC) 10 MG tablet Take 10 mg by mouth daily as needed.       fish oil-omega-3 fatty acids 300-1,000 mg capsule Take 2 g by mouth daily as needed.       fluticasone propionate (FLONASE) 50  "mcg/actuation nasal spray use 1 spray (50 mcg total) by Each Nostril route daily as needed. 16 g 11    guaiFENesin 1,200 mg Ta12 Take by mouth daily as needed.       hydroCHLOROthiazide (HYDRODIURIL) 25 MG tablet Take 1 tablet (25 mg total) by mouth daily as needed. 90 tablet 1    mirabegron (MYRBETRIQ) 50 mg Tb24 Take 1 tablet (50 mg total) by mouth once daily. 30 tablet 11    multivitamin capsule Take 1 capsule by mouth once daily.      atorvastatin (LIPITOR) 40 MG tablet Take 1 tablet (40 mg total) by mouth once daily. (Patient not taking: No sig reported) 90 tablet 1     No current facility-administered medications for this visit.       Review of Systems   Constitutional: Negative for appetite change and unexpected weight change.   HENT: Negative for mouth sores.    Eyes: Negative for visual disturbance.   Respiratory: Negative for cough and shortness of breath.    Cardiovascular: Negative for chest pain.   Gastrointestinal: Negative for abdominal pain and diarrhea.   Genitourinary: Negative for frequency.   Musculoskeletal: Negative for back pain.   Skin: Negative for rash.   Neurological: Negative for headaches.   Hematological: Negative for adenopathy.   Psychiatric/Behavioral: The patient is not nervous/anxious.        ECOG Performance Status:   ECOG SCORE           Objective:      Vitals:   Vitals:    04/05/22 1255   BP: 124/86   BP Location: Right arm   Patient Position: Sitting   BP Method: Medium (Manual)   Pulse: 75   Temp: 98.2 °F (36.8 °C)   TempSrc: Temporal   SpO2: 98%   Weight: 64.5 kg (142 lb 3.2 oz)   Height: 4' 11" (1.499 m)     BMI: Body mass index is 28.72 kg/m².    Physical Exam  Vitals and nursing note reviewed.   Constitutional:       General: She is not in acute distress.     Appearance: She is normal weight. She is not diaphoretic.   HENT:      Head: Normocephalic and atraumatic.   Eyes:      General: No scleral icterus.  Cardiovascular:      Rate and Rhythm: Normal rate and regular " rhythm.      Heart sounds: Normal heart sounds. No murmur heard.  Pulmonary:      Effort: Pulmonary effort is normal. No respiratory distress.      Breath sounds: No wheezing or rhonchi.   Chest:      Chest wall: Tenderness (Tenderness noted bilaterally along chest wall, just under axilla) present.   Breasts:      Right: No swelling, inverted nipple, mass, skin change, tenderness, axillary adenopathy or supraclavicular adenopathy.      Left: No swelling, inverted nipple, mass, skin change, tenderness, axillary adenopathy or supraclavicular adenopathy.        Comments: Left breast; post-surgical changes along lumpectomy scar  Abdominal:      General: Abdomen is flat. Bowel sounds are normal. There is no distension.      Palpations: Abdomen is soft. There is no mass.      Tenderness: There is no abdominal tenderness. There is no guarding.   Musculoskeletal:         General: No swelling.      Cervical back: No tenderness.      Right lower leg: No edema.      Left lower leg: No edema.   Lymphadenopathy:      Cervical: No cervical adenopathy.      Upper Body:      Right upper body: No supraclavicular, axillary or pectoral adenopathy.      Left upper body: No supraclavicular, axillary or pectoral adenopathy.   Skin:     General: Skin is warm and dry.      Coloration: Skin is not jaundiced.      Findings: No bruising, erythema or rash.   Neurological:      Mental Status: She is alert and oriented to person, place, and time.      Gait: Gait normal.   Psychiatric:         Mood and Affect: Mood normal.         Behavior: Behavior normal.         Judgment: Judgment normal.         Laboratory Data:  Lab Results   Component Value Date    WBC 7.28 05/24/2021    HGB 12.8 05/24/2021    HCT 39.4 05/24/2021    MCV 95 05/24/2021     05/24/2021        Assessment:       1. 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 Situational depression    3. Lump of left breast            Plan:   Malignant neoplasm of left breast  -Completed 4 cycles AC followed by 12 weekly doses of Taxol. S/P lumpectomy and post lumpectomy radiation  -Completed full 60 months of AI therapy with Arimidex   -Mammogram done 10/14/21; birads 2, routine due one year from that date  -CXR 10/25/21 shows no radiographic abnormality   -Due for follow-up with Dr. Bartlett in June 2022 with labs prior to the visit; keep those appointments     Left breast tenderness/lump  -tenderness caused from recent heavy lifting; bilateral in nature gives cause for less suspicion  -Will take Tylenol/Ibuprofen for tenderness  -Post surgical changes along the left breast lumpectomy scar. New to patient, could be more noticeable due to recent weight loss.  -Will order US left breast to evaluate area of concern     Situational depression   -Past referral to Dr. Ryder; patient plans to r/s follow-up     Patient queried and all questions were answered.    Route Chart for Scheduling    Med Onc Chart Routing      Follow up with physician Other. Keep follow-up appointments in June as planned    Follow up with ANGIE    Labs    Imaging    US left breast now   Pharmacy appointment    Other referrals            Therapy Plan Information  PROLIA (DENOSUMAB) Q6M  5. Medications  denosumab (PROLIA) injection 60 mg  60 mg, Subcutaneous, Every visit    PORT FLUSH  Flushes  heparin, porcine (PF) 100 unit/mL injection flush 500 Units  500 Units, Intravenous, Every visit  sodium chloride 0.9% flush 10 mL  10 mL, Intravenous, Every visit

## 2022-04-06 ENCOUNTER — HOSPITAL ENCOUNTER (OUTPATIENT)
Dept: RADIOLOGY | Facility: HOSPITAL | Age: 72
Discharge: HOME | End: 2022-04-06
Payer: MEDICARE

## 2022-04-06 DIAGNOSIS — C50.012 MALIGNANT NEOPLASM OF NIPPLE OF LEFT BREAST IN FEMALE, UNSPECIFIED ESTROGEN RECEPTOR STATUS: ICD-10-CM

## 2022-04-06 DIAGNOSIS — N63.20 LUMP OF LEFT BREAST: ICD-10-CM

## 2022-04-06 PROCEDURE — 88360 PR  TUMOR IMMUNOHISTOCHEM/MANUAL: ICD-10-PCS | Mod: 26,,, | Performed by: STUDENT IN AN ORGANIZED HEALTH CARE EDUCATION/TRAINING PROGRAM

## 2022-04-06 PROCEDURE — 88342 IMHCHEM/IMCYTCHM 1ST ANTB: CPT | Mod: 26,59,, | Performed by: STUDENT IN AN ORGANIZED HEALTH CARE EDUCATION/TRAINING PROGRAM

## 2022-04-06 PROCEDURE — 19083 BX BREAST 1ST LESION US IMAG: CPT | Mod: LT,,, | Performed by: RADIOLOGY

## 2022-04-06 PROCEDURE — 88305 TISSUE EXAM BY PATHOLOGIST: ICD-10-PCS | Mod: 26,,, | Performed by: STUDENT IN AN ORGANIZED HEALTH CARE EDUCATION/TRAINING PROGRAM

## 2022-04-06 PROCEDURE — A4648 IMPLANTABLE TISSUE MARKER: HCPCS | Mod: PO

## 2022-04-06 PROCEDURE — 88360 TUMOR IMMUNOHISTOCHEM/MANUAL: CPT | Mod: 26,,, | Performed by: STUDENT IN AN ORGANIZED HEALTH CARE EDUCATION/TRAINING PROGRAM

## 2022-04-06 PROCEDURE — 88342 IMHCHEM/IMCYTCHM 1ST ANTB: CPT | Performed by: STUDENT IN AN ORGANIZED HEALTH CARE EDUCATION/TRAINING PROGRAM

## 2022-04-06 PROCEDURE — 19083 US BREAST BIOPSY WITH IMAGING 1ST SITE LEFT: ICD-10-PCS | Mod: LT,,, | Performed by: RADIOLOGY

## 2022-04-06 PROCEDURE — 88305 TISSUE EXAM BY PATHOLOGIST: CPT | Mod: 26,,, | Performed by: STUDENT IN AN ORGANIZED HEALTH CARE EDUCATION/TRAINING PROGRAM

## 2022-04-06 PROCEDURE — 88305 TISSUE EXAM BY PATHOLOGIST: CPT | Performed by: STUDENT IN AN ORGANIZED HEALTH CARE EDUCATION/TRAINING PROGRAM

## 2022-04-06 PROCEDURE — 88342 CHG IMMUNOCYTOCHEMISTRY: ICD-10-PCS | Mod: 26,59,, | Performed by: STUDENT IN AN ORGANIZED HEALTH CARE EDUCATION/TRAINING PROGRAM

## 2022-04-06 PROCEDURE — 88341 IMHCHEM/IMCYTCHM EA ADD ANTB: CPT | Performed by: STUDENT IN AN ORGANIZED HEALTH CARE EDUCATION/TRAINING PROGRAM

## 2022-04-06 PROCEDURE — 25000003 PHARM REV CODE 250: Mod: PO

## 2022-04-06 RX ORDER — LIDOCAINE HYDROCHLORIDE AND EPINEPHRINE 20; 10 MG/ML; UG/ML
10 INJECTION, SOLUTION INFILTRATION; PERINEURAL ONCE
Status: COMPLETED | OUTPATIENT
Start: 2022-04-06 | End: 2022-04-06

## 2022-04-06 RX ORDER — LIDOCAINE HYDROCHLORIDE 10 MG/ML
5 INJECTION INFILTRATION; PERINEURAL ONCE
Status: COMPLETED | OUTPATIENT
Start: 2022-04-06 | End: 2022-04-06

## 2022-04-06 RX ADMIN — LIDOCAINE HYDROCHLORIDE ANHYDROUS 5 ML: 10 INJECTION, SOLUTION INFILTRATION at 08:04

## 2022-04-06 RX ADMIN — LIDOCAINE HYDROCHLORIDE,EPINEPHRINE BITARTRATE 10 ML: 20; .01 INJECTION, SOLUTION INFILTRATION; PERINEURAL at 08:04

## 2022-04-11 ENCOUNTER — PATIENT MESSAGE (OUTPATIENT)
Dept: HEMATOLOGY/ONCOLOGY | Facility: CLINIC | Age: 72
End: 2022-04-11
Payer: MEDICARE

## 2022-04-11 ENCOUNTER — PATIENT MESSAGE (OUTPATIENT)
Dept: RADIOLOGY | Facility: HOSPITAL | Age: 72
End: 2022-04-11
Payer: MEDICARE

## 2022-04-11 ENCOUNTER — TELEPHONE (OUTPATIENT)
Dept: RADIOLOGY | Facility: HOSPITAL | Age: 72
End: 2022-04-11
Payer: MEDICARE

## 2022-04-11 LAB
FINAL PATHOLOGIC DIAGNOSIS: NORMAL
GROSS: NORMAL
Lab: NORMAL

## 2022-04-11 NOTE — TELEPHONE ENCOUNTER
Returned call to patient, patient has seen breast biopsy results online.   Final Pathologic Diagnosis Left breast, 3:00, 8 cm from the nipple, ultrasound-guided core biopsy:   Invasive ductal carcinoma, moderately differentiated   Manor grade 2:  Tubule formation-3, nuclear pleomorphism -2 and mitotic   count -2   Invasive carcinoma measures 15 mm in greatest dimension   No lymphovascular invasion or carcinoma in Situ is identified   All submitted cores are involved by invasive carcinoma   Tumor biomarkers   Estrogen receptor (ER):  Negative   Progesterone receptor (PGR):  Negative   HER2 (IHC):  Negative, 0   Ki-67:  80%   P63 IHC is negative, supporting the diagnosis of invasive carcinoma   All immunostains were performed with appropriate controls.   This case was reviewed by Dr. ANISHA Washington who concurs with the above diagnosis.    Comment: Interp By Ritika Lawson M.D., Signed on 04/11/2022 at 11:36   Patient is desperate to see her oncologist, Dr. Michael Bartlett. I have messaged Dr. Bartlett and his asistanc, Crystal Manning as well Assured patient she will be contacted by that office asap.

## 2022-04-12 ENCOUNTER — TELEPHONE (OUTPATIENT)
Dept: HEMATOLOGY/ONCOLOGY | Facility: CLINIC | Age: 72
End: 2022-04-12
Payer: MEDICARE

## 2022-04-12 DIAGNOSIS — C50.012 MALIGNANT NEOPLASM OF NIPPLE OF LEFT BREAST IN FEMALE, ESTROGEN RECEPTOR NEGATIVE: Primary | ICD-10-CM

## 2022-04-12 DIAGNOSIS — Z17.1 MALIGNANT NEOPLASM OF NIPPLE OF LEFT BREAST IN FEMALE, ESTROGEN RECEPTOR NEGATIVE: Primary | ICD-10-CM

## 2022-04-12 NOTE — PLAN OF CARE
START ON PATHWAY REGIMEN - Breast    [id number]        Pembrolizumab (Keytruda)       Paclitaxel (Taxol)       Carboplatin (Paraplatin)       Filgrastim-xxxx       Pembrolizumab (Keytruda)       Doxorubicin (Adriamycin)       Cyclophosphamide (Cytoxan)       Pegfilgrastim-xxxx           Additional Orders: In the KEYNOTE-522 trial (Ro et al., 2020), G-CSF   was recommended after each chemotherapy cycle beginning 24 hours after the last   dose of chemotherapy. For the paclitaxel + carboplatin cycles, filgrastim was   recommended and it continued daily at least until 72 hours after the last dose   of chemotherapy. For the doxorubicin + cyclophosphamide cycles, pegfilgrastim   could be used. See protocol for details. Assess LVEF prior to initiation of   doxorubicin and as clinically indicated during and after treatment. Doxorubicin   can cause myocardial damage, including acute left ventricular failure. Risk of   cardiomyopathy is generally proportional to cumulative   anthracycline/anthracenedione exposure. Use of concomitant cardiotoxic agents,   previous radiotherapy to the mediastinum, or presence/history of cardiovascular   disease can additionally increase cardiomyopathy risk. See PI for details.   Serious immune-mediated adverse events can occur with pembrolizumab. Please   monitor your patient and refer to the linked immune-mediated adverse reaction   management materials for more information.    **Always confirm dose/schedule in your pharmacy ordering system**    Patient Characteristics:  Preoperative or Nonsurgical Candidate (Clinical Staging), Neoadjuvant Therapy   followed by Surgery, Invasive Disease, Chemotherapy, HER2   Negative/Unknown/Equivocal, ER Negative/Unknown, Platinum Therapy Indicated,   High-Risk Disease Present  Therapeutic Status: Preoperative or Nonsurgical Candidate (Clinical Staging)  AJCC M Category: cM0  AJCC Grade: G2  Breast Surgical Plan: Neoadjuvant Therapy followed by Surgery  ER  Status: Negative (-)  AJCC 8 Stage Grouping: IIB  HER2 Status: Negative (-)  AJCC T Category: cT2  AJCC N Category: cN0  AK Status: Negative (-)  Type of Therapy: Platinum Therapy Indicated  Intent of Therapy:  Curative Intent, Discussed with Patient

## 2022-04-12 NOTE — TELEPHONE ENCOUNTER
Requested copy of BRCA testing from [facility]. They state it was ordered by Dr Olivares and will need signed release. Will get this from patient at visit tomorrow.

## 2022-04-13 ENCOUNTER — OFFICE VISIT (OUTPATIENT)
Dept: HEMATOLOGY/ONCOLOGY | Facility: CLINIC | Age: 72
End: 2022-04-13
Payer: MEDICARE

## 2022-04-13 VITALS
RESPIRATION RATE: 16 BRPM | DIASTOLIC BLOOD PRESSURE: 101 MMHG | WEIGHT: 138 LBS | HEIGHT: 59 IN | BODY MASS INDEX: 27.82 KG/M2 | OXYGEN SATURATION: 97 % | TEMPERATURE: 99 F | HEART RATE: 89 BPM | SYSTOLIC BLOOD PRESSURE: 168 MMHG

## 2022-04-13 DIAGNOSIS — C50.012 MALIGNANT NEOPLASM OF NIPPLE OF LEFT BREAST IN FEMALE, UNSPECIFIED ESTROGEN RECEPTOR STATUS: Primary | ICD-10-CM

## 2022-04-13 PROCEDURE — 99215 OFFICE O/P EST HI 40 MIN: CPT | Mod: S$GLB,,, | Performed by: INTERNAL MEDICINE

## 2022-04-13 PROCEDURE — 99999 PR PBB SHADOW E&M-EST. PATIENT-LVL V: CPT | Mod: PBBFAC,,, | Performed by: INTERNAL MEDICINE

## 2022-04-13 PROCEDURE — 99999 PR PBB SHADOW E&M-EST. PATIENT-LVL V: ICD-10-PCS | Mod: PBBFAC,,, | Performed by: INTERNAL MEDICINE

## 2022-04-13 PROCEDURE — 99215 PR OFFICE/OUTPT VISIT, EST, LEVL V, 40-54 MIN: ICD-10-PCS | Mod: S$GLB,,, | Performed by: INTERNAL MEDICINE

## 2022-04-13 RX ORDER — TEMAZEPAM 15 MG/1
15-30 CAPSULE ORAL NIGHTLY PRN
Qty: 60 CAPSULE | Refills: 0 | Status: SHIPPED | OUTPATIENT
Start: 2022-04-13 | End: 2022-05-02

## 2022-04-13 NOTE — Clinical Note
Consult Dr. Ruggiero for evaluation/management of triple negative breast carcinoma as well as port placement. Patient needs follow-up appointment with Dr. sage Consultations for nurse navigation and chemotherapy teaching school. Laboratory to include BRCA 1 and 2 testing, CBC, CMP, LDH, magnesium, TSH, free T4, CA 27-29 as soon as possible. Return to clinic cycle 1 day 1 of carboplatin/Taxol/Keytruda.  Patient is to see Dr. Machado in my absence for vacation. Patient is to follow-up 1 week later with interval CBC, BMP, and magnesium prior to cycle 1, day 8 of therapy.

## 2022-04-14 ENCOUNTER — TELEPHONE (OUTPATIENT)
Dept: HEMATOLOGY/ONCOLOGY | Facility: CLINIC | Age: 72
End: 2022-04-14
Payer: MEDICARE

## 2022-04-14 NOTE — PROGRESS NOTES
HISTORY OF PRESENT ILLNESS:    The patient is a 71-year-old white female well   known to me for locally advanced left breast carcinoma, who underwent   neoadjuvant therapy consisting of four cycles of Adriamycin/Cytoxan followed by 12 weekly doses of Taxol.  She had a residual 0.3 mm focus of metastatic carcinoma in a single lymph node.  Patient and postlumpectomy radiation.  She completed a 60 month course of Arimidex/biannual Prolia for prevention of aromatase inhibitor induced bone loss.  She was recently placed on annual follow-up.  She returns to clinic earlier than scheduled appointment.  Patient is having some anxiety symptoms around the possibility of recurrence.  She feels a little less certain of her course now that she is off active treatment and having less frequent surveillance.  She also continues to struggle caring for her  who became severely ill during the pandemic.  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  She presents seeking reassurance.  She declines medical therapy for sleep disturbance/situational depression.    Shortly after patient's last office evaluation, she began to experience pain in the other portions of her left breast.  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  However, the patient has actively been dieting, losing weight, and upon palpation noted a change in the breast parenchyma which she wished evaluated.  She was seen by morro Butler for additional imaging which reveals suspicious findings.  Patient has gone on to have breast biopsy and returns to review results.  Patient had visited with Dr. Ryder since her last visit in my clinic and found is assistance helpful.    PHYSICAL EXAMINATION:  GENERAL:  Well-developed, well-nourished white female in no acute distress, witha weight of 138 pounds (decreased by 14.5 pounds).  VITAL SIGNS:  Documented in EMR and reviewed.  HEENT:  Normocephalic,  atraumatic.  Oral mucosa pink and moist.  Lips without lesions.  Tongue midline.  Oropharynx clear.  Nonicteric sclerae.   NECK:  Supple, no adenopathy.  No carotid bruits, thyromegaly or thyroid nodule.  HEART:  Regular rate and rhythm without murmur, gallop or rub.                LUNGS:  Clear to auscultation bilaterally.  Normal respiratory effort.       ABDOMEN:  Soft, nontender, nondistended with positive normoactive bowel sounds, no hepatosplenomegaly.    EXTREMITIES:  No cyanosis, clubbing or edema.  Distal pulses are intact.                                           AXILLAE AND GROIN:  No palpable pathologic lymphadenopathy is appreciated.        SKIN:  Intact/turgor normal.  NEUROLOGIC:  Cranial nerves II-XII grossly intact.  Motor:  Good muscle bulk and tone.  Strength/sensory 5/5 throughout.  Gait stable.  BREASTS:  Patient has palpable abnormality near the 3 o'clock position of the left breast.    Diagnostic left breast mammography/ultrasound:  Mammo Digital Diagnostic Left with Barron  Left  Post-Surgical Finding: There are post-surgical findings from a previous lumpectomy with radiation seen in the upper outer quadrant of the left breast. There has been no interval development of a suspicious mass, microcalcification, or architectural distortion.   Developing Asymmetry: There is a 20 mm developing asymmetry seen in the left breast at 3 o'clock in the anterior depth, 8 cm from the nipple on the CC (slice 25) view. The asymmetry correlates with the palpable mass noted during physical examination. Compared to the previous study, the asymmetry increased in size. There are also associated dystrophic and coarse heterogeneous calcifications in a grouped distribution.      US Breast Left Limited  Left  Post-Surgical Finding: There are no corresponding post-surgical findings seen on this modality.   Mass: There is a 16 mm irregularly shaped, non-parallel, hypoechoic mass with spiculated margins with shadowing  seen in the left breast at 3 o'clock in the anterior depth, 8 cm from the nipple. The mass correlates with the palpable mass reported by the patient. The mass correlates with the asymmetry seen on the mammogram.       There is a normal sized, morphologically normal left axillary lymph node present which show preserved central fatty antelmo and no abnormal eccentric cortical thickening.     Pathology:  Left breast, 3:00, 8 cm from the nipple, ultrasound-guided core biopsy:   Invasive ductal carcinoma, moderately differentiated   Felipe grade 2:  Tubule formation-3, nuclear pleomorphism -2 and mitotic   count -2   Invasive carcinoma measures 15 mm in greatest dimension   No lymphovascular invasion or carcinoma in Situ is identified   All submitted cores are involved by invasive carcinoma   Tumor biomarkers   Estrogen receptor (ER):  Negative   Progesterone receptor (PGR):  Negative   HER2 (IHC):  Negative, 0   Ki-67:  80%   P63 IHC is negative, supporting the diagnosis of invasive carcinoma   All immunostains were performed with appropriate controls.   This case was reviewed by Dr. ANISHA Washington who concurs with the above diagnosis.     IMPRESSION:    1.  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  2. Situational depression.  3. Second primary, triple negative, clinical stage II B (T2, N0, M0), left breast carcinoma.    PLAN:  1. Refer to Dr. Padma Ruggiero for evaluation of breast carcinoma/assistance with Port-A-Cath placement.  2. Arrange follow-up appointment with oncologic Psychology.  3. Consultations for nurse navigation and chemotherapy teaching school.  4. Arrange for BRCA 1 and 2 testing and baseline laboratory to include CBC, CMP, LDH, magnesium, TSH, free T4, and CA 27-29.  5. As patient has previously received Adriamycin/Cytoxan, I will not rechallenge her with Adriamycin.  6. I plan to treat the patient with  neoadjuvant carboplatin/Taxol/Keytruda per the keynote trial, and reimage to assess for response prior to surgery.  7. Patient is desirous of bilateral mastectomy as this is her 2nd bout with breast carcinoma.  She is interested in reconstruction.  8. Following definitive surgery, the patient will receive 9 additional cycles of adjuvant Keytruda.  9. Return to clinic to receive 1st cycle of neoadjuvant therapy at earliest convenience.  10.  Patient will be re-evaluated 1 week later with interval CBC, BMP, and magnesium prior to cycle 1, day 8 of therapy.  11. 40 minutes of contact time was spent counseling patient regarding development of a 2nd primary breast carcinoma, is unique histology compared to her original diagnosis, rationale for receipt of neoadjuvant chemo/immunotherapy, antineoplastic/supportive care meds to be employed in therapy, aspects of their administration, potential side effects associated with therapy, interventions for such side effects, etcetera.  She voices understanding wishes to proceed as above.      THIS NOTE WAS CREATED USING VOICE RECOGNITION SOFTWARE AND MAY CONTAIN GRAMMATICAL ERRORS.

## 2022-04-14 NOTE — TELEPHONE ENCOUNTER
Melissa RN navigator advised patient had BRCA done years ago and I am trying to get copy of it from [facility]. Dr Domingo to see patient to set up port and [place] in Breast navigation notified of need for Breast consult.  ----- Message from Michael Bartlett MD sent at 4/13/2022  8:25 PM CDT -----  Consult Dr. Ruggiero for evaluation/management of triple negative breast carcinoma as well as port placement.  Patient needs follow-up appointment with Dr. sage  Consultations for nurse navigation and chemotherapy teaching school.  Laboratory to include BRCA 1 and 2 testing, CBC, CMP, LDH, magnesium, TSH, free T4, CA 27-29 as soon as possible.  Return to clinic cycle 1 day 1 of carboplatin/Taxol/Keytruda.  Patient is to see Dr. Machado in my absence for vacation.  Patient is to follow-up 1 week later with interval CBC, BMP, and magnesium prior to cycle 1, day 8 of therapy.

## 2022-04-18 ENCOUNTER — TELEPHONE (OUTPATIENT)
Dept: INFUSION THERAPY | Facility: HOSPITAL | Age: 72
End: 2022-04-18
Payer: MEDICARE

## 2022-04-18 ENCOUNTER — TELEPHONE (OUTPATIENT)
Dept: HEMATOLOGY/ONCOLOGY | Facility: CLINIC | Age: 72
End: 2022-04-18
Payer: MEDICARE

## 2022-04-18 NOTE — TELEPHONE ENCOUNTER
[2:34 PM] Melissa Ash  MRN: [id number], [facility] (back again with another primary!) Needs to start Tuesday 26th.    [2:37 PM] Magaly Burgos  mrn [id number] 04/26/22 @1:30

## 2022-04-18 NOTE — NURSING
"Spoke to pt several times today trying to coordinate chemo class, pre tx lab work, tx clearance, and tx.  Pt takes care of her  and I had to work with appts previously scheduled for him.  All appointments made and confirmed with pt.  Contact information given and pt encouraged to call with questions or concerns.  Pt was very appreciative and thanked me for all my "hard work" and "navigating" today.   "

## 2022-04-19 ENCOUNTER — CLINICAL SUPPORT (OUTPATIENT)
Dept: HEMATOLOGY/ONCOLOGY | Facility: CLINIC | Age: 72
End: 2022-04-19
Payer: MEDICARE

## 2022-04-19 VITALS
OXYGEN SATURATION: 99 % | WEIGHT: 140 LBS | HEART RATE: 79 BPM | DIASTOLIC BLOOD PRESSURE: 88 MMHG | TEMPERATURE: 98 F | BODY MASS INDEX: 28.22 KG/M2 | HEIGHT: 59 IN | SYSTOLIC BLOOD PRESSURE: 122 MMHG

## 2022-04-19 DIAGNOSIS — C50.012 MALIGNANT NEOPLASM OF NIPPLE OF LEFT BREAST IN FEMALE, ESTROGEN RECEPTOR NEGATIVE: ICD-10-CM

## 2022-04-19 DIAGNOSIS — Z17.1 MALIGNANT NEOPLASM OF NIPPLE OF LEFT BREAST IN FEMALE, ESTROGEN RECEPTOR NEGATIVE: ICD-10-CM

## 2022-04-19 DIAGNOSIS — C50.912 INVASIVE DUCTAL CARCINOMA OF LEFT BREAST: Primary | ICD-10-CM

## 2022-04-19 DIAGNOSIS — C50.012 MALIGNANT NEOPLASM OF NIPPLE OF LEFT BREAST IN FEMALE, UNSPECIFIED ESTROGEN RECEPTOR STATUS: Primary | ICD-10-CM

## 2022-04-19 PROCEDURE — 99999 PR PBB SHADOW E&M-EST. PATIENT-LVL IV: ICD-10-PCS | Mod: PBBFAC,,,

## 2022-04-19 PROCEDURE — 99999 PR PBB SHADOW E&M-EST. PATIENT-LVL IV: CPT | Mod: PBBFAC,,,

## 2022-04-19 NOTE — PROGRESS NOTES
[facility] - Breast Surgery   History and Physical    Subjective:      Zayra Rodgers is a 72 y.o. female     Patient with a history of left breast cancer that was locally advanced with positive lymph node treated with neoadjuvant chemo and lumpectomy with sentinel node biopsy in .  She had 1 micro met of 2 sentinel nodes.  She underwent radiation and 5 years of anastrozole.    She then felt some changes in the left lateral breast around 2021 and felt a mass around a month ago 2022.  She has had the same shooting pains associated with it. Workup showed triple negative left breast cancer.    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         Menarche at age 14. . Age at first live birth 26. Did breast feed for 3-4 months. Had Hysterectomy in . Taken Anastrozole post cancer previously.  Had previous breast biopsy in . Breast cancer in 2016 with chemo and radiation. Had genetic testing in 2016.     Family history cancer:  Mother-Breast cancer-Dx in 40s premenopausal   Sister- Breast cancer- Premenopausal     Non smoker  Covid-19 vaccine to the right arm      16 bilat diag MMG, left US limited DIS  Left 0200 mid depth UOQ 2.6cm mass  Apparent met LN left    16 left US core biopsy DIS  Grade 3 IDC ER 40 ID neg Her 2 neg Ki 81  Pos LN met     3/1/16 Genetics myriad neg      3/10/16 PET  Left breast mass and left axillary lymphadenopathy - large cluster of LN and smaller hypermetabolic subpectoral LN    3/14/16 right port Armas    AC-T chemo NAC bizette    16 PET  VISHAL    10/20/16 left lumpectomy, left SLNB Dr Luciano  One small focus residual IDC 2mm, several foci DCIS largest 3mm  Neg margins, 4mm ant margin (original resection had 1mm DCIS sup margin and 2mm IDC superior but new sup margin removed)  Additional sup margin neg  1/2 pos SLNB 3mm met    arimidex bizette 5 years    XRT kemp      10/14/21 scr MMG [facility]  Cat 2    22 left diag [facility],  left US limited [facility]  Left 0300 lateral anterior 8cm FN 1.6cm mass  nml left LN    4/6/22 left 0300 lateral 8cm FN biopsy [facility]  Grade 2 IDC (3,2,2) triple neg Ki 80    4/22/22 port Dr Domingo    4/21/22 MRI   Right neg  Left 0300 UOQ anterior 2.6cm mass 8cm FN, 4mm from skin, 8cm from pec, 2.2cm anterior to lumpectomy site  LN neg  Right humeral cyst    Genetics pending    Plan keynote trial (carboplatin, taxol, keytruda), blaze      Cancer Staging  Breast cancer  Staging form: Breast, AJCC 8th Edition  - Clinical stage from 4/13/2022: Stage IIB (cT2, cN0, cM0, G2, ER-, [place]-, HER2-) - Signed by Michael Bartlett MD on 4/13/2022        Patient Active Problem List   Diagnosis    Essential hypertension    Hyperlipemia    Insomnia    Breast cancer    Malignant neoplasm metastatic to lymph node of axilla    Malignant neoplasm of nipple of left breast in female    Bone/cartilage disorder    Aromatase inhibitor use    Atelectasis of left lung     Current Outpatient Medications on File Prior to Visit   Medication Sig Dispense Refill    albuterol (PROAIR HFA) 90 mcg/actuation inhaler Inhale 2 puffs into the lungs every 6 (six) hours as needed for Wheezing. Rescue 18 g 11    B-complex with vitamin C (Z-BEC OR EQUIV) tablet Take 1 tablet by mouth once daily.      calcium carbonate-vitamin D3 600 mg-20 mcg (800 unit) Tab Take 2 tablets by mouth once daily. 200 tablet 3    cetirizine (ZYRTEC) 10 MG tablet Take 10 mg by mouth daily as needed.       chlorhexidine (PERIDEX) 0.12 % solution Swish in mouth & spit 10mL twice daily for 5 days, Start 4/20/2022. 473 mL 0    diphenhydrAMINE-LIDOcaine HCl 2%-nystatin-magnesium hydroxide 400 mg/5 ml Swish and swallow 5 ml every 4 hours as needed for mouth / throat pain 100 mL 5    fish oil-omega-3 fatty acids 300-1,000 mg capsule Take 2 g by mouth daily as needed.       fluticasone propionate (FLONASE) 50 mcg/actuation nasal spray use 1 spray (50 mcg total) by Each Nostril  route daily as needed. 16 g 11    hydroCHLOROthiazide (HYDRODIURIL) 25 MG tablet Take 1 tablet (25 mg total) by mouth daily as needed. 90 tablet 1    HYDROcodone-acetaminophen (NORCO) 5-325 mg per tablet Take 1 tablet by mouth every 6 (six) hours as needed for Pain. 10 tablet 0    LIDOcaine-prilocaine (EMLA) cream Apply topically as directed 30 g 5    mirabegron (MYRBETRIQ) 50 mg Tb24 Take 1 tablet (50 mg total) by mouth once daily. 30 tablet 11    multivitamin capsule Take 1 capsule by mouth once daily.      mupirocin (BACTROBAN) 2 % ointment Apply to each nostril with clean Q-Tip twice daily for 5 days. Start 2022. 22 g 0    prochlorperazine (COMPAZINE) 10 MG tablet Take 1 tablet (10 mg total) by mouth every 6 (six) hours as needed for nausea and vomiting 30 tablet 5    temazepam (RESTORIL) 15 mg Cap Take 1-2 capsules (15-30 mg total) by mouth nightly as needed (insomnia). 60 capsule 0    aspirin (ECOTRIN) 81 MG EC tablet Take 81 mg by mouth once daily.      atorvastatin (LIPITOR) 40 MG tablet Take 1 tablet (40 mg total) by mouth once daily. (Patient not taking: No sig reported) 90 tablet 1    [] chlorhexidine (PERIDEX) 0.12 % solution Use as directed 10 mLs in the mouth or throat 2 (two) times daily.      [] mupirocin (BACTROBAN) 2 % ointment 1 g by Nasal route 2 (two) times daily.       Current Facility-Administered Medications on File Prior to Visit   Medication Dose Route Frequency Provider Last Rate Last Admin    chlorhexidine 0.12 % solution 10 mL  10 mL Mouth/Throat BID Haile Domingo MD        mupirocin 2 % ointment   Nasal BID Haile Domingo MD         Review of patient's allergies indicates:  No Known Allergies  Past Medical History:   Diagnosis Date    Allergy     Breast cancer 2016    left breast, neoadjuvant chemo, lumpectomy, and radiation    Bronchitis     COVID-19 2020    HLD (hyperlipidemia)     Hypertension     S/P chemotherapy, time since greater  than 12 weeks     Skin cancer     resovled     Past Surgical History:   Procedure Laterality Date    ADENOIDECTOMY      BREAST BIOPSY Left 2016    BREAST CYST EXCISION      BREAST LUMPECTOMY Left 2016    COLONOSCOPY N/A 06/21/2018    Procedure: COLONOSCOPY;  Surgeon: Hiro Billy Jr., MD;  Location: [facility];  Service: Endoscopy;  Laterality: N/A;    CYSTOCELE REPAIR      EXCISIONAL HEMORRHOIDECTOMY      HYSTERECTOMY      due to prolapse    ORBITAL RECONSTRUCTION      PORTACATH PLACEMENT      since removed    REMOVAL OF VASCULAR ACCESS PORT      RHINOPLASTY TIP      SIGMOIDOSCOPY       Family History   Problem Relation Age of Onset    Breast cancer Mother 42    Cancer Mother     COPD Father     Colon cancer Sister     Breast cancer Sister 40     Social History     Socioeconomic History    Marital status:    Tobacco Use    Smoking status: Never Smoker    Smokeless tobacco: Never Used   Substance and Sexual Activity    Alcohol use: Yes     Alcohol/week: 2.0 standard drinks     Types: 2 Cans of beer per week     Comment: per  week    Drug use: No    Sexual activity: Yes     Partners: Male     Social Determinants of Health     Financial Resource Strain: Low Risk     Difficulty of Paying Living Expenses: Not hard at all   Food Insecurity: No Food Insecurity    Worried About Running Out of Food in the Last Year: Never true    Ran Out of Food in the Last Year: Never true   Transportation Needs: No Transportation Needs    Lack of Transportation (Medical): No    Lack of Transportation (Non-Medical): No   Physical Activity: Insufficiently Active    Days of Exercise per Week: 2 days    Minutes of Exercise per Session: 20 min   Stress: No Stress Concern Present    Feeling of Stress : Only a little   Social Connections: Unknown    Frequency of Communication with Friends and Family: More than three times a week    Frequency of Social Gatherings with Friends and Family: More than  "three times a week    Active Member of Clubs or Organizations: Yes    Attends Club or Organization Meetings: 1 to 4 times per year    Marital Status:    Housing Stability: Low Risk     Unable to Pay for Housing in the Last Year: No    Number of Places Lived in the Last Year: 1    Unstable Housing in the Last Year: No         Review of Systems    No CP, no SOB        Objective:      BP (!) 155/104 (BP Location: Left arm, Patient Position: Sitting)   Pulse 83   Temp 98.6 °F (37 °C) (Temporal)   Resp 16   Ht 4' 11.5" (1.511 m)   Wt 63.5 kg (139 lb 15.9 oz)   SpO2 98%   BMI 27.80 kg/m²     Constitutional: NAD AAOX4  HEENT: EOMI, nonicteric sclera  NECK: no mass, no thyromegaly, no lymphadenopathy  CV: regular rate  PULM: good respiratory effort  ABDOMEN: soft, Nontender, nondistended. No guarding. No rebound.  MUSCULOSKELETAL: Good ROM, no clubbing  SKIN: No rashes or ulcerations seen.   NEUROLOGIC: CN grossly intact. Motor, sensory grossly intact    Breast exam   CD with considerable ptosis  Right neg, no mass, dimpling, DC, LN  Left UOQ mass. 3x2.5cm, movable, mild skin tenting. 5cm FN  No LN. No DC, no dimpling.       Lab Visit on 04/25/2022   Component Date Value Ref Range Status    WBC 04/25/2022 11.62  3.90 - 12.70 K/uL Final    RBC 04/25/2022 4.38  4.00 - 5.40 M/uL Final    Hemoglobin 04/25/2022 13.2  12.0 - 16.0 g/dL Final    Hematocrit 04/25/2022 40.3  37.0 - 48.5 % Final    MCV 04/25/2022 92  82 - 98 fL Final    MCH 04/25/2022 30.1  27.0 - 31.0 pg Final    MCHC 04/25/2022 32.8  32.0 - 36.0 g/dL Final    RDW 04/25/2022 13.1  11.5 - 14.5 % Final    Platelets 04/25/2022 214  150 - 450 K/uL Final    MPV 04/25/2022 10.1  9.2 - 12.9 fL Final    Immature Granulocytes 04/25/2022 0.3  0.0 - 0.5 % Final    Gran # (ANC) 04/25/2022 9.4 (A) 1.8 - 7.7 K/uL Final    Immature Grans (Abs) 04/25/2022 0.03  0.00 - 0.04 K/uL Final    Comment: Mild elevation in immature granulocytes is non specific " and   can be seen in a variety of conditions including stress response,   acute inflammation, trauma and pregnancy. Correlation with other   laboratory and clinical findings is essential.      Lymph # 04/25/2022 1.4  1.0 - 4.8 K/uL Final    Mono # 04/25/2022 0.7  0.3 - 1.0 K/uL Final    Eos # 04/25/2022 0.0  0.0 - 0.5 K/uL Final    Baso # 04/25/2022 0.03  0.00 - 0.20 K/uL Final    nRBC 04/25/2022 0  0 /100 WBC Final    Gran % 04/25/2022 81.0 (A) 38.0 - 73.0 % Final    Lymph % 04/25/2022 12.1 (A) 18.0 - 48.0 % Final    Mono % 04/25/2022 6.0  4.0 - 15.0 % Final    Eosinophil % 04/25/2022 0.3  0.0 - 8.0 % Final    Basophil % 04/25/2022 0.3  0.0 - 1.9 % Final    Differential Method 04/25/2022 Automated   Final    Sodium 04/25/2022 142  136 - 145 mmol/L Final    Potassium 04/25/2022 4.0  3.5 - 5.1 mmol/L Final    Chloride 04/25/2022 103  95 - 110 mmol/L Final    CO2 04/25/2022 26  23 - 29 mmol/L Final    Glucose 04/25/2022 105  70 - 110 mg/dL Final    BUN 04/25/2022 16  8 - 23 mg/dL Final    Creatinine 04/25/2022 0.8  0.5 - 1.4 mg/dL Final    Calcium 04/25/2022 10.2  8.7 - 10.5 mg/dL Final    Total Protein 04/25/2022 7.2  6.0 - 8.4 g/dL Final    Albumin 04/25/2022 4.0  3.5 - 5.2 g/dL Final    Total Bilirubin 04/25/2022 0.5  0.1 - 1.0 mg/dL Final    Comment: For infants and newborns, interpretation of results should be based  on gestational age, weight and in agreement with clinical  observations.    Premature Infant recommended reference ranges:  Up to 24 hours.............<8.0 mg/dL  Up to 48 hours............<12.0 mg/dL  3-5 days..................<15.0 mg/dL  6-29 days.................<15.0 mg/dL      Alkaline Phosphatase 04/25/2022 53 (A) 55 - 135 U/L Final    AST 04/25/2022 17  10 - 40 U/L Final    ALT 04/25/2022 12  10 - 44 U/L Final    Anion Gap 04/25/2022 13  8 - 16 mmol/L Final    eGFR if African American 04/25/2022 >60  >60 mL/min/1.73 m^2 Final    eGFR if non African American  04/25/2022 >60  >60 mL/min/1.73 m^2 Final    Comment: Calculation used to obtain the estimated glomerular filtration  rate (eGFR) is the CKD-EPI equation.       LD 04/25/2022 150  110 - 260 U/L Final    Results are increased in hemolyzed samples.    Magnesium 04/25/2022 2.2  1.6 - 2.6 mg/dL Final   Admission on 04/22/2022, Discharged on 04/22/2022   Component Date Value Ref Range Status    Sodium 04/22/2022 140  136 - 145 mmol/L Final    Potassium 04/22/2022 4.3  3.5 - 5.1 mmol/L Final    Chloride 04/22/2022 105  95 - 110 mmol/L Final    CO2 04/22/2022 32 (A) 22 - 31 mmol/L Final    Glucose 04/22/2022 105  70 - 110 mg/dL Final    Comment: The ADA recommends the following guidelines for fasting glucose:    Normal:       less than 100 mg/dL    Prediabetes:  100 mg/dL to 125 mg/dL    Diabetes:     126 mg/dL or higher      BUN 04/22/2022 23 (A) 7 - 18 mg/dL Final    Creatinine 04/22/2022 0.81  0.50 - 1.40 mg/dL Final    Calcium 04/22/2022 10.0  8.4 - 10.2 mg/dL Final    Anion Gap 04/22/2022 3 (A) 8 - 16 mmol/L Final    eGFR if African American 04/22/2022 >60  >60 mL/min/1.73 m^2 Final    eGFR if non African American 04/22/2022 >60  >60 mL/min/1.73 m^2 Final    Comment: Calculation used to obtain the estimated glomerular filtration  rate (eGFR) is the CKD-EPI equation.       aPTT 04/22/2022 26.8  24.6 - 36.7 sec Final    PTT normal range is not established for pediatrics.    PT 04/22/2022 11.8  11.8 - 14.7 sec Final    PT normal range is not established for pediatrics.    INR 04/22/2022 0.9   Final    WBC 04/22/2022 7.64  3.90 - 12.70 K/uL Final    RBC 04/22/2022 4.56  4.00 - 5.40 M/uL Final    Hemoglobin 04/22/2022 13.9  12.0 - 16.0 g/dL Final    Hematocrit 04/22/2022 41.7  37.0 - 48.5 % Final    MCV 04/22/2022 91  82 - 98 fL Final    MCH 04/22/2022 30.5  27.0 - 31.0 pg Final    MCHC 04/22/2022 33.3  32.0 - 36.0 g/dL Final    RDW 04/22/2022 12.8  11.5 - 14.5 % Final    Platelets 04/22/2022 228   "150 - 450 K/uL Final    MPV 04/22/2022 10.6  9.2 - 12.9 fL Final   Hospital Outpatient Visit on 04/06/2022   Component Date Value Ref Range Status    Final Pathologic Diagnosis 04/06/2022    Final                    Value:Left breast, 3:00, 8 cm from the nipple, ultrasound-guided core biopsy:  Invasive ductal carcinoma, moderately differentiated  [place] grade 2:  Tubule formation-3, nuclear pleomorphism -2 and mitotic  count -2  Invasive carcinoma measures 15 mm in greatest dimension  No lymphovascular invasion or carcinoma in Situ is identified  All submitted cores are involved by invasive carcinoma  Tumor biomarkers  Estrogen receptor (ER):  Negative  Progesterone receptor (PGR):  Negative  HER2 (IHC):  Negative, 0  Ki-67:  80%  P63 IHC is negative, supporting the diagnosis of invasive carcinoma  All immunostains were performed with appropriate controls.  This case was reviewed by Dr. ANISHA Washington who concurs with the above diagnosis.      Interp By Ritika Lawson M.D., Signed on 04/11/2022 at 11:36    Gross 04/06/2022    Final                    Value:Patient ID/ Pathology ID:  [id number]  Received in formalin, labeled "left breast 3:00 8CFN", and are multiple  yellow-red soft tissue fragments with a measurement of 1.4 x 0.6 x 0.4 cm, in  aggregate. Placed in formalin at 8:28 a.m., with no ischemic time indicated.  Submitted entirely in cassette [id number]  Carmel Hubbard      Disclaimer 04/06/2022    Final                    Value:Unless the case is a 'gross only' or additional testing only, the final  diagnosis for each specimen is based on a microscopic examination of  appropriate tissue sections.  ER immunohistochemical staining (clone SP1, DAB detection method) is  performed on formalin-fixed, paraffin embedded tissue sections. The  percentage of cell nuclei stained and the strength of staining is reported  (weak, moderate, strong), using the 2010 ACSO/CAP scoring guidelines. Tumors  used for determing " predictive markers are fixed in10% neutral buffered  formalin for 6-72 hours. It has been cleared by the U.S[place] FDA for use as an  IVD test. This assay has not been validated on decalcified tissues. Results  should be interpreted with caution given the likelihood of false negativity  on decalcified specimens.  HER-2/bertram IHC (4B5) clone, DAB detection method) is done on 10% buffered  formalin-fixed (for 6-72 hrs), paraffin embedded tissue sections. The scoring  is completed on a 4-tiered scoring system of membran                          e staining using the 2014  ASCO/CAP scoring guidelines. It has been cleared by the U.S. FDA for use as  an IVD test. This assay has not been validated on decalcified tissues.  Results should be interpreted with caution given the likelihood of false  negativity on decalcified specimens.           Patient Active Problem List   Diagnosis    Essential hypertension    Hyperlipemia    Insomnia    Breast cancer    Malignant neoplasm metastatic to lymph node of axilla    Malignant neoplasm of nipple of left breast in female    Bone/cartilage disorder    Aromatase inhibitor use    Atelectasis of left lung        High complexity of problems addressed - breast cancer, treatment options, expectations, effects of treatment, risks, benefits. Complex data reviewed, independent interpretation of tests, discussion of management with another health care provider.    Alternative efficacious methods of treatment of breast cancer were given.  Options including lumpectomy, mastectomy, reconstruction options with advantages/disadvantages of each, provisions assuring coverage of reconstructive surgery costs, how the patient may access reconstructive care including the potential to be transferred to a facility that provides reconstructive care or choosing reconstruction after completion of breast cancer surgery and treatment.  LD[facility], [facility] breast cancer brochure given.      Assessment/Plan:     Cancer  Staging  Breast cancer  Staging form: Breast, AJCC 8th Edition  - Clinical stage from 4/13/2022: Stage IIB (cT2, cN0, cM0, G2, ER-, [place]-, HER2-) - Signed by Michael Bartlett MD on 4/13/2022 2/17/16 bilat diag MMG, left US limited DIS  Left 0200 mid depth UOQ 2.6cm mass  Apparent met LN left    2/22/16 left US core biopsy DIS  Grade 3 IDC ER 40 [place] neg Her 2 neg Ki 81  Pos LN met     3/1/16 Genetics myriad neg      3/10/16 PET  Left breast mass and left axillary lymphadenopathy - large cluster of LN and smaller hypermetabolic subpectoral LN    3/14/16 right port Armas    AC-T chemo NAC bizette    9/20/16 PET  VISHAL    10/20/16 left lumpectomy, left SLNB Dr Luciano  One small focus residual IDC 2mm, several foci DCIS largest 3mm  Neg margins, 4mm ant margin (original resection had 1mm DCIS sup margin and 2mm IDC superior but new sup margin removed)  Additional sup margin neg  1/2 pos SLNB 3mm met    arimidex bizette 5 years    XRT kemp      10/14/21 scr [facility]  Cat 2    4/5/22 left diag [facility], left US limited [facility]  Left 0300 lateral anterior 8cm FN 1.6cm mass  nml left LN    4/6/22 left 0300 lateral 8cm FN biopsy [facility]  Grade 2 IDC (3,2,2) triple neg Ki 80    4/22/22 port Dr Domingo    4/21/22 MRI   Right neg  Left 0300 UOQ anterior 2.6cm mass 8cm FN, 4mm from skin, 8cm from pec, 2.2cm anterior to lumpectomy site  LN neg  Right humeral cyst      Genetics pending    Plan keynote trial (carboplatin, taxol, keytruda)      In summary:  Prior ER 40 [place] HER2 neg locally adv breast cancer with bulky LN disease treated with NAC then lump SLNB after neg PET with Dr Luciano  Now with left 0300 triple neg breast cancer    Discussed options  Plan neoadjuvant chemo  She wishes to have bilat mast with recon  Nipple areola complex clear by imaging    After chemo, plan bilateral mastectomy, left SLNB (if possible - 2nd time for SLNB) vs ALND after chemo.  By tumor conference and review of MRI - 4mm from lateral skin - will excise  skin or kimberly if appears close to skin after chemo response at the time of surgery.       Genetics pending  Referral sim    Humeral cyst by MRI. Likely benign. Workup per med onc.      Will get keytruda after surg      RTC midway with US left        I have given her all options - lumpectomy XRT, unilateral or bilateral mastectomy +/- reconstruction. I have explained procedure, risks, benefits, postop expectations. All questions answered. Risks include but are not limited to infection, bleeding, injury to nerves, vessels, numbness, weakness, difficulty lifting arm, swelling of arm (lymphedema), inability to remove all lesion, residual breast tissue, recurrence of malignancy, need for further procedure, loss of skin flap, seroma (fluid collection), inability to find sentinel lymph node, need for axillary dissection, chronic pain, radioactive substance may be given, allergic reaction, staining of the skin, difficulty with future imaging.

## 2022-04-19 NOTE — PROGRESS NOTES
PATIENT: Zayra Rodgers  MRN: [id number]  DATE: 4/19/2022      Diagnosis:   1. 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 Malignant neoplasm of nipple of left breast in female, estrogen receptor negative        Chief Complaint: Patient Education ([employer] )    Subjective:    Interval History: Ms. Rodgers is a 72 y.o. female with depression, history of locally advanced hormone receptor positive/HER2 negative left breast cancer who is here today for education prior to treatment of her newly diagnosed triple negative, clinical stage II B (T2, N0, M0), left breast cancer.   Planned treatment regimen for neoadjuvant Carboplatin/Taxol/Keytruda, followed by re-imaging, surgery and adjuvant Keytruda for an additional 9 cycles.   She is in good spirits today, motivated to get started on treatment. She is currently asymptomatic.   Denies HA, CP, cough, abd pain, changes in bowel habits, dysuria, swelling. No new lumps or bumps, LAD.     Prior History:   3/22/2016-5/24/2016: Completed 4 cycles AC  6/15/2016-8/31/2016: Completed 12 cycles weekly Taxol   10/20/2016: Lumpectomy that revealed residual 0.3 mm focus of metastatic carcinoma in a single lymph node  Completed postlumpectomy radiation   11/2016: Began Arimidex 1 mg po daily--> completed 60 month course of AI and biannual Prolia for prevention of AI induced bone loss.     4/5/22: Presented to clinic for evaluation of new left breast lump  4/6/22: Underwent biopsy of left breast mass which revealed second primary, triple negative, clinical stage II B (T2, N0, M0) left breast carcinoma    Past Medical History:   Past Medical History:   Diagnosis Date    Allergy     Breast cancer 02/2016    left breast, neoadjuvant chemo, lumpectomy, and radiation    Bronchitis     COVID-19 08/2020    HLD (hyperlipidemia)     Hypertension     S/P chemotherapy, time since greater than 12 weeks     Skin cancer     resovled        Past Surgical HIstory:   Past Surgical History:   Procedure Laterality Date    ADENOIDECTOMY      BREAST BIOPSY Left 2016    BREAST CYST EXCISION      BREAST LUMPECTOMY Left 2016    COLONOSCOPY N/A 06/21/2018    Procedure: COLONOSCOPY;  Surgeon: Hiro Billy Jr., MD;  Location: [facility];  Service: Endoscopy;  Laterality: N/A;    CYSTOCELE REPAIR      EXCISIONAL HEMORRHOIDECTOMY      HYSTERECTOMY      due to prolapse    ORBITAL RECONSTRUCTION      PORTACATH PLACEMENT      since removed    REMOVAL OF VASCULAR ACCESS PORT      RHINOPLASTY TIP      SIGMOIDOSCOPY         Family History:   Family History   Problem Relation Age of Onset    Breast cancer Mother 42    Cancer Mother     COPD Father     Colon cancer Sister     Breast cancer Sister 40       Social History:  reports that she has never smoked. She has never used smokeless tobacco. She reports current alcohol use of about 2.0 standard drinks of alcohol per week. She reports that she does not use drugs.    Allergies:  Review of patient's allergies indicates:  No Known Allergies    Medications:  Current Outpatient Medications   Medication Sig Dispense Refill    albuterol (PROAIR HFA) 90 mcg/actuation inhaler Inhale 2 puffs into the lungs every 6 (six) hours as needed for Wheezing. Rescue 18 g 11    aspirin (ECOTRIN) 81 MG EC tablet Take 81 mg by mouth once daily.      B-complex with vitamin C (Z-BEC OR EQUIV) tablet Take 1 tablet by mouth once daily.      calcium carbonate-vitamin D3 600 mg-20 mcg (800 unit) Tab Take 2 tablets by mouth once daily. 200 tablet 3    cetirizine (ZYRTEC) 10 MG tablet Take 10 mg by mouth daily as needed.       [START ON 4/20/2022] chlorhexidine (PERIDEX) 0.12 % solution Use as directed 10 mLs in the mouth or throat 2 (two) times daily.      chlorhexidine (PERIDEX) 0.12 % solution Swish in mouth & spit 10mL twice daily for 5 days, Start 4/20/2022. 473 mL 0    fish oil-omega-3 fatty acids 300-1,000 mg  capsule Take 2 g by mouth daily as needed.       fluticasone propionate (FLONASE) 50 mcg/actuation nasal spray use 1 spray (50 mcg total) by Each Nostril route daily as needed. 16 g 11    hydroCHLOROthiazide (HYDRODIURIL) 25 MG tablet Take 1 tablet (25 mg total) by mouth daily as needed. 90 tablet 1    mirabegron (MYRBETRIQ) 50 mg Tb24 Take 1 tablet (50 mg total) by mouth once daily. 30 tablet 11    multivitamin capsule Take 1 capsule by mouth once daily.      [START ON 4/20/2022] mupirocin (BACTROBAN) 2 % ointment 1 g by Nasal route 2 (two) times daily.      mupirocin (BACTROBAN) 2 % ointment Apply to each nostril with clean Q-Tip twice daily for 5 days. Start 4/20/2022. 22 g 0    temazepam (RESTORIL) 15 mg Cap Take 1-2 capsules (15-30 mg total) by mouth nightly as needed (insomnia). 60 capsule 0    atorvastatin (LIPITOR) 40 MG tablet Take 1 tablet (40 mg total) by mouth once daily. (Patient not taking: No sig reported) 90 tablet 1     No current facility-administered medications for this visit.       Review of Systems   Constitutional: Negative for appetite change, fatigue and unexpected weight change.   HENT: Negative for mouth sores and sinus pain.    Eyes: Negative for visual disturbance.   Respiratory: Negative for cough and shortness of breath.    Cardiovascular: Negative for chest pain and leg swelling.   Gastrointestinal: Negative for abdominal pain and diarrhea.   Genitourinary: Negative for dysuria, frequency and hematuria.   Musculoskeletal: Negative for back pain.   Skin: Negative for rash.   Neurological: Negative for headaches.   Hematological: Negative for adenopathy.   Psychiatric/Behavioral: The patient is not nervous/anxious.        ECOG Performance Status:   ECOG SCORE    0 - Fully active-able to carry on all pre-disease performance without restriction         Objective:      Vitals:   Vitals:    04/19/22 1353   BP: 122/88   BP Location: Right arm   Patient Position: Sitting   BP Method:  "Medium (Manual)   Pulse: 79   Temp: 97.9 °F (36.6 °C)   TempSrc: Temporal   SpO2: 99%   Weight: 63.5 kg (139 lb 15.9 oz)   Height: 4' 11" (1.499 m)     BMI: Body mass index is 28.27 kg/m².    Physical Exam  Vitals reviewed.   Constitutional:       General: She is not in acute distress.     Appearance: She is not ill-appearing or diaphoretic.   HENT:      Head: Normocephalic and atraumatic.   Eyes:      General: No scleral icterus.  Pulmonary:      Effort: Pulmonary effort is normal. No respiratory distress.   Abdominal:      General: There is no distension.   Musculoskeletal:      Right lower leg: No edema.      Left lower leg: No edema.   Skin:     General: Skin is warm and dry.      Findings: No bruising or rash.   Neurological:      Mental Status: She is alert and oriented to person, place, and time.      Gait: Gait normal.   Psychiatric:         Mood and Affect: Mood normal.         Behavior: Behavior normal.         Judgment: Judgment normal.         Laboratory Data:  Lab Results   Component Value Date    WBC 7.28 05/24/2021    HGB 12.8 05/24/2021    HCT 39.4 05/24/2021    MCV 95 05/24/2021     05/24/2021        Assessment:       1. 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 Malignant neoplasm of nipple of left breast in female, estrogen receptor negative           Plan:   History of Malignant neoplasm of left breast, hormone receptor positive/HER2 bertram negative  -Completed 4 cycles AC followed by 12 weekly doses of Taxol. S/P lumpectomy and post lumpectomy radiation  -Completed full 60 months of AI therapy with Arimidex   -Mammogram done 10/14/21; birads 2, routine due one year from that date  -CXR 10/25/21 shows no radiographic abnormality     Malignant neoplasm of the left breast, triple negative, second primary  -Clinical stage II B (T2, N0, M0), left breast cancer   -Treatment plan of neoadjuvant Carbo/Taxol/Keytruda discussed with patient   - " Handouts provided from [employer] on chemotherapy agents.    -Discussed the mechanism of action, potential side effects of this treatment as well as ways to manage them at home. Some of these side effects include but not limited to fever, nausea, vomiting, decreased appetite, fatigue, weakness, cytopenias, myalgia/arthralgia, constipation, diarrhea, bleeding, headache, nail changes, taste change, hair thinning/loss, peripheral neuropathy, kidney toxicity, or ototoxicity.   - Dietary modifications discussed.  Detail instructions to be provided by dietician at follow-up next week   -Chemotherapy Binder supplied with contact information.   -Discussed follow-up with the physician for toxicity monitoring throughout treatment.    -prescriptions given for Compazine to be taken every 6 hours PRN for nausea/vomiting, Magic Mouthwash swish and spit 5 mL every 4-6 hours PRN, EMLA cream to be applied to port site one hour prior to port access.   -The patient verbalized understanding. All questions were answered and an informed consent was obtained.  -Patient would like to sign up for chemo    -Continue follow-up with Dr. Ryder       Route Chart for Scheduling    Med Onc Chart Routing      Follow up with physician Other. Dr. Machado as planned 4/25/22, start treatment 4/26/22   Follow up with ANGIE    Labs    Lab interval:  4/25/22 prior to treatment, as previously ordered    Imaging    Pharmacy appointment No pharmacy appointment needed      Other referrals No additional referrals needed         Treatment Plan Information   OP PEMBROLIZUMAB CARBOPLATIN (AUC 5) WITH WEEKLY PACLITAXEL FOLLOWED BY PEMBROLIZUMAB DOXORUBICIN CYCLOPHOSPHAMIDE FOLLOWED BY PEMBROLIZUMAB 200 MG Q3W   Michael Bartlett MD   Upcoming Treatment Dates - OP PEMBROLIZUMAB CARBOPLATIN (AUC 5) WITH WEEKLY PACLITAXEL FOLLOWED BY PEMBROLIZUMAB DOXORUBICIN CYCLOPHOSPHAMIDE FOLLOWED BY PEMBROLIZUMAB 200 MG Q3W    4/26/2022       Pre-Medications        acetaminophen tablet 1,000 mg       diphenhydramine (BENADRYL) 50 mg in NS 50 mL IVPB       famotidine (PF) injection 20 mg       Chemotherapy       PACLitaxeL (TAXOL) 80 mg/m2 = 132 mg in sodium chloride 0.9% 250 mL chemo infusion       CARBOplatin (PARAPLATIN) 125 mg in sodium chloride 0.9% 250 mL chemo infusion  5/3/2022       Pre-Medications       diphenhydramine (BENADRYL) 50 mg in NS 50 mL IVPB       famotidine (PF) injection 20 mg       Chemotherapy       PACLitaxeL (TAXOL) 80 mg/m2 = 132 mg in sodium chloride 0.9% 250 mL chemo infusion       CARBOplatin (PARAPLATIN) 125 mg in sodium chloride 0.9% 250 mL chemo infusion  5/10/2022       Pre-Medications       diphenhydramine (BENADRYL) 50 mg in NS 50 mL IVPB       famotidine (PF) injection 20 mg       Chemotherapy       PACLitaxeL (TAXOL) 80 mg/m2 = 132 mg in sodium chloride 0.9% 250 mL chemo infusion       CARBOplatin (PARAPLATIN) 125 mg in sodium chloride 0.9% 250 mL chemo infusion  5/17/2022       Pre-Medications       acetaminophen tablet 1,000 mg       diphenhydramine (BENADRYL) 50 mg in NS 50 mL IVPB       famotidine (PF) injection 20 mg       Chemotherapy       PACLitaxeL (TAXOL) 80 mg/m2 = 132 mg in sodium chloride 0.9% 250 mL chemo infusion       CARBOplatin (PARAPLATIN) 125 mg in sodium chloride 0.9% 250 mL chemo infusion    Therapy Plan Information  PROLIA (DENOSUMAB) Q6M  5. Medications  denosumab (PROLIA) injection 60 mg  60 mg, Subcutaneous, Every visit    PORT FLUSH  Flushes  heparin, porcine (PF) 100 unit/mL injection flush 500 Units  500 Units, Intravenous, Every visit  sodium chloride 0.9% flush 10 mL  10 mL, Intravenous, Every visit

## 2022-04-20 ENCOUNTER — TELEPHONE (OUTPATIENT)
Dept: PHARMACY | Facility: CLINIC | Age: 72
End: 2022-04-20
Payer: MEDICARE

## 2022-04-25 ENCOUNTER — DOCUMENTATION ONLY (OUTPATIENT)
Dept: SURGERY | Facility: CLINIC | Age: 72
End: 2022-04-25
Payer: MEDICARE

## 2022-04-25 ENCOUNTER — OFFICE VISIT (OUTPATIENT)
Dept: HEMATOLOGY/ONCOLOGY | Facility: CLINIC | Age: 72
End: 2022-04-25
Payer: MEDICARE

## 2022-04-25 ENCOUNTER — OFFICE VISIT (OUTPATIENT)
Dept: SURGERY | Facility: CLINIC | Age: 72
End: 2022-04-25
Payer: MEDICARE

## 2022-04-25 ENCOUNTER — TUMOR BOARD CONFERENCE (OUTPATIENT)
Dept: SURGERY | Facility: CLINIC | Age: 72
End: 2022-04-25

## 2022-04-25 ENCOUNTER — LAB VISIT (OUTPATIENT)
Dept: LAB | Facility: HOSPITAL | Age: 72
End: 2022-04-25
Attending: INTERNAL MEDICINE
Payer: MEDICARE

## 2022-04-25 ENCOUNTER — TELEPHONE (OUTPATIENT)
Dept: HEMATOLOGY/ONCOLOGY | Facility: CLINIC | Age: 72
End: 2022-04-25
Payer: MEDICARE

## 2022-04-25 ENCOUNTER — DOCUMENTATION ONLY (OUTPATIENT)
Dept: PHARMACY | Facility: AMBULARY SURGERY CENTER | Age: 72
End: 2022-04-25
Payer: MEDICARE

## 2022-04-25 VITALS
HEIGHT: 60 IN | HEART RATE: 83 BPM | WEIGHT: 140 LBS | RESPIRATION RATE: 16 BRPM | DIASTOLIC BLOOD PRESSURE: 104 MMHG | TEMPERATURE: 99 F | BODY MASS INDEX: 27.48 KG/M2 | SYSTOLIC BLOOD PRESSURE: 155 MMHG | OXYGEN SATURATION: 98 %

## 2022-04-25 VITALS
HEIGHT: 60 IN | HEART RATE: 83 BPM | SYSTOLIC BLOOD PRESSURE: 155 MMHG | OXYGEN SATURATION: 98 % | TEMPERATURE: 99 F | WEIGHT: 140 LBS | BODY MASS INDEX: 27.48 KG/M2 | RESPIRATION RATE: 16 BRPM | DIASTOLIC BLOOD PRESSURE: 104 MMHG

## 2022-04-25 DIAGNOSIS — C50.912 MALIGNANT NEOPLASM OF LEFT BREAST IN FEMALE, ESTROGEN RECEPTOR NEGATIVE, UNSPECIFIED SITE OF BREAST: Primary | ICD-10-CM

## 2022-04-25 DIAGNOSIS — C50.012 MALIGNANT NEOPLASM OF NIPPLE OF LEFT BREAST IN FEMALE, UNSPECIFIED ESTROGEN RECEPTOR STATUS: Primary | ICD-10-CM

## 2022-04-25 DIAGNOSIS — N63.20 LUMP OF LEFT BREAST: ICD-10-CM

## 2022-04-25 DIAGNOSIS — F43.21 SITUATIONAL DEPRESSION: ICD-10-CM

## 2022-04-25 DIAGNOSIS — Z17.1 MALIGNANT NEOPLASM OF LEFT BREAST IN FEMALE, ESTROGEN RECEPTOR NEGATIVE, UNSPECIFIED SITE OF BREAST: Primary | ICD-10-CM

## 2022-04-25 DIAGNOSIS — Z79.811 AROMATASE INHIBITOR USE: ICD-10-CM

## 2022-04-25 DIAGNOSIS — C50.012 MALIGNANT NEOPLASM OF NIPPLE OF LEFT BREAST IN FEMALE, UNSPECIFIED ESTROGEN RECEPTOR STATUS: ICD-10-CM

## 2022-04-25 LAB
ALBUMIN SERPL BCP-MCNC: 4 G/DL (ref 3.5–5.2)
ALP SERPL-CCNC: 53 U/L (ref 55–135)
ALT SERPL W/O P-5'-P-CCNC: 12 U/L (ref 10–44)
ANION GAP SERPL CALC-SCNC: 13 MMOL/L (ref 8–16)
AST SERPL-CCNC: 17 U/L (ref 10–40)
BASOPHILS # BLD AUTO: 0.03 K/UL (ref 0–0.2)
BASOPHILS NFR BLD: 0.3 % (ref 0–1.9)
BILIRUB SERPL-MCNC: 0.5 MG/DL (ref 0.1–1)
BUN SERPL-MCNC: 16 MG/DL (ref 8–23)
CALCIUM SERPL-MCNC: 10.2 MG/DL (ref 8.7–10.5)
CHLORIDE SERPL-SCNC: 103 MMOL/L (ref 95–110)
CO2 SERPL-SCNC: 26 MMOL/L (ref 23–29)
CREAT SERPL-MCNC: 0.8 MG/DL (ref 0.5–1.4)
DIFFERENTIAL METHOD: ABNORMAL
EOSINOPHIL # BLD AUTO: 0 K/UL (ref 0–0.5)
EOSINOPHIL NFR BLD: 0.3 % (ref 0–8)
ERYTHROCYTE [DISTWIDTH] IN BLOOD BY AUTOMATED COUNT: 13.1 % (ref 11.5–14.5)
EST. GFR  (AFRICAN AMERICAN): >60 ML/MIN/1.73 M^2
EST. GFR  (NON AFRICAN AMERICAN): >60 ML/MIN/1.73 M^2
GLUCOSE SERPL-MCNC: 105 MG/DL (ref 70–110)
HCT VFR BLD AUTO: 40.3 % (ref 37–48.5)
HGB BLD-MCNC: 13.2 G/DL (ref 12–16)
IMM GRANULOCYTES # BLD AUTO: 0.03 K/UL (ref 0–0.04)
IMM GRANULOCYTES NFR BLD AUTO: 0.3 % (ref 0–0.5)
LDH SERPL L TO P-CCNC: 150 U/L (ref 110–260)
LYMPHOCYTES # BLD AUTO: 1.4 K/UL (ref 1–4.8)
LYMPHOCYTES NFR BLD: 12.1 % (ref 18–48)
MAGNESIUM SERPL-MCNC: 2.2 MG/DL (ref 1.6–2.6)
MCH RBC QN AUTO: 30.1 PG (ref 27–31)
MCHC RBC AUTO-ENTMCNC: 32.8 G/DL (ref 32–36)
MCV RBC AUTO: 92 FL (ref 82–98)
MONOCYTES # BLD AUTO: 0.7 K/UL (ref 0.3–1)
MONOCYTES NFR BLD: 6 % (ref 4–15)
NEUTROPHILS # BLD AUTO: 9.4 K/UL (ref 1.8–7.7)
NEUTROPHILS NFR BLD: 81 % (ref 38–73)
NRBC BLD-RTO: 0 /100 WBC
PLATELET # BLD AUTO: 214 K/UL (ref 150–450)
PMV BLD AUTO: 10.1 FL (ref 9.2–12.9)
POTASSIUM SERPL-SCNC: 4 MMOL/L (ref 3.5–5.1)
PROT SERPL-MCNC: 7.2 G/DL (ref 6–8.4)
RBC # BLD AUTO: 4.38 M/UL (ref 4–5.4)
SODIUM SERPL-SCNC: 142 MMOL/L (ref 136–145)
WBC # BLD AUTO: 11.62 K/UL (ref 3.9–12.7)

## 2022-04-25 PROCEDURE — 83615 LACTATE (LD) (LDH) ENZYME: CPT | Mod: PN | Performed by: INTERNAL MEDICINE

## 2022-04-25 PROCEDURE — 84443 ASSAY THYROID STIM HORMONE: CPT | Performed by: INTERNAL MEDICINE

## 2022-04-25 PROCEDURE — 83735 ASSAY OF MAGNESIUM: CPT | Mod: PN | Performed by: INTERNAL MEDICINE

## 2022-04-25 PROCEDURE — 99999 PR PBB SHADOW E&M-EST. PATIENT-LVL IV: ICD-10-PCS | Mod: PBBFAC,,, | Performed by: STUDENT IN AN ORGANIZED HEALTH CARE EDUCATION/TRAINING PROGRAM

## 2022-04-25 PROCEDURE — 86300 IMMUNOASSAY TUMOR CA 15-3: CPT | Performed by: INTERNAL MEDICINE

## 2022-04-25 PROCEDURE — 99205 OFFICE O/P NEW HI 60 MIN: CPT | Mod: S$GLB,,, | Performed by: SURGERY

## 2022-04-25 PROCEDURE — 99999 PR PBB SHADOW E&M-EST. PATIENT-LVL IV: CPT | Mod: PBBFAC,,, | Performed by: SURGERY

## 2022-04-25 PROCEDURE — 99999 PR PBB SHADOW E&M-EST. PATIENT-LVL IV: ICD-10-PCS | Mod: PBBFAC,,, | Performed by: SURGERY

## 2022-04-25 PROCEDURE — 85025 COMPLETE CBC W/AUTO DIFF WBC: CPT | Mod: PN | Performed by: INTERNAL MEDICINE

## 2022-04-25 PROCEDURE — 36415 COLL VENOUS BLD VENIPUNCTURE: CPT | Mod: PN | Performed by: INTERNAL MEDICINE

## 2022-04-25 PROCEDURE — 99999 PR PBB SHADOW E&M-EST. PATIENT-LVL IV: CPT | Mod: PBBFAC,,, | Performed by: STUDENT IN AN ORGANIZED HEALTH CARE EDUCATION/TRAINING PROGRAM

## 2022-04-25 PROCEDURE — 99215 PR OFFICE/OUTPT VISIT, EST, LEVL V, 40-54 MIN: ICD-10-PCS | Mod: S$GLB,,, | Performed by: STUDENT IN AN ORGANIZED HEALTH CARE EDUCATION/TRAINING PROGRAM

## 2022-04-25 PROCEDURE — 99205 PR OFFICE/OUTPT VISIT, NEW, LEVL V, 60-74 MIN: ICD-10-PCS | Mod: S$GLB,,, | Performed by: SURGERY

## 2022-04-25 PROCEDURE — 80053 COMPREHEN METABOLIC PANEL: CPT | Mod: PN | Performed by: INTERNAL MEDICINE

## 2022-04-25 PROCEDURE — 99215 OFFICE O/P EST HI 40 MIN: CPT | Mod: S$GLB,,, | Performed by: STUDENT IN AN ORGANIZED HEALTH CARE EDUCATION/TRAINING PROGRAM

## 2022-04-25 PROCEDURE — 84439 ASSAY OF FREE THYROXINE: CPT | Performed by: INTERNAL MEDICINE

## 2022-04-25 RX ORDER — FAMOTIDINE 10 MG/ML
20 INJECTION INTRAVENOUS
Status: CANCELLED | OUTPATIENT
Start: 2022-04-26

## 2022-04-25 RX ORDER — EPINEPHRINE 0.3 MG/.3ML
0.3 INJECTION SUBCUTANEOUS ONCE AS NEEDED
Status: CANCELLED | OUTPATIENT
Start: 2022-05-03

## 2022-04-25 RX ORDER — DIPHENHYDRAMINE HYDROCHLORIDE 50 MG/ML
50 INJECTION INTRAMUSCULAR; INTRAVENOUS ONCE AS NEEDED
Status: CANCELLED | OUTPATIENT
Start: 2022-05-03

## 2022-04-25 RX ORDER — DIPHENHYDRAMINE HYDROCHLORIDE 50 MG/ML
50 INJECTION INTRAMUSCULAR; INTRAVENOUS ONCE AS NEEDED
Status: CANCELLED | OUTPATIENT
Start: 2022-04-26

## 2022-04-25 RX ORDER — SODIUM CHLORIDE 0.9 % (FLUSH) 0.9 %
10 SYRINGE (ML) INJECTION
Status: CANCELLED | OUTPATIENT
Start: 2022-05-03

## 2022-04-25 RX ORDER — EPINEPHRINE 0.3 MG/.3ML
0.3 INJECTION SUBCUTANEOUS ONCE AS NEEDED
Status: CANCELLED | OUTPATIENT
Start: 2022-04-26

## 2022-04-25 RX ORDER — DIPHENHYDRAMINE HYDROCHLORIDE 50 MG/ML
50 INJECTION INTRAMUSCULAR; INTRAVENOUS ONCE AS NEEDED
Status: CANCELLED | OUTPATIENT
Start: 2022-05-10

## 2022-04-25 RX ORDER — HEPARIN 100 UNIT/ML
500 SYRINGE INTRAVENOUS
Status: CANCELLED | OUTPATIENT
Start: 2022-05-03

## 2022-04-25 RX ORDER — SODIUM CHLORIDE 0.9 % (FLUSH) 0.9 %
10 SYRINGE (ML) INJECTION
Status: CANCELLED | OUTPATIENT
Start: 2022-05-10

## 2022-04-25 RX ORDER — FAMOTIDINE 10 MG/ML
20 INJECTION INTRAVENOUS
Status: CANCELLED | OUTPATIENT
Start: 2022-05-10

## 2022-04-25 RX ORDER — HEPARIN 100 UNIT/ML
500 SYRINGE INTRAVENOUS
Status: CANCELLED | OUTPATIENT
Start: 2022-05-10

## 2022-04-25 RX ORDER — HEPARIN 100 UNIT/ML
500 SYRINGE INTRAVENOUS
Status: CANCELLED | OUTPATIENT
Start: 2022-04-26

## 2022-04-25 RX ORDER — EPINEPHRINE 0.3 MG/.3ML
0.3 INJECTION SUBCUTANEOUS ONCE AS NEEDED
Status: CANCELLED | OUTPATIENT
Start: 2022-05-10

## 2022-04-25 RX ORDER — FAMOTIDINE 10 MG/ML
20 INJECTION INTRAVENOUS
Status: CANCELLED | OUTPATIENT
Start: 2022-05-03

## 2022-04-25 RX ORDER — SODIUM CHLORIDE 0.9 % (FLUSH) 0.9 %
10 SYRINGE (ML) INJECTION
Status: CANCELLED | OUTPATIENT
Start: 2022-04-26

## 2022-04-25 RX ORDER — ACETAMINOPHEN 500 MG
1000 TABLET ORAL
Status: CANCELLED
Start: 2022-04-26

## 2022-04-25 NOTE — PROGRESS NOTES
Pharmacy Treatment Plan Review    Patient  Zayra Rodgers, 72 y.o.  1950    Indication: Breast Cancer     History:  -History of locally advanced, hormone receptor positive/HER2 Lalitha negative left breast carcinoma without evidence of recurrent disease.  -Second primary, triple negative, clinical stage II B (T2, N0, M0), left breast carcinoma  -prior treatment with four cycles of Adriamycin/Cytoxan followed by 12 weekly doses of Taxol.  -Completed full 60 months of AI therapy with Arimidex        Labs:  CBC  Lab Results   Component Value Date    WBC 11.62 04/25/2022    HGB 13.2 04/25/2022    HCT 40.3 04/25/2022    MCV 92 04/25/2022     04/25/2022       BMP  Lab Results   Component Value Date     04/25/2022    K 4.0 04/25/2022     04/25/2022    CO2 26 04/25/2022    BUN 16 04/25/2022    CREATININE 0.8 04/25/2022    CALCIUM 10.2 04/25/2022    ANIONGAP 13 04/25/2022    ESTGFRAFRICA >60 04/25/2022    EGFRNONAA >60 04/25/2022       LFTs  Lab Results   Component Value Date    ALT 12 04/25/2022    AST 17 04/25/2022    ALKPHOS 53 (L) 04/25/2022    BILITOT 0.5 04/25/2022       The patient is scheduled to start the following infusion:   OP PEMBROLIZUMAB CARBOPLATIN (AUC 1.5) WITH WEEKLY PACLITAXEL PEMBROLIZUMAB 200 MG Q3W    21-day cycle for 4 cycles of:    -Pembrolizumab 200 mg in sodium chloride 0.9% 108 mL, infusion, intravneous, on day 1    -Paclitaxel 80 mg/m2 in sodium chloride 0.9% 250 mL, infusion, intravneous, on day 1, 8, 15    -Carboplatin AUC of 1.5 in sodium chloride 0.9% 250 mL, infusion, intravneous, on day 1, 8, 15    Premeds  -Acetaminophen 1000 mg PO  -Diphenhydramine 50 mg IVPB  -Famotidine 20 mg IV  -Palonosetron 0.25 mg/Dexamethasone 10 mg     The treatment plan matches NCCN template     Kings Rai PharmD

## 2022-04-25 NOTE — NURSING
Met with patient in multi disciplinary clinic today.  Reviewed plan of care and answered questions.  Her  is having surgery and we may need to move her appt with Francisco Alex next week.   She is trying to get his surgery moved up and will let me know.  My contact information was provided and pt was encouraged to call with any other questions or concerns.  Pt verbalized an understanding.  A referral for Integrative Oncology was placed.  Pt is interested in different resources they have to offer.

## 2022-04-25 NOTE — NURSING
Met with the patient to discuss what the patient has learned thus far about her diagnosis and answer all questions.  Follow-up appts made and a folder with NCCN patient guidelines book on Invasive/Noninvasive Breast Cancer given to her along with copies of her imaging, biopsy, and pathology reports. Also given a copy of the [facility] Board of Medical Examiners brochure on Introductory Guide to Breast Cancer Treatment Options.  Contact information given to her for nurse navigation and she was told to call for any questions or concerns.  She verbalized understanding.  Consent signed for bilateral mastectomies with left sentinel node biopsy.  Referrals placed for plastics ( Dr Alanis ), and Pre-hab. She knows there is no rush on getting these appts.   Post-op instructions given about mastectomy but told her Dr Alanis's instructions are the final word.  Genetics specimen obtained.  The following was explained in detail. The patient understands that this is a voluntary test.    PURPOSE: This test analyzes a specific gene or genes for genetic changes called mutations. This test will help determine if a person has a significantly increased risk if developing certain tumors due to a mutation in a cancer predisposing gene.    TEST RESULTS & INTERPRETATION: Possible result outcomes include positive, negative, and uncertain. A positive mutation is associated with an increased risk for hereditary cancer. A negative result is interpreted that a mutation was not identified. Uncertain means a genetic change was detected but it is not known if this change is linked to cancer risk.    BENEFITS: The benefits include informed choices about health care such as screening, risk reducing surgeries and preventive medication strategies.    RISKS: Concerns regarding possible health insurance discrimination, most states and the federal government have enacted laws to prohibit genetic discrimination.    LIMITATIONS: This test analyzes only  certain important genes associated with a specific hereditary cancer syndrome. Genetic testing clarifies cancer risks for only those cancers related to the genes analyzed.    FINANCIAL RESPONSIBILITY: Genetic testing of appropriate individuals is typically reimbursed by health insurance. You are responsible for any cost of the genetic test not reimbursed by insurance.     PATIENT INSTRUCTIONS & COLLECTION PROCESS:  Saliva collected.  Place tube into plastic box and sent off through [employer].  Informed patient results can take up to 2-4 weeks.  Will call patient with results.

## 2022-04-25 NOTE — PROGRESS NOTES
Subjective:      Patient ID:    Name: Zayra Rodgers  : 1950  MRN: [id number]      HPI:   Zayra Rodgers is a 72 y.o. female presents for evaluation of Breast Cancer (Left IDC TN)       Patient of Dr Bartlett, here for clearance prior to chemotherapy.Previous hx of locally advanced left breast carcinoma, who underwent neoadjuvant therapy consisting of four cycles of Adriamycin/Cytoxan followed by 12 weekly doses of Taxol.  She had a residual 0.3 mm focus of metastatic carcinoma in a single lymph node. Patient and postlumpectomy radiation.  She completed a 60 month course of Arimidex/biannual Prolia for prevention of aromatase inhibitor induced bone loss. Now with a new left TNBC, planning to start neoadjuvant chemotherapy with Paclitaxel+Carbo+Keytruda for 4 cycle, will not give AC given her previous exposure, and will cont with immunotherapy afterwards. Saw Dr Monroe today and discussion about surgery options       Patient had questions about her risk for leukemia given her risk for exposure to chemotherapy again, also has questions about MRI of the shoulder which she will discuss it with Dr Bartlett      Oncology History   Breast cancer   3/14/2016 Initial Diagnosis    Breast cancer     2022 Cancer Staged    Staging form: Breast, AJCC 8th Edition  - Clinical stage from 2022: Stage IIB (cT2, cN0, cM0, G2, ER-, IL-, HER2-)     Malignant neoplasm of nipple of left breast in female   2016 Initial Diagnosis    Malignant neoplasm of nipple of left breast in female     2022 -  Chemotherapy    Treatment Summary   Plan Name: OP PEMBROLIZUMAB WITH WEEKLY CARBOPLATIN (AUC 1.5)  AND PACLITAXEL FOLLOWED BY PEMBROLIZUMAB 200 MG Q3W  Treatment Goal: Curative  Status: Active  Start Date: 2022  End Date: 1/3/2023 (Planned)  Provider: Michael Bartlett MD  Chemotherapy: CARBOplatin (PARAPLATIN) 125 mg in sodium chloride 0.9% 250 mL chemo infusion, 125 mg, Intravenous, Clinic/HOD 1 time, 1 of 4  cycles  Administration: 125 mg (4/26/2022)  PACLitaxeL (TAXOL) 80 mg/m2 = 132 mg in sodium chloride 0.9% 250 mL chemo infusion, 80 mg/m2 = 132 mg, Intravenous, Clinic/HOD 1 time, 1 of 4 cycles  Dose modification: 80 mg/m2 (original dose 80 mg/m2, Cycle 2)  Administration: 132 mg (4/26/2022)  pembrolizumab (KEYTRUDA) 200 mg in sodium chloride 0.9% 108 mL infusion, 200 mg, Intravenous, Clinic/HOD 1 time, 1 of 13 cycles  Administration: 200 mg (4/26/2022)            Past Medical History:   Diagnosis Date    Allergy     Breast cancer 02/2016    left breast, neoadjuvant chemo, lumpectomy, and radiation    Bronchitis     COVID-19 08/2020    HLD (hyperlipidemia)     Hypertension     S/P chemotherapy, time since greater than 12 weeks     Skin cancer     resovled       Past Surgical History:   Procedure Laterality Date    ADENOIDECTOMY      BREAST BIOPSY Left 2016    BREAST CYST EXCISION      BREAST LUMPECTOMY Left 2016    COLONOSCOPY N/A 06/21/2018    Procedure: COLONOSCOPY;  Surgeon: Hiro Billy Jr., MD;  Location: [facility];  Service: Endoscopy;  Laterality: N/A;    CYSTOCELE REPAIR      EXCISIONAL HEMORRHOIDECTOMY      HYSTERECTOMY      due to prolapse    INSERTION OF TUNNELED CENTRAL VENOUS CATHETER (CVC) WITH SUBCUTANEOUS PORT Right 4/22/2022    Procedure: [id number];  Surgeon: Haile Domingo MD;  Location: [facility];  Service: General;  Laterality: Right;    ORBITAL RECONSTRUCTION      PORTACATH PLACEMENT      since removed    REMOVAL OF VASCULAR ACCESS PORT      RHINOPLASTY TIP      SIGMOIDOSCOPY         Family History   Problem Relation Age of Onset    Breast cancer Mother 42    Cancer Mother     COPD Father     Colon cancer Sister     Breast cancer Sister 40       Social History     Socioeconomic History    Marital status:    Tobacco Use    Smoking status: Never Smoker    Smokeless tobacco: Never Used   Substance and Sexual Activity    Alcohol use: Yes      Alcohol/week: 2.0 standard drinks     Types: 2 Cans of beer per week     Comment: per  week    Drug use: No    Sexual activity: Yes     Partners: Male     Social Determinants of Health     Financial Resource Strain: Low Risk     Difficulty of Paying Living Expenses: Not hard at all   Food Insecurity: No Food Insecurity    Worried About Running Out of Food in the Last Year: Never true    Ran Out of Food in the Last Year: Never true   Transportation Needs: No Transportation Needs    Lack of Transportation (Medical): No    Lack of Transportation (Non-Medical): No   Physical Activity: Insufficiently Active    Days of Exercise per Week: 1 day    Minutes of Exercise per Session: 20 min   Stress: No Stress Concern Present    Feeling of Stress : Only a little   Social Connections: Unknown    Frequency of Communication with Friends and Family: More than three times a week    Frequency of Social Gatherings with Friends and Family: More than three times a week    Active Member of Clubs or Organizations: Yes    Attends Club or Organization Meetings: 1 to 4 times per year    Marital Status:    Housing Stability: Low Risk     Unable to Pay for Housing in the Last Year: No    Number of Places Lived in the Last Year: 1    Unstable Housing in the Last Year: No       Review of patient's allergies indicates:  No Known Allergies    Review of Systems   Constitutional: Negative for decreased appetite, fever and night sweats.   Eyes: Negative for blurred vision, double vision, pain and visual disturbance.   Cardiovascular: Negative for chest pain.   Respiratory: Negative for cough and shortness of breath.    Hematologic/Lymphatic: Negative for adenopathy.   Skin: Negative for rash.   Musculoskeletal: Negative for back pain.   Gastrointestinal: Negative for abdominal pain and diarrhea.   Genitourinary: Negative for frequency.   Neurological: Negative for headaches.   Psychiatric/Behavioral: The patient is not  "nervous/anxious.             Objective:     Vitals:    04/25/22 0859   BP: (!) 155/104   BP Location: Left arm   Patient Position: Sitting   Pulse: 83   Resp: 16   Temp: 98.6 °F (37 °C)   TempSrc: Temporal   SpO2: 98%   Weight: 63.5 kg (139 lb 15.9 oz)   Height: 4' 11.5" (1.511 m)        Physical Exam  Constitutional:       General: She is not in acute distress.     Appearance: Normal appearance. She is normal weight.   HENT:      Head: Normocephalic and atraumatic.   Eyes:      General: No scleral icterus.  Cardiovascular:      Rate and Rhythm: Normal rate and regular rhythm.      Heart sounds: Normal heart sounds.   Pulmonary:      Effort: Pulmonary effort is normal.      Breath sounds: Normal breath sounds. No wheezing.   Chest:      Chest wall: No tenderness.      Comments: Left UOQ mass, 3x3cm, movable, mild skin tenting  Abdominal:      General: Bowel sounds are normal. There is no distension.      Palpations: Abdomen is soft. There is no mass.   Musculoskeletal:         General: No swelling or tenderness.   Lymphadenopathy:      Cervical: No cervical adenopathy.   Skin:     Coloration: Skin is not jaundiced or pale.   Neurological:      General: No focal deficit present.      Mental Status: She is oriented to person, place, and time.   Psychiatric:         Mood and Affect: Mood normal.         Behavior: Behavior normal.               Current Outpatient Medications on File Prior to Visit   Medication Sig    albuterol (PROAIR HFA) 90 mcg/actuation inhaler Inhale 2 puffs into the lungs every 6 (six) hours as needed for Wheezing. Rescue    B-complex with vitamin C (Z-BEC OR EQUIV) tablet Take 1 tablet by mouth once daily.    calcium carbonate-vitamin D3 600 mg-20 mcg (800 unit) Tab Take 2 tablets by mouth once daily.    cetirizine (ZYRTEC) 10 MG tablet Take 10 mg by mouth daily as needed.     chlorhexidine (PERIDEX) 0.12 % solution Swish in mouth & spit 10mL twice daily for 5 days, Start 4/20/2022.    " diphenhydrAMINE-LIDOcaine HCl 2%-nystatin-magnesium hydroxide 400 mg/5 ml Swish and swallow 5 ml every 4 hours as needed for mouth / throat pain    fish oil-omega-3 fatty acids 300-1,000 mg capsule Take 2 g by mouth daily as needed.     fluticasone propionate (FLONASE) 50 mcg/actuation nasal spray use 1 spray (50 mcg total) by Each Nostril route daily as needed.    hydroCHLOROthiazide (HYDRODIURIL) 25 MG tablet Take 1 tablet (25 mg total) by mouth daily as needed.    HYDROcodone-acetaminophen (NORCO) 5-325 mg per tablet Take 1 tablet by mouth every 6 (six) hours as needed for Pain.    LIDOcaine-prilocaine (EMLA) cream Apply topically as directed    mirabegron (MYRBETRIQ) 50 mg Tb24 Take 1 tablet (50 mg total) by mouth once daily.    multivitamin capsule Take 1 capsule by mouth once daily.    mupirocin (BACTROBAN) 2 % ointment Apply to each nostril with clean Q-Tip twice daily for 5 days. Start 4/20/2022.    prochlorperazine (COMPAZINE) 10 MG tablet Take 1 tablet (10 mg total) by mouth every 6 (six) hours as needed for nausea and vomiting    temazepam (RESTORIL) 15 mg Cap Take 1-2 capsules (15-30 mg total) by mouth nightly as needed (insomnia).    aspirin (ECOTRIN) 81 MG EC tablet Take 81 mg by mouth once daily.    atorvastatin (LIPITOR) 40 MG tablet Take 1 tablet (40 mg total) by mouth once daily. (Patient not taking: No sig reported)     No current facility-administered medications on file prior to visit.       CBC:  Lab Results   Component Value Date    WBC 11.62 04/25/2022    HGB 13.2 04/25/2022    HCT 40.3 04/25/2022    MCV 92 04/25/2022     04/25/2022         CMP:  Sodium   Date Value Ref Range Status   04/25/2022 142 136 - 145 mmol/L Final     Potassium   Date Value Ref Range Status   04/25/2022 4.0 3.5 - 5.1 mmol/L Final     Chloride   Date Value Ref Range Status   04/25/2022 103 95 - 110 mmol/L Final     CO2   Date Value Ref Range Status   04/25/2022 26 23 - 29 mmol/L Final     Glucose    Date Value Ref Range Status   04/25/2022 105 70 - 110 mg/dL Final     BUN   Date Value Ref Range Status   04/25/2022 16 8 - 23 mg/dL Final     Creatinine   Date Value Ref Range Status   04/25/2022 0.8 0.5 - 1.4 mg/dL Final     Calcium   Date Value Ref Range Status   04/25/2022 10.2 8.7 - 10.5 mg/dL Final     Total Protein   Date Value Ref Range Status   04/25/2022 7.2 6.0 - 8.4 g/dL Final     Albumin   Date Value Ref Range Status   04/25/2022 4.0 3.5 - 5.2 g/dL Final     Total Bilirubin   Date Value Ref Range Status   04/25/2022 0.5 0.1 - 1.0 mg/dL Final     Comment:     For infants and newborns, interpretation of results should be based  on gestational age, weight and in agreement with clinical  observations.    Premature Infant recommended reference ranges:  Up to 24 hours.............<8.0 mg/dL  Up to 48 hours............<12.0 mg/dL  3-5 days..................<15.0 mg/dL  6-29 days.................<15.0 mg/dL       Alkaline Phosphatase   Date Value Ref Range Status   04/25/2022 53 (L) 55 - 135 U/L Final     AST   Date Value Ref Range Status   04/25/2022 17 10 - 40 U/L Final     ALT   Date Value Ref Range Status   04/25/2022 12 10 - 44 U/L Final     Anion Gap   Date Value Ref Range Status   04/25/2022 13 8 - 16 mmol/L Final     eGFR if    Date Value Ref Range Status   04/25/2022 >60 >60 mL/min/1.73 m^2 Final     eGFR if non    Date Value Ref Range Status   04/25/2022 >60 >60 mL/min/1.73 m^2 Final     Comment:     Calculation used to obtain the estimated glomerular filtration  rate (eGFR) is the CKD-EPI equation.          X-Ray Chest AP Portable  Narrative: EXAMINATION:  XR CHEST AP PORTABLE    CLINICAL HISTORY:  port placement;    TECHNIQUE:  Single frontal view of the chest was performed.    COMPARISON:  10/25/2021    FINDINGS:  Port-A-Cath in place.  Lungs are clear.  Heart size is within normal limits.  The costophrenic angles are clear.  There is vascular calcification  noted.  Impression: No acute disease is seen    Electronically signed by: Leo Gillis MD  Date:    04/22/2022  Time:    12:40  SURG FL Surgery Fluoro Usage  See OP Notes for results.     IMPRESSION: See OP Notes for results.     This procedure was auto-finalized by: Virtual Radiologist       ECOG SCORE    1 - Restricted in strenuous activity-ambulatory and able to carry out work of a light nature          All pertinent labs and imaging reviewed. As well as outside records     Assessment:       1. 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 Lump of left breast    3. Aromatase inhibitor use    4. Situational depression      # Stage IIB TNBC of the left breast:  - starting neoadjuvant chemotherapy with paclitaxel+Carbo+Pembrolizumab, no AC given previous exposure  - discussion about surgery after chemo and re-imaging   - following up with Dr Bartlett     # Anxiety/depression: due to diagnosis of cancer, following up with Dr Ryder   # HTN: following up with PCP, seems stable. Clinic visit axiety   # HLD: following up with PCP      Plan:     Malignant neoplasm of nipple of left breast in female, unspecified estrogen receptor status    Lump of left breast    Aromatase inhibitor use    Situational depression    Other orders  -     Cancel: PACLitaxeL (TAXOL) 80 mg/m2 = 132 mg in sodium chloride 0.9% 250 mL chemo infusion  -     Cancel: CARBOplatin (PARAPLATIN) 125 mg in sodium chloride 0.9% 250 mL chemo infusion  -     Cancel: EPINEPHrine (EPIPEN) 0.3 mg/0.3 mL pen injection 0.3 mg  -     Cancel: diphenhydrAMINE injection 50 mg  -     Cancel: hydrocortisone sodium succinate injection 100 mg  -     Cancel: heparin, porcine (PF) 100 unit/mL injection flush 500 Units  -     Cancel: alteplase injection 2 mg  -     diphenhydramine (BENADRYL) 50 mg in NS 50 mL IVPB  -     famotidine (PF) injection 20 mg  -     palonosetron 0.25mg/dexamethasone 10mg in NS IVPB 0.25 mg  -      Cancel: PACLitaxeL (TAXOL) 80 mg/m2 = 132 mg in sodium chloride 0.9% 250 mL chemo infusion  -     Cancel: CARBOplatin (PARAPLATIN) 125 mg in sodium chloride 0.9% 250 mL chemo infusion  -     EPINEPHrine (EPIPEN) 0.3 mg/0.3 mL pen injection 0.3 mg  -     diphenhydrAMINE injection 50 mg  -     hydrocortisone sodium succinate injection 100 mg  -     sodium chloride 0.9% 250 mL flush bag  -     sodium chloride 0.9% flush 10 mL  -     heparin, porcine (PF) 100 unit/mL injection flush 500 Units  -     alteplase injection 2 mg  -     diphenhydramine (BENADRYL) 50 mg in NS 50 mL IVPB  -     famotidine (PF) injection 20 mg  -     palonosetron 0.25mg/dexamethasone 10mg in NS IVPB 0.25 mg  -     Cancel: PACLitaxeL (TAXOL) 80 mg/m2 = 132 mg in sodium chloride 0.9% 250 mL chemo infusion  -     Cancel: CARBOplatin (PARAPLATIN) 125 mg in sodium chloride 0.9% 250 mL chemo infusion  -     EPINEPHrine (EPIPEN) 0.3 mg/0.3 mL pen injection 0.3 mg  -     diphenhydrAMINE injection 50 mg  -     hydrocortisone sodium succinate injection 100 mg  -     sodium chloride 0.9% 250 mL flush bag  -     sodium chloride 0.9% flush 10 mL  -     heparin, porcine (PF) 100 unit/mL injection flush 500 Units  -     alteplase injection 2 mg  -     PACLitaxeL (TAXOL) 80 mg/m2 = 132 mg in sodium chloride 0.9% 250 mL chemo infusion  -     CARBOplatin (PARAPLATIN) 125 mg in sodium chloride 0.9% 250 mL chemo infusion  -     PACLitaxeL (TAXOL) 80 mg/m2 = 132 mg in sodium chloride 0.9% 250 mL chemo infusion  -     CARBOplatin (PARAPLATIN) 125 mg in sodium chloride 0.9% 250 mL chemo infusion         Patient queried and all questions were answered.    Plan was discussed with the patient at length, and he verbalized understanding.        Recommend COVID guidelines being followed   Recommend  exercise, nutrition and weight management and health maintenance activities and follow-up with PCPs recommendations       Signed:  Cindy Machado MD  Hematology and Oncology  [facility]  [facility] [place]  [facility] [place] [facility]    Answers for HPI/ROS submitted by the patient on 4/19/2022  appetite change : No  unexpected weight change: No  mouth sores: No  Route Chart for Scheduling    Med Onc Chart Routing      Follow up with physician 2 weeks.   Follow up with ANGIE 1 week.   Labs    Lab interval:  CBC,CMP,LDH, magnesium weekly, TSH, free T4 every 3 weeks   Imaging    Pharmacy appointment    Other referrals          Treatment Plan Information   OP PEMBROLIZUMAB WITH WEEKLY CARBOPLATIN (AUC 1.5)  AND PACLITAXEL FOLLOWED BY PEMBROLIZUMAB 200 MG Q3W   Michael Bartlett MD   Upcoming Treatment Dates - OP PEMBROLIZUMAB WITH WEEKLY CARBOPLATIN (AUC 1.5)  AND PACLITAXEL FOLLOWED BY PEMBROLIZUMAB 200 MG Q3W    5/3/2022       Pre-Medications       diphenhydramine (BENADRYL) 50 mg in NS 50 mL IVPB       famotidine (PF) injection 20 mg       Chemotherapy       PACLitaxeL (TAXOL) 80 mg/m2 = 132 mg in sodium chloride 0.9% 250 mL chemo infusion       CARBOplatin (PARAPLATIN) 125 mg in sodium chloride 0.9% 250 mL chemo infusion  5/10/2022       Pre-Medications       diphenhydramine (BENADRYL) 50 mg in NS 50 mL IVPB       famotidine (PF) injection 20 mg       Chemotherapy       PACLitaxeL (TAXOL) 80 mg/m2 = 132 mg in sodium chloride 0.9% 250 mL chemo infusion       CARBOplatin (PARAPLATIN) 125 mg in sodium chloride 0.9% 250 mL chemo infusion  5/17/2022       Pre-Medications       acetaminophen tablet 1,000 mg       diphenhydramine (BENADRYL) 50 mg in NS 50 mL IVPB       famotidine (PF) injection 20 mg       Chemotherapy       CARBOplatin (PARAPLATIN) 125 mg in sodium chloride 0.9% 250 mL chemo infusion       PACLitaxeL (TAXOL) 80 mg/m2 = 132 mg in sodium chloride 0.9% 250 mL chemo infusion  5/24/2022       Pre-Medications       diphenhydramine (BENADRYL) 50 mg in NS 50 mL IVPB       famotidine (PF) injection 20 mg       Chemotherapy       CARBOplatin (PARAPLATIN) 125 mg in sodium chloride 0.9% 250  mL chemo infusion       PACLitaxeL (TAXOL) 80 mg/m2 = 132 mg in sodium chloride 0.9% 250 mL chemo infusion    Therapy Plan Information  PROLIA (DENOSUMAB) Q6M  5. Medications  denosumab (PROLIA) injection 60 mg  60 mg, Subcutaneous, Every visit    PORT FLUSH  Flushes  heparin, porcine (PF) 100 unit/mL injection flush 500 Units  500 Units, Intravenous, Every visit  sodium chloride 0.9% flush 10 mL  10 mL, Intravenous, Every visit

## 2022-04-26 ENCOUNTER — INFUSION (OUTPATIENT)
Dept: INFUSION THERAPY | Facility: HOSPITAL | Age: 72
End: 2022-04-26
Attending: INTERNAL MEDICINE
Payer: MEDICARE

## 2022-04-26 ENCOUNTER — DOCUMENTATION ONLY (OUTPATIENT)
Dept: INFUSION THERAPY | Facility: HOSPITAL | Age: 72
End: 2022-04-26
Payer: MEDICARE

## 2022-04-26 ENCOUNTER — TELEPHONE (OUTPATIENT)
Dept: HEMATOLOGY/ONCOLOGY | Facility: CLINIC | Age: 72
End: 2022-04-26
Payer: MEDICARE

## 2022-04-26 VITALS
DIASTOLIC BLOOD PRESSURE: 87 MMHG | HEART RATE: 72 BPM | TEMPERATURE: 99 F | HEIGHT: 60 IN | WEIGHT: 141.31 LBS | SYSTOLIC BLOOD PRESSURE: 170 MMHG | BODY MASS INDEX: 27.74 KG/M2 | RESPIRATION RATE: 16 BRPM

## 2022-04-26 DIAGNOSIS — C50.012 MALIGNANT NEOPLASM OF NIPPLE OF LEFT BREAST IN FEMALE, ESTROGEN RECEPTOR NEGATIVE: Primary | ICD-10-CM

## 2022-04-26 DIAGNOSIS — Z17.1 MALIGNANT NEOPLASM OF NIPPLE OF LEFT BREAST IN FEMALE, ESTROGEN RECEPTOR NEGATIVE: Primary | ICD-10-CM

## 2022-04-26 LAB
T4 FREE SERPL-MCNC: 1.02 NG/DL (ref 0.71–1.51)
TSH SERPL DL<=0.005 MIU/L-ACNC: 2.05 UIU/ML (ref 0.4–4)

## 2022-04-26 PROCEDURE — 25000003 PHARM REV CODE 250: Mod: PN | Performed by: STUDENT IN AN ORGANIZED HEALTH CARE EDUCATION/TRAINING PROGRAM

## 2022-04-26 PROCEDURE — 63600175 PHARM REV CODE 636 W HCPCS: Mod: PN | Performed by: STUDENT IN AN ORGANIZED HEALTH CARE EDUCATION/TRAINING PROGRAM

## 2022-04-26 PROCEDURE — 96413 CHEMO IV INFUSION 1 HR: CPT | Mod: PN

## 2022-04-26 PROCEDURE — 96375 TX/PRO/DX INJ NEW DRUG ADDON: CPT | Mod: PN

## 2022-04-26 PROCEDURE — A4216 STERILE WATER/SALINE, 10 ML: HCPCS | Mod: PN | Performed by: STUDENT IN AN ORGANIZED HEALTH CARE EDUCATION/TRAINING PROGRAM

## 2022-04-26 PROCEDURE — 96417 CHEMO IV INFUS EACH ADDL SEQ: CPT | Mod: PN

## 2022-04-26 PROCEDURE — 96367 TX/PROPH/DG ADDL SEQ IV INF: CPT | Mod: PN

## 2022-04-26 RX ORDER — SODIUM CHLORIDE 0.9 % (FLUSH) 0.9 %
10 SYRINGE (ML) INJECTION
Status: DISCONTINUED | OUTPATIENT
Start: 2022-04-26 | End: 2022-04-26 | Stop reason: HOSPADM

## 2022-04-26 RX ORDER — ACETAMINOPHEN 500 MG
1000 TABLET ORAL
Status: COMPLETED | OUTPATIENT
Start: 2022-04-26 | End: 2022-04-26

## 2022-04-26 RX ORDER — FAMOTIDINE 10 MG/ML
20 INJECTION INTRAVENOUS
Status: COMPLETED | OUTPATIENT
Start: 2022-04-26 | End: 2022-04-26

## 2022-04-26 RX ADMIN — ACETAMINOPHEN 1000 MG: 500 TABLET, FILM COATED ORAL at 02:04

## 2022-04-26 RX ADMIN — Medication 10 ML: at 05:04

## 2022-04-26 RX ADMIN — PALONOSETRON 0.25 MG: 0.05 INJECTION, SOLUTION INTRAVENOUS at 03:04

## 2022-04-26 RX ADMIN — SODIUM CHLORIDE: 0.9 INJECTION, SOLUTION INTRAVENOUS at 01:04

## 2022-04-26 RX ADMIN — FAMOTIDINE 20 MG: 10 INJECTION INTRAVENOUS at 02:04

## 2022-04-26 RX ADMIN — PACLITAXEL 132 MG: 6 INJECTION, SOLUTION, CONCENTRATE INTRAVENOUS at 03:04

## 2022-04-26 RX ADMIN — SODIUM CHLORIDE 200 MG: 9 INJECTION, SOLUTION INTRAVENOUS at 02:04

## 2022-04-26 RX ADMIN — DIPHENHYDRAMINE HYDROCHLORIDE 50 MG: 50 INJECTION INTRAMUSCULAR; INTRAVENOUS at 02:04

## 2022-04-26 RX ADMIN — CARBOPLATIN 125 MG: 10 INJECTION, SOLUTION INTRAVENOUS at 05:04

## 2022-04-26 NOTE — PROGRESS NOTES
Oncology   Chemotherapy School Education  Zayra Rodgers   1950    Social Service Education   This is a 72 y.o.female with a medical diagnosis of breast cancer.    Current Support System: The pt was accompanied by her only child, her son who was very supportive to his mom.    Met with pt for new pt education. Reviewed role of  during cancer treatment. Educated on role of SW on care team and resources available at the cancer center.     Answered all psychosocial/socioeconomic related questions.     The pt was provide with a port pillow and some beanies were provided as well.    Patient provided with social contact number and advised to call with questions or make future appointment if further intervention is needed. SW to follow throughout tx.    Kimberly Krueger LCSW  04/26/2022  1:40 PM

## 2022-04-26 NOTE — TELEPHONE ENCOUNTER
I spoke with Jailyn today about getting PT and IO scheduled. She said she met with Dr. Ruggiero yesterday and the plan is to get thru chemo and then a few weeks post tx have a mastectomy with reconstruction. She voiced acceptance of PT prehab end of June and anything changes she will call. She is scheduled for a wig fitting today and will let me know if she is interested in IO once she checks her schedule. She has care arranged for her  and will let me know.

## 2022-04-26 NOTE — PLAN OF CARE
Pt tolerated 1st Keytruda/Taxol/Carboplatin infusions well.  No s/s of infusion reaction noted.  Reviewed side effects of treatment regimen and management of side effects.  Instructed to call MD with any problems.

## 2022-04-27 NOTE — PROGRESS NOTES
Interdisciplinary Breast Cancer Conference    Zayra Rodgers    Female    Date Presented to Tumor Board: 04/25/22    Presenting Hospital / Clinic: [facility]    Tumor Laterality: Left    Breast Tumor Site: Anterior    Presenter: Padma Ruggiero    Reason for Consultation: Initial Presentation    Specialties Present: Medical Oncology;Radiation Oncology;Surgical Oncology;Pathology;Navigation;Research;Radiology;Integrative Oncology    Patient Status: a current patient    Treatment to Date: Surgical Intervention(s)    Clinical Trial Eligibility: None available                   Cancer Staging  Breast cancer  Staging form: Breast, AJCC 8th Edition  - Clinical stage from 4/13/2022: Stage IIB (cT2, cN0, cM0, G2, ER-, LA-, HER2-) - Signed by Michael Bartlett MD on 4/13/2022      Recommended Plan: Surgery;Chemotherapy;Genetic Testing;Biopsy;Imaging    Neoadjuvant chemo  Bilateral mastectomy, left SLNB       Presentation at Cancer Conference: [facility]

## 2022-04-28 ENCOUNTER — TELEPHONE (OUTPATIENT)
Dept: HEMATOLOGY/ONCOLOGY | Facility: CLINIC | Age: 72
End: 2022-04-28
Payer: MEDICARE

## 2022-04-28 LAB — CANCER AG27-29 SERPL-ACNC: <12 U/ML

## 2022-04-28 NOTE — NURSING
Spoke to pt.  Confirmed upcoming appointments.  She is having some mild fatigue, but other than that she is feeling good.  Encouraged her to call with any questions or concerns.  Pt verbalized an understanding.

## 2022-04-29 DIAGNOSIS — C50.012 MALIGNANT NEOPLASM OF NIPPLE OF LEFT BREAST IN FEMALE, UNSPECIFIED ESTROGEN RECEPTOR STATUS: Primary | ICD-10-CM

## 2022-04-29 PROBLEM — F43.21 SITUATIONAL DEPRESSION: Status: ACTIVE | Noted: 2022-04-29

## 2022-04-29 PROBLEM — N63.20 LUMP OF LEFT BREAST: Status: ACTIVE | Noted: 2022-04-29

## 2022-05-02 ENCOUNTER — OFFICE VISIT (OUTPATIENT)
Dept: HEMATOLOGY/ONCOLOGY | Facility: CLINIC | Age: 72
End: 2022-05-02
Payer: MEDICARE

## 2022-05-02 ENCOUNTER — LAB VISIT (OUTPATIENT)
Dept: LAB | Facility: HOSPITAL | Age: 72
End: 2022-05-02
Attending: NURSE PRACTITIONER
Payer: MEDICARE

## 2022-05-02 VITALS
HEART RATE: 81 BPM | WEIGHT: 142.63 LBS | SYSTOLIC BLOOD PRESSURE: 132 MMHG | TEMPERATURE: 99 F | BODY MASS INDEX: 28 KG/M2 | RESPIRATION RATE: 18 BRPM | DIASTOLIC BLOOD PRESSURE: 81 MMHG | OXYGEN SATURATION: 97 % | HEIGHT: 60 IN

## 2022-05-02 DIAGNOSIS — C50.012 MALIGNANT NEOPLASM OF NIPPLE OF LEFT BREAST IN FEMALE, UNSPECIFIED ESTROGEN RECEPTOR STATUS: ICD-10-CM

## 2022-05-02 DIAGNOSIS — C50.411 MALIGNANT NEOPLASM OF UPPER-OUTER QUADRANT OF RIGHT BREAST IN FEMALE, ESTROGEN RECEPTOR NEGATIVE: Primary | ICD-10-CM

## 2022-05-02 DIAGNOSIS — F41.9 ANXIETY: ICD-10-CM

## 2022-05-02 DIAGNOSIS — Z17.1 MALIGNANT NEOPLASM OF UPPER-OUTER QUADRANT OF RIGHT BREAST IN FEMALE, ESTROGEN RECEPTOR NEGATIVE: Primary | ICD-10-CM

## 2022-05-02 LAB
ANION GAP SERPL CALC-SCNC: 11 MMOL/L (ref 8–16)
BASOPHILS # BLD AUTO: 0.03 K/UL (ref 0–0.2)
BASOPHILS NFR BLD: 0.4 % (ref 0–1.9)
BUN SERPL-MCNC: 25 MG/DL (ref 8–23)
CALCIUM SERPL-MCNC: 9.9 MG/DL (ref 8.7–10.5)
CHLORIDE SERPL-SCNC: 103 MMOL/L (ref 95–110)
CO2 SERPL-SCNC: 28 MMOL/L (ref 23–29)
CREAT SERPL-MCNC: 0.7 MG/DL (ref 0.5–1.4)
DIFFERENTIAL METHOD: NORMAL
EOSINOPHIL # BLD AUTO: 0 K/UL (ref 0–0.5)
EOSINOPHIL NFR BLD: 0.3 % (ref 0–8)
ERYTHROCYTE [DISTWIDTH] IN BLOOD BY AUTOMATED COUNT: 13.1 % (ref 11.5–14.5)
EST. GFR  (AFRICAN AMERICAN): >60 ML/MIN/1.73 M^2
EST. GFR  (NON AFRICAN AMERICAN): >60 ML/MIN/1.73 M^2
GLUCOSE SERPL-MCNC: 102 MG/DL (ref 70–110)
HCT VFR BLD AUTO: 38.3 % (ref 37–48.5)
HGB BLD-MCNC: 12.6 G/DL (ref 12–16)
IMM GRANULOCYTES # BLD AUTO: 0.03 K/UL (ref 0–0.04)
IMM GRANULOCYTES NFR BLD AUTO: 0.4 % (ref 0–0.5)
LYMPHOCYTES # BLD AUTO: 1.7 K/UL (ref 1–4.8)
LYMPHOCYTES NFR BLD: 23.4 % (ref 18–48)
MAGNESIUM SERPL-MCNC: 2.1 MG/DL (ref 1.6–2.6)
MCH RBC QN AUTO: 30.3 PG (ref 27–31)
MCHC RBC AUTO-ENTMCNC: 32.9 G/DL (ref 32–36)
MCV RBC AUTO: 92 FL (ref 82–98)
MONOCYTES # BLD AUTO: 0.3 K/UL (ref 0.3–1)
MONOCYTES NFR BLD: 4 % (ref 4–15)
NEUTROPHILS # BLD AUTO: 5.2 K/UL (ref 1.8–7.7)
NEUTROPHILS NFR BLD: 71.5 % (ref 38–73)
NRBC BLD-RTO: 0 /100 WBC
PLATELET # BLD AUTO: 209 K/UL (ref 150–450)
PMV BLD AUTO: 10 FL (ref 9.2–12.9)
POTASSIUM SERPL-SCNC: 3.8 MMOL/L (ref 3.5–5.1)
RBC # BLD AUTO: 4.16 M/UL (ref 4–5.4)
SODIUM SERPL-SCNC: 142 MMOL/L (ref 136–145)
WBC # BLD AUTO: 7.25 K/UL (ref 3.9–12.7)

## 2022-05-02 PROCEDURE — 99214 OFFICE O/P EST MOD 30 MIN: CPT | Mod: S$GLB,,, | Performed by: NURSE PRACTITIONER

## 2022-05-02 PROCEDURE — 99999 PR PBB SHADOW E&M-EST. PATIENT-LVL IV: ICD-10-PCS | Mod: PBBFAC,,, | Performed by: NURSE PRACTITIONER

## 2022-05-02 PROCEDURE — 99999 PR PBB SHADOW E&M-EST. PATIENT-LVL IV: CPT | Mod: PBBFAC,,, | Performed by: NURSE PRACTITIONER

## 2022-05-02 PROCEDURE — 85025 COMPLETE CBC W/AUTO DIFF WBC: CPT | Mod: PN | Performed by: INTERNAL MEDICINE

## 2022-05-02 PROCEDURE — 80048 BASIC METABOLIC PNL TOTAL CA: CPT | Mod: PN | Performed by: INTERNAL MEDICINE

## 2022-05-02 PROCEDURE — 83735 ASSAY OF MAGNESIUM: CPT | Mod: PN | Performed by: INTERNAL MEDICINE

## 2022-05-02 PROCEDURE — 99214 PR OFFICE/OUTPT VISIT, EST, LEVL IV, 30-39 MIN: ICD-10-PCS | Mod: S$GLB,,, | Performed by: NURSE PRACTITIONER

## 2022-05-02 PROCEDURE — 36415 COLL VENOUS BLD VENIPUNCTURE: CPT | Mod: PN | Performed by: INTERNAL MEDICINE

## 2022-05-02 RX ORDER — ALPRAZOLAM 0.5 MG/1
0.5 TABLET ORAL 3 TIMES DAILY PRN
Qty: 60 TABLET | Refills: 0 | Status: SHIPPED | OUTPATIENT
Start: 2022-05-02 | End: 2022-11-01 | Stop reason: CLARIF

## 2022-05-02 NOTE — PROGRESS NOTES
PATIENT: Zayra Rodgers  MRN: [id number]  DATE: 5/2/2022    Diagnosis:   1. Malignant neoplasm of upper-outer quadrant of right breast in female, estrogen receptor negative    2. Anxiety      Chief Complaint: Clearance for Cycle 1 Day 8    Subjective:       Interval History: Ms. Rodgers is a 72 y.o. female with depression, history of locally advanced hormone receptor positive/HER2 negative left breast cancer.  Newly diagnosed triple negative, clinical stage II B (T2, N0, M0), left breast cancer.   Planned treatment regimen for neoadjuvant Carboplatin/Taxol/Keytruda, followed by re-imaging, surgery and adjuvant Keytruda for an additional 9 cycles.     Prior History:   3/22/2016-5/24/2016: Completed 4 cycles AC  6/15/2016-8/31/2016: Completed 12 cycles weekly Taxol   10/20/2016: Lumpectomy that revealed residual 0.3 mm focus of metastatic carcinoma in a single lymph node  Completed postlumpectomy radiation   11/2016: Began Arimidex 1 mg po daily--> completed 60 month course of AI and biannual Prolia for prevention of AI induced bone loss.     4/5/22: Presented to clinic for evaluation of new left breast lump  4/6/22: Underwent biopsy of left breast mass which revealed second primary, triple negative, clinical stage II B (T2, N0, M0) left breast carcinoma    04/26/22:   Cycle 1, Day 1 Keytruda/Carbo/Taxol    TODAY  05/02/22:     Presents to the clinic today for evaluation prior to Cycle 1, Day 8 of tx.  Difficulties with anxiety/sleeping; Restoril does not make her feel right; hung over the next day even with just (1).  Experience dull headache after infusion.  Has not been hydrating well.    Many questions over treatment  Denies any fevers, mouth sores, abdominal discomfort, N/V/D, etc.    Past Medical History:   Past Medical History:   Diagnosis Date    Allergy     Breast cancer 02/2016    left breast, neoadjuvant chemo, lumpectomy, and radiation    Bronchitis     COVID-19 08/2020    HLD (hyperlipidemia)      Hypertension     S/P chemotherapy, time since greater than 12 weeks     Skin cancer     resovled       Past Surgical HIstory:   Past Surgical History:   Procedure Laterality Date    ADENOIDECTOMY      BREAST BIOPSY Left 2016    BREAST CYST EXCISION      BREAST LUMPECTOMY Left 2016    COLONOSCOPY N/A 06/21/2018    Procedure: COLONOSCOPY;  Surgeon: Hiro Billy Jr., MD;  Location: [facility];  Service: Endoscopy;  Laterality: N/A;    CYSTOCELE REPAIR      EXCISIONAL HEMORRHOIDECTOMY      HYSTERECTOMY      due to prolapse    INSERTION OF TUNNELED CENTRAL VENOUS CATHETER (CVC) WITH SUBCUTANEOUS PORT Right 4/22/2022    Procedure: [id number];  Surgeon: Haile Domingo MD;  Location: [facility];  Service: General;  Laterality: Right;    ORBITAL RECONSTRUCTION      PORTACATH PLACEMENT      since removed    REMOVAL OF VASCULAR ACCESS PORT      RHINOPLASTY TIP      SIGMOIDOSCOPY         Family History:   Family History   Problem Relation Age of Onset    Breast cancer Mother 42    Cancer Mother     COPD Father     Colon cancer Sister     Breast cancer Sister 40       Social History:  reports that she has never smoked. She has never used smokeless tobacco. She reports current alcohol use of about 2.0 standard drinks of alcohol per week. She reports that she does not use drugs.    Allergies:  Review of patient's allergies indicates:  No Known Allergies    Medications:  Current Outpatient Medications   Medication Sig Dispense Refill    albuterol (PROAIR HFA) 90 mcg/actuation inhaler Inhale 2 puffs into the lungs every 6 (six) hours as needed for Wheezing. Rescue 18 g 11    B-complex with vitamin C (Z-BEC OR EQUIV) tablet Take 1 tablet by mouth once daily.      calcium carbonate-vitamin D3 600 mg-20 mcg (800 unit) Tab Take 2 tablets by mouth once daily. 200 tablet 3    cetirizine (ZYRTEC) 10 MG tablet Take 10 mg by mouth daily as needed.       chlorhexidine (PERIDEX) 0.12 % solution Swish in  mouth & spit 10mL twice daily for 5 days, Start 4/20/2022. 473 mL 0    diphenhydrAMINE-LIDOcaine HCl 2%-nystatin-magnesium hydroxide 400 mg/5 ml Swish and swallow 5 ml every 4 hours as needed for mouth / throat pain 100 mL 5    fish oil-omega-3 fatty acids 300-1,000 mg capsule Take 2 g by mouth daily as needed.       fluticasone propionate (FLONASE) 50 mcg/actuation nasal spray use 1 spray (50 mcg total) by Each Nostril route daily as needed. 16 g 11    hydroCHLOROthiazide (HYDRODIURIL) 25 MG tablet Take 1 tablet (25 mg total) by mouth daily as needed. 90 tablet 1    HYDROcodone-acetaminophen (NORCO) 5-325 mg per tablet Take 1 tablet by mouth every 6 (six) hours as needed for Pain. 10 tablet 0    LIDOcaine-prilocaine (EMLA) cream Apply topically as directed 30 g 5    mirabegron (MYRBETRIQ) 50 mg Tb24 Take 1 tablet (50 mg total) by mouth once daily. 30 tablet 11    multivitamin capsule Take 1 capsule by mouth once daily.      mupirocin (BACTROBAN) 2 % ointment Apply to each nostril with clean Q-Tip twice daily for 5 days. Start 4/20/2022. 22 g 0    prochlorperazine (COMPAZINE) 10 MG tablet Take 1 tablet (10 mg total) by mouth every 6 (six) hours as needed for nausea and vomiting 30 tablet 5    ALPRAZolam (XANAX) 0.5 MG tablet Take 1 tablet (0.5 mg total) by mouth 3 (three) times daily as needed for Insomnia or Anxiety. 60 tablet 0    aspirin (ECOTRIN) 81 MG EC tablet Take 81 mg by mouth once daily.      atorvastatin (LIPITOR) 40 MG tablet Take 1 tablet (40 mg total) by mouth once daily. (Patient not taking: No sig reported) 90 tablet 1     No current facility-administered medications for this visit.       Review of Systems   Constitutional: Negative for appetite change, fatigue and unexpected weight change.   HENT: Negative for mouth sores and sinus pain.    Eyes: Negative for visual disturbance.   Respiratory: Negative for cough and shortness of breath.    Cardiovascular: Negative for chest pain and leg  "swelling.   Gastrointestinal: Negative for abdominal pain and diarrhea.   Genitourinary: Negative for dysuria, frequency and hematuria.   Musculoskeletal: Negative for back pain.   Skin: Negative for rash.   Neurological: Negative for headaches.   Hematological: Negative for adenopathy.   Psychiatric/Behavioral: The patient is not nervous/anxious.        Objective:      Vitals:   Vitals:    05/02/22 1519   BP: 132/81   BP Location: Right arm   Patient Position: Sitting   BP Method: Medium (Automatic)   Pulse: 81   Resp: 18   Temp: 98.8 °F (37.1 °C)   TempSrc: Temporal   SpO2: 97%   Weight: 64.7 kg (142 lb 10.2 oz)   Height: 4' 11.5" (1.511 m)     BMI: Body mass index is 28.33 kg/m².    Physical Exam  Vitals reviewed.   Constitutional:       General: She is not in acute distress.     Appearance: She is not ill-appearing or diaphoretic.   HENT:      Head: Normocephalic and atraumatic.      Mouth/Throat:      Mouth: Mucous membranes are moist.      Pharynx: Oropharynx is clear.   Eyes:      General: No scleral icterus.     Conjunctiva/sclera: Conjunctivae normal.   Cardiovascular:      Rate and Rhythm: Normal rate and regular rhythm.      Pulses: Normal pulses.      Heart sounds: Normal heart sounds.   Pulmonary:      Effort: Pulmonary effort is normal. No respiratory distress.      Breath sounds: Normal breath sounds.   Abdominal:      General: Bowel sounds are normal. There is no distension.      Palpations: Abdomen is soft.   Musculoskeletal:         General: Normal range of motion.      Cervical back: Neck supple.      Right lower leg: No edema.      Left lower leg: No edema.   Lymphadenopathy:      Cervical: No cervical adenopathy.   Skin:     General: Skin is warm and dry.      Capillary Refill: Capillary refill takes less than 2 seconds.      Findings: No bruising or rash.      Comments: POC to RCW without erythema  Breast exam:  Left; UOQ with movable mass palpated       Neurological:      Mental Status: She is " alert and oriented to person, place, and time.      Gait: Gait normal.   Psychiatric:         Mood and Affect: Mood normal.         Behavior: Behavior normal.         Judgment: Judgment normal.         Laboratory Data:  Lab Results   Component Value Date    WBC 7.25 05/02/2022    HGB 12.6 05/02/2022    HCT 38.3 05/02/2022    MCV 92 05/02/2022     05/02/2022      ANC = 5184    BMP  Lab Results   Component Value Date     05/02/2022    K 3.8 05/02/2022     05/02/2022    CO2 28 05/02/2022    BUN 25 (H) 05/02/2022    CREATININE 0.7 05/02/2022    CALCIUM 9.9 05/02/2022    ANIONGAP 11 05/02/2022    ESTGFRAFRICA >60 05/02/2022    EGFRNONAA >60 05/02/2022     Magnesium:  2.1    Assessment:       1. Malignant neoplasm of upper-outer quadrant of right breast in female, estrogen receptor negative    2. Anxiety         Plan:     History of Malignant neoplasm of left breast, hormone receptor positive/HER2 bertram negative  -Completed 4 cycles AC followed by 12 weekly doses of Taxol. S/P lumpectomy and post lumpectomy radiation  -Completed full 60 months of AI therapy with Arimidex   -Mammogram done 10/14/21; birads 2, routine due one year from that date  -CXR 10/25/21 shows no radiographic abnormality     Malignant neoplasm of the left breast, triple negative, second primary  -Clinical stage II B (T2, N0, M0), left breast cancer   -Treatment plan of neoadjuvant Carbo/Taxol/Keytruda discussed with patient   * Proceed with Cycle 1 Day 8 today; f/u with Dr. Bartlett in 1 week with CBC, BMP, MG prior     Anxiety  -discontinue Restoril; start Xanax 0.5 mg tid prn   -Dr. Ryder as needed    Route Chart for Scheduling    Med Onc Chart Routing      Follow up with physician Other and 1 week. Proceed with C1D8 today; f/u with MD (must see Dr. Bartlett) in 1 week for C1D15 with CBC, BMP, MG prior  (lab/MD visit on Monday; infusion on Tuesday)   Follow up with ANGIE    Labs    Lab interval:      Imaging    Pharmacy appointment No  pharmacy appointment needed      Other referrals No additional referrals needed         Treatment Plan Information   OP PEMBROLIZUMAB WITH WEEKLY CARBOPLATIN (AUC 1.5)  AND PACLITAXEL FOLLOWED BY PEMBROLIZUMAB 200 MG Q3W   Michael Bartlett MD   Upcoming Treatment Dates - OP PEMBROLIZUMAB WITH WEEKLY CARBOPLATIN (AUC 1.5)  AND PACLITAXEL FOLLOWED BY PEMBROLIZUMAB 200 MG Q3W    5/3/2022       Pre-Medications       diphenhydramine (BENADRYL) 50 mg in NS 50 mL IVPB       famotidine (PF) injection 20 mg       Chemotherapy       PACLitaxeL (TAXOL) 80 mg/m2 = 132 mg in sodium chloride 0.9% 250 mL chemo infusion       CARBOplatin (PARAPLATIN) 125 mg in sodium chloride 0.9% 250 mL chemo infusion  5/10/2022       Pre-Medications       diphenhydramine (BENADRYL) 50 mg in NS 50 mL IVPB       famotidine (PF) injection 20 mg       Chemotherapy       PACLitaxeL (TAXOL) 80 mg/m2 = 132 mg in sodium chloride 0.9% 250 mL chemo infusion       CARBOplatin (PARAPLATIN) 125 mg in sodium chloride 0.9% 250 mL chemo infusion  5/17/2022       Pre-Medications       acetaminophen tablet 1,000 mg       diphenhydramine (BENADRYL) 50 mg in NS 50 mL IVPB       famotidine (PF) injection 20 mg       Chemotherapy       CARBOplatin (PARAPLATIN) 125 mg in sodium chloride 0.9% 250 mL chemo infusion       PACLitaxeL (TAXOL) 80 mg/m2 = 132 mg in sodium chloride 0.9% 250 mL chemo infusion  5/24/2022       Pre-Medications       diphenhydramine (BENADRYL) 50 mg in NS 50 mL IVPB       famotidine (PF) injection 20 mg       Chemotherapy       CARBOplatin (PARAPLATIN) 125 mg in sodium chloride 0.9% 250 mL chemo infusion       PACLitaxeL (TAXOL) 80 mg/m2 = 132 mg in sodium chloride 0.9% 250 mL chemo infusion

## 2022-05-03 ENCOUNTER — INFUSION (OUTPATIENT)
Dept: INFUSION THERAPY | Facility: HOSPITAL | Age: 72
End: 2022-05-03
Attending: INTERNAL MEDICINE
Payer: MEDICARE

## 2022-05-03 VITALS
OXYGEN SATURATION: 98 % | SYSTOLIC BLOOD PRESSURE: 133 MMHG | RESPIRATION RATE: 18 BRPM | BODY MASS INDEX: 28 KG/M2 | HEART RATE: 73 BPM | WEIGHT: 142.63 LBS | DIASTOLIC BLOOD PRESSURE: 67 MMHG | TEMPERATURE: 99 F | HEIGHT: 60 IN

## 2022-05-03 DIAGNOSIS — C50.012 MALIGNANT NEOPLASM OF NIPPLE OF LEFT BREAST IN FEMALE, ESTROGEN RECEPTOR NEGATIVE: Primary | ICD-10-CM

## 2022-05-03 DIAGNOSIS — Z17.1 MALIGNANT NEOPLASM OF NIPPLE OF LEFT BREAST IN FEMALE, ESTROGEN RECEPTOR NEGATIVE: Primary | ICD-10-CM

## 2022-05-03 PROCEDURE — A4216 STERILE WATER/SALINE, 10 ML: HCPCS | Mod: PN | Performed by: STUDENT IN AN ORGANIZED HEALTH CARE EDUCATION/TRAINING PROGRAM

## 2022-05-03 PROCEDURE — 96413 CHEMO IV INFUSION 1 HR: CPT | Mod: PN

## 2022-05-03 PROCEDURE — 96417 CHEMO IV INFUS EACH ADDL SEQ: CPT | Mod: PN

## 2022-05-03 PROCEDURE — 96375 TX/PRO/DX INJ NEW DRUG ADDON: CPT | Mod: PN

## 2022-05-03 PROCEDURE — 96367 TX/PROPH/DG ADDL SEQ IV INF: CPT | Mod: PN

## 2022-05-03 PROCEDURE — 25000003 PHARM REV CODE 250: Mod: PN | Performed by: STUDENT IN AN ORGANIZED HEALTH CARE EDUCATION/TRAINING PROGRAM

## 2022-05-03 PROCEDURE — 63600175 PHARM REV CODE 636 W HCPCS: Mod: PN | Performed by: STUDENT IN AN ORGANIZED HEALTH CARE EDUCATION/TRAINING PROGRAM

## 2022-05-03 RX ORDER — SODIUM CHLORIDE 0.9 % (FLUSH) 0.9 %
10 SYRINGE (ML) INJECTION
Status: DISCONTINUED | OUTPATIENT
Start: 2022-05-03 | End: 2022-05-03 | Stop reason: HOSPADM

## 2022-05-03 RX ORDER — FAMOTIDINE 10 MG/ML
20 INJECTION INTRAVENOUS
Status: COMPLETED | OUTPATIENT
Start: 2022-05-03 | End: 2022-05-03

## 2022-05-03 RX ADMIN — Medication 10 ML: at 05:05

## 2022-05-03 RX ADMIN — PACLITAXEL 132 MG: 6 INJECTION, SOLUTION, CONCENTRATE INTRAVENOUS at 03:05

## 2022-05-03 RX ADMIN — CARBOPLATIN 125 MG: 10 INJECTION, SOLUTION INTRAVENOUS at 04:05

## 2022-05-03 RX ADMIN — PALONOSETRON 0.25 MG: 0.05 INJECTION, SOLUTION INTRAVENOUS at 03:05

## 2022-05-03 RX ADMIN — FAMOTIDINE 20 MG: 10 INJECTION INTRAVENOUS at 02:05

## 2022-05-03 RX ADMIN — SODIUM CHLORIDE: 0.9 INJECTION, SOLUTION INTRAVENOUS at 02:05

## 2022-05-03 RX ADMIN — DIPHENHYDRAMINE HYDROCHLORIDE 50 MG: 50 INJECTION INTRAMUSCULAR; INTRAVENOUS at 02:05

## 2022-05-03 NOTE — PLAN OF CARE
Problem: Adult Inpatient Plan of Care  Goal: Plan of Care Review  Outcome: Ongoing, Progressing  Goal: Patient-Specific Goal (Individualized)  Outcome: Ongoing, Progressing  Goal: Absence of Hospital-Acquired Illness or Injury  Outcome: Ongoing, Progressing  Goal: Optimal Comfort and Wellbeing  Outcome: Ongoing, Progressing  Goal: Readiness for Transition of Care  Outcome: Ongoing, Progressing     Problem: Fatigue  Goal: Improved Activity Tolerance  Outcome: Ongoing, Progressing   .Pt tolerated taxol / carbo infusion well.  No adverse reaction noted.   Port flushed with NS and de-accessed per protocol.  Patient left clinic in no acute distress.

## 2022-05-03 NOTE — PLAN OF CARE
Problem: Adult Inpatient Plan of Care  Goal: Plan of Care Review  Outcome: Ongoing, Progressing  Goal: Patient-Specific Goal (Individualized)  Outcome: Ongoing, Progressing  Goal: Absence of Hospital-Acquired Illness or Injury  Outcome: Ongoing, Progressing  Goal: Optimal Comfort and Wellbeing  Outcome: Ongoing, Progressing  Goal: Readiness for Transition of Care  Outcome: Ongoing, Progressing     Problem: Fatigue  Goal: Improved Activity Tolerance  Outcome: Ongoing, Progressing

## 2022-05-04 ENCOUNTER — PATIENT MESSAGE (OUTPATIENT)
Dept: SURGERY | Facility: CLINIC | Age: 72
End: 2022-05-04
Payer: MEDICARE

## 2022-05-06 ENCOUNTER — PES CALL (OUTPATIENT)
Dept: ADMINISTRATIVE | Facility: CLINIC | Age: 72
End: 2022-05-06
Payer: MEDICARE

## 2022-05-09 ENCOUNTER — LAB VISIT (OUTPATIENT)
Dept: LAB | Facility: HOSPITAL | Age: 72
End: 2022-05-09
Attending: STUDENT IN AN ORGANIZED HEALTH CARE EDUCATION/TRAINING PROGRAM
Payer: MEDICARE

## 2022-05-09 ENCOUNTER — OFFICE VISIT (OUTPATIENT)
Dept: HEMATOLOGY/ONCOLOGY | Facility: CLINIC | Age: 72
End: 2022-05-09
Payer: MEDICARE

## 2022-05-09 VITALS
HEIGHT: 60 IN | BODY MASS INDEX: 28.13 KG/M2 | TEMPERATURE: 98 F | WEIGHT: 143.31 LBS | OXYGEN SATURATION: 99 % | SYSTOLIC BLOOD PRESSURE: 150 MMHG | DIASTOLIC BLOOD PRESSURE: 100 MMHG | RESPIRATION RATE: 16 BRPM | HEART RATE: 80 BPM

## 2022-05-09 DIAGNOSIS — Z17.1 MALIGNANT NEOPLASM OF UPPER-OUTER QUADRANT OF RIGHT BREAST IN FEMALE, ESTROGEN RECEPTOR NEGATIVE: ICD-10-CM

## 2022-05-09 DIAGNOSIS — F43.21 SITUATIONAL DEPRESSION: ICD-10-CM

## 2022-05-09 DIAGNOSIS — C50.411 MALIGNANT NEOPLASM OF UPPER-OUTER QUADRANT OF RIGHT BREAST IN FEMALE, ESTROGEN RECEPTOR NEGATIVE: Primary | ICD-10-CM

## 2022-05-09 DIAGNOSIS — Z17.1 MALIGNANT NEOPLASM OF UPPER-OUTER QUADRANT OF RIGHT BREAST IN FEMALE, ESTROGEN RECEPTOR NEGATIVE: Primary | ICD-10-CM

## 2022-05-09 DIAGNOSIS — F41.9 ANXIETY: ICD-10-CM

## 2022-05-09 DIAGNOSIS — C50.012 MALIGNANT NEOPLASM OF NIPPLE OF LEFT BREAST IN FEMALE, UNSPECIFIED ESTROGEN RECEPTOR STATUS: ICD-10-CM

## 2022-05-09 DIAGNOSIS — C50.411 MALIGNANT NEOPLASM OF UPPER-OUTER QUADRANT OF RIGHT BREAST IN FEMALE, ESTROGEN RECEPTOR NEGATIVE: ICD-10-CM

## 2022-05-09 LAB
ANION GAP SERPL CALC-SCNC: 10 MMOL/L (ref 8–16)
BASOPHILS # BLD AUTO: 0.03 K/UL (ref 0–0.2)
BASOPHILS NFR BLD: 0.6 % (ref 0–1.9)
BUN SERPL-MCNC: 17 MG/DL (ref 8–23)
CALCIUM SERPL-MCNC: 9.8 MG/DL (ref 8.7–10.5)
CHLORIDE SERPL-SCNC: 103 MMOL/L (ref 95–110)
CO2 SERPL-SCNC: 28 MMOL/L (ref 23–29)
CREAT SERPL-MCNC: 0.8 MG/DL (ref 0.5–1.4)
DIFFERENTIAL METHOD: ABNORMAL
EOSINOPHIL # BLD AUTO: 0 K/UL (ref 0–0.5)
EOSINOPHIL NFR BLD: 0.2 % (ref 0–8)
ERYTHROCYTE [DISTWIDTH] IN BLOOD BY AUTOMATED COUNT: 13.2 % (ref 11.5–14.5)
EST. GFR  (AFRICAN AMERICAN): >60 ML/MIN/1.73 M^2
EST. GFR  (NON AFRICAN AMERICAN): >60 ML/MIN/1.73 M^2
GLUCOSE SERPL-MCNC: 92 MG/DL (ref 70–110)
HCT VFR BLD AUTO: 36 % (ref 37–48.5)
HGB BLD-MCNC: 12.1 G/DL (ref 12–16)
IMM GRANULOCYTES # BLD AUTO: 0.03 K/UL (ref 0–0.04)
IMM GRANULOCYTES NFR BLD AUTO: 0.6 % (ref 0–0.5)
LYMPHOCYTES # BLD AUTO: 1.6 K/UL (ref 1–4.8)
LYMPHOCYTES NFR BLD: 30.9 % (ref 18–48)
MAGNESIUM SERPL-MCNC: 2.1 MG/DL (ref 1.6–2.6)
MCH RBC QN AUTO: 30.4 PG (ref 27–31)
MCHC RBC AUTO-ENTMCNC: 33.6 G/DL (ref 32–36)
MCV RBC AUTO: 91 FL (ref 82–98)
MONOCYTES # BLD AUTO: 0.3 K/UL (ref 0.3–1)
MONOCYTES NFR BLD: 5.5 % (ref 4–15)
NEUTROPHILS # BLD AUTO: 3.2 K/UL (ref 1.8–7.7)
NEUTROPHILS NFR BLD: 62.2 % (ref 38–73)
NRBC BLD-RTO: 0 /100 WBC
PLATELET # BLD AUTO: 240 K/UL (ref 150–450)
PMV BLD AUTO: 9.7 FL (ref 9.2–12.9)
POTASSIUM SERPL-SCNC: 4.3 MMOL/L (ref 3.5–5.1)
RBC # BLD AUTO: 3.98 M/UL (ref 4–5.4)
SODIUM SERPL-SCNC: 141 MMOL/L (ref 136–145)
WBC # BLD AUTO: 5.11 K/UL (ref 3.9–12.7)

## 2022-05-09 PROCEDURE — 99999 PR PBB SHADOW E&M-EST. PATIENT-LVL IV: CPT | Mod: PBBFAC,,, | Performed by: INTERNAL MEDICINE

## 2022-05-09 PROCEDURE — 99214 OFFICE O/P EST MOD 30 MIN: CPT | Mod: S$GLB,,, | Performed by: INTERNAL MEDICINE

## 2022-05-09 PROCEDURE — 36415 COLL VENOUS BLD VENIPUNCTURE: CPT | Mod: PN | Performed by: NURSE PRACTITIONER

## 2022-05-09 PROCEDURE — 99214 PR OFFICE/OUTPT VISIT, EST, LEVL IV, 30-39 MIN: ICD-10-PCS | Mod: S$GLB,,, | Performed by: INTERNAL MEDICINE

## 2022-05-09 PROCEDURE — 80048 BASIC METABOLIC PNL TOTAL CA: CPT | Mod: PN | Performed by: NURSE PRACTITIONER

## 2022-05-09 PROCEDURE — 99999 PR PBB SHADOW E&M-EST. PATIENT-LVL IV: ICD-10-PCS | Mod: PBBFAC,,, | Performed by: INTERNAL MEDICINE

## 2022-05-09 PROCEDURE — 85025 COMPLETE CBC W/AUTO DIFF WBC: CPT | Mod: PN | Performed by: NURSE PRACTITIONER

## 2022-05-09 PROCEDURE — 83735 ASSAY OF MAGNESIUM: CPT | Mod: PN | Performed by: NURSE PRACTITIONER

## 2022-05-09 NOTE — PROGRESS NOTES
HISTORY OF PRESENT ILLNESS:    The patient is a 71-year-old white female well   known to me for locally advanced left breast carcinoma, who underwent   neoadjuvant therapy consisting of four cycles of Adriamycin/Cytoxan followed by 12 weekly doses of Taxol.  She had a residual 0.3 mm focus of metastatic carcinoma in a single lymph node.  Patient and postlumpectomy radiation.  She completed a 60 month course of Arimidex/biannual Prolia for prevention of aromatase inhibitor induced bone loss.  She was recently placed on annual follow-up.  She returns to clinic earlier than scheduled appointment.  Patient is having some anxiety symptoms around the possibility of recurrence.  She feels a little less certain of her course now that she is off active treatment and having less frequent surveillance.  She also continues to struggle caring for her  who became severely ill during the pandemic.  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  She presents seeking reassurance.  She declines medical therapy for sleep disturbance/situational depression.    Shortly after patient's last office evaluation, she began to experience pain in the other portions of her left breast.  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  However, the patient has actively been dieting, losing weight, and upon palpation noted a change in the breast parenchyma which she wished evaluated.  She was seen by morro Butler for additional imaging which reveals suspicious findings.  Patient has gone on to have breast biopsy and returns to review results.  Patient had visited with Dr. Ryder since her last visit in my clinic and found is assistance helpful.    Patient has initiated neoadjuvant therapy consisting of carboplatin/Taxol/Keytruda.  She returns to clinic for re-evaluation prior to cycle 1, day 15 dose of therapy.    PHYSICAL EXAMINATION:  GENERAL:  Well-developed,  well-nourished white female in no acute distress, witha weight of 143.5 pounds (increased by 5.5 pounds).  VITAL SIGNS:  Documented in EMR and reviewed.  HEENT:  Normocephalic, atraumatic.  Oral mucosa pink and moist.  Lips without lesions.  Tongue midline.  Oropharynx clear.  Nonicteric sclerae.   NECK:  Supple, no adenopathy.  No carotid bruits, thyromegaly or thyroid nodule.  HEART:  Regular rate and rhythm without murmur, gallop or rub.                LUNGS:  Clear to auscultation bilaterally.  Normal respiratory effort.       ABDOMEN:  Soft, nontender, nondistended with positive normoactive bowel sounds, no hepatosplenomegaly.    EXTREMITIES:  No cyanosis, clubbing or edema.  Distal pulses are intact.                                           AXILLAE AND GROIN:  No palpable pathologic lymphadenopathy is appreciated.        SKIN:  Intact/turgor normal.  NEUROLOGIC:  Cranial nerves II-XII grossly intact.  Motor:  Good muscle bulk and tone.  Strength/sensory 5/5 throughout.  Gait stable.  BREASTS:  Previously palpable breast mass is significantly diminished in size since initiation of therapy.  Laboratory:  White count 5.1, hemoglobin 12.1, hematocrit 36, platelets 240, absolute neutrophil count is 3200.  Sodium 141, potassium 4.3, chloride 103, CO2 28, BUN 17, creatinine 0.8, glucose 92, calcium 9.8, magnesium 2.1, GFR greater than 60.     IMPRESSION:    1.  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  2. Situational depression.  3. Second primary, triple negative, clinical stage II B (T2, N0, M0), left breast carcinoma.    PLAN:  1. Proceed with cycle 1, day 15 of therapy.  Return to clinic in 1 week with interval CBC, CMP, LDH, magnesium, CA 27-29 prior to appointment.      THIS NOTE WAS CREATED USING VOICE RECOGNITION SOFTWARE AND MAY CONTAIN GRAMMATICAL ERRORS.               Route Chart for Scheduling    Med Onc Chart  Routing      Follow up with physician 1 week. CBC, CMP, LDH, magnesium, CA 27-29 prior to appointment   Follow up with ANGIE    Labs    Imaging    Pharmacy appointment    Other referrals          Treatment Plan Information   OP PEMBROLIZUMAB WITH WEEKLY CARBOPLATIN (AUC 1.5)  AND PACLITAXEL FOLLOWED BY PEMBROLIZUMAB 200 MG Q3W   Michael Bartlett MD   Upcoming Treatment Dates - OP PEMBROLIZUMAB WITH WEEKLY CARBOPLATIN (AUC 1.5)  AND PACLITAXEL FOLLOWED BY PEMBROLIZUMAB 200 MG Q3W    5/10/2022       Pre-Medications       diphenhydramine (BENADRYL) 50 mg in NS 50 mL IVPB       famotidine (PF) injection 20 mg       Chemotherapy       PACLitaxeL (TAXOL) 80 mg/m2 = 132 mg in sodium chloride 0.9% 250 mL chemo infusion       CARBOplatin (PARAPLATIN) 125 mg in sodium chloride 0.9% 250 mL chemo infusion  5/17/2022       Pre-Medications       acetaminophen tablet 1,000 mg       diphenhydramine (BENADRYL) 50 mg in NS 50 mL IVPB       famotidine (PF) injection 20 mg       Chemotherapy       CARBOplatin (PARAPLATIN) 125 mg in sodium chloride 0.9% 250 mL chemo infusion       PACLitaxeL (TAXOL) 80 mg/m2 = 132 mg in sodium chloride 0.9% 250 mL chemo infusion  5/24/2022       Pre-Medications       diphenhydramine (BENADRYL) 50 mg in NS 50 mL IVPB       famotidine (PF) injection 20 mg       Chemotherapy       CARBOplatin (PARAPLATIN) 125 mg in sodium chloride 0.9% 250 mL chemo infusion       PACLitaxeL (TAXOL) 80 mg/m2 = 132 mg in sodium chloride 0.9% 250 mL chemo infusion  5/31/2022       Pre-Medications       diphenhydramine (BENADRYL) 50 mg in NS 50 mL IVPB       famotidine (PF) injection 20 mg       Chemotherapy       CARBOplatin (PARAPLATIN) 125 mg in sodium chloride 0.9% 250 mL chemo infusion       PACLitaxeL (TAXOL) 80 mg/m2 = 132 mg in sodium chloride 0.9% 250 mL chemo infusion

## 2022-05-10 ENCOUNTER — CLINICAL SUPPORT (OUTPATIENT)
Dept: HEMATOLOGY/ONCOLOGY | Facility: CLINIC | Age: 72
End: 2022-05-10
Payer: MEDICARE

## 2022-05-10 ENCOUNTER — DOCUMENTATION ONLY (OUTPATIENT)
Dept: INFUSION THERAPY | Facility: HOSPITAL | Age: 72
End: 2022-05-10
Payer: MEDICARE

## 2022-05-10 ENCOUNTER — PATIENT MESSAGE (OUTPATIENT)
Dept: HEMATOLOGY/ONCOLOGY | Facility: CLINIC | Age: 72
End: 2022-05-10

## 2022-05-10 ENCOUNTER — INFUSION (OUTPATIENT)
Dept: INFUSION THERAPY | Facility: HOSPITAL | Age: 72
End: 2022-05-10
Attending: INTERNAL MEDICINE
Payer: MEDICARE

## 2022-05-10 VITALS
DIASTOLIC BLOOD PRESSURE: 74 MMHG | RESPIRATION RATE: 16 BRPM | SYSTOLIC BLOOD PRESSURE: 144 MMHG | WEIGHT: 143.31 LBS | HEART RATE: 69 BPM | BODY MASS INDEX: 28.13 KG/M2 | HEIGHT: 60 IN | TEMPERATURE: 99 F

## 2022-05-10 DIAGNOSIS — Z17.1 MALIGNANT NEOPLASM OF NIPPLE OF LEFT BREAST IN FEMALE, ESTROGEN RECEPTOR NEGATIVE: Primary | ICD-10-CM

## 2022-05-10 DIAGNOSIS — C50.012 MALIGNANT NEOPLASM OF NIPPLE OF LEFT BREAST IN FEMALE, ESTROGEN RECEPTOR NEGATIVE: Primary | ICD-10-CM

## 2022-05-10 PROCEDURE — 96367 TX/PROPH/DG ADDL SEQ IV INF: CPT | Mod: PN

## 2022-05-10 PROCEDURE — 96417 CHEMO IV INFUS EACH ADDL SEQ: CPT | Mod: PN

## 2022-05-10 PROCEDURE — 96375 TX/PRO/DX INJ NEW DRUG ADDON: CPT | Mod: PN

## 2022-05-10 PROCEDURE — 63600175 PHARM REV CODE 636 W HCPCS: Mod: PN | Performed by: STUDENT IN AN ORGANIZED HEALTH CARE EDUCATION/TRAINING PROGRAM

## 2022-05-10 PROCEDURE — A4216 STERILE WATER/SALINE, 10 ML: HCPCS | Mod: PN | Performed by: STUDENT IN AN ORGANIZED HEALTH CARE EDUCATION/TRAINING PROGRAM

## 2022-05-10 PROCEDURE — 63600175 PHARM REV CODE 636 W HCPCS: Mod: PN | Performed by: INTERNAL MEDICINE

## 2022-05-10 PROCEDURE — 96413 CHEMO IV INFUSION 1 HR: CPT | Mod: PN

## 2022-05-10 PROCEDURE — 25000003 PHARM REV CODE 250: Mod: PN | Performed by: STUDENT IN AN ORGANIZED HEALTH CARE EDUCATION/TRAINING PROGRAM

## 2022-05-10 PROCEDURE — 25000003 PHARM REV CODE 250: Mod: PN | Performed by: INTERNAL MEDICINE

## 2022-05-10 RX ORDER — DIPHENHYDRAMINE HYDROCHLORIDE 50 MG/ML
50 INJECTION INTRAMUSCULAR; INTRAVENOUS ONCE AS NEEDED
Status: DISCONTINUED | OUTPATIENT
Start: 2022-05-10 | End: 2022-05-10 | Stop reason: HOSPADM

## 2022-05-10 RX ORDER — EPINEPHRINE 0.3 MG/.3ML
0.3 INJECTION SUBCUTANEOUS ONCE AS NEEDED
Status: DISCONTINUED | OUTPATIENT
Start: 2022-05-10 | End: 2022-05-10 | Stop reason: HOSPADM

## 2022-05-10 RX ORDER — SODIUM CHLORIDE 0.9 % (FLUSH) 0.9 %
10 SYRINGE (ML) INJECTION
Status: DISCONTINUED | OUTPATIENT
Start: 2022-05-10 | End: 2022-05-10 | Stop reason: HOSPADM

## 2022-05-10 RX ORDER — FAMOTIDINE 10 MG/ML
20 INJECTION INTRAVENOUS
Status: COMPLETED | OUTPATIENT
Start: 2022-05-10 | End: 2022-05-10

## 2022-05-10 RX ADMIN — PACLITAXEL 132 MG: 6 INJECTION, SOLUTION, CONCENTRATE INTRAVENOUS at 03:05

## 2022-05-10 RX ADMIN — SODIUM CHLORIDE: 0.9 INJECTION, SOLUTION INTRAVENOUS at 01:05

## 2022-05-10 RX ADMIN — FAMOTIDINE 20 MG: 10 INJECTION INTRAVENOUS at 02:05

## 2022-05-10 RX ADMIN — PALONOSETRON 0.25 MG: 0.05 INJECTION, SOLUTION INTRAVENOUS at 02:05

## 2022-05-10 RX ADMIN — CARBOPLATIN 125 MG: 10 INJECTION, SOLUTION INTRAVENOUS at 04:05

## 2022-05-10 RX ADMIN — DIPHENHYDRAMINE HYDROCHLORIDE 50 MG: 50 INJECTION INTRAMUSCULAR; INTRAVENOUS at 02:05

## 2022-05-10 RX ADMIN — Medication 10 ML: at 04:05

## 2022-05-10 NOTE — PLAN OF CARE
Pt tolerated taxol and carboplatin well today. Vitals remain stable. Reviewed follow-up appointments. All questions were answered, ambulated independently at d/c.

## 2022-05-10 NOTE — PROGRESS NOTES
Oncology Nutrition   Chemotherapy Infusion Visit    Nutrition Introduction   RD met with patient at chairside during infusion treatment. Pt states she has been through cancer treatment previously. Denies any significant nutrition side effects at this time. Reports minor metallic taste that last for a couple days after treatment but knows of some of the tips to help such as using plastic utensils. RD explained role in POC and encouraged pt to reach out if things change. Pt VU.     Wt Readings from Last 10 Encounters:   05/10/22 65 kg (143 lb 4.8 oz)   05/09/22 65 kg (143 lb 4.8 oz)   05/03/22 64.7 kg (142 lb 10.2 oz)   05/02/22 64.7 kg (142 lb 10.2 oz)   04/26/22 64.1 kg (141 lb 5 oz)   04/25/22 63.5 kg (139 lb 15.9 oz)   04/25/22 63.5 kg (139 lb 15.9 oz)   04/22/22 62.7 kg (138 lb 3.7 oz)   04/19/22 63.5 kg (139 lb 15.9 oz)   04/15/22 62.6 kg (138 lb)       All other nutrition questions/concerns addressed as appropriate. Will continue to follow and monitor throughout treatment.     Aminata Whittington, MS, RD, LDN  05/10/2022  2:55 PM

## 2022-05-11 ENCOUNTER — TELEPHONE (OUTPATIENT)
Dept: HEMATOLOGY/ONCOLOGY | Facility: CLINIC | Age: 72
End: 2022-05-11
Payer: MEDICARE

## 2022-05-11 NOTE — TELEPHONE ENCOUNTER
I spoke with Jailyn Allana Aleman's request about changing her IO appt to another time or date because she will be getting Benadryl that tx and doesn't know if she will be awake for the appt. After talking about the schedule she voiced acceptance of moving appt time from 3 to 11 and said if she feels to goofy from the medicine she will r/s.

## 2022-05-11 NOTE — NURSING
Pt called to see if I could help get her Infusion appt moved up.  Moved appt time up as requested.  Pt then said she was concerned about her chairside appt with Noelle because the Benadryl makes her so sleepy.  She has down time between labs & Dr. Bartlett on Monday's.  I told her I would send a message to Noelle and Enio to call her and work out something.  Pt thanked me and verbalized an understanding.

## 2022-05-11 NOTE — PROGRESS NOTES
SW met with pt for a wig fitting. SW assisted pt in choosing a wig she felt comfortable with. Pt shared that she has under gone cancer treatments in the past in which she lost her hair and had to wear a wig. She donated these wigs to others in the past. She is grateful for the opportunity to receive a wig today.   Pt shared with this AMAW and Kimberly ROSENTHAL that she has received positive news from her doctor. The cancer is responding to the treatment she is receiving. SW provided support and encouragement.   SW to follow.    Sho Armstrong, [facility]

## 2022-05-16 ENCOUNTER — OFFICE VISIT (OUTPATIENT)
Dept: HEMATOLOGY/ONCOLOGY | Facility: CLINIC | Age: 72
End: 2022-05-16
Payer: MEDICARE

## 2022-05-16 ENCOUNTER — LAB VISIT (OUTPATIENT)
Dept: LAB | Facility: HOSPITAL | Age: 72
End: 2022-05-16
Attending: INTERNAL MEDICINE
Payer: MEDICARE

## 2022-05-16 ENCOUNTER — TELEPHONE (OUTPATIENT)
Dept: HEMATOLOGY/ONCOLOGY | Facility: CLINIC | Age: 72
End: 2022-05-16
Payer: MEDICARE

## 2022-05-16 VITALS
HEIGHT: 60 IN | SYSTOLIC BLOOD PRESSURE: 159 MMHG | TEMPERATURE: 99 F | DIASTOLIC BLOOD PRESSURE: 81 MMHG | OXYGEN SATURATION: 100 % | RESPIRATION RATE: 16 BRPM | HEART RATE: 74 BPM | BODY MASS INDEX: 28.91 KG/M2 | WEIGHT: 147.25 LBS

## 2022-05-16 DIAGNOSIS — C50.012 MALIGNANT NEOPLASM OF NIPPLE OF LEFT BREAST IN FEMALE, ESTROGEN RECEPTOR NEGATIVE: Primary | ICD-10-CM

## 2022-05-16 DIAGNOSIS — C50.411 MALIGNANT NEOPLASM OF UPPER-OUTER QUADRANT OF RIGHT BREAST IN FEMALE, ESTROGEN RECEPTOR NEGATIVE: ICD-10-CM

## 2022-05-16 DIAGNOSIS — Z17.1 MALIGNANT NEOPLASM OF UPPER-OUTER QUADRANT OF RIGHT BREAST IN FEMALE, ESTROGEN RECEPTOR NEGATIVE: ICD-10-CM

## 2022-05-16 DIAGNOSIS — F41.9 ANXIETY: ICD-10-CM

## 2022-05-16 DIAGNOSIS — Z17.1 MALIGNANT NEOPLASM OF NIPPLE OF LEFT BREAST IN FEMALE, ESTROGEN RECEPTOR NEGATIVE: Primary | ICD-10-CM

## 2022-05-16 DIAGNOSIS — F43.21 SITUATIONAL DEPRESSION: ICD-10-CM

## 2022-05-16 LAB
ALBUMIN SERPL BCP-MCNC: 3.7 G/DL (ref 3.5–5.2)
ALP SERPL-CCNC: 53 U/L (ref 55–135)
ALT SERPL W/O P-5'-P-CCNC: 13 U/L (ref 10–44)
ANION GAP SERPL CALC-SCNC: 7 MMOL/L (ref 8–16)
AST SERPL-CCNC: 16 U/L (ref 10–40)
BASOPHILS # BLD AUTO: 0.03 K/UL (ref 0–0.2)
BASOPHILS NFR BLD: 0.7 % (ref 0–1.9)
BILIRUB SERPL-MCNC: 0.3 MG/DL (ref 0.1–1)
BUN SERPL-MCNC: 18 MG/DL (ref 8–23)
CALCIUM SERPL-MCNC: 9.8 MG/DL (ref 8.7–10.5)
CHLORIDE SERPL-SCNC: 105 MMOL/L (ref 95–110)
CO2 SERPL-SCNC: 29 MMOL/L (ref 23–29)
CREAT SERPL-MCNC: 0.7 MG/DL (ref 0.5–1.4)
DIFFERENTIAL METHOD: ABNORMAL
EOSINOPHIL # BLD AUTO: 0 K/UL (ref 0–0.5)
EOSINOPHIL NFR BLD: 0.2 % (ref 0–8)
ERYTHROCYTE [DISTWIDTH] IN BLOOD BY AUTOMATED COUNT: 13.9 % (ref 11.5–14.5)
EST. GFR  (AFRICAN AMERICAN): >60 ML/MIN/1.73 M^2
EST. GFR  (NON AFRICAN AMERICAN): >60 ML/MIN/1.73 M^2
GLUCOSE SERPL-MCNC: 88 MG/DL (ref 70–110)
HCT VFR BLD AUTO: 34.2 % (ref 37–48.5)
HGB BLD-MCNC: 11.2 G/DL (ref 12–16)
IMM GRANULOCYTES # BLD AUTO: 0.02 K/UL (ref 0–0.04)
IMM GRANULOCYTES NFR BLD AUTO: 0.5 % (ref 0–0.5)
LDH SERPL L TO P-CCNC: 147 U/L (ref 110–260)
LYMPHOCYTES # BLD AUTO: 1.6 K/UL (ref 1–4.8)
LYMPHOCYTES NFR BLD: 36.8 % (ref 18–48)
MAGNESIUM SERPL-MCNC: 2.3 MG/DL (ref 1.6–2.6)
MCH RBC QN AUTO: 30.4 PG (ref 27–31)
MCHC RBC AUTO-ENTMCNC: 32.7 G/DL (ref 32–36)
MCV RBC AUTO: 93 FL (ref 82–98)
MONOCYTES # BLD AUTO: 0.3 K/UL (ref 0.3–1)
MONOCYTES NFR BLD: 6.4 % (ref 4–15)
NEUTROPHILS # BLD AUTO: 2.4 K/UL (ref 1.8–7.7)
NEUTROPHILS NFR BLD: 55.4 % (ref 38–73)
NRBC BLD-RTO: 0 /100 WBC
PLATELET # BLD AUTO: 222 K/UL (ref 150–450)
PMV BLD AUTO: 9.7 FL (ref 9.2–12.9)
POTASSIUM SERPL-SCNC: 4.5 MMOL/L (ref 3.5–5.1)
PROT SERPL-MCNC: 6.5 G/DL (ref 6–8.4)
RBC # BLD AUTO: 3.68 M/UL (ref 4–5.4)
SODIUM SERPL-SCNC: 141 MMOL/L (ref 136–145)
WBC # BLD AUTO: 4.24 K/UL (ref 3.9–12.7)

## 2022-05-16 PROCEDURE — 83735 ASSAY OF MAGNESIUM: CPT | Mod: PN | Performed by: INTERNAL MEDICINE

## 2022-05-16 PROCEDURE — 85025 COMPLETE CBC W/AUTO DIFF WBC: CPT | Mod: PN | Performed by: INTERNAL MEDICINE

## 2022-05-16 PROCEDURE — 99999 PR PBB SHADOW E&M-EST. PATIENT-LVL IV: CPT | Mod: PBBFAC,,, | Performed by: INTERNAL MEDICINE

## 2022-05-16 PROCEDURE — 86300 IMMUNOASSAY TUMOR CA 15-3: CPT | Performed by: INTERNAL MEDICINE

## 2022-05-16 PROCEDURE — 99999 PR PBB SHADOW E&M-EST. PATIENT-LVL IV: ICD-10-PCS | Mod: PBBFAC,,, | Performed by: INTERNAL MEDICINE

## 2022-05-16 PROCEDURE — 80053 COMPREHEN METABOLIC PANEL: CPT | Mod: PN | Performed by: INTERNAL MEDICINE

## 2022-05-16 PROCEDURE — 99214 PR OFFICE/OUTPT VISIT, EST, LEVL IV, 30-39 MIN: ICD-10-PCS | Mod: S$GLB,,, | Performed by: INTERNAL MEDICINE

## 2022-05-16 PROCEDURE — 99214 OFFICE O/P EST MOD 30 MIN: CPT | Mod: S$GLB,,, | Performed by: INTERNAL MEDICINE

## 2022-05-16 PROCEDURE — 83615 LACTATE (LD) (LDH) ENZYME: CPT | Mod: PN | Performed by: INTERNAL MEDICINE

## 2022-05-16 PROCEDURE — 36415 COLL VENOUS BLD VENIPUNCTURE: CPT | Mod: PN | Performed by: INTERNAL MEDICINE

## 2022-05-16 RX ORDER — FAMOTIDINE 10 MG/ML
20 INJECTION INTRAVENOUS
Status: CANCELLED | OUTPATIENT
Start: 2022-05-17

## 2022-05-16 RX ORDER — EPINEPHRINE 0.3 MG/.3ML
0.3 INJECTION SUBCUTANEOUS ONCE AS NEEDED
Status: CANCELLED | OUTPATIENT
Start: 2022-05-17

## 2022-05-16 RX ORDER — HEPARIN 100 UNIT/ML
500 SYRINGE INTRAVENOUS
Status: CANCELLED | OUTPATIENT
Start: 2022-05-17

## 2022-05-16 RX ORDER — ACETAMINOPHEN 500 MG
1000 TABLET ORAL
Status: CANCELLED
Start: 2022-05-17

## 2022-05-16 RX ORDER — DIPHENHYDRAMINE HYDROCHLORIDE 50 MG/ML
50 INJECTION INTRAMUSCULAR; INTRAVENOUS ONCE AS NEEDED
Status: CANCELLED | OUTPATIENT
Start: 2022-05-17

## 2022-05-16 RX ORDER — SODIUM CHLORIDE 0.9 % (FLUSH) 0.9 %
10 SYRINGE (ML) INJECTION
Status: CANCELLED | OUTPATIENT
Start: 2022-05-17

## 2022-05-16 NOTE — PROGRESS NOTES
HISTORY OF PRESENT ILLNESS:    The patient is a 71-year-old white female well   known to me for locally advanced left breast carcinoma, who underwent   neoadjuvant therapy consisting of four cycles of Adriamycin/Cytoxan followed by 12 weekly doses of Taxol.  She had a residual 0.3 mm focus of metastatic carcinoma in a single lymph node.  Patient and postlumpectomy radiation.  She completed a 60 month course of Arimidex/biannual Prolia for prevention of aromatase inhibitor induced bone loss.  She was recently placed on annual follow-up.  She returns to clinic earlier than scheduled appointment.  Patient is having some anxiety symptoms around the possibility of recurrence.  She feels a little less certain of her course now that she is off active treatment and having less frequent surveillance.  She also continues to struggle caring for her  who became severely ill during the pandemic.  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  She presents seeking reassurance.  She declines medical therapy for sleep disturbance/situational depression.    Shortly after patient's last office evaluation, she began to experience pain in the other portions of her left breast.  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  However, the patient has actively been dieting, losing weight, and upon palpation noted a change in the breast parenchyma which she wished evaluated.  She was seen by morro Butler for additional imaging which reveals suspicious findings.  Patient has gone on to have breast biopsy and returns to review results.  Patient had visited with Dr. Ryder since her last visit in my clinic and found is assistance helpful.    Patient has initiated neoadjuvant therapy consisting of carboplatin/Taxol/Keytruda.  She returns to clinic for re-evaluation prior to cycle 2, day 1 dose of therapy.  No difficulties with last infusion.    PHYSICAL  EXAMINATION:  GENERAL:  Well-developed, well-nourished white female in no acute distress, witha weight of 147.5 pounds (increased by 4 pounds).  VITAL SIGNS:  Documented in EMR and reviewed.  HEENT:  Normocephalic, atraumatic.  Oral mucosa pink and moist.  Lips without lesions.  Tongue midline.  Oropharynx clear.  Nonicteric sclerae.   NECK:  Supple, no adenopathy.  No carotid bruits, thyromegaly or thyroid nodule.  HEART:  Regular rate and rhythm without murmur, gallop or rub.                LUNGS:  Clear to auscultation bilaterally.  Normal respiratory effort.       ABDOMEN:  Soft, nontender, nondistended with positive normoactive bowel sounds, no hepatosplenomegaly.    EXTREMITIES:  No cyanosis, clubbing or edema.  Distal pulses are intact.                                           AXILLAE AND GROIN:  No palpable pathologic lymphadenopathy is appreciated.        SKIN:  Intact/turgor normal.  NEUROLOGIC:  Cranial nerves II-XII grossly intact.  Motor:  Good muscle bulk and tone.  Strength/sensory 5/5 throughout.  Gait stable.    Laboratory:  White count 4.2, hemoglobin 11.2, hematocrit 34.2, platelets 222, absolute neutrophil count 2400.  Sodium 141, potassium 4.5, chloride 105, CO2 29, BUN 18, creatinine 0.7, glucose 88, calcium 9.8, liver function test within normal limits, LDH is 147, GFR is greater than 60.    IMPRESSION:    1.  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  2. Situational depression.  3. Second primary, triple negative, clinical stage II B (T2, N0, M0), left breast carcinoma.    PLAN:  1. Proceed with cycle 2, day 1 of therapy.  Return to clinic in 1 week with interval CBC, BMP, and magnesium prior to appointment.      THIS NOTE WAS CREATED USING VOICE RECOGNITION SOFTWARE AND MAY CONTAIN GRAMMATICAL ERRORS.

## 2022-05-17 ENCOUNTER — INFUSION (OUTPATIENT)
Dept: INFUSION THERAPY | Facility: HOSPITAL | Age: 72
End: 2022-05-17
Attending: INTERNAL MEDICINE
Payer: MEDICARE

## 2022-05-17 ENCOUNTER — DOCUMENTATION ONLY (OUTPATIENT)
Dept: INFUSION THERAPY | Facility: HOSPITAL | Age: 72
End: 2022-05-17
Payer: MEDICARE

## 2022-05-17 VITALS
HEART RATE: 73 BPM | TEMPERATURE: 99 F | HEIGHT: 60 IN | WEIGHT: 147.25 LBS | DIASTOLIC BLOOD PRESSURE: 75 MMHG | BODY MASS INDEX: 28.91 KG/M2 | SYSTOLIC BLOOD PRESSURE: 135 MMHG | RESPIRATION RATE: 18 BRPM

## 2022-05-17 DIAGNOSIS — Z17.1 MALIGNANT NEOPLASM OF NIPPLE OF LEFT BREAST IN FEMALE, ESTROGEN RECEPTOR NEGATIVE: Primary | ICD-10-CM

## 2022-05-17 DIAGNOSIS — C50.012 MALIGNANT NEOPLASM OF NIPPLE OF LEFT BREAST IN FEMALE, ESTROGEN RECEPTOR NEGATIVE: Primary | ICD-10-CM

## 2022-05-17 PROCEDURE — 63600175 PHARM REV CODE 636 W HCPCS: Mod: PN | Performed by: INTERNAL MEDICINE

## 2022-05-17 PROCEDURE — 96367 TX/PROPH/DG ADDL SEQ IV INF: CPT | Mod: PN

## 2022-05-17 PROCEDURE — 96375 TX/PRO/DX INJ NEW DRUG ADDON: CPT | Mod: PN

## 2022-05-17 PROCEDURE — 96417 CHEMO IV INFUS EACH ADDL SEQ: CPT | Mod: PN

## 2022-05-17 PROCEDURE — 96413 CHEMO IV INFUSION 1 HR: CPT | Mod: PN

## 2022-05-17 PROCEDURE — 25000003 PHARM REV CODE 250: Mod: PN | Performed by: INTERNAL MEDICINE

## 2022-05-17 RX ORDER — DIPHENHYDRAMINE HYDROCHLORIDE 50 MG/ML
50 INJECTION INTRAMUSCULAR; INTRAVENOUS ONCE AS NEEDED
Status: DISCONTINUED | OUTPATIENT
Start: 2022-05-17 | End: 2022-05-17 | Stop reason: HOSPADM

## 2022-05-17 RX ORDER — FAMOTIDINE 10 MG/ML
20 INJECTION INTRAVENOUS
Status: COMPLETED | OUTPATIENT
Start: 2022-05-17 | End: 2022-05-17

## 2022-05-17 RX ORDER — ACETAMINOPHEN 500 MG
1000 TABLET ORAL
Status: COMPLETED | OUTPATIENT
Start: 2022-05-17 | End: 2022-05-17

## 2022-05-17 RX ORDER — SODIUM CHLORIDE 0.9 % (FLUSH) 0.9 %
10 SYRINGE (ML) INJECTION
Status: DISCONTINUED | OUTPATIENT
Start: 2022-05-17 | End: 2022-05-17 | Stop reason: HOSPADM

## 2022-05-17 RX ORDER — EPINEPHRINE 0.3 MG/.3ML
0.3 INJECTION SUBCUTANEOUS ONCE AS NEEDED
Status: DISCONTINUED | OUTPATIENT
Start: 2022-05-17 | End: 2022-05-17 | Stop reason: HOSPADM

## 2022-05-17 RX ADMIN — CARBOPLATIN 125 MG: 10 INJECTION, SOLUTION INTRAVENOUS at 04:05

## 2022-05-17 RX ADMIN — ACETAMINOPHEN 1000 MG: 500 TABLET, FILM COATED ORAL at 01:05

## 2022-05-17 RX ADMIN — SODIUM CHLORIDE: 0.9 INJECTION, SOLUTION INTRAVENOUS at 01:05

## 2022-05-17 RX ADMIN — DIPHENHYDRAMINE HYDROCHLORIDE 50 MG: 50 INJECTION INTRAMUSCULAR; INTRAVENOUS at 01:05

## 2022-05-17 RX ADMIN — FAMOTIDINE 20 MG: 10 INJECTION INTRAVENOUS at 01:05

## 2022-05-17 RX ADMIN — PACLITAXEL 132 MG: 6 INJECTION, SOLUTION, CONCENTRATE INTRAVENOUS at 03:05

## 2022-05-17 RX ADMIN — PALONOSETRON 0.25 MG: 0.05 INJECTION, SOLUTION INTRAVENOUS at 03:05

## 2022-05-17 RX ADMIN — SODIUM CHLORIDE 200 MG: 9 INJECTION, SOLUTION INTRAVENOUS at 02:05

## 2022-05-17 NOTE — PROGRESS NOTES
2:33 PM    This LCSW met with this pt to check in and provide support. The pt reported getting her hair cut short in order to prepare for her hair loss. She said it will be easier for when it is time to wear her wig too!  The pt reported liking her wig and said her hairdresser trimmed the bangs and they plan to cut it more as needed to fit more the style she usually wears.    Overall, the pt was in good spirits and reported no needs at this time.

## 2022-05-17 NOTE — PLAN OF CARE
Problem: Adult Inpatient Plan of Care  Goal: Plan of Care Review  Outcome: Ongoing, Progressing  Goal: Patient-Specific Goal (Individualized)  Outcome: Ongoing, Progressing     Problem: Fatigue  Goal: Improved Activity Tolerance  Outcome: Ongoing, Progressing       Pt tolerated tx well this shift. VSS. Schedule printed and reviewed with pt. Pt is safe for discharge.

## 2022-05-18 LAB — CANCER AG27-29 SERPL-ACNC: <12 U/ML

## 2022-05-23 ENCOUNTER — OFFICE VISIT (OUTPATIENT)
Dept: HEMATOLOGY/ONCOLOGY | Facility: CLINIC | Age: 72
End: 2022-05-23
Payer: MEDICARE

## 2022-05-23 ENCOUNTER — LAB VISIT (OUTPATIENT)
Dept: LAB | Facility: HOSPITAL | Age: 72
End: 2022-05-23
Attending: INTERNAL MEDICINE
Payer: MEDICARE

## 2022-05-23 VITALS
HEIGHT: 60 IN | BODY MASS INDEX: 28.7 KG/M2 | SYSTOLIC BLOOD PRESSURE: 170 MMHG | OXYGEN SATURATION: 99 % | RESPIRATION RATE: 16 BRPM | HEART RATE: 76 BPM | TEMPERATURE: 98 F | WEIGHT: 146.19 LBS | DIASTOLIC BLOOD PRESSURE: 85 MMHG

## 2022-05-23 DIAGNOSIS — Z17.1 MALIGNANT NEOPLASM OF NIPPLE OF LEFT BREAST IN FEMALE, ESTROGEN RECEPTOR NEGATIVE: ICD-10-CM

## 2022-05-23 DIAGNOSIS — C50.012 MALIGNANT NEOPLASM OF NIPPLE OF LEFT BREAST IN FEMALE, ESTROGEN RECEPTOR NEGATIVE: Primary | ICD-10-CM

## 2022-05-23 DIAGNOSIS — F43.21 SITUATIONAL DEPRESSION: ICD-10-CM

## 2022-05-23 DIAGNOSIS — F41.9 ANXIETY: ICD-10-CM

## 2022-05-23 DIAGNOSIS — C50.012 MALIGNANT NEOPLASM OF NIPPLE OF LEFT BREAST IN FEMALE, ESTROGEN RECEPTOR NEGATIVE: ICD-10-CM

## 2022-05-23 DIAGNOSIS — Z17.1 MALIGNANT NEOPLASM OF NIPPLE OF LEFT BREAST IN FEMALE, ESTROGEN RECEPTOR NEGATIVE: Primary | ICD-10-CM

## 2022-05-23 LAB
ANION GAP SERPL CALC-SCNC: 9 MMOL/L (ref 8–16)
BASOPHILS # BLD AUTO: 0.02 K/UL (ref 0–0.2)
BASOPHILS NFR BLD: 0.5 % (ref 0–1.9)
BUN SERPL-MCNC: 18 MG/DL (ref 8–23)
CALCIUM SERPL-MCNC: 9.6 MG/DL (ref 8.7–10.5)
CHLORIDE SERPL-SCNC: 104 MMOL/L (ref 95–110)
CO2 SERPL-SCNC: 28 MMOL/L (ref 23–29)
CREAT SERPL-MCNC: 0.7 MG/DL (ref 0.5–1.4)
DIFFERENTIAL METHOD: ABNORMAL
EOSINOPHIL # BLD AUTO: 0 K/UL (ref 0–0.5)
EOSINOPHIL NFR BLD: 0.2 % (ref 0–8)
ERYTHROCYTE [DISTWIDTH] IN BLOOD BY AUTOMATED COUNT: 14.3 % (ref 11.5–14.5)
EST. GFR  (AFRICAN AMERICAN): >60 ML/MIN/1.73 M^2
EST. GFR  (NON AFRICAN AMERICAN): >60 ML/MIN/1.73 M^2
GLUCOSE SERPL-MCNC: 96 MG/DL (ref 70–110)
HCT VFR BLD AUTO: 34.7 % (ref 37–48.5)
HGB BLD-MCNC: 11.6 G/DL (ref 12–16)
IMM GRANULOCYTES # BLD AUTO: 0.03 K/UL (ref 0–0.04)
IMM GRANULOCYTES NFR BLD AUTO: 0.7 % (ref 0–0.5)
LYMPHOCYTES # BLD AUTO: 1.3 K/UL (ref 1–4.8)
LYMPHOCYTES NFR BLD: 29.6 % (ref 18–48)
MAGNESIUM SERPL-MCNC: 2.3 MG/DL (ref 1.6–2.6)
MCH RBC QN AUTO: 30.8 PG (ref 27–31)
MCHC RBC AUTO-ENTMCNC: 33.4 G/DL (ref 32–36)
MCV RBC AUTO: 92 FL (ref 82–98)
MONOCYTES # BLD AUTO: 0.3 K/UL (ref 0.3–1)
MONOCYTES NFR BLD: 6.2 % (ref 4–15)
NEUTROPHILS # BLD AUTO: 2.7 K/UL (ref 1.8–7.7)
NEUTROPHILS NFR BLD: 62.8 % (ref 38–73)
NRBC BLD-RTO: 0 /100 WBC
PLATELET # BLD AUTO: 199 K/UL (ref 150–450)
PMV BLD AUTO: 9.7 FL (ref 9.2–12.9)
POTASSIUM SERPL-SCNC: 4.4 MMOL/L (ref 3.5–5.1)
RBC # BLD AUTO: 3.77 M/UL (ref 4–5.4)
SODIUM SERPL-SCNC: 141 MMOL/L (ref 136–145)
WBC # BLD AUTO: 4.36 K/UL (ref 3.9–12.7)

## 2022-05-23 PROCEDURE — 85025 COMPLETE CBC W/AUTO DIFF WBC: CPT | Mod: PN | Performed by: INTERNAL MEDICINE

## 2022-05-23 PROCEDURE — 99214 OFFICE O/P EST MOD 30 MIN: CPT | Mod: S$GLB,,, | Performed by: INTERNAL MEDICINE

## 2022-05-23 PROCEDURE — 99214 PR OFFICE/OUTPT VISIT, EST, LEVL IV, 30-39 MIN: ICD-10-PCS | Mod: S$GLB,,, | Performed by: INTERNAL MEDICINE

## 2022-05-23 PROCEDURE — 80048 BASIC METABOLIC PNL TOTAL CA: CPT | Mod: PN | Performed by: INTERNAL MEDICINE

## 2022-05-23 PROCEDURE — 99999 PR PBB SHADOW E&M-EST. PATIENT-LVL IV: CPT | Mod: PBBFAC,,, | Performed by: INTERNAL MEDICINE

## 2022-05-23 PROCEDURE — 36415 COLL VENOUS BLD VENIPUNCTURE: CPT | Mod: PN | Performed by: INTERNAL MEDICINE

## 2022-05-23 PROCEDURE — 99999 PR PBB SHADOW E&M-EST. PATIENT-LVL IV: ICD-10-PCS | Mod: PBBFAC,,, | Performed by: INTERNAL MEDICINE

## 2022-05-23 PROCEDURE — 83735 ASSAY OF MAGNESIUM: CPT | Mod: PN | Performed by: INTERNAL MEDICINE

## 2022-05-23 RX ORDER — EPINEPHRINE 0.3 MG/.3ML
0.3 INJECTION SUBCUTANEOUS ONCE AS NEEDED
Status: CANCELLED | OUTPATIENT
Start: 2022-05-24

## 2022-05-23 RX ORDER — DIPHENHYDRAMINE HYDROCHLORIDE 50 MG/ML
50 INJECTION INTRAMUSCULAR; INTRAVENOUS ONCE AS NEEDED
Status: CANCELLED | OUTPATIENT
Start: 2022-05-24

## 2022-05-23 RX ORDER — FAMOTIDINE 10 MG/ML
20 INJECTION INTRAVENOUS
Status: CANCELLED | OUTPATIENT
Start: 2022-05-24

## 2022-05-23 RX ORDER — HEPARIN 100 UNIT/ML
500 SYRINGE INTRAVENOUS
Status: CANCELLED | OUTPATIENT
Start: 2022-05-24

## 2022-05-23 RX ORDER — SODIUM CHLORIDE 0.9 % (FLUSH) 0.9 %
10 SYRINGE (ML) INJECTION
Status: CANCELLED | OUTPATIENT
Start: 2022-05-24

## 2022-05-23 NOTE — PROGRESS NOTES
HISTORY OF PRESENT ILLNESS:    The patient is a 71-year-old white female well   known to me for locally advanced left breast carcinoma, who underwent   neoadjuvant therapy consisting of four cycles of Adriamycin/Cytoxan followed by 12 weekly doses of Taxol.  She had a residual 0.3 mm focus of metastatic carcinoma in a single lymph node.  Patient and postlumpectomy radiation.  She completed a 60 month course of Arimidex/biannual Prolia for prevention of aromatase inhibitor induced bone loss.  She was recently placed on annual follow-up.  She returns to clinic earlier than scheduled appointment.  Patient is having some anxiety symptoms around the possibility of recurrence.  She feels a little less certain of her course now that she is off active treatment and having less frequent surveillance.  She also continues to struggle caring for her  who became severely ill during the pandemic.  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  She presents seeking reassurance.  She declines medical therapy for sleep disturbance/situational depression.    Shortly after patient's last office evaluation, she began to experience pain in the other portions of her left breast.  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  However, the patient has actively been dieting, losing weight, and upon palpation noted a change in the breast parenchyma which she wished evaluated.  She was seen by morro Butler for additional imaging which reveals suspicious findings.  Patient has gone on to have breast biopsy and returns to review results.  Patient had visited with Dr. Ryder since her last visit in my clinic and found is assistance helpful.    Patient has initiated neoadjuvant therapy consisting of carboplatin/Taxol/Keytruda.  She returns to clinic for re-evaluation prior to cycle 2, day 8 dose of therapy.  She continues to experience tenderness about the left  breast and the axilla.  No obvious swelling, erythema, warmth, or trauma.    PHYSICAL EXAMINATION:  GENERAL:  Well-developed, well-nourished white female in no acute distress, witha weight of 146 pounds (decreased by 1.5 pounds).  VITAL SIGNS:  Documented in EMR and reviewed.  HEENT:  Normocephalic, atraumatic.  Oral mucosa pink and moist.  Lips without lesions.  Tongue midline.  Oropharynx clear.  Nonicteric sclerae.   NECK:  Supple, no adenopathy.  No carotid bruits, thyromegaly or thyroid nodule.  HEART:  Regular rate and rhythm without murmur, gallop or rub.                LUNGS:  Clear to auscultation bilaterally.  Normal respiratory effort.       ABDOMEN:  Soft, nontender, nondistended with positive normoactive bowel sounds, no hepatosplenomegaly.    EXTREMITIES:  No cyanosis, clubbing or edema.  Distal pulses are intact.                                           AXILLAE AND GROIN:  No palpable pathologic lymphadenopathy is appreciated.        SKIN:  Intact/turgor normal.  NEUROLOGIC:  Cranial nerves II-XII grossly intact.  Motor:  Good muscle bulk and tone.  Strength/sensory 5/5 throughout.  Gait stable.  BREASTS:  No signs of infection left axilla.  Little to no palpable remains of the patient's new primary tumor.    Laboratory:  White count 4.4, hemoglobin 11.6, hematocrit 34.7, platelets 199, absolute neutrophil count 2700.  Sodium 141, potassium 4.4, chloride 104, CO2 28, BUN 18, creatinine 0.7, glucose 96, calcium 9.6, magnesium 2.3, GFR is greater than 60.     IMPRESSION:    1.  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  2. Situational depression.  3. Second primary, triple negative, clinical stage II B (T2, N0, M0), left breast carcinoma.    PLAN:  1. Proceed with cycle 2, day 8 of therapy.  Return to clinic in 1 week with interval CBC, BMP, and magnesium prior to appointment.      THIS NOTE WAS CREATED USING  VOICE RECOGNITION SOFTWARE AND MAY CONTAIN GRAMMATICAL ERRORS.

## 2022-05-24 ENCOUNTER — INFUSION (OUTPATIENT)
Dept: INFUSION THERAPY | Facility: HOSPITAL | Age: 72
End: 2022-05-24
Attending: INTERNAL MEDICINE
Payer: MEDICARE

## 2022-05-24 ENCOUNTER — DOCUMENTATION ONLY (OUTPATIENT)
Dept: INFUSION THERAPY | Facility: HOSPITAL | Age: 72
End: 2022-05-24
Payer: MEDICARE

## 2022-05-24 ENCOUNTER — DOCUMENTATION ONLY (OUTPATIENT)
Dept: HEMATOLOGY/ONCOLOGY | Facility: CLINIC | Age: 72
End: 2022-05-24
Payer: MEDICARE

## 2022-05-24 VITALS
BODY MASS INDEX: 29.03 KG/M2 | SYSTOLIC BLOOD PRESSURE: 119 MMHG | DIASTOLIC BLOOD PRESSURE: 66 MMHG | RESPIRATION RATE: 16 BRPM | OXYGEN SATURATION: 99 % | HEART RATE: 71 BPM | WEIGHT: 146.19 LBS | TEMPERATURE: 98 F

## 2022-05-24 DIAGNOSIS — C50.012 MALIGNANT NEOPLASM OF NIPPLE OF LEFT BREAST IN FEMALE, ESTROGEN RECEPTOR NEGATIVE: Primary | ICD-10-CM

## 2022-05-24 DIAGNOSIS — Z17.1 MALIGNANT NEOPLASM OF NIPPLE OF LEFT BREAST IN FEMALE, ESTROGEN RECEPTOR NEGATIVE: Primary | ICD-10-CM

## 2022-05-24 PROCEDURE — 63600175 PHARM REV CODE 636 W HCPCS: Mod: PN | Performed by: INTERNAL MEDICINE

## 2022-05-24 PROCEDURE — 96367 TX/PROPH/DG ADDL SEQ IV INF: CPT | Mod: PN

## 2022-05-24 PROCEDURE — 96413 CHEMO IV INFUSION 1 HR: CPT | Mod: PN

## 2022-05-24 PROCEDURE — 96375 TX/PRO/DX INJ NEW DRUG ADDON: CPT | Mod: PN

## 2022-05-24 PROCEDURE — 25000003 PHARM REV CODE 250: Mod: PN | Performed by: INTERNAL MEDICINE

## 2022-05-24 PROCEDURE — 96417 CHEMO IV INFUS EACH ADDL SEQ: CPT | Mod: PN

## 2022-05-24 RX ORDER — SODIUM CHLORIDE 0.9 % (FLUSH) 0.9 %
10 SYRINGE (ML) INJECTION
Status: DISCONTINUED | OUTPATIENT
Start: 2022-05-24 | End: 2022-05-24 | Stop reason: HOSPADM

## 2022-05-24 RX ORDER — HEPARIN 100 UNIT/ML
500 SYRINGE INTRAVENOUS
Status: DISCONTINUED | OUTPATIENT
Start: 2022-05-24 | End: 2022-05-24 | Stop reason: HOSPADM

## 2022-05-24 RX ORDER — FAMOTIDINE 10 MG/ML
20 INJECTION INTRAVENOUS
Status: COMPLETED | OUTPATIENT
Start: 2022-05-24 | End: 2022-05-24

## 2022-05-24 RX ADMIN — DIPHENHYDRAMINE HYDROCHLORIDE 50 MG: 50 INJECTION, SOLUTION INTRAMUSCULAR; INTRAVENOUS at 02:05

## 2022-05-24 RX ADMIN — PALONOSETRON 0.25 MG: 0.05 INJECTION, SOLUTION INTRAVENOUS at 02:05

## 2022-05-24 RX ADMIN — CARBOPLATIN 125 MG: 10 INJECTION, SOLUTION INTRAVENOUS at 04:05

## 2022-05-24 RX ADMIN — FAMOTIDINE 20 MG: 10 INJECTION INTRAVENOUS at 01:05

## 2022-05-24 RX ADMIN — PACLITAXEL 132 MG: 6 INJECTION, SOLUTION, CONCENTRATE INTRAVENOUS at 03:05

## 2022-05-24 RX ADMIN — SODIUM CHLORIDE: 0.9 INJECTION, SOLUTION INTRAVENOUS at 02:05

## 2022-05-24 RX ADMIN — Medication 500 UNITS: at 04:05

## 2022-05-24 NOTE — PROGRESS NOTES
Oncology   Chemotherapy Infusion Visit    Quick Social Service Status Follow Up   Met w/ pt briefly to follow up on social and emotional needs since initiation of treatment.      The pt reported losing a lot of hair, but has her wig and will wear it when necessary.   This pt was provided with a chemo beanie.  The pt reported no other needs at this time.       Kimberly Krueger, LAYO  05/24/2022  3:37 PM

## 2022-05-24 NOTE — PROGRESS NOTES
Oncology Nutrition   Chemotherapy Infusion Visit    Nutrition Follow Up   RD met with patient at chairside during infusion treatment. At previous RD visit, pt stated she was struggling with metallic taste. Today patient states is has been much better and she denies any other nutrition related concerns. Pt very appreciative for RD stopping by.    Wt Readings from Last 10 Encounters:   05/23/22 66.3 kg (146 lb 2.6 oz)   05/17/22 66.8 kg (147 lb 4.3 oz)   05/16/22 66.8 kg (147 lb 4.3 oz)   05/10/22 65 kg (143 lb 4.8 oz)   05/09/22 65 kg (143 lb 4.8 oz)   05/03/22 64.7 kg (142 lb 10.2 oz)   05/02/22 64.7 kg (142 lb 10.2 oz)   04/26/22 64.1 kg (141 lb 5 oz)   04/25/22 63.5 kg (139 lb 15.9 oz)   04/25/22 63.5 kg (139 lb 15.9 oz)       All other nutrition questions/concerns addressed as appropriate. Will continue to follow and monitor throughout treatment.     Aminata Whittington MS, RD, LDN  05/24/2022  2:29 PM

## 2022-05-30 ENCOUNTER — OFFICE VISIT (OUTPATIENT)
Dept: HEMATOLOGY/ONCOLOGY | Facility: CLINIC | Age: 72
End: 2022-05-30
Payer: MEDICARE

## 2022-05-30 ENCOUNTER — LAB VISIT (OUTPATIENT)
Dept: LAB | Facility: HOSPITAL | Age: 72
End: 2022-05-30
Attending: INTERNAL MEDICINE
Payer: MEDICARE

## 2022-05-30 VITALS
RESPIRATION RATE: 16 BRPM | TEMPERATURE: 98 F | OXYGEN SATURATION: 99 % | HEIGHT: 60 IN | SYSTOLIC BLOOD PRESSURE: 161 MMHG | DIASTOLIC BLOOD PRESSURE: 91 MMHG | WEIGHT: 136 LBS | HEART RATE: 77 BPM | BODY MASS INDEX: 26.7 KG/M2

## 2022-05-30 DIAGNOSIS — C50.012 MALIGNANT NEOPLASM OF NIPPLE OF LEFT BREAST IN FEMALE, ESTROGEN RECEPTOR NEGATIVE: ICD-10-CM

## 2022-05-30 DIAGNOSIS — F41.9 ANXIETY: ICD-10-CM

## 2022-05-30 DIAGNOSIS — Z17.1 MALIGNANT NEOPLASM OF NIPPLE OF LEFT BREAST IN FEMALE, ESTROGEN RECEPTOR NEGATIVE: Primary | ICD-10-CM

## 2022-05-30 DIAGNOSIS — Z17.1 MALIGNANT NEOPLASM OF NIPPLE OF LEFT BREAST IN FEMALE, ESTROGEN RECEPTOR NEGATIVE: ICD-10-CM

## 2022-05-30 DIAGNOSIS — F43.21 SITUATIONAL DEPRESSION: ICD-10-CM

## 2022-05-30 DIAGNOSIS — C50.012 MALIGNANT NEOPLASM OF NIPPLE OF LEFT BREAST IN FEMALE, ESTROGEN RECEPTOR NEGATIVE: Primary | ICD-10-CM

## 2022-05-30 LAB
ANION GAP SERPL CALC-SCNC: 8 MMOL/L (ref 8–16)
BASOPHILS # BLD AUTO: 0.04 K/UL (ref 0–0.2)
BASOPHILS NFR BLD: 0.9 % (ref 0–1.9)
BUN SERPL-MCNC: 20 MG/DL (ref 8–23)
CALCIUM SERPL-MCNC: 9.9 MG/DL (ref 8.7–10.5)
CHLORIDE SERPL-SCNC: 101 MMOL/L (ref 95–110)
CO2 SERPL-SCNC: 28 MMOL/L (ref 23–29)
CREAT SERPL-MCNC: 0.7 MG/DL (ref 0.5–1.4)
DIFFERENTIAL METHOD: ABNORMAL
EOSINOPHIL # BLD AUTO: 0 K/UL (ref 0–0.5)
EOSINOPHIL NFR BLD: 0.2 % (ref 0–8)
ERYTHROCYTE [DISTWIDTH] IN BLOOD BY AUTOMATED COUNT: 14.8 % (ref 11.5–14.5)
EST. GFR  (AFRICAN AMERICAN): >60 ML/MIN/1.73 M^2
EST. GFR  (NON AFRICAN AMERICAN): >60 ML/MIN/1.73 M^2
GLUCOSE SERPL-MCNC: 99 MG/DL (ref 70–110)
HCT VFR BLD AUTO: 35.4 % (ref 37–48.5)
HGB BLD-MCNC: 11.8 G/DL (ref 12–16)
IMM GRANULOCYTES # BLD AUTO: 0.04 K/UL (ref 0–0.04)
IMM GRANULOCYTES NFR BLD AUTO: 0.9 % (ref 0–0.5)
LYMPHOCYTES # BLD AUTO: 1.2 K/UL (ref 1–4.8)
LYMPHOCYTES NFR BLD: 25.9 % (ref 18–48)
MAGNESIUM SERPL-MCNC: 2.1 MG/DL (ref 1.6–2.6)
MCH RBC QN AUTO: 31.2 PG (ref 27–31)
MCHC RBC AUTO-ENTMCNC: 33.3 G/DL (ref 32–36)
MCV RBC AUTO: 94 FL (ref 82–98)
MONOCYTES # BLD AUTO: 0.3 K/UL (ref 0.3–1)
MONOCYTES NFR BLD: 6.3 % (ref 4–15)
NEUTROPHILS # BLD AUTO: 3.1 K/UL (ref 1.8–7.7)
NEUTROPHILS NFR BLD: 65.8 % (ref 38–73)
NRBC BLD-RTO: 0 /100 WBC
PLATELET # BLD AUTO: 171 K/UL (ref 150–450)
PMV BLD AUTO: 9.7 FL (ref 9.2–12.9)
POTASSIUM SERPL-SCNC: 4.6 MMOL/L (ref 3.5–5.1)
RBC # BLD AUTO: 3.78 M/UL (ref 4–5.4)
SODIUM SERPL-SCNC: 137 MMOL/L (ref 136–145)
WBC # BLD AUTO: 4.63 K/UL (ref 3.9–12.7)

## 2022-05-30 PROCEDURE — 80048 BASIC METABOLIC PNL TOTAL CA: CPT | Mod: PN | Performed by: INTERNAL MEDICINE

## 2022-05-30 PROCEDURE — 99214 PR OFFICE/OUTPT VISIT, EST, LEVL IV, 30-39 MIN: ICD-10-PCS | Mod: S$GLB,,, | Performed by: INTERNAL MEDICINE

## 2022-05-30 PROCEDURE — 99999 PR PBB SHADOW E&M-EST. PATIENT-LVL IV: ICD-10-PCS | Mod: PBBFAC,,, | Performed by: INTERNAL MEDICINE

## 2022-05-30 PROCEDURE — 99999 PR PBB SHADOW E&M-EST. PATIENT-LVL IV: CPT | Mod: PBBFAC,,, | Performed by: INTERNAL MEDICINE

## 2022-05-30 PROCEDURE — 36415 COLL VENOUS BLD VENIPUNCTURE: CPT | Mod: PN | Performed by: INTERNAL MEDICINE

## 2022-05-30 PROCEDURE — 99214 OFFICE O/P EST MOD 30 MIN: CPT | Mod: S$GLB,,, | Performed by: INTERNAL MEDICINE

## 2022-05-30 PROCEDURE — 83735 ASSAY OF MAGNESIUM: CPT | Mod: PN | Performed by: INTERNAL MEDICINE

## 2022-05-30 PROCEDURE — 85025 COMPLETE CBC W/AUTO DIFF WBC: CPT | Mod: PN | Performed by: INTERNAL MEDICINE

## 2022-05-30 RX ORDER — EPINEPHRINE 0.3 MG/.3ML
0.3 INJECTION SUBCUTANEOUS ONCE AS NEEDED
Status: CANCELLED | OUTPATIENT
Start: 2022-05-31

## 2022-05-30 RX ORDER — SODIUM CHLORIDE 0.9 % (FLUSH) 0.9 %
10 SYRINGE (ML) INJECTION
Status: CANCELLED | OUTPATIENT
Start: 2022-05-31

## 2022-05-30 RX ORDER — DIPHENHYDRAMINE HYDROCHLORIDE 50 MG/ML
50 INJECTION INTRAMUSCULAR; INTRAVENOUS ONCE AS NEEDED
Status: CANCELLED | OUTPATIENT
Start: 2022-05-31

## 2022-05-30 RX ORDER — FAMOTIDINE 10 MG/ML
20 INJECTION INTRAVENOUS
Status: CANCELLED | OUTPATIENT
Start: 2022-05-31

## 2022-05-30 RX ORDER — HEPARIN 100 UNIT/ML
500 SYRINGE INTRAVENOUS
Status: CANCELLED | OUTPATIENT
Start: 2022-05-31

## 2022-05-30 NOTE — PROGRESS NOTES
HISTORY OF PRESENT ILLNESS:    The patient is a 72-year-old white female well   known to me for locally advanced left breast carcinoma, who underwent   neoadjuvant therapy consisting of four cycles of Adriamycin/Cytoxan followed by 12 weekly doses of Taxol.  She had a residual 0.3 mm focus of metastatic carcinoma in a single lymph node.  Patient and postlumpectomy radiation.  She completed a 60 month course of Arimidex/biannual Prolia for prevention of aromatase inhibitor induced bone loss.  She was recently placed on annual follow-up.  She returns to clinic earlier than scheduled appointment.  Patient is having some anxiety symptoms around the possibility of recurrence.  She feels a little less certain of her course now that she is off active treatment and having less frequent surveillance.  She also continues to struggle caring for her  who became severely ill during the pandemic.  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  She presents seeking reassurance.  She declines medical therapy for sleep disturbance/situational depression.    Shortly after patient's last office evaluation, she began to experience pain in the other portions of her left breast.  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  However, the patient has actively been dieting, losing weight, and upon palpation noted a change in the breast parenchyma which she wished evaluated.  She was seen by morro Butler for additional imaging which reveals suspicious findings.  Patient has gone on to have breast biopsy and returns to review results.  Patient had visited with Dr. Ryder since her last visit in my clinic and found is assistance helpful.    Patient has initiated neoadjuvant therapy consisting of carboplatin/Taxol/Keytruda.  She returns to clinic for re-evaluation prior to cycle 2, day 15 dose of therapy.  She continues to experience tenderness about the left  breast and the axilla.  No obvious swelling, erythema, warmth, or trauma.    PHYSICAL EXAMINATION:  GENERAL:  Well-developed, well-nourished white female in no acute distress, witha weight of 136 pounds (decreased by 10 pounds).  VITAL SIGNS:  Documented in EMR and reviewed.  HEENT:  Normocephalic, atraumatic.  Oral mucosa pink and moist.  Lips without lesions.  Tongue midline.  Oropharynx clear.  Nonicteric sclerae.   NECK:  Supple, no adenopathy.  No carotid bruits, thyromegaly or thyroid nodule.  HEART:  Regular rate and rhythm without murmur, gallop or rub.                LUNGS:  Clear to auscultation bilaterally.  Normal respiratory effort.       ABDOMEN:  Soft, nontender, nondistended with positive normoactive bowel sounds, no hepatosplenomegaly.    EXTREMITIES:  No cyanosis, clubbing or edema.  Distal pulses are intact.                                           AXILLAE AND GROIN:  No palpable pathologic lymphadenopathy is appreciated.        SKIN:  Intact/turgor normal.  NEUROLOGIC:  Cranial nerves II-XII grossly intact.  Motor:  Good muscle bulk and tone.  Strength/sensory 5/5 throughout.  Gait stable.  BREASTS:  No signs of infection left axilla.  Little to no palpable remains of the patient's new primary tumor.    Laboratory:  White count 4.6, hemoglobin 11.8, hematocrit 35.4, platelets 171, absolute neutrophil count is 3100.  Sodium 137, potassium 4.6, chloride 101, CO2 28, BUN 20, creatinine 0.7, glucose 99, calcium 9.9, magnesium 2.1, GFR is greater than 60.     IMPRESSION:    1.  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  2. Situational depression.  3. Second primary, triple negative, clinical stage II B (T2, N0, M0), left breast carcinoma.    PLAN:  1. Proceed with cycle 2, day 15 of therapy.  Return to clinic in 1 week with interval CBC, CMP, LDH, and magnesium prior to appointment.      THIS NOTE WAS CREATED  USING VOICE RECOGNITION SOFTWARE AND MAY CONTAIN GRAMMATICAL ERRORS.           Route Chart for Scheduling    Med Onc Chart Routing      Follow up with physician 1 week.   Follow up with ANGIE    Labs CBC, CMP, LDH and magnesium   Lab interval:     Imaging    Pharmacy appointment    Other referrals          Treatment Plan Information   OP PEMBROLIZUMAB WITH WEEKLY CARBOPLATIN (AUC 1.5)  AND PACLITAXEL FOLLOWED BY PEMBROLIZUMAB 200 MG Q3W   Michael Bartlett MD   Upcoming Treatment Dates - OP PEMBROLIZUMAB WITH WEEKLY CARBOPLATIN (AUC 1.5)  AND PACLITAXEL FOLLOWED BY PEMBROLIZUMAB 200 MG Q3W    5/31/2022       Pre-Medications       diphenhydramine (BENADRYL) 50 mg in NS 50 mL IVPB       famotidine (PF) injection 20 mg       Chemotherapy       PACLitaxeL (TAXOL) 80 mg/m2 = 132 mg in sodium chloride 0.9% 250 mL chemo infusion       CARBOplatin (PARAPLATIN) 125 mg in sodium chloride 0.9% 250 mL chemo infusion  6/7/2022       Pre-Medications       acetaminophen tablet 1,000 mg       diphenhydramine (BENADRYL) 50 mg in NS 50 mL IVPB       famotidine (PF) injection 20 mg       Chemotherapy       PACLitaxeL (TAXOL) 80 mg/m2 = 132 mg in sodium chloride 0.9% 250 mL chemo infusion       CARBOplatin (PARAPLATIN) 125 mg in sodium chloride 0.9% 250 mL chemo infusion  6/14/2022       Pre-Medications       diphenhydramine (BENADRYL) 50 mg in NS 50 mL IVPB       famotidine (PF) injection 20 mg       Chemotherapy       PACLitaxeL (TAXOL) 80 mg/m2 = 132 mg in sodium chloride 0.9% 250 mL chemo infusion       CARBOplatin (PARAPLATIN) 125 mg in sodium chloride 0.9% 250 mL chemo infusion  6/21/2022       Pre-Medications       diphenhydramine (BENADRYL) 50 mg in NS 50 mL IVPB       famotidine (PF) injection 20 mg       Chemotherapy       PACLitaxeL (TAXOL) 80 mg/m2 = 132 mg in sodium chloride 0.9% 250 mL chemo infusion       CARBOplatin (PARAPLATIN) 125 mg in sodium chloride 0.9% 250 mL chemo infusion

## 2022-05-31 ENCOUNTER — INFUSION (OUTPATIENT)
Dept: INFUSION THERAPY | Facility: HOSPITAL | Age: 72
End: 2022-05-31
Attending: INTERNAL MEDICINE
Payer: MEDICARE

## 2022-05-31 ENCOUNTER — DOCUMENTATION ONLY (OUTPATIENT)
Dept: INFUSION THERAPY | Facility: HOSPITAL | Age: 72
End: 2022-05-31
Payer: MEDICARE

## 2022-05-31 VITALS
RESPIRATION RATE: 16 BRPM | BODY MASS INDEX: 26.7 KG/M2 | HEART RATE: 71 BPM | HEIGHT: 60 IN | DIASTOLIC BLOOD PRESSURE: 72 MMHG | WEIGHT: 136 LBS | TEMPERATURE: 99 F | SYSTOLIC BLOOD PRESSURE: 136 MMHG

## 2022-05-31 DIAGNOSIS — C50.012 MALIGNANT NEOPLASM OF NIPPLE OF LEFT BREAST IN FEMALE, ESTROGEN RECEPTOR NEGATIVE: Primary | ICD-10-CM

## 2022-05-31 DIAGNOSIS — Z17.1 MALIGNANT NEOPLASM OF NIPPLE OF LEFT BREAST IN FEMALE, ESTROGEN RECEPTOR NEGATIVE: Primary | ICD-10-CM

## 2022-05-31 PROCEDURE — 96367 TX/PROPH/DG ADDL SEQ IV INF: CPT | Mod: PN

## 2022-05-31 PROCEDURE — A4216 STERILE WATER/SALINE, 10 ML: HCPCS | Mod: PN | Performed by: INTERNAL MEDICINE

## 2022-05-31 PROCEDURE — 96413 CHEMO IV INFUSION 1 HR: CPT | Mod: PN

## 2022-05-31 PROCEDURE — 96375 TX/PRO/DX INJ NEW DRUG ADDON: CPT | Mod: PN

## 2022-05-31 PROCEDURE — 25000003 PHARM REV CODE 250: Mod: PN | Performed by: INTERNAL MEDICINE

## 2022-05-31 PROCEDURE — 96417 CHEMO IV INFUS EACH ADDL SEQ: CPT | Mod: PN

## 2022-05-31 PROCEDURE — 63600175 PHARM REV CODE 636 W HCPCS: Mod: PN | Performed by: INTERNAL MEDICINE

## 2022-05-31 RX ORDER — FAMOTIDINE 10 MG/ML
20 INJECTION INTRAVENOUS
Status: COMPLETED | OUTPATIENT
Start: 2022-05-31 | End: 2022-05-31

## 2022-05-31 RX ORDER — SODIUM CHLORIDE 0.9 % (FLUSH) 0.9 %
10 SYRINGE (ML) INJECTION
Status: DISCONTINUED | OUTPATIENT
Start: 2022-05-31 | End: 2022-05-31 | Stop reason: HOSPADM

## 2022-05-31 RX ADMIN — FAMOTIDINE 20 MG: 10 INJECTION INTRAVENOUS at 02:05

## 2022-05-31 RX ADMIN — PACLITAXEL 132 MG: 6 INJECTION, SOLUTION, CONCENTRATE INTRAVENOUS at 02:05

## 2022-05-31 RX ADMIN — Medication 10 ML: at 04:05

## 2022-05-31 RX ADMIN — SODIUM CHLORIDE: 9 INJECTION, SOLUTION INTRAVENOUS at 01:05

## 2022-05-31 RX ADMIN — DIPHENHYDRAMINE HYDROCHLORIDE 50 MG: 50 INJECTION INTRAMUSCULAR; INTRAVENOUS at 01:05

## 2022-05-31 RX ADMIN — CARBOPLATIN 125 MG: 10 INJECTION, SOLUTION INTRAVENOUS at 03:05

## 2022-05-31 RX ADMIN — PALONOSETRON 0.25 MG: 0.05 INJECTION, SOLUTION INTRAVENOUS at 01:05

## 2022-05-31 NOTE — PROGRESS NOTES
3:22 PM    [facility] to check in and provide support.  The pt said she got her haircut short to prepare for the hair loss and wore her wig today to just try to get use to it.    The pt said she was glad that today was more than half way through her treatment.    The pt was in good spirits and reported no needs at this time.

## 2022-05-31 NOTE — PLAN OF CARE
Pt tolerated Taxol/Carboplatin infusions well.  No s/s of infusion reaction noted.  Encouraged to increase oral fluid intake.  Instructed to call MD with any problems

## 2022-06-01 ENCOUNTER — TELEPHONE (OUTPATIENT)
Dept: HEMATOLOGY/ONCOLOGY | Facility: CLINIC | Age: 72
End: 2022-06-01
Payer: MEDICARE

## 2022-06-01 NOTE — TELEPHONE ENCOUNTER
I spoke with Jailyn about getting her IO appt r/s. She said her schedule is tight and asked to look at her calendar and will call me back when she gets time for the appt.

## 2022-06-06 ENCOUNTER — OFFICE VISIT (OUTPATIENT)
Dept: HEMATOLOGY/ONCOLOGY | Facility: CLINIC | Age: 72
End: 2022-06-06
Payer: MEDICARE

## 2022-06-06 ENCOUNTER — LAB VISIT (OUTPATIENT)
Dept: LAB | Facility: HOSPITAL | Age: 72
End: 2022-06-06
Attending: INTERNAL MEDICINE
Payer: MEDICARE

## 2022-06-06 VITALS
HEIGHT: 60 IN | TEMPERATURE: 98 F | WEIGHT: 148.56 LBS | RESPIRATION RATE: 16 BRPM | SYSTOLIC BLOOD PRESSURE: 149 MMHG | OXYGEN SATURATION: 97 % | BODY MASS INDEX: 29.17 KG/M2 | DIASTOLIC BLOOD PRESSURE: 95 MMHG | HEART RATE: 86 BPM

## 2022-06-06 DIAGNOSIS — F43.21 SITUATIONAL DEPRESSION: ICD-10-CM

## 2022-06-06 DIAGNOSIS — E78.5 HYPERLIPIDEMIA, UNSPECIFIED HYPERLIPIDEMIA TYPE: ICD-10-CM

## 2022-06-06 DIAGNOSIS — C50.012 MALIGNANT NEOPLASM OF NIPPLE OF LEFT BREAST IN FEMALE, ESTROGEN RECEPTOR NEGATIVE: Primary | ICD-10-CM

## 2022-06-06 DIAGNOSIS — R79.9 ABNORMAL FINDING OF BLOOD CHEMISTRY, UNSPECIFIED: ICD-10-CM

## 2022-06-06 DIAGNOSIS — Z17.1 MALIGNANT NEOPLASM OF NIPPLE OF LEFT BREAST IN FEMALE, ESTROGEN RECEPTOR NEGATIVE: Primary | ICD-10-CM

## 2022-06-06 DIAGNOSIS — C50.012 MALIGNANT NEOPLASM OF NIPPLE OF LEFT BREAST IN FEMALE, UNSPECIFIED ESTROGEN RECEPTOR STATUS: ICD-10-CM

## 2022-06-06 DIAGNOSIS — Z79.811 AROMATASE INHIBITOR USE: ICD-10-CM

## 2022-06-06 DIAGNOSIS — F41.9 ANXIETY: ICD-10-CM

## 2022-06-06 LAB
ALBUMIN SERPL BCP-MCNC: 3.7 G/DL (ref 3.5–5.2)
ALP SERPL-CCNC: 51 U/L (ref 55–135)
ALT SERPL W/O P-5'-P-CCNC: 16 U/L (ref 10–44)
ANION GAP SERPL CALC-SCNC: 8 MMOL/L (ref 8–16)
AST SERPL-CCNC: 17 U/L (ref 10–40)
BASOPHILS # BLD AUTO: 0.02 K/UL (ref 0–0.2)
BASOPHILS NFR BLD: 0.4 % (ref 0–1.9)
BILIRUB SERPL-MCNC: 0.5 MG/DL (ref 0.1–1)
BUN SERPL-MCNC: 19 MG/DL (ref 8–23)
CALCIUM SERPL-MCNC: 9.9 MG/DL (ref 8.7–10.5)
CHLORIDE SERPL-SCNC: 102 MMOL/L (ref 95–110)
CHOLEST SERPL-MCNC: 257 MG/DL (ref 120–199)
CHOLEST/HDLC SERPL: 4.5 {RATIO} (ref 2–5)
CO2 SERPL-SCNC: 29 MMOL/L (ref 23–29)
CREAT SERPL-MCNC: 0.7 MG/DL (ref 0.5–1.4)
DIFFERENTIAL METHOD: ABNORMAL
EOSINOPHIL # BLD AUTO: 0 K/UL (ref 0–0.5)
EOSINOPHIL NFR BLD: 0.2 % (ref 0–8)
ERYTHROCYTE [DISTWIDTH] IN BLOOD BY AUTOMATED COUNT: 15.5 % (ref 11.5–14.5)
EST. GFR  (AFRICAN AMERICAN): >60 ML/MIN/1.73 M^2
EST. GFR  (NON AFRICAN AMERICAN): >60 ML/MIN/1.73 M^2
GLUCOSE SERPL-MCNC: 95 MG/DL (ref 70–110)
HCT VFR BLD AUTO: 35.3 % (ref 37–48.5)
HDLC SERPL-MCNC: 57 MG/DL (ref 40–75)
HDLC SERPL: 22.2 % (ref 20–50)
HGB BLD-MCNC: 11.7 G/DL (ref 12–16)
IMM GRANULOCYTES # BLD AUTO: 0.04 K/UL (ref 0–0.04)
IMM GRANULOCYTES NFR BLD AUTO: 0.9 % (ref 0–0.5)
IRON SERPL-MCNC: 70 UG/DL (ref 30–160)
LDH SERPL L TO P-CCNC: 168 U/L (ref 110–260)
LDLC SERPL CALC-MCNC: 183.6 MG/DL (ref 63–159)
LYMPHOCYTES # BLD AUTO: 1.2 K/UL (ref 1–4.8)
LYMPHOCYTES NFR BLD: 25.3 % (ref 18–48)
MCH RBC QN AUTO: 31 PG (ref 27–31)
MCHC RBC AUTO-ENTMCNC: 33.1 G/DL (ref 32–36)
MCV RBC AUTO: 93 FL (ref 82–98)
MONOCYTES # BLD AUTO: 0.3 K/UL (ref 0.3–1)
MONOCYTES NFR BLD: 6 % (ref 4–15)
NEUTROPHILS # BLD AUTO: 3.1 K/UL (ref 1.8–7.7)
NEUTROPHILS NFR BLD: 67.2 % (ref 38–73)
NONHDLC SERPL-MCNC: 200 MG/DL
NRBC BLD-RTO: 0 /100 WBC
PLATELET # BLD AUTO: 176 K/UL (ref 150–450)
PMV BLD AUTO: 9.7 FL (ref 9.2–12.9)
POTASSIUM SERPL-SCNC: 4.7 MMOL/L (ref 3.5–5.1)
PROT SERPL-MCNC: 6.7 G/DL (ref 6–8.4)
RBC # BLD AUTO: 3.78 M/UL (ref 4–5.4)
SODIUM SERPL-SCNC: 139 MMOL/L (ref 136–145)
TRIGL SERPL-MCNC: 82 MG/DL (ref 30–150)
TSH SERPL DL<=0.005 MIU/L-ACNC: 2.1 UIU/ML (ref 0.4–4)
WBC # BLD AUTO: 4.66 K/UL (ref 3.9–12.7)

## 2022-06-06 PROCEDURE — 83615 LACTATE (LD) (LDH) ENZYME: CPT | Mod: PN | Performed by: INTERNAL MEDICINE

## 2022-06-06 PROCEDURE — 1125F PR PAIN SEVERITY QUANTIFIED, PAIN PRESENT: ICD-10-PCS | Mod: CPTII,S$GLB,, | Performed by: INTERNAL MEDICINE

## 2022-06-06 PROCEDURE — 85025 COMPLETE CBC W/AUTO DIFF WBC: CPT | Mod: PN | Performed by: INTERNAL MEDICINE

## 2022-06-06 PROCEDURE — 1160F PR REVIEW ALL MEDS BY PRESCRIBER/CLIN PHARMACIST DOCUMENTED: ICD-10-PCS | Mod: CPTII,S$GLB,, | Performed by: INTERNAL MEDICINE

## 2022-06-06 PROCEDURE — 1101F PR PT FALLS ASSESS DOC 0-1 FALLS W/OUT INJ PAST YR: ICD-10-PCS | Mod: CPTII,S$GLB,, | Performed by: INTERNAL MEDICINE

## 2022-06-06 PROCEDURE — 80061 LIPID PANEL: CPT | Performed by: FAMILY MEDICINE

## 2022-06-06 PROCEDURE — 3288F FALL RISK ASSESSMENT DOCD: CPT | Mod: CPTII,S$GLB,, | Performed by: INTERNAL MEDICINE

## 2022-06-06 PROCEDURE — 99214 OFFICE O/P EST MOD 30 MIN: CPT | Mod: S$GLB,,, | Performed by: INTERNAL MEDICINE

## 2022-06-06 PROCEDURE — 99999 PR PBB SHADOW E&M-EST. PATIENT-LVL IV: CPT | Mod: PBBFAC,,, | Performed by: INTERNAL MEDICINE

## 2022-06-06 PROCEDURE — 99999 PR PBB SHADOW E&M-EST. PATIENT-LVL IV: ICD-10-PCS | Mod: PBBFAC,,, | Performed by: INTERNAL MEDICINE

## 2022-06-06 PROCEDURE — 3077F PR MOST RECENT SYSTOLIC BLOOD PRESSURE >= 140 MM HG: ICD-10-PCS | Mod: CPTII,S$GLB,, | Performed by: INTERNAL MEDICINE

## 2022-06-06 PROCEDURE — 80053 COMPREHEN METABOLIC PANEL: CPT | Mod: PN | Performed by: INTERNAL MEDICINE

## 2022-06-06 PROCEDURE — 84443 ASSAY THYROID STIM HORMONE: CPT | Performed by: FAMILY MEDICINE

## 2022-06-06 PROCEDURE — 99499 UNLISTED E&M SERVICE: CPT | Mod: S$GLB,,, | Performed by: INTERNAL MEDICINE

## 2022-06-06 PROCEDURE — 99214 PR OFFICE/OUTPT VISIT, EST, LEVL IV, 30-39 MIN: ICD-10-PCS | Mod: S$GLB,,, | Performed by: INTERNAL MEDICINE

## 2022-06-06 PROCEDURE — 3008F BODY MASS INDEX DOCD: CPT | Mod: CPTII,S$GLB,, | Performed by: INTERNAL MEDICINE

## 2022-06-06 PROCEDURE — 1159F MED LIST DOCD IN RCRD: CPT | Mod: CPTII,S$GLB,, | Performed by: INTERNAL MEDICINE

## 2022-06-06 PROCEDURE — 99499 RISK ADDL DX/OHS AUDIT: ICD-10-PCS | Mod: S$GLB,,, | Performed by: INTERNAL MEDICINE

## 2022-06-06 PROCEDURE — 1159F PR MEDICATION LIST DOCUMENTED IN MEDICAL RECORD: ICD-10-PCS | Mod: CPTII,S$GLB,, | Performed by: INTERNAL MEDICINE

## 2022-06-06 PROCEDURE — 3080F DIAST BP >= 90 MM HG: CPT | Mod: CPTII,S$GLB,, | Performed by: INTERNAL MEDICINE

## 2022-06-06 PROCEDURE — 3008F PR BODY MASS INDEX (BMI) DOCUMENTED: ICD-10-PCS | Mod: CPTII,S$GLB,, | Performed by: INTERNAL MEDICINE

## 2022-06-06 PROCEDURE — 3080F PR MOST RECENT DIASTOLIC BLOOD PRESSURE >= 90 MM HG: ICD-10-PCS | Mod: CPTII,S$GLB,, | Performed by: INTERNAL MEDICINE

## 2022-06-06 PROCEDURE — 1160F RVW MEDS BY RX/DR IN RCRD: CPT | Mod: CPTII,S$GLB,, | Performed by: INTERNAL MEDICINE

## 2022-06-06 PROCEDURE — 1125F AMNT PAIN NOTED PAIN PRSNT: CPT | Mod: CPTII,S$GLB,, | Performed by: INTERNAL MEDICINE

## 2022-06-06 PROCEDURE — 3077F SYST BP >= 140 MM HG: CPT | Mod: CPTII,S$GLB,, | Performed by: INTERNAL MEDICINE

## 2022-06-06 PROCEDURE — 36415 COLL VENOUS BLD VENIPUNCTURE: CPT | Mod: PN | Performed by: INTERNAL MEDICINE

## 2022-06-06 PROCEDURE — 83540 ASSAY OF IRON: CPT | Performed by: FAMILY MEDICINE

## 2022-06-06 PROCEDURE — 3288F PR FALLS RISK ASSESSMENT DOCUMENTED: ICD-10-PCS | Mod: CPTII,S$GLB,, | Performed by: INTERNAL MEDICINE

## 2022-06-06 PROCEDURE — 1101F PT FALLS ASSESS-DOCD LE1/YR: CPT | Mod: CPTII,S$GLB,, | Performed by: INTERNAL MEDICINE

## 2022-06-06 RX ORDER — FAMOTIDINE 10 MG/ML
20 INJECTION INTRAVENOUS
Status: CANCELLED | OUTPATIENT
Start: 2022-06-07

## 2022-06-06 RX ORDER — HEPARIN 100 UNIT/ML
500 SYRINGE INTRAVENOUS
Status: CANCELLED | OUTPATIENT
Start: 2022-06-07

## 2022-06-06 RX ORDER — ACETAMINOPHEN 500 MG
1000 TABLET ORAL
Status: CANCELLED
Start: 2022-06-07

## 2022-06-06 RX ORDER — SODIUM CHLORIDE 0.9 % (FLUSH) 0.9 %
10 SYRINGE (ML) INJECTION
Status: CANCELLED | OUTPATIENT
Start: 2022-06-07

## 2022-06-06 RX ORDER — EPINEPHRINE 0.3 MG/.3ML
0.3 INJECTION SUBCUTANEOUS ONCE AS NEEDED
Status: CANCELLED | OUTPATIENT
Start: 2022-06-07

## 2022-06-06 RX ORDER — DIPHENHYDRAMINE HYDROCHLORIDE 50 MG/ML
50 INJECTION INTRAMUSCULAR; INTRAVENOUS ONCE AS NEEDED
Status: CANCELLED | OUTPATIENT
Start: 2022-06-07

## 2022-06-06 NOTE — PROGRESS NOTES
HISTORY OF PRESENT ILLNESS:    The patient is a 72-year-old white female well   known to me for locally advanced left breast carcinoma, who underwent   neoadjuvant therapy consisting of four cycles of Adriamycin/Cytoxan followed by 12 weekly doses of Taxol.  She had a residual 0.3 mm focus of metastatic carcinoma in a single lymph node.  Patient and postlumpectomy radiation.  She completed a 60 month course of Arimidex/biannual Prolia for prevention of aromatase inhibitor induced bone loss.  She was recently placed on annual follow-up.  She returns to clinic earlier than scheduled appointment.  Patient is having some anxiety symptoms around the possibility of recurrence.  She feels a little less certain of her course now that she is off active treatment and having less frequent surveillance.  She also continues to struggle caring for her  who became severely ill during the pandemic.  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  She presents seeking reassurance.  She declines medical therapy for sleep disturbance/situational depression.    Shortly after patient's last office evaluation, she began to experience pain in the other portions of her left breast.  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  However, the patient has actively been dieting, losing weight, and upon palpation noted a change in the breast parenchyma which she wished evaluated.  She was seen by morro Butler for additional imaging which reveals suspicious findings.  Patient has gone on to have breast biopsy and returns to review results.  Patient had visited with Dr. Ryder since her last visit in my clinic and found is assistance helpful.    Patient has initiated neoadjuvant therapy consisting of carboplatin/Taxol/Keytruda.  She returns to clinic for re-evaluation prior to cycle 3, day 1 dose of therapy.  She continues to experience tenderness about the left  breast and the axilla.  No obvious swelling, erythema, warmth, or trauma.    PHYSICAL EXAMINATION:  GENERAL:  Well-developed, well-nourished white female in no acute distress, witha weight of 148.5 pounds (increased by 12.5 pounds).  VITAL SIGNS:  Documented in EMR and reviewed.  HEENT:  Normocephalic, atraumatic.  Oral mucosa pink and moist.  Lips without lesions.  Tongue midline.  Oropharynx clear.  Nonicteric sclerae.   NECK:  Supple, no adenopathy.  No carotid bruits, thyromegaly or thyroid nodule.  HEART:  Regular rate and rhythm without murmur, gallop or rub.                LUNGS:  Clear to auscultation bilaterally.  Normal respiratory effort.       ABDOMEN:  Soft, nontender, nondistended with positive normoactive bowel sounds, no hepatosplenomegaly.    EXTREMITIES:  No cyanosis, clubbing or edema.  Distal pulses are intact.                                           AXILLAE AND GROIN:  No palpable pathologic lymphadenopathy is appreciated.        SKIN:  Intact/turgor normal.  NEUROLOGIC:  Cranial nerves II-XII grossly intact.  Motor:  Good muscle bulk and tone.  Strength/sensory 5/5 throughout.  Gait stable.  BREASTS:  No signs of infection left axilla.  Little to no palpable remains of the patient's new primary tumor.    Laboratory:  White count 4.7, hemoglobin 11.7, hematocrit 35.3, platelets 176, absolute neutrophil count is 3100.  Sodium 139, potassium 4.7, chloride 102, CO2 29, BUN 19, creatinine 0.7, glucose 95, calcium 9.9, liver function tests are within normal limits, LDH is 168, GFR is greater than 60.     IMPRESSION:    1.  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  2. Situational depression.  3. Second primary, triple negative, clinical stage II B (T2, N0, M0), left breast carcinoma.    PLAN:  1. Proceed with cycle 3, day 1 of therapy.  Return to clinic in 1 week with interval CBC, BMP, and magnesium prior  to appointment.      THIS NOTE WAS CREATED USING VOICE RECOGNITION SOFTWARE AND MAY CONTAIN GRAMMATICAL ERRORS.

## 2022-06-07 ENCOUNTER — INFUSION (OUTPATIENT)
Dept: INFUSION THERAPY | Facility: HOSPITAL | Age: 72
End: 2022-06-07
Attending: INTERNAL MEDICINE
Payer: MEDICARE

## 2022-06-07 ENCOUNTER — HOSPITAL ENCOUNTER (OUTPATIENT)
Dept: RADIOLOGY | Facility: HOSPITAL | Age: 72
Discharge: HOME | End: 2022-06-07
Attending: INTERNAL MEDICINE
Payer: MEDICARE

## 2022-06-07 ENCOUNTER — DOCUMENTATION ONLY (OUTPATIENT)
Dept: INFUSION THERAPY | Facility: HOSPITAL | Age: 72
End: 2022-06-07
Payer: MEDICARE

## 2022-06-07 VITALS
HEART RATE: 74 BPM | RESPIRATION RATE: 16 BRPM | SYSTOLIC BLOOD PRESSURE: 133 MMHG | TEMPERATURE: 99 F | WEIGHT: 150.56 LBS | HEIGHT: 60 IN | DIASTOLIC BLOOD PRESSURE: 74 MMHG | BODY MASS INDEX: 29.56 KG/M2

## 2022-06-07 DIAGNOSIS — Z17.1 MALIGNANT NEOPLASM OF NIPPLE OF LEFT BREAST IN FEMALE, ESTROGEN RECEPTOR NEGATIVE: Primary | ICD-10-CM

## 2022-06-07 DIAGNOSIS — Z79.811 AROMATASE INHIBITOR USE: ICD-10-CM

## 2022-06-07 DIAGNOSIS — C50.012 MALIGNANT NEOPLASM OF NIPPLE OF LEFT BREAST IN FEMALE, ESTROGEN RECEPTOR NEGATIVE: Primary | ICD-10-CM

## 2022-06-07 DIAGNOSIS — C50.012 MALIGNANT NEOPLASM OF NIPPLE OF LEFT BREAST IN FEMALE, UNSPECIFIED ESTROGEN RECEPTOR STATUS: ICD-10-CM

## 2022-06-07 PROCEDURE — A4216 STERILE WATER/SALINE, 10 ML: HCPCS | Mod: PN | Performed by: INTERNAL MEDICINE

## 2022-06-07 PROCEDURE — 96413 CHEMO IV INFUSION 1 HR: CPT | Mod: PN

## 2022-06-07 PROCEDURE — 71046 XR CHEST PA AND LATERAL: ICD-10-PCS | Mod: 26,,, | Performed by: RADIOLOGY

## 2022-06-07 PROCEDURE — 25000003 PHARM REV CODE 250: Mod: PN | Performed by: INTERNAL MEDICINE

## 2022-06-07 PROCEDURE — 71046 X-RAY EXAM CHEST 2 VIEWS: CPT | Mod: TC,FY,PO

## 2022-06-07 PROCEDURE — 96367 TX/PROPH/DG ADDL SEQ IV INF: CPT | Mod: PN

## 2022-06-07 PROCEDURE — 63600175 PHARM REV CODE 636 W HCPCS: Mod: PN | Performed by: INTERNAL MEDICINE

## 2022-06-07 PROCEDURE — 96417 CHEMO IV INFUS EACH ADDL SEQ: CPT | Mod: PN

## 2022-06-07 PROCEDURE — 96375 TX/PRO/DX INJ NEW DRUG ADDON: CPT | Mod: PN

## 2022-06-07 PROCEDURE — 71046 X-RAY EXAM CHEST 2 VIEWS: CPT | Mod: 26,,, | Performed by: RADIOLOGY

## 2022-06-07 RX ORDER — FAMOTIDINE 10 MG/ML
20 INJECTION INTRAVENOUS
Status: COMPLETED | OUTPATIENT
Start: 2022-06-07 | End: 2022-06-07

## 2022-06-07 RX ORDER — ACETAMINOPHEN 500 MG
1000 TABLET ORAL
Status: COMPLETED | OUTPATIENT
Start: 2022-06-07 | End: 2022-06-07

## 2022-06-07 RX ORDER — SODIUM CHLORIDE 0.9 % (FLUSH) 0.9 %
10 SYRINGE (ML) INJECTION
Status: DISCONTINUED | OUTPATIENT
Start: 2022-06-07 | End: 2022-06-07 | Stop reason: HOSPADM

## 2022-06-07 RX ADMIN — SODIUM CHLORIDE 200 MG: 9 INJECTION, SOLUTION INTRAVENOUS at 02:06

## 2022-06-07 RX ADMIN — DIPHENHYDRAMINE HYDROCHLORIDE 50 MG: 50 INJECTION INTRAMUSCULAR; INTRAVENOUS at 01:06

## 2022-06-07 RX ADMIN — Medication 10 ML: at 05:06

## 2022-06-07 RX ADMIN — FAMOTIDINE 20 MG: 10 INJECTION INTRAVENOUS at 02:06

## 2022-06-07 RX ADMIN — CARBOPLATIN 125 MG: 10 INJECTION, SOLUTION INTRAVENOUS at 04:06

## 2022-06-07 RX ADMIN — ACETAMINOPHEN 1000 MG: 500 TABLET, FILM COATED ORAL at 01:06

## 2022-06-07 RX ADMIN — PALONOSETRON 0.25 MG: 0.05 INJECTION, SOLUTION INTRAVENOUS at 02:06

## 2022-06-07 RX ADMIN — PACLITAXEL 132 MG: 6 INJECTION, SOLUTION, CONCENTRATE INTRAVENOUS at 03:06

## 2022-06-07 RX ADMIN — SODIUM CHLORIDE: 0.9 INJECTION, SOLUTION INTRAVENOUS at 01:06

## 2022-06-07 NOTE — PLAN OF CARE
Pt tolerated treatment well.  No s/s of infusion reaction noted.  Instructed to call MD with any problems

## 2022-06-07 NOTE — PROGRESS NOTES
"3:37 PM    This LCSW met with this pt briefly to check in and provide support.  The pt reported doing well and said she was drowsy due to the benadryl and "don't mean to be unsocial able, but I am tired from the medication"    This LCSW allowed pt to rest and let her know that we are here if she has any concerns or questions.        "

## 2022-06-08 DIAGNOSIS — C50.412 MALIGNANT NEOPLASM OF UPPER-OUTER QUADRANT OF LEFT BREAST IN FEMALE, ESTROGEN RECEPTOR NEGATIVE: Primary | ICD-10-CM

## 2022-06-08 DIAGNOSIS — Z17.1 MALIGNANT NEOPLASM OF UPPER-OUTER QUADRANT OF LEFT BREAST IN FEMALE, ESTROGEN RECEPTOR NEGATIVE: Primary | ICD-10-CM

## 2022-06-09 ENCOUNTER — TELEPHONE (OUTPATIENT)
Dept: RADIATION ONCOLOGY | Facility: CLINIC | Age: 72
End: 2022-06-09
Payer: MEDICARE

## 2022-06-09 ENCOUNTER — TELEPHONE (OUTPATIENT)
Dept: SURGERY | Facility: CLINIC | Age: 72
End: 2022-06-09
Payer: MEDICARE

## 2022-06-09 NOTE — NURSING
Called patient back, she wanted to know if the plastic surgeon always does tissue expanders first for reconstruction after mastectomies, if the patient is negative. Told her I would ask the doctor and get back to her.

## 2022-06-13 ENCOUNTER — OFFICE VISIT (OUTPATIENT)
Dept: RADIATION ONCOLOGY | Facility: CLINIC | Age: 72
End: 2022-06-13
Payer: MEDICARE

## 2022-06-13 ENCOUNTER — OFFICE VISIT (OUTPATIENT)
Dept: HEMATOLOGY/ONCOLOGY | Facility: CLINIC | Age: 72
End: 2022-06-13
Payer: MEDICARE

## 2022-06-13 ENCOUNTER — LAB VISIT (OUTPATIENT)
Dept: LAB | Facility: HOSPITAL | Age: 72
End: 2022-06-13
Attending: INTERNAL MEDICINE
Payer: MEDICARE

## 2022-06-13 VITALS
WEIGHT: 147.06 LBS | HEART RATE: 98 BPM | HEIGHT: 60 IN | BODY MASS INDEX: 28.87 KG/M2 | RESPIRATION RATE: 18 BRPM | DIASTOLIC BLOOD PRESSURE: 93 MMHG | OXYGEN SATURATION: 97 % | TEMPERATURE: 100 F | SYSTOLIC BLOOD PRESSURE: 163 MMHG

## 2022-06-13 VITALS
TEMPERATURE: 99 F | WEIGHT: 147.69 LBS | HEART RATE: 92 BPM | HEIGHT: 59 IN | SYSTOLIC BLOOD PRESSURE: 162 MMHG | DIASTOLIC BLOOD PRESSURE: 100 MMHG | OXYGEN SATURATION: 98 % | RESPIRATION RATE: 18 BRPM | BODY MASS INDEX: 29.77 KG/M2

## 2022-06-13 DIAGNOSIS — C50.012 MALIGNANT NEOPLASM OF NIPPLE OF LEFT BREAST IN FEMALE, ESTROGEN RECEPTOR NEGATIVE: Primary | ICD-10-CM

## 2022-06-13 DIAGNOSIS — Z17.1 MALIGNANT NEOPLASM OF UPPER-OUTER QUADRANT OF LEFT BREAST IN FEMALE, ESTROGEN RECEPTOR NEGATIVE: ICD-10-CM

## 2022-06-13 DIAGNOSIS — C50.412 MALIGNANT NEOPLASM OF UPPER-OUTER QUADRANT OF LEFT BREAST IN FEMALE, ESTROGEN RECEPTOR NEGATIVE: ICD-10-CM

## 2022-06-13 DIAGNOSIS — F41.9 ANXIETY: ICD-10-CM

## 2022-06-13 DIAGNOSIS — Z17.1 MALIGNANT NEOPLASM OF NIPPLE OF LEFT BREAST IN FEMALE, ESTROGEN RECEPTOR NEGATIVE: Primary | ICD-10-CM

## 2022-06-13 DIAGNOSIS — C77.3 MALIGNANT NEOPLASM METASTATIC TO LYMPH NODE OF AXILLA: Primary | ICD-10-CM

## 2022-06-13 DIAGNOSIS — F43.21 SITUATIONAL DEPRESSION: ICD-10-CM

## 2022-06-13 DIAGNOSIS — Z17.1 MALIGNANT NEOPLASM OF NIPPLE OF LEFT BREAST IN FEMALE, ESTROGEN RECEPTOR NEGATIVE: ICD-10-CM

## 2022-06-13 DIAGNOSIS — C50.012 MALIGNANT NEOPLASM OF NIPPLE OF LEFT BREAST IN FEMALE, ESTROGEN RECEPTOR NEGATIVE: ICD-10-CM

## 2022-06-13 LAB
ALBUMIN SERPL BCP-MCNC: 3.7 G/DL (ref 3.5–5.2)
ALP SERPL-CCNC: 54 U/L (ref 55–135)
ALT SERPL W/O P-5'-P-CCNC: 10 U/L (ref 10–44)
ANION GAP SERPL CALC-SCNC: 9 MMOL/L (ref 8–16)
AST SERPL-CCNC: 16 U/L (ref 10–40)
BASOPHILS # BLD AUTO: 0.03 K/UL (ref 0–0.2)
BASOPHILS NFR BLD: 0.7 % (ref 0–1.9)
BILIRUB SERPL-MCNC: 0.3 MG/DL (ref 0.1–1)
BUN SERPL-MCNC: 19 MG/DL (ref 8–23)
CALCIUM SERPL-MCNC: 9.6 MG/DL (ref 8.7–10.5)
CHLORIDE SERPL-SCNC: 103 MMOL/L (ref 95–110)
CO2 SERPL-SCNC: 28 MMOL/L (ref 23–29)
CREAT SERPL-MCNC: 0.7 MG/DL (ref 0.5–1.4)
DIFFERENTIAL METHOD: ABNORMAL
EOSINOPHIL # BLD AUTO: 0 K/UL (ref 0–0.5)
EOSINOPHIL NFR BLD: 0.2 % (ref 0–8)
ERYTHROCYTE [DISTWIDTH] IN BLOOD BY AUTOMATED COUNT: 16 % (ref 11.5–14.5)
EST. GFR  (AFRICAN AMERICAN): >60 ML/MIN/1.73 M^2
EST. GFR  (NON AFRICAN AMERICAN): >60 ML/MIN/1.73 M^2
GLUCOSE SERPL-MCNC: 105 MG/DL (ref 70–110)
HCT VFR BLD AUTO: 33.4 % (ref 37–48.5)
HGB BLD-MCNC: 11 G/DL (ref 12–16)
IMM GRANULOCYTES # BLD AUTO: 0.02 K/UL (ref 0–0.04)
IMM GRANULOCYTES NFR BLD AUTO: 0.5 % (ref 0–0.5)
LYMPHOCYTES # BLD AUTO: 1.4 K/UL (ref 1–4.8)
LYMPHOCYTES NFR BLD: 32.2 % (ref 18–48)
MAGNESIUM SERPL-MCNC: 2.2 MG/DL (ref 1.6–2.6)
MCH RBC QN AUTO: 31.3 PG (ref 27–31)
MCHC RBC AUTO-ENTMCNC: 32.9 G/DL (ref 32–36)
MCV RBC AUTO: 95 FL (ref 82–98)
MONOCYTES # BLD AUTO: 0.3 K/UL (ref 0.3–1)
MONOCYTES NFR BLD: 6.4 % (ref 4–15)
NEUTROPHILS # BLD AUTO: 2.5 K/UL (ref 1.8–7.7)
NEUTROPHILS NFR BLD: 60 % (ref 38–73)
NRBC BLD-RTO: 0 /100 WBC
PLATELET # BLD AUTO: 181 K/UL (ref 150–450)
PMV BLD AUTO: 9.3 FL (ref 9.2–12.9)
POTASSIUM SERPL-SCNC: 4.3 MMOL/L (ref 3.5–5.1)
PROT SERPL-MCNC: 6.7 G/DL (ref 6–8.4)
RBC # BLD AUTO: 3.51 M/UL (ref 4–5.4)
SODIUM SERPL-SCNC: 140 MMOL/L (ref 136–145)
WBC # BLD AUTO: 4.22 K/UL (ref 3.9–12.7)

## 2022-06-13 PROCEDURE — 3288F FALL RISK ASSESSMENT DOCD: CPT | Mod: CPTII,S$GLB,, | Performed by: RADIOLOGY

## 2022-06-13 PROCEDURE — 85025 COMPLETE CBC W/AUTO DIFF WBC: CPT | Mod: PN | Performed by: INTERNAL MEDICINE

## 2022-06-13 PROCEDURE — 1125F AMNT PAIN NOTED PAIN PRSNT: CPT | Mod: CPTII,S$GLB,, | Performed by: RADIOLOGY

## 2022-06-13 PROCEDURE — 3077F PR MOST RECENT SYSTOLIC BLOOD PRESSURE >= 140 MM HG: ICD-10-PCS | Mod: CPTII,S$GLB,, | Performed by: RADIOLOGY

## 2022-06-13 PROCEDURE — 3080F PR MOST RECENT DIASTOLIC BLOOD PRESSURE >= 90 MM HG: ICD-10-PCS | Mod: CPTII,S$GLB,, | Performed by: RADIOLOGY

## 2022-06-13 PROCEDURE — 1101F PR PT FALLS ASSESS DOC 0-1 FALLS W/OUT INJ PAST YR: ICD-10-PCS | Mod: CPTII,S$GLB,, | Performed by: INTERNAL MEDICINE

## 2022-06-13 PROCEDURE — 3288F PR FALLS RISK ASSESSMENT DOCUMENTED: ICD-10-PCS | Mod: CPTII,S$GLB,, | Performed by: RADIOLOGY

## 2022-06-13 PROCEDURE — 3080F PR MOST RECENT DIASTOLIC BLOOD PRESSURE >= 90 MM HG: ICD-10-PCS | Mod: CPTII,S$GLB,, | Performed by: INTERNAL MEDICINE

## 2022-06-13 PROCEDURE — 36415 COLL VENOUS BLD VENIPUNCTURE: CPT | Mod: PN | Performed by: INTERNAL MEDICINE

## 2022-06-13 PROCEDURE — 99214 OFFICE O/P EST MOD 30 MIN: CPT | Mod: S$GLB,,, | Performed by: INTERNAL MEDICINE

## 2022-06-13 PROCEDURE — 1101F PT FALLS ASSESS-DOCD LE1/YR: CPT | Mod: CPTII,S$GLB,, | Performed by: INTERNAL MEDICINE

## 2022-06-13 PROCEDURE — 1159F PR MEDICATION LIST DOCUMENTED IN MEDICAL RECORD: ICD-10-PCS | Mod: CPTII,S$GLB,, | Performed by: INTERNAL MEDICINE

## 2022-06-13 PROCEDURE — 3077F PR MOST RECENT SYSTOLIC BLOOD PRESSURE >= 140 MM HG: ICD-10-PCS | Mod: CPTII,S$GLB,, | Performed by: INTERNAL MEDICINE

## 2022-06-13 PROCEDURE — 80053 COMPREHEN METABOLIC PANEL: CPT | Mod: PN | Performed by: INTERNAL MEDICINE

## 2022-06-13 PROCEDURE — 99205 PR OFFICE/OUTPT VISIT, NEW, LEVL V, 60-74 MIN: ICD-10-PCS | Mod: S$GLB,,, | Performed by: RADIOLOGY

## 2022-06-13 PROCEDURE — 3077F SYST BP >= 140 MM HG: CPT | Mod: CPTII,S$GLB,, | Performed by: INTERNAL MEDICINE

## 2022-06-13 PROCEDURE — 3080F DIAST BP >= 90 MM HG: CPT | Mod: CPTII,S$GLB,, | Performed by: RADIOLOGY

## 2022-06-13 PROCEDURE — 3077F SYST BP >= 140 MM HG: CPT | Mod: CPTII,S$GLB,, | Performed by: RADIOLOGY

## 2022-06-13 PROCEDURE — 1125F PR PAIN SEVERITY QUANTIFIED, PAIN PRESENT: ICD-10-PCS | Mod: CPTII,S$GLB,, | Performed by: RADIOLOGY

## 2022-06-13 PROCEDURE — 99999 PR PBB SHADOW E&M-EST. PATIENT-LVL IV: ICD-10-PCS | Mod: PBBFAC,,, | Performed by: INTERNAL MEDICINE

## 2022-06-13 PROCEDURE — 3008F PR BODY MASS INDEX (BMI) DOCUMENTED: ICD-10-PCS | Mod: CPTII,S$GLB,, | Performed by: RADIOLOGY

## 2022-06-13 PROCEDURE — 1125F PR PAIN SEVERITY QUANTIFIED, PAIN PRESENT: ICD-10-PCS | Mod: CPTII,S$GLB,, | Performed by: INTERNAL MEDICINE

## 2022-06-13 PROCEDURE — 3288F PR FALLS RISK ASSESSMENT DOCUMENTED: ICD-10-PCS | Mod: CPTII,S$GLB,, | Performed by: INTERNAL MEDICINE

## 2022-06-13 PROCEDURE — 1160F PR REVIEW ALL MEDS BY PRESCRIBER/CLIN PHARMACIST DOCUMENTED: ICD-10-PCS | Mod: CPTII,S$GLB,, | Performed by: INTERNAL MEDICINE

## 2022-06-13 PROCEDURE — 3008F BODY MASS INDEX DOCD: CPT | Mod: CPTII,S$GLB,, | Performed by: RADIOLOGY

## 2022-06-13 PROCEDURE — 99205 OFFICE O/P NEW HI 60 MIN: CPT | Mod: S$GLB,,, | Performed by: RADIOLOGY

## 2022-06-13 PROCEDURE — 3008F BODY MASS INDEX DOCD: CPT | Mod: CPTII,S$GLB,, | Performed by: INTERNAL MEDICINE

## 2022-06-13 PROCEDURE — 99999 PR PBB SHADOW E&M-EST. PATIENT-LVL IV: CPT | Mod: PBBFAC,,, | Performed by: RADIOLOGY

## 2022-06-13 PROCEDURE — 3288F FALL RISK ASSESSMENT DOCD: CPT | Mod: CPTII,S$GLB,, | Performed by: INTERNAL MEDICINE

## 2022-06-13 PROCEDURE — 99214 PR OFFICE/OUTPT VISIT, EST, LEVL IV, 30-39 MIN: ICD-10-PCS | Mod: S$GLB,,, | Performed by: INTERNAL MEDICINE

## 2022-06-13 PROCEDURE — 1159F MED LIST DOCD IN RCRD: CPT | Mod: CPTII,S$GLB,, | Performed by: INTERNAL MEDICINE

## 2022-06-13 PROCEDURE — 3080F DIAST BP >= 90 MM HG: CPT | Mod: CPTII,S$GLB,, | Performed by: INTERNAL MEDICINE

## 2022-06-13 PROCEDURE — 83735 ASSAY OF MAGNESIUM: CPT | Mod: PN | Performed by: INTERNAL MEDICINE

## 2022-06-13 PROCEDURE — 1160F RVW MEDS BY RX/DR IN RCRD: CPT | Mod: CPTII,S$GLB,, | Performed by: INTERNAL MEDICINE

## 2022-06-13 PROCEDURE — 99499 UNLISTED E&M SERVICE: CPT | Mod: S$GLB,,, | Performed by: INTERNAL MEDICINE

## 2022-06-13 PROCEDURE — 3008F PR BODY MASS INDEX (BMI) DOCUMENTED: ICD-10-PCS | Mod: CPTII,S$GLB,, | Performed by: INTERNAL MEDICINE

## 2022-06-13 PROCEDURE — 99999 PR PBB SHADOW E&M-EST. PATIENT-LVL IV: CPT | Mod: PBBFAC,,, | Performed by: INTERNAL MEDICINE

## 2022-06-13 PROCEDURE — 1101F PT FALLS ASSESS-DOCD LE1/YR: CPT | Mod: CPTII,S$GLB,, | Performed by: RADIOLOGY

## 2022-06-13 PROCEDURE — 1101F PR PT FALLS ASSESS DOC 0-1 FALLS W/OUT INJ PAST YR: ICD-10-PCS | Mod: CPTII,S$GLB,, | Performed by: RADIOLOGY

## 2022-06-13 PROCEDURE — 1125F AMNT PAIN NOTED PAIN PRSNT: CPT | Mod: CPTII,S$GLB,, | Performed by: INTERNAL MEDICINE

## 2022-06-13 PROCEDURE — 99499 RISK ADDL DX/OHS AUDIT: ICD-10-PCS | Mod: S$GLB,,, | Performed by: INTERNAL MEDICINE

## 2022-06-13 PROCEDURE — 99999 PR PBB SHADOW E&M-EST. PATIENT-LVL IV: ICD-10-PCS | Mod: PBBFAC,,, | Performed by: RADIOLOGY

## 2022-06-13 RX ORDER — DIPHENHYDRAMINE HYDROCHLORIDE 50 MG/ML
50 INJECTION INTRAMUSCULAR; INTRAVENOUS ONCE AS NEEDED
Status: CANCELLED | OUTPATIENT
Start: 2022-06-14

## 2022-06-13 RX ORDER — EPINEPHRINE 0.3 MG/.3ML
0.3 INJECTION SUBCUTANEOUS ONCE AS NEEDED
Status: CANCELLED | OUTPATIENT
Start: 2022-06-14

## 2022-06-13 RX ORDER — SODIUM CHLORIDE 0.9 % (FLUSH) 0.9 %
10 SYRINGE (ML) INJECTION
Status: CANCELLED | OUTPATIENT
Start: 2022-06-14

## 2022-06-13 RX ORDER — HEPARIN 100 UNIT/ML
500 SYRINGE INTRAVENOUS
Status: CANCELLED | OUTPATIENT
Start: 2022-06-14

## 2022-06-13 RX ORDER — FAMOTIDINE 10 MG/ML
20 INJECTION INTRAVENOUS
Status: CANCELLED | OUTPATIENT
Start: 2022-06-14

## 2022-06-13 NOTE — PROGRESS NOTES
HISTORY OF PRESENT ILLNESS:    The patient is a 72-year-old white female well   known to me for locally advanced left breast carcinoma, who underwent   neoadjuvant therapy consisting of four cycles of Adriamycin/Cytoxan followed by 12 weekly doses of Taxol.  She had a residual 0.3 mm focus of metastatic carcinoma in a single lymph node.  Patient and postlumpectomy radiation.  She completed a 60 month course of Arimidex/biannual Prolia for prevention of aromatase inhibitor induced bone loss.  She was recently placed on annual follow-up.  She returns to clinic earlier than scheduled appointment.  Patient is having some anxiety symptoms around the possibility of recurrence.  She feels a little less certain of her course now that she is off active treatment and having less frequent surveillance.  She also continues to struggle caring for her  who became severely ill during the pandemic.  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  She presents seeking reassurance.  She declines medical therapy for sleep disturbance/situational depression.    Shortly after patient's last office evaluation, she began to experience pain in the other portions of her left breast.  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  However, the patient has actively been dieting, losing weight, and upon palpation noted a change in the breast parenchyma which she wished evaluated.  She was seen by morro Butler for additional imaging which reveals suspicious findings.  Patient has gone on to have breast biopsy and returns to review results.  Patient had visited with Dr. Ryder since her last visit in my clinic and found is assistance helpful.    Patient has initiated neoadjuvant therapy consisting of carboplatin/Taxol/Keytruda.  She returns to clinic for re-evaluation prior to cycle 3, day 8 dose of therapy.  She continues to experience tenderness about the left  breast and the axilla.  No obvious swelling, erythema, warmth, or trauma.  Worse after recent interval ultrasound.  Patient has been seen and evaluated by surgery as well as Radiation Oncology.  Patient continues to plan on bilateral mastectomy and implant reconstruction.  Patient was a little leery of having tissue expanders placed 1st but now understands the rationale behind this.    PHYSICAL EXAMINATION:  GENERAL:  Well-developed, well-nourished white female in no acute distress, witha weight of 147.5 pounds (decreased by 1 pound).  VITAL SIGNS:  Documented in EMR and reviewed.  HEENT:  Normocephalic, atraumatic.  Oral mucosa pink and moist.  Lips without lesions.  Tongue midline.  Oropharynx clear.  Nonicteric sclerae.   NECK:  Supple, no adenopathy.  No carotid bruits, thyromegaly or thyroid nodule.  HEART:  Regular rate and rhythm without murmur, gallop or rub.                LUNGS:  Clear to auscultation bilaterally.  Normal respiratory effort.       ABDOMEN:  Soft, nontender, nondistended with positive normoactive bowel sounds, no hepatosplenomegaly.    EXTREMITIES:  No cyanosis, clubbing or edema.  Distal pulses are intact.                                           AXILLAE AND GROIN:  No palpable pathologic lymphadenopathy is appreciated.        SKIN:  Intact/turgor normal.  NEUROLOGIC:  Cranial nerves II-XII grossly intact.  Motor:  Good muscle bulk and tone.  Strength/sensory 5/5 throughout.  Gait stable.    Laboratory:  White count 4.2, hemoglobin 11, hematocrit 33.4, platelets 181, absolute neutrophil count is 2500.   Sodium 140, potassium 4.3, chloride 103, CO2 28, BUN 19, creatinine 0.7, glucose 105, calcium 9.6, liver function test within acceptable limits, GFR is greater than 60.    IMPRESSION:    1.  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  2. Situational depression.  3. Second primary, triple negative,  clinical stage II B (T2, N0, M0), left breast carcinoma.    PLAN:  1. Proceed with cycle 3, day 8 of therapy.  Return to clinic in 1 week with interval CBC, BMP, and magnesium prior to appointment.      THIS NOTE WAS CREATED USING VOICE RECOGNITION SOFTWARE AND MAY CONTAIN GRAMMATICAL ERRORS.

## 2022-06-13 NOTE — Clinical Note
Return to clinic in 1 week with interval CBC, BMP, and magnesium prior to appointment.  Patient to see me.

## 2022-06-13 NOTE — PROGRESS NOTES
06/13/2022    [facility]   Radiation Oncology Consultation    Assessment   This is a 72 y.o. y/o female with Stage IIB (cT2 N0 M0), G2, triple negtive invasive ductal carcinoma of the LEFT breast. She is seen as part of Multidisciplinary Breast Clinic prior to initiation of therapy for discussion of treatment options.  Undergoing neoadjuvant chemotherapy per Keynote trial (Carbo/Taxol/Keytruda- planned finish mid July 2022)       Plan      Treatment options were discussed with the patient including possibility of adjuvant radiation therapy.  She understands that re-irradiation is only carried out in limited scenarios due to increased associated risk.  We discussed that final recommendation will depend on surgical pathology, but is unlikely she will require post-mastectomy radiation therapy.  She has had some, but not pronounced response to neoadjuvant chemotherapy, and given triple negative disease and limited response to therapy, I would consider adjuvant radiation therapy had she not been previously radiated, which increases her risks of late toxicity, particularly to her heart, as well as negative impact on reconstruction.  However, these risks may be outweighed by benefits to locoregional control if she is found to be lymph node positive (residual marv disease after neoadjuvant chemotherapy) at the time of surgery.  In the event that final pathology shows no disease in the nodes but scarring as evidence of treatment response, Dr. Ruggiero, Dr. Bartlett, the pathologist and I will need to have a discussion (likely at Tumor Board) to determine if we feel this is a response of marv disease to the current treatment regimen, or if we believe it is a remnant of her previously treated (with neoadjuvant chemotherapy and radiation therapy) un-dissected lymph node disease.    We discussed the goals of treatment to be curative.   We discussed that spacer placement is generally preferable to  implant reconstruction in the event that she does need radiation therapy, as radiation therapy can have a significant negative implant on implant outcome/cosmesis/stability, potentially necessitating multiple revisions and future surgeries.    The risks, benefits, scheduling, alternatives to and rationale of radiation therapy were explained in detail.     After this discussion, she elected to proceed with completion of neoadjuvant chemotherapy and mastectomy.  Final radiation therapy recommendations to follow.    She was given our contact information, and she was told that she could call our clinic at any time if she has any questions or concerns.      Radiation Treatment Details:    Pending final pathology and review of previous treatment records (records have been requested from 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 and anastrazole x 5 years. More recently, she noted some changes in the LEFT lateral breast in 12/2022, and felt a discrete mass around 3/2022, associated with some shooting pains.  Work up this time around showed LEFT 3:00 triple negative breast cancer. +strong family history of pre-menopausal breast cancer; genetic testing negative in 2016.    6/14/22 Screening mammogram:  BI-RADS 2    4/5/22 LEFT Diagnostic mammogram/ultrasound: post-surgical findings from prior lumpectomy and radiation therapy in the upper outer quadrant; new developing asymmetry in the LEFT 3:00 anterior depth 8cmfn which correlates with palpable mass, increased in size compared to priors; on ultrasound 1.6cm irregularly shaped mass with spiculated margins in LEFT 3:00 anterior depth; normal-sized,  morphologically nrml LEfT axillary lymph node prsent with preserved fatty antelmo and no abnormal cortical thickening.     4/6/22 LEFT 3:00 8cmfn core biopsy: invasive ductal carcinoma, grade 2, no LVI, no ductal carcinoma in-situ, ER-/AR-/HER2-, Ki67 80%    4/21/22 MRI Breasts: No evidence of malignancy on the right.  LEFT 2.6 x 1.4 x 2.2 irregularly shaped mass with spiculated margins at upper outer quadrant 3:00 located 2.2cm anterior to prior lumpectomy site; no suspicious lymphadenopathy; 2.2cm cyst in RIGHT humeral head    Patient has had a limited response to neoadjuvant therapy, with only slight decrease in size of tumor.  She reports pain associated with recent ultrasound.  She expresses some concern about her reconstruction, particularly the potential need for a spacer prior to final implant.     Possibility of pregnancy: No  History of prior irradiation: Yes - at [facility] in 2016 (breast and regional nodes- awaiting records)  History of prior systemic anti-cancer therapy: Yes - neoadjuvant chemotherapy x2, adjuvant anastrazole, currently on chemotherapy-Keytruda  History of collagen vascular disease: No  Implanted electronic device (pacer/defib/nerve stimulator): No      Past Medical History:   Diagnosis Date    Allergy     Breast cancer 02/2016    left breast, neoadjuvant chemo, lumpectomy, and radiation    Breast cancer 04/2022    left breast 3:00 invasive ductal carcinoma    Bronchitis     COVID-19 08/2020    HLD (hyperlipidemia)     Hypertension     S/P chemotherapy, time since greater than 12 weeks     Skin cancer     resovled       Past Surgical History:   Procedure Laterality Date    ADENOIDECTOMY      BREAST BIOPSY Left 2016    BREAST BIOPSY Left 04/2022    left breast invasive ductal carcinoma    BREAST CYST EXCISION      BREAST LUMPECTOMY Left 2016    COLONOSCOPY N/A 06/21/2018    Procedure: COLONOSCOPY;  Surgeon: Hiro Billy Jr., MD;  Location: [facility];  Service: Endoscopy;   Laterality: N/A;    CYSTOCELE REPAIR      EXCISIONAL HEMORRHOIDECTOMY      HYSTERECTOMY      due to prolapse    INSERTION OF TUNNELED CENTRAL VENOUS CATHETER (CVC) WITH SUBCUTANEOUS PORT Right 04/22/2022    Procedure: [id number];  Surgeon: Haile Domingo MD;  Location[facility];  Service: General;  Laterality: Right;    ORBITAL RECONSTRUCTION      PORTACATH PLACEMENT      since removed    REMOVAL OF VASCULAR ACCESS PORT      RHINOPLASTY TIP      SIGMOIDOSCOPY         Social History     Tobacco Use    Smoking status: Never Smoker    Smokeless tobacco: Never Used   Substance Use Topics    Alcohol use: Yes     Alcohol/week: 2.0 standard drinks     Types: 2 Cans of beer per week     Comment: per  week    Drug use: No       Cancer-related family history includes Breast cancer (age of onset: 40) in her sister; Breast cancer (age of onset: 42) in her mother; Cancer in her mother.    Current Outpatient Medications on File Prior to Visit   Medication Sig Dispense Refill    albuterol (PROAIR HFA) 90 mcg/actuation inhaler Inhale 2 puffs into the lungs every 6 (six) hours as needed for Wheezing. Rescue 18 g 11    ALPRAZolam (XANAX) 0.5 MG tablet Take 1 tablet (0.5 mg total) by mouth 3 (three) times daily as needed for Insomnia or Anxiety. (Patient not taking: Reported on 6/8/2022) 60 tablet 0    aspirin (ECOTRIN) 81 MG EC tablet Take 81 mg by mouth once daily.      atorvastatin (LIPITOR) 40 MG tablet Take 1 tablet (40 mg total) by mouth once daily. (Patient not taking: No sig reported) 90 tablet 1    B-complex with vitamin C (Z-BEC OR EQUIV) tablet Take 1 tablet by mouth once daily.      calcium carbonate-vitamin D3 600 mg-20 mcg (800 unit) Tab Take 2 tablets by mouth once daily. 200 tablet 3    cetirizine (ZYRTEC) 10 MG tablet Take 10 mg by mouth daily as needed.       chlorhexidine (PERIDEX) 0.12 % solution Swish in mouth & spit 10mL twice daily for 5 days, Start 4/20/2022. 473 mL 0     "diphenhydrAMINE-LIDOcaine HCl 2%-nystatin-magnesium hydroxide 400 mg/5 ml Swish and swallow 5 ml every 4 hours as needed for mouth / throat pain 100 mL 5    fish oil-omega-3 fatty acids 300-1,000 mg capsule Take 2 g by mouth daily as needed.       fluticasone propionate (FLONASE) 50 mcg/actuation nasal spray use 1 spray (50 mcg total) by Each Nostril route daily as needed. 16 g 11    hydroCHLOROthiazide (HYDRODIURIL) 25 MG tablet Take 1 tablet (25 mg total) by mouth daily as needed. 90 tablet 1    LIDOcaine-prilocaine (EMLA) cream Apply topically as directed 30 g 5    mirabegron (MYRBETRIQ) 50 mg Tb24 Take 1 tablet (50 mg total) by mouth once daily. 30 tablet 11    multivitamin capsule Take 1 capsule by mouth once daily.      mupirocin (BACTROBAN) 2 % ointment Apply to each nostril with clean Q-Tip twice daily for 5 days. Start 4/20/2022. 22 g 0    prochlorperazine (COMPAZINE) 10 MG tablet Take 1 tablet (10 mg total) by mouth every 6 (six) hours as needed for nausea and vomiting 30 tablet 5     No current facility-administered medications on file prior to visit.       Review of patient's allergies indicates:  No Known Allergies    Review of Systems   Constitutional: Negative.    HENT: Negative.    Eyes: Negative.    Respiratory: Negative.    Cardiovascular: Negative.    Gastrointestinal: Negative.    Genitourinary: Negative.    Musculoskeletal: Negative.    Skin: Negative.    Neurological: Negative.    Endo/Heme/Allergies: Negative.    Psychiatric/Behavioral: Negative.         Vital Signs: BP (!) 176/101 (BP Location: Right arm, Patient Position: Sitting)   Pulse 98   Resp 18   Ht 4' 11.5" (1.511 m)   Wt 66.7 kg (147 lb 0.8 oz)   SpO2 97%   BMI 29.20 kg/m²     ECOG Performance Status: 0 - Fully Active    Physical Exam  Vitals reviewed.   Constitutional:       General: She is not in acute distress.     Appearance: Normal appearance. She is not ill-appearing or toxic-appearing.   HENT:      Head: " Normocephalic and atraumatic.      Nose: Nose normal.   Eyes:      Extraocular Movements: Extraocular movements intact.      Conjunctiva/sclera: Conjunctivae normal.      Pupils: Pupils are equal, round, and reactive to light.   Pulmonary:      Effort: Pulmonary effort is normal.   Chest:       Musculoskeletal:         General: Normal range of motion.      Cervical back: Normal range of motion.   Skin:     General: Skin is warm.   Neurological:      General: No focal deficit present.      Mental Status: She is alert and oriented to person, place, and time.      Cranial Nerves: No cranial nerve deficit.      Gait: Gait normal.   Psychiatric:         Mood and Affect: Mood normal.         Behavior: Behavior normal.         Thought Content: Thought content normal.         Judgment: Judgment normal.            Imaging: I have personally reviewed the patient's available images and reports and summarized pertinent findings above in HPI.     Pathology: I have personally reviewed the patient's available pathology and summarized pertinent findings above in HPI.     This case was discussed with Dr. Bartlett and Dr. Ruggiero.      - Thank you for allowing me to participate in the care of your patient.    Rosangela Kiser MD

## 2022-06-14 ENCOUNTER — INFUSION (OUTPATIENT)
Dept: INFUSION THERAPY | Facility: HOSPITAL | Age: 72
End: 2022-06-14
Attending: INTERNAL MEDICINE
Payer: MEDICARE

## 2022-06-14 VITALS
SYSTOLIC BLOOD PRESSURE: 160 MMHG | BODY MASS INDEX: 29.77 KG/M2 | DIASTOLIC BLOOD PRESSURE: 79 MMHG | HEIGHT: 59 IN | WEIGHT: 147.69 LBS | HEART RATE: 80 BPM | TEMPERATURE: 98 F | RESPIRATION RATE: 18 BRPM

## 2022-06-14 DIAGNOSIS — Z17.1 MALIGNANT NEOPLASM OF NIPPLE OF LEFT BREAST IN FEMALE, ESTROGEN RECEPTOR NEGATIVE: Primary | ICD-10-CM

## 2022-06-14 DIAGNOSIS — C50.012 MALIGNANT NEOPLASM OF NIPPLE OF LEFT BREAST IN FEMALE, ESTROGEN RECEPTOR NEGATIVE: Primary | ICD-10-CM

## 2022-06-14 PROCEDURE — 96413 CHEMO IV INFUSION 1 HR: CPT | Mod: PN

## 2022-06-14 PROCEDURE — 96375 TX/PRO/DX INJ NEW DRUG ADDON: CPT | Mod: PN

## 2022-06-14 PROCEDURE — 96367 TX/PROPH/DG ADDL SEQ IV INF: CPT | Mod: PN

## 2022-06-14 PROCEDURE — 63600175 PHARM REV CODE 636 W HCPCS: Mod: PN | Performed by: INTERNAL MEDICINE

## 2022-06-14 PROCEDURE — 96417 CHEMO IV INFUS EACH ADDL SEQ: CPT | Mod: PN

## 2022-06-14 PROCEDURE — 25000003 PHARM REV CODE 250: Mod: PN | Performed by: INTERNAL MEDICINE

## 2022-06-14 RX ORDER — HEPARIN 100 UNIT/ML
500 SYRINGE INTRAVENOUS
Status: DISCONTINUED | OUTPATIENT
Start: 2022-06-14 | End: 2022-06-14 | Stop reason: HOSPADM

## 2022-06-14 RX ORDER — EPINEPHRINE 0.3 MG/.3ML
0.3 INJECTION SUBCUTANEOUS ONCE AS NEEDED
Status: DISCONTINUED | OUTPATIENT
Start: 2022-06-14 | End: 2022-06-14 | Stop reason: HOSPADM

## 2022-06-14 RX ORDER — SODIUM CHLORIDE 0.9 % (FLUSH) 0.9 %
10 SYRINGE (ML) INJECTION
Status: DISCONTINUED | OUTPATIENT
Start: 2022-06-14 | End: 2022-06-14 | Stop reason: HOSPADM

## 2022-06-14 RX ORDER — DIPHENHYDRAMINE HYDROCHLORIDE 50 MG/ML
50 INJECTION INTRAMUSCULAR; INTRAVENOUS ONCE AS NEEDED
Status: DISCONTINUED | OUTPATIENT
Start: 2022-06-14 | End: 2022-06-14 | Stop reason: HOSPADM

## 2022-06-14 RX ORDER — FAMOTIDINE 10 MG/ML
20 INJECTION INTRAVENOUS
Status: COMPLETED | OUTPATIENT
Start: 2022-06-14 | End: 2022-06-14

## 2022-06-14 RX ADMIN — CARBOPLATIN 125 MG: 10 INJECTION, SOLUTION INTRAVENOUS at 01:06

## 2022-06-14 RX ADMIN — FAMOTIDINE 20 MG: 10 INJECTION INTRAVENOUS at 12:06

## 2022-06-14 RX ADMIN — PALONOSETRON 0.25 MG: 0.05 INJECTION, SOLUTION INTRAVENOUS at 12:06

## 2022-06-14 RX ADMIN — PACLITAXEL 132 MG: 6 INJECTION, SOLUTION, CONCENTRATE INTRAVENOUS at 12:06

## 2022-06-14 RX ADMIN — DIPHENHYDRAMINE HYDROCHLORIDE 50 MG: 50 INJECTION, SOLUTION INTRAMUSCULAR; INTRAVENOUS at 11:06

## 2022-06-14 RX ADMIN — SODIUM CHLORIDE: 9 INJECTION, SOLUTION INTRAVENOUS at 11:06

## 2022-06-14 NOTE — PLAN OF CARE
Problem: Adult Inpatient Plan of Care  Goal: Plan of Care Review  Outcome: Ongoing, Progressing  Flowsheets (Taken 6/14/2022 1406)  Plan of Care Reviewed With: patient   Pt tolerated taxol/carbo infusion well.  No adverse reaction noted.   PAD flushed with NS and de-accessed per protocol.  Patient left clinic in no acute distress.

## 2022-06-14 NOTE — PLAN OF CARE
Problem: Adult Inpatient Plan of Care  Goal: Patient-Specific Goal (Individualized)  Outcome: Ongoing, Progressing  Flowsheets (Taken 6/14/2022 1203)  Anxieties, Fears or Concerns: none  Individualized Care Needs: recliner, warm blanket  Patient-Specific Goals (Include Timeframe): no s/s of rx during tx     Problem: Fatigue  Goal: Improved Activity Tolerance  Outcome: Ongoing, Progressing  Intervention: Promote Improved Energy  Flowsheets (Taken 6/14/2022 1203)  Fatigue Management: frequent rest breaks encouraged  Sleep/Rest Enhancement: relaxation techniques promoted  Activity Management: Ambulated -L4

## 2022-06-15 DIAGNOSIS — Z17.1 MALIGNANT NEOPLASM OF UPPER-OUTER QUADRANT OF LEFT BREAST IN FEMALE, ESTROGEN RECEPTOR NEGATIVE: Primary | ICD-10-CM

## 2022-06-15 DIAGNOSIS — C50.412 MALIGNANT NEOPLASM OF UPPER-OUTER QUADRANT OF LEFT BREAST IN FEMALE, ESTROGEN RECEPTOR NEGATIVE: Primary | ICD-10-CM

## 2022-06-20 ENCOUNTER — TELEPHONE (OUTPATIENT)
Dept: HEMATOLOGY/ONCOLOGY | Facility: CLINIC | Age: 72
End: 2022-06-20
Payer: MEDICARE

## 2022-06-20 ENCOUNTER — DOCUMENTATION ONLY (OUTPATIENT)
Dept: INFUSION THERAPY | Facility: HOSPITAL | Age: 72
End: 2022-06-20
Payer: MEDICARE

## 2022-06-20 ENCOUNTER — OFFICE VISIT (OUTPATIENT)
Dept: HEMATOLOGY/ONCOLOGY | Facility: CLINIC | Age: 72
End: 2022-06-20
Payer: MEDICARE

## 2022-06-20 ENCOUNTER — LAB VISIT (OUTPATIENT)
Dept: LAB | Facility: HOSPITAL | Age: 72
End: 2022-06-20
Attending: INTERNAL MEDICINE
Payer: MEDICARE

## 2022-06-20 VITALS
WEIGHT: 150.13 LBS | HEART RATE: 73 BPM | BODY MASS INDEX: 30.27 KG/M2 | HEIGHT: 59 IN | TEMPERATURE: 99 F | OXYGEN SATURATION: 99 % | RESPIRATION RATE: 18 BRPM | SYSTOLIC BLOOD PRESSURE: 172 MMHG | DIASTOLIC BLOOD PRESSURE: 92 MMHG

## 2022-06-20 DIAGNOSIS — L30.9 DERMATITIS: ICD-10-CM

## 2022-06-20 DIAGNOSIS — C50.411 MALIGNANT NEOPLASM OF UPPER-OUTER QUADRANT OF RIGHT BREAST IN FEMALE, ESTROGEN RECEPTOR NEGATIVE: ICD-10-CM

## 2022-06-20 DIAGNOSIS — C50.012 MALIGNANT NEOPLASM OF NIPPLE OF LEFT BREAST IN FEMALE, ESTROGEN RECEPTOR NEGATIVE: ICD-10-CM

## 2022-06-20 DIAGNOSIS — F41.9 ANXIETY: ICD-10-CM

## 2022-06-20 DIAGNOSIS — F43.21 SITUATIONAL DEPRESSION: ICD-10-CM

## 2022-06-20 DIAGNOSIS — C50.012 MALIGNANT NEOPLASM OF NIPPLE OF LEFT BREAST IN FEMALE, ESTROGEN RECEPTOR NEGATIVE: Primary | ICD-10-CM

## 2022-06-20 DIAGNOSIS — Z17.1 MALIGNANT NEOPLASM OF NIPPLE OF LEFT BREAST IN FEMALE, ESTROGEN RECEPTOR NEGATIVE: ICD-10-CM

## 2022-06-20 DIAGNOSIS — Z17.1 MALIGNANT NEOPLASM OF UPPER-OUTER QUADRANT OF RIGHT BREAST IN FEMALE, ESTROGEN RECEPTOR NEGATIVE: ICD-10-CM

## 2022-06-20 DIAGNOSIS — Z17.1 MALIGNANT NEOPLASM OF NIPPLE OF LEFT BREAST IN FEMALE, ESTROGEN RECEPTOR NEGATIVE: Primary | ICD-10-CM

## 2022-06-20 LAB
ANION GAP SERPL CALC-SCNC: 8 MMOL/L (ref 8–16)
BASOPHILS # BLD AUTO: 0.01 K/UL (ref 0–0.2)
BASOPHILS NFR BLD: 0.3 % (ref 0–1.9)
BUN SERPL-MCNC: 16 MG/DL (ref 8–23)
CALCIUM SERPL-MCNC: 9.6 MG/DL (ref 8.7–10.5)
CHLORIDE SERPL-SCNC: 103 MMOL/L (ref 95–110)
CO2 SERPL-SCNC: 28 MMOL/L (ref 23–29)
CREAT SERPL-MCNC: 0.7 MG/DL (ref 0.5–1.4)
DIFFERENTIAL METHOD: ABNORMAL
EOSINOPHIL # BLD AUTO: 0 K/UL (ref 0–0.5)
EOSINOPHIL NFR BLD: 0.3 % (ref 0–8)
ERYTHROCYTE [DISTWIDTH] IN BLOOD BY AUTOMATED COUNT: 16.6 % (ref 11.5–14.5)
EST. GFR  (AFRICAN AMERICAN): >60 ML/MIN/1.73 M^2
EST. GFR  (NON AFRICAN AMERICAN): >60 ML/MIN/1.73 M^2
GLUCOSE SERPL-MCNC: 96 MG/DL (ref 70–110)
HCT VFR BLD AUTO: 33.9 % (ref 37–48.5)
HGB BLD-MCNC: 11.4 G/DL (ref 12–16)
IMM GRANULOCYTES # BLD AUTO: 0.03 K/UL (ref 0–0.04)
IMM GRANULOCYTES NFR BLD AUTO: 0.8 % (ref 0–0.5)
LYMPHOCYTES # BLD AUTO: 1.4 K/UL (ref 1–4.8)
LYMPHOCYTES NFR BLD: 35.5 % (ref 18–48)
MAGNESIUM SERPL-MCNC: 2.2 MG/DL (ref 1.6–2.6)
MCH RBC QN AUTO: 32.1 PG (ref 27–31)
MCHC RBC AUTO-ENTMCNC: 33.6 G/DL (ref 32–36)
MCV RBC AUTO: 96 FL (ref 82–98)
MONOCYTES # BLD AUTO: 0.3 K/UL (ref 0.3–1)
MONOCYTES NFR BLD: 6.8 % (ref 4–15)
NEUTROPHILS # BLD AUTO: 2.2 K/UL (ref 1.8–7.7)
NEUTROPHILS NFR BLD: 56.3 % (ref 38–73)
NRBC BLD-RTO: 0 /100 WBC
PLATELET # BLD AUTO: 182 K/UL (ref 150–450)
PMV BLD AUTO: 9.4 FL (ref 9.2–12.9)
POTASSIUM SERPL-SCNC: 4.5 MMOL/L (ref 3.5–5.1)
RBC # BLD AUTO: 3.55 M/UL (ref 4–5.4)
SODIUM SERPL-SCNC: 139 MMOL/L (ref 136–145)
WBC # BLD AUTO: 3.83 K/UL (ref 3.9–12.7)

## 2022-06-20 PROCEDURE — 1160F RVW MEDS BY RX/DR IN RCRD: CPT | Mod: CPTII,S$GLB,, | Performed by: INTERNAL MEDICINE

## 2022-06-20 PROCEDURE — 3077F PR MOST RECENT SYSTOLIC BLOOD PRESSURE >= 140 MM HG: ICD-10-PCS | Mod: CPTII,S$GLB,, | Performed by: INTERNAL MEDICINE

## 2022-06-20 PROCEDURE — 99999 PR PBB SHADOW E&M-EST. PATIENT-LVL V: CPT | Mod: PBBFAC,,, | Performed by: INTERNAL MEDICINE

## 2022-06-20 PROCEDURE — 3008F PR BODY MASS INDEX (BMI) DOCUMENTED: ICD-10-PCS | Mod: CPTII,S$GLB,, | Performed by: INTERNAL MEDICINE

## 2022-06-20 PROCEDURE — 1101F PT FALLS ASSESS-DOCD LE1/YR: CPT | Mod: CPTII,S$GLB,, | Performed by: INTERNAL MEDICINE

## 2022-06-20 PROCEDURE — 99215 PR OFFICE/OUTPT VISIT, EST, LEVL V, 40-54 MIN: ICD-10-PCS | Mod: S$GLB,,, | Performed by: INTERNAL MEDICINE

## 2022-06-20 PROCEDURE — 85025 COMPLETE CBC W/AUTO DIFF WBC: CPT | Mod: PN | Performed by: INTERNAL MEDICINE

## 2022-06-20 PROCEDURE — 3080F DIAST BP >= 90 MM HG: CPT | Mod: CPTII,S$GLB,, | Performed by: INTERNAL MEDICINE

## 2022-06-20 PROCEDURE — 3077F SYST BP >= 140 MM HG: CPT | Mod: CPTII,S$GLB,, | Performed by: INTERNAL MEDICINE

## 2022-06-20 PROCEDURE — 83735 ASSAY OF MAGNESIUM: CPT | Mod: PN | Performed by: INTERNAL MEDICINE

## 2022-06-20 PROCEDURE — 99499 RISK ADDL DX/OHS AUDIT: ICD-10-PCS | Mod: S$GLB,,, | Performed by: INTERNAL MEDICINE

## 2022-06-20 PROCEDURE — 1159F MED LIST DOCD IN RCRD: CPT | Mod: CPTII,S$GLB,, | Performed by: INTERNAL MEDICINE

## 2022-06-20 PROCEDURE — 1159F PR MEDICATION LIST DOCUMENTED IN MEDICAL RECORD: ICD-10-PCS | Mod: CPTII,S$GLB,, | Performed by: INTERNAL MEDICINE

## 2022-06-20 PROCEDURE — 99499 UNLISTED E&M SERVICE: CPT | Mod: S$GLB,,, | Performed by: INTERNAL MEDICINE

## 2022-06-20 PROCEDURE — 1160F PR REVIEW ALL MEDS BY PRESCRIBER/CLIN PHARMACIST DOCUMENTED: ICD-10-PCS | Mod: CPTII,S$GLB,, | Performed by: INTERNAL MEDICINE

## 2022-06-20 PROCEDURE — 1126F AMNT PAIN NOTED NONE PRSNT: CPT | Mod: CPTII,S$GLB,, | Performed by: INTERNAL MEDICINE

## 2022-06-20 PROCEDURE — 36415 COLL VENOUS BLD VENIPUNCTURE: CPT | Mod: PN | Performed by: INTERNAL MEDICINE

## 2022-06-20 PROCEDURE — 99999 PR PBB SHADOW E&M-EST. PATIENT-LVL V: ICD-10-PCS | Mod: PBBFAC,,, | Performed by: INTERNAL MEDICINE

## 2022-06-20 PROCEDURE — 99215 OFFICE O/P EST HI 40 MIN: CPT | Mod: S$GLB,,, | Performed by: INTERNAL MEDICINE

## 2022-06-20 PROCEDURE — 3288F PR FALLS RISK ASSESSMENT DOCUMENTED: ICD-10-PCS | Mod: CPTII,S$GLB,, | Performed by: INTERNAL MEDICINE

## 2022-06-20 PROCEDURE — 1101F PR PT FALLS ASSESS DOC 0-1 FALLS W/OUT INJ PAST YR: ICD-10-PCS | Mod: CPTII,S$GLB,, | Performed by: INTERNAL MEDICINE

## 2022-06-20 PROCEDURE — 3080F PR MOST RECENT DIASTOLIC BLOOD PRESSURE >= 90 MM HG: ICD-10-PCS | Mod: CPTII,S$GLB,, | Performed by: INTERNAL MEDICINE

## 2022-06-20 PROCEDURE — 1126F PR PAIN SEVERITY QUANTIFIED, NO PAIN PRESENT: ICD-10-PCS | Mod: CPTII,S$GLB,, | Performed by: INTERNAL MEDICINE

## 2022-06-20 PROCEDURE — 3288F FALL RISK ASSESSMENT DOCD: CPT | Mod: CPTII,S$GLB,, | Performed by: INTERNAL MEDICINE

## 2022-06-20 PROCEDURE — 3008F BODY MASS INDEX DOCD: CPT | Mod: CPTII,S$GLB,, | Performed by: INTERNAL MEDICINE

## 2022-06-20 PROCEDURE — 80048 BASIC METABOLIC PNL TOTAL CA: CPT | Mod: PN | Performed by: INTERNAL MEDICINE

## 2022-06-20 RX ORDER — FAMOTIDINE 10 MG/ML
20 INJECTION INTRAVENOUS
Status: CANCELLED | OUTPATIENT
Start: 2022-06-21

## 2022-06-20 RX ORDER — EPINEPHRINE 0.3 MG/.3ML
0.3 INJECTION SUBCUTANEOUS ONCE AS NEEDED
Status: CANCELLED | OUTPATIENT
Start: 2022-06-21

## 2022-06-20 RX ORDER — HEPARIN 100 UNIT/ML
500 SYRINGE INTRAVENOUS
Status: CANCELLED | OUTPATIENT
Start: 2022-06-21

## 2022-06-20 RX ORDER — SODIUM CHLORIDE 0.9 % (FLUSH) 0.9 %
10 SYRINGE (ML) INJECTION
Status: CANCELLED | OUTPATIENT
Start: 2022-06-21

## 2022-06-20 RX ORDER — TRIAMCINOLONE ACETONIDE 0.25 MG/G
CREAM TOPICAL 2 TIMES DAILY
Qty: 15 G | Refills: 2 | Status: SHIPPED | OUTPATIENT
Start: 2022-06-20 | End: 2023-01-06

## 2022-06-20 RX ORDER — DIPHENHYDRAMINE HYDROCHLORIDE 50 MG/ML
50 INJECTION INTRAMUSCULAR; INTRAVENOUS ONCE AS NEEDED
Status: CANCELLED | OUTPATIENT
Start: 2022-06-21

## 2022-06-20 NOTE — PROGRESS NOTES
HISTORY OF PRESENT ILLNESS:    The patient is a 72-year-old white female well   known to me for locally advanced left breast carcinoma, who underwent   neoadjuvant therapy consisting of four cycles of Adriamycin/Cytoxan followed by 12 weekly doses of Taxol.  She had a residual 0.3 mm focus of metastatic carcinoma in a single lymph node.  Patient and postlumpectomy radiation.  She completed a 60 month course of Arimidex/biannual Prolia for prevention of aromatase inhibitor induced bone loss.  She was recently placed on annual follow-up.  She returns to clinic earlier than scheduled appointment.  Patient is having some anxiety symptoms around the possibility of recurrence.  She feels a little less certain of her course now that she is off active treatment and having less frequent surveillance.  She also continues to struggle caring for her  who became severely ill during the pandemic.  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  She presents seeking reassurance.  She declines medical therapy for sleep disturbance/situational depression.    Shortly after patient's last office evaluation, she began to experience pain in the other portions of her left breast.  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  However, the patient has actively been dieting, losing weight, and upon palpation noted a change in the breast parenchyma which she wished evaluated.  She was seen by morro Butler for additional imaging which reveals suspicious findings.  Patient has gone on to have breast biopsy and returns to review results.  Patient had visited with Dr. Ryder since her last visit in my clinic and found is assistance helpful.    Patient has initiated neoadjuvant therapy consisting of carboplatin/Taxol/Keytruda.  She returns to clinic for re-evaluation prior to cycle 3, day 15 dose of therapy.  Patient has had some difficulty with pruritic dermatitis  involving the anterior chest wall and all extremities.    PHYSICAL EXAMINATION:  GENERAL:  Well-developed, well-nourished white female in no acute distress, witha weight of 150 pounds (increased by 2.5 pound).  VITAL SIGNS:  Documented in EMR and reviewed.  HEENT:  Normocephalic, atraumatic.  Oral mucosa pink and moist.  Lips without lesions.  Tongue midline.  Oropharynx clear.  Nonicteric sclerae.   NECK:  Supple, no adenopathy.  No carotid bruits, thyromegaly or thyroid nodule.  HEART:  Regular rate and rhythm without murmur, gallop or rub.                LUNGS:  Clear to auscultation bilaterally.  Normal respiratory effort.       ABDOMEN:  Soft, nontender, nondistended with positive normoactive bowel sounds, no hepatosplenomegaly.    EXTREMITIES:  No cyanosis, clubbing or edema.  Distal pulses are intact.                                           AXILLAE AND GROIN:  No palpable pathologic lymphadenopathy is appreciated.        SKIN:  Intact/turgor normal.  NEUROLOGIC:  Cranial nerves II-XII grossly intact.  Motor:  Good muscle bulk and tone.  Strength/sensory 5/5 throughout.  Gait stable.    Laboratory:  White count 3.8, hemoglobin 11.4, hematocrit 33.9, platelets 182, absolute neutrophil count 2200.  Sodium 138, potassium 4.5, chloride 103, CO2 28, BUN 16, creatinine 0.7, glucose 96, calcium 9.6, magnesium 2.2, GFR is greater than 60.     IMPRESSION:    1.  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  2. Situational depression.  3. Second primary, triple negative, clinical stage II B (T2, N0, M0), left breast carcinoma.  4. Therapy associated dermatitis.    PLAN:  1. Proceed with cycle 3, day 15 of therapy.  Return to clinic in 1 week with interval CBC, CMP, LDH, and magnesium prior to appointment.  2. Triamcinolone cream topically 2 times per day as needed for control of dermatitis.      THIS NOTE WAS CREATED USING VOICE  RECOGNITION SOFTWARE AND MAY CONTAIN GRAMMATICAL ERRORS.           Route Chart for Scheduling    Med Onc Chart Routing      Follow up with physician 1 week.   Follow up with ANGIE    Labs CBC, CMP, LDH and magnesium   Lab interval:     Imaging    Pharmacy appointment    Other referrals          Treatment Plan Information   OP PEMBROLIZUMAB WITH WEEKLY CARBOPLATIN (AUC 1.5)  AND PACLITAXEL FOLLOWED BY PEMBROLIZUMAB 200 MG Q3W   Michael Bartlett MD   Upcoming Treatment Dates - OP PEMBROLIZUMAB WITH WEEKLY CARBOPLATIN (AUC 1.5)  AND PACLITAXEL FOLLOWED BY PEMBROLIZUMAB 200 MG Q3W    6/21/2022       Pre-Medications       diphenhydramine (BENADRYL) 50 mg in NS 50 mL IVPB       famotidine (PF) injection 20 mg       Chemotherapy       PACLitaxeL (TAXOL) 80 mg/m2 = 132 mg in sodium chloride 0.9% 250 mL chemo infusion       CARBOplatin (PARAPLATIN) 125 mg in sodium chloride 0.9% 250 mL chemo infusion  6/28/2022       Pre-Medications       acetaminophen tablet 1,000 mg       diphenhydramine (BENADRYL) 50 mg in NS 50 mL IVPB       famotidine (PF) injection 20 mg       Chemotherapy       PACLitaxeL (TAXOL) 80 mg/m2 = 132 mg in sodium chloride 0.9% 250 mL chemo infusion       CARBOplatin (PARAPLATIN) 125 mg in sodium chloride 0.9% 250 mL chemo infusion  7/5/2022       Pre-Medications       diphenhydramine (BENADRYL) 50 mg in NS 50 mL IVPB       famotidine (PF) injection 20 mg       Chemotherapy       PACLitaxeL (TAXOL) 80 mg/m2 = 132 mg in sodium chloride 0.9% 250 mL chemo infusion       CARBOplatin (PARAPLATIN) 125 mg in sodium chloride 0.9% 250 mL chemo infusion  7/12/2022       Pre-Medications       diphenhydramine (BENADRYL) 50 mg in NS 50 mL IVPB       famotidine (PF) injection 20 mg       Chemotherapy       PACLitaxeL (TAXOL) 80 mg/m2 = 132 mg in sodium chloride 0.9% 250 mL chemo infusion       CARBOplatin (PARAPLATIN) 125 mg in sodium chloride 0.9% 250 mL chemo infusion

## 2022-06-20 NOTE — TELEPHONE ENCOUNTER
I spoke with Jailyn and she said she was int he building and will come down to the desk about IO appt scheduling.

## 2022-06-20 NOTE — PROGRESS NOTES
1:56 PM    This LCSW met with this pt briefly to check in and provide support. The pt reported doing well and said she was glad she scheduled her surgery, so she is moving forward.    The pt was in good spirits and reported no needs at this time.

## 2022-06-21 ENCOUNTER — INFUSION (OUTPATIENT)
Dept: INFUSION THERAPY | Facility: HOSPITAL | Age: 72
End: 2022-06-21
Attending: INTERNAL MEDICINE
Payer: MEDICARE

## 2022-06-21 ENCOUNTER — DOCUMENTATION ONLY (OUTPATIENT)
Dept: INFUSION THERAPY | Facility: HOSPITAL | Age: 72
End: 2022-06-21
Payer: MEDICARE

## 2022-06-21 ENCOUNTER — TELEPHONE (OUTPATIENT)
Dept: HEMATOLOGY/ONCOLOGY | Facility: CLINIC | Age: 72
End: 2022-06-21
Payer: MEDICARE

## 2022-06-21 VITALS
BODY MASS INDEX: 30.14 KG/M2 | SYSTOLIC BLOOD PRESSURE: 134 MMHG | RESPIRATION RATE: 18 BRPM | WEIGHT: 149.5 LBS | HEIGHT: 59 IN | HEART RATE: 75 BPM | DIASTOLIC BLOOD PRESSURE: 81 MMHG | TEMPERATURE: 99 F

## 2022-06-21 DIAGNOSIS — C50.012 MALIGNANT NEOPLASM OF NIPPLE OF LEFT BREAST IN FEMALE, ESTROGEN RECEPTOR NEGATIVE: Primary | ICD-10-CM

## 2022-06-21 DIAGNOSIS — Z17.1 MALIGNANT NEOPLASM OF NIPPLE OF LEFT BREAST IN FEMALE, ESTROGEN RECEPTOR NEGATIVE: Primary | ICD-10-CM

## 2022-06-21 PROCEDURE — 96413 CHEMO IV INFUSION 1 HR: CPT | Mod: PN

## 2022-06-21 PROCEDURE — 96367 TX/PROPH/DG ADDL SEQ IV INF: CPT | Mod: PN

## 2022-06-21 PROCEDURE — 96375 TX/PRO/DX INJ NEW DRUG ADDON: CPT | Mod: PN

## 2022-06-21 PROCEDURE — 25000003 PHARM REV CODE 250: Mod: PN | Performed by: INTERNAL MEDICINE

## 2022-06-21 PROCEDURE — 96417 CHEMO IV INFUS EACH ADDL SEQ: CPT | Mod: PN

## 2022-06-21 PROCEDURE — 63600175 PHARM REV CODE 636 W HCPCS: Mod: PN | Performed by: INTERNAL MEDICINE

## 2022-06-21 RX ORDER — HEPARIN 100 UNIT/ML
500 SYRINGE INTRAVENOUS
Status: DISCONTINUED | OUTPATIENT
Start: 2022-06-21 | End: 2022-06-21 | Stop reason: HOSPADM

## 2022-06-21 RX ORDER — SODIUM CHLORIDE 0.9 % (FLUSH) 0.9 %
10 SYRINGE (ML) INJECTION
Status: DISCONTINUED | OUTPATIENT
Start: 2022-06-21 | End: 2022-06-21 | Stop reason: HOSPADM

## 2022-06-21 RX ORDER — DIPHENHYDRAMINE HYDROCHLORIDE 50 MG/ML
50 INJECTION INTRAMUSCULAR; INTRAVENOUS ONCE AS NEEDED
Status: DISCONTINUED | OUTPATIENT
Start: 2022-06-21 | End: 2022-06-21 | Stop reason: HOSPADM

## 2022-06-21 RX ORDER — EPINEPHRINE 0.3 MG/.3ML
0.3 INJECTION SUBCUTANEOUS ONCE AS NEEDED
Status: DISCONTINUED | OUTPATIENT
Start: 2022-06-21 | End: 2022-06-21 | Stop reason: HOSPADM

## 2022-06-21 RX ORDER — FAMOTIDINE 10 MG/ML
20 INJECTION INTRAVENOUS
Status: COMPLETED | OUTPATIENT
Start: 2022-06-21 | End: 2022-06-21

## 2022-06-21 RX ADMIN — DEXAMETHASONE SODIUM PHOSPHATE 0.25 MG: 10 INJECTION, SOLUTION INTRAMUSCULAR; INTRAVENOUS at 01:06

## 2022-06-21 RX ADMIN — PACLITAXEL 132 MG: 6 INJECTION, SOLUTION, CONCENTRATE INTRAVENOUS at 03:06

## 2022-06-21 RX ADMIN — CARBOPLATIN 125 MG: 10 INJECTION, SOLUTION INTRAVENOUS at 04:06

## 2022-06-21 RX ADMIN — FAMOTIDINE 20 MG: 10 INJECTION, SOLUTION INTRAVENOUS at 01:06

## 2022-06-21 RX ADMIN — DIPHENHYDRAMINE HYDROCHLORIDE 50 MG: 50 INJECTION, SOLUTION INTRAMUSCULAR; INTRAVENOUS at 02:06

## 2022-06-21 RX ADMIN — SODIUM CHLORIDE: 0.9 INJECTION, SOLUTION INTRAVENOUS at 01:06

## 2022-06-21 NOTE — TELEPHONE ENCOUNTER
I spoke with Jailyn about getting her IO appt scheduled and after many times of trying she said she will touch base with me in a few weeks. She said she is working through appts and having a cna for her  and needs to get things figured out first. I informed her to call when she was ready.

## 2022-06-21 NOTE — PLAN OF CARE
Problem: Adult Inpatient Plan of Care  Goal: Plan of Care Review  Outcome: Ongoing, Progressing  Flowsheets (Taken 6/21/2022 1707)  Plan of Care Reviewed With: patient   Pt tolerated taxol / carbo infusion well.   No adverse reaction noted.  PAC flushed with NS and de-accessed per protocol.   PIV de-accessed per protocol.   Pt left clinic in no acute distress.

## 2022-06-21 NOTE — PROGRESS NOTES
Oncology Nutrition       Weight Check   Chart reviewed. Weight check completed on pt.       Wt Readings from Last 10 Encounters:   06/21/22 67.8 kg (149 lb 7.6 oz)   06/20/22 68.1 kg (150 lb 2.1 oz)   06/14/22 67 kg (147 lb 11.3 oz)   06/13/22 67 kg (147 lb 11.3 oz)   06/13/22 66.7 kg (147 lb 0.8 oz)   06/08/22 68.3 kg (150 lb 9.2 oz)   06/07/22 68.3 kg (150 lb 9.2 oz)   06/06/22 67.4 kg (148 lb 9.4 oz)   05/31/22 61.7 kg (136 lb 0.4 oz)   05/30/22 61.7 kg (136 lb 0.4 oz)          RD plan of care: Weight is currently 149 lbs. No significant change at this time. Per nursing nutrition risk report, pt denies any nutritional concerns or challenges at this time. RD to continue to monitor; no nutritional interventions are needed at this time.     Keerthi Alaniz, FRANIKN, LDN  06/21/2022  1:51 PM

## 2022-06-21 NOTE — PLAN OF CARE
Problem: Adult Inpatient Plan of Care  Goal: Patient-Specific Goal (Individualized)  Outcome: Ongoing, Progressing  Flowsheets (Taken 6/21/2022 1614)  Anxieties, Fears or Concerns: none  Individualized Care Needs: recliner, blanket, pillow  Patient-Specific Goals (Include Timeframe): no s/sx of rx during tx     Problem: Fatigue  Goal: Improved Activity Tolerance  Intervention: Promote Improved Energy  Flowsheets (Taken 6/21/2022 1614)  Fatigue Management:   frequent rest breaks encouraged   paced activity encouraged  Sleep/Rest Enhancement:   natural light exposure provided   noise level reduced  Activity Management:   Ambulated -L4   Ambulated to bathroom - L4   Ambulated in yi - L4   Up in chair - L3

## 2022-06-21 NOTE — PROGRESS NOTES
3:00 PM    This LCSW met with this pt to check in and provide support and encouragement. The pt reported being excited she's on treatment 9 of 12 and has her surgery scheduled.    The pt was pleasant and shared general conversation. She reported no needs at this time.

## 2022-06-27 ENCOUNTER — OFFICE VISIT (OUTPATIENT)
Dept: HEMATOLOGY/ONCOLOGY | Facility: CLINIC | Age: 72
End: 2022-06-27
Payer: MEDICARE

## 2022-06-27 ENCOUNTER — LAB VISIT (OUTPATIENT)
Dept: LAB | Facility: HOSPITAL | Age: 72
End: 2022-06-27
Attending: INTERNAL MEDICINE
Payer: MEDICARE

## 2022-06-27 VITALS
DIASTOLIC BLOOD PRESSURE: 83 MMHG | RESPIRATION RATE: 18 BRPM | SYSTOLIC BLOOD PRESSURE: 164 MMHG | BODY MASS INDEX: 30.35 KG/M2 | TEMPERATURE: 99 F | HEART RATE: 84 BPM | HEIGHT: 59 IN | WEIGHT: 150.56 LBS | OXYGEN SATURATION: 98 %

## 2022-06-27 DIAGNOSIS — Z17.1 MALIGNANT NEOPLASM OF NIPPLE OF LEFT BREAST IN FEMALE, ESTROGEN RECEPTOR NEGATIVE: Primary | ICD-10-CM

## 2022-06-27 DIAGNOSIS — C50.012 MALIGNANT NEOPLASM OF NIPPLE OF LEFT BREAST IN FEMALE, ESTROGEN RECEPTOR NEGATIVE: ICD-10-CM

## 2022-06-27 DIAGNOSIS — Z17.1 MALIGNANT NEOPLASM OF NIPPLE OF LEFT BREAST IN FEMALE, ESTROGEN RECEPTOR NEGATIVE: ICD-10-CM

## 2022-06-27 DIAGNOSIS — C50.012 MALIGNANT NEOPLASM OF NIPPLE OF LEFT BREAST IN FEMALE, ESTROGEN RECEPTOR NEGATIVE: Primary | ICD-10-CM

## 2022-06-27 LAB
ALBUMIN SERPL BCP-MCNC: 3.5 G/DL (ref 3.5–5.2)
ALP SERPL-CCNC: 54 U/L (ref 55–135)
ALT SERPL W/O P-5'-P-CCNC: 14 U/L (ref 10–44)
ANION GAP SERPL CALC-SCNC: 9 MMOL/L (ref 8–16)
AST SERPL-CCNC: 15 U/L (ref 10–40)
BASOPHILS # BLD AUTO: 0.03 K/UL (ref 0–0.2)
BASOPHILS NFR BLD: 0.9 % (ref 0–1.9)
BILIRUB SERPL-MCNC: 0.3 MG/DL (ref 0.1–1)
BUN SERPL-MCNC: 18 MG/DL (ref 8–23)
CALCIUM SERPL-MCNC: 9.6 MG/DL (ref 8.7–10.5)
CHLORIDE SERPL-SCNC: 104 MMOL/L (ref 95–110)
CO2 SERPL-SCNC: 27 MMOL/L (ref 23–29)
CREAT SERPL-MCNC: 0.7 MG/DL (ref 0.5–1.4)
DIFFERENTIAL METHOD: ABNORMAL
EOSINOPHIL # BLD AUTO: 0 K/UL (ref 0–0.5)
EOSINOPHIL NFR BLD: 0.3 % (ref 0–8)
ERYTHROCYTE [DISTWIDTH] IN BLOOD BY AUTOMATED COUNT: 17 % (ref 11.5–14.5)
EST. GFR  (AFRICAN AMERICAN): >60 ML/MIN/1.73 M^2
EST. GFR  (NON AFRICAN AMERICAN): >60 ML/MIN/1.73 M^2
GLUCOSE SERPL-MCNC: 91 MG/DL (ref 70–110)
HCT VFR BLD AUTO: 33.6 % (ref 37–48.5)
HGB BLD-MCNC: 11 G/DL (ref 12–16)
IMM GRANULOCYTES # BLD AUTO: 0.02 K/UL (ref 0–0.04)
IMM GRANULOCYTES NFR BLD AUTO: 0.6 % (ref 0–0.5)
LDH SERPL L TO P-CCNC: 170 U/L (ref 110–260)
LYMPHOCYTES # BLD AUTO: 1.3 K/UL (ref 1–4.8)
LYMPHOCYTES NFR BLD: 40.2 % (ref 18–48)
MAGNESIUM SERPL-MCNC: 2.2 MG/DL (ref 1.6–2.6)
MCH RBC QN AUTO: 32 PG (ref 27–31)
MCHC RBC AUTO-ENTMCNC: 32.7 G/DL (ref 32–36)
MCV RBC AUTO: 98 FL (ref 82–98)
MONOCYTES # BLD AUTO: 0.3 K/UL (ref 0.3–1)
MONOCYTES NFR BLD: 9.9 % (ref 4–15)
NEUTROPHILS # BLD AUTO: 1.6 K/UL (ref 1.8–7.7)
NEUTROPHILS NFR BLD: 48.1 % (ref 38–73)
NRBC BLD-RTO: 0 /100 WBC
PLATELET # BLD AUTO: 184 K/UL (ref 150–450)
PMV BLD AUTO: 9.3 FL (ref 9.2–12.9)
POTASSIUM SERPL-SCNC: 4.6 MMOL/L (ref 3.5–5.1)
PROT SERPL-MCNC: 6.6 G/DL (ref 6–8.4)
RBC # BLD AUTO: 3.44 M/UL (ref 4–5.4)
SODIUM SERPL-SCNC: 140 MMOL/L (ref 136–145)
WBC # BLD AUTO: 3.33 K/UL (ref 3.9–12.7)

## 2022-06-27 PROCEDURE — 1101F PT FALLS ASSESS-DOCD LE1/YR: CPT | Mod: CPTII,S$GLB,, | Performed by: NURSE PRACTITIONER

## 2022-06-27 PROCEDURE — 3077F SYST BP >= 140 MM HG: CPT | Mod: CPTII,S$GLB,, | Performed by: NURSE PRACTITIONER

## 2022-06-27 PROCEDURE — 3008F PR BODY MASS INDEX (BMI) DOCUMENTED: ICD-10-PCS | Mod: CPTII,S$GLB,, | Performed by: NURSE PRACTITIONER

## 2022-06-27 PROCEDURE — 1101F PR PT FALLS ASSESS DOC 0-1 FALLS W/OUT INJ PAST YR: ICD-10-PCS | Mod: CPTII,S$GLB,, | Performed by: NURSE PRACTITIONER

## 2022-06-27 PROCEDURE — 3008F BODY MASS INDEX DOCD: CPT | Mod: CPTII,S$GLB,, | Performed by: NURSE PRACTITIONER

## 2022-06-27 PROCEDURE — 3288F PR FALLS RISK ASSESSMENT DOCUMENTED: ICD-10-PCS | Mod: CPTII,S$GLB,, | Performed by: NURSE PRACTITIONER

## 2022-06-27 PROCEDURE — 1160F PR REVIEW ALL MEDS BY PRESCRIBER/CLIN PHARMACIST DOCUMENTED: ICD-10-PCS | Mod: CPTII,S$GLB,, | Performed by: NURSE PRACTITIONER

## 2022-06-27 PROCEDURE — 3079F PR MOST RECENT DIASTOLIC BLOOD PRESSURE 80-89 MM HG: ICD-10-PCS | Mod: CPTII,S$GLB,, | Performed by: NURSE PRACTITIONER

## 2022-06-27 PROCEDURE — 99214 PR OFFICE/OUTPT VISIT, EST, LEVL IV, 30-39 MIN: ICD-10-PCS | Mod: S$GLB,,, | Performed by: NURSE PRACTITIONER

## 2022-06-27 PROCEDURE — 99999 PR PBB SHADOW E&M-EST. PATIENT-LVL IV: ICD-10-PCS | Mod: PBBFAC,,, | Performed by: NURSE PRACTITIONER

## 2022-06-27 PROCEDURE — 85025 COMPLETE CBC W/AUTO DIFF WBC: CPT | Mod: PN | Performed by: INTERNAL MEDICINE

## 2022-06-27 PROCEDURE — 99214 OFFICE O/P EST MOD 30 MIN: CPT | Mod: S$GLB,,, | Performed by: NURSE PRACTITIONER

## 2022-06-27 PROCEDURE — 3077F PR MOST RECENT SYSTOLIC BLOOD PRESSURE >= 140 MM HG: ICD-10-PCS | Mod: CPTII,S$GLB,, | Performed by: NURSE PRACTITIONER

## 2022-06-27 PROCEDURE — 1126F PR PAIN SEVERITY QUANTIFIED, NO PAIN PRESENT: ICD-10-PCS | Mod: CPTII,S$GLB,, | Performed by: NURSE PRACTITIONER

## 2022-06-27 PROCEDURE — 99999 PR PBB SHADOW E&M-EST. PATIENT-LVL IV: CPT | Mod: PBBFAC,,, | Performed by: NURSE PRACTITIONER

## 2022-06-27 PROCEDURE — 3079F DIAST BP 80-89 MM HG: CPT | Mod: CPTII,S$GLB,, | Performed by: NURSE PRACTITIONER

## 2022-06-27 PROCEDURE — 36415 COLL VENOUS BLD VENIPUNCTURE: CPT | Mod: PN | Performed by: INTERNAL MEDICINE

## 2022-06-27 PROCEDURE — 1159F PR MEDICATION LIST DOCUMENTED IN MEDICAL RECORD: ICD-10-PCS | Mod: CPTII,S$GLB,, | Performed by: NURSE PRACTITIONER

## 2022-06-27 PROCEDURE — 1159F MED LIST DOCD IN RCRD: CPT | Mod: CPTII,S$GLB,, | Performed by: NURSE PRACTITIONER

## 2022-06-27 PROCEDURE — 3288F FALL RISK ASSESSMENT DOCD: CPT | Mod: CPTII,S$GLB,, | Performed by: NURSE PRACTITIONER

## 2022-06-27 PROCEDURE — 80053 COMPREHEN METABOLIC PANEL: CPT | Mod: PN | Performed by: INTERNAL MEDICINE

## 2022-06-27 PROCEDURE — 83615 LACTATE (LD) (LDH) ENZYME: CPT | Mod: PN | Performed by: INTERNAL MEDICINE

## 2022-06-27 PROCEDURE — 1160F RVW MEDS BY RX/DR IN RCRD: CPT | Mod: CPTII,S$GLB,, | Performed by: NURSE PRACTITIONER

## 2022-06-27 PROCEDURE — 83735 ASSAY OF MAGNESIUM: CPT | Mod: PN | Performed by: INTERNAL MEDICINE

## 2022-06-27 PROCEDURE — 1126F AMNT PAIN NOTED NONE PRSNT: CPT | Mod: CPTII,S$GLB,, | Performed by: NURSE PRACTITIONER

## 2022-06-27 RX ORDER — ACETAMINOPHEN 500 MG
1000 TABLET ORAL
Status: CANCELLED
Start: 2022-06-28

## 2022-06-27 RX ORDER — HEPARIN 100 UNIT/ML
500 SYRINGE INTRAVENOUS
Status: CANCELLED | OUTPATIENT
Start: 2022-06-28

## 2022-06-27 RX ORDER — EPINEPHRINE 0.3 MG/.3ML
0.3 INJECTION SUBCUTANEOUS ONCE AS NEEDED
Status: CANCELLED | OUTPATIENT
Start: 2022-06-28

## 2022-06-27 RX ORDER — FAMOTIDINE 10 MG/ML
20 INJECTION INTRAVENOUS
Status: CANCELLED | OUTPATIENT
Start: 2022-06-28

## 2022-06-27 RX ORDER — SODIUM CHLORIDE 0.9 % (FLUSH) 0.9 %
10 SYRINGE (ML) INJECTION
Status: CANCELLED | OUTPATIENT
Start: 2022-06-28

## 2022-06-27 RX ORDER — DIPHENHYDRAMINE HYDROCHLORIDE 50 MG/ML
50 INJECTION INTRAMUSCULAR; INTRAVENOUS ONCE AS NEEDED
Status: CANCELLED | OUTPATIENT
Start: 2022-06-28

## 2022-06-27 NOTE — PROGRESS NOTES
PATIENT: Zayra Rodgers  MRN: [id number]  DATE: 6/27/2022    Diagnosis:   1 Malignant neoplasm of upper-outer quadrant of LEFT breast in female, estrogen receptor negative    2. Anxiety        Chief Complaint: Clearance for Cycle 4 Day 1    Subjective:       Interval History: Ms. Rodgers is a 72 y.o. female with depression, history of locally advanced hormone receptor positive/HER2 negative left breast cancer.  Newly diagnosed triple negative, clinical stage II B (T2, N0, M0), left breast cancer.   Planned treatment regimen for neoadjuvant Carboplatin/Taxol/Keytruda, followed by re-imaging, surgery and adjuvant Keytruda for an additional 9 cycles.     Prior History:   3/22/2016-5/24/2016: Completed 4 cycles AC  6/15/2016-8/31/2016: Completed 12 cycles weekly Taxol   10/20/2016: Lumpectomy that revealed residual 0.3 mm focus of metastatic carcinoma in a single lymph node  Completed postlumpectomy radiation   11/2016: Began Arimidex 1 mg po daily--> completed 60 month course of AI and biannual Prolia for prevention of AI induced bone loss.     4/5/22: Presented to clinic for evaluation of new left breast lump  4/6/22: Underwent biopsy of left breast mass which revealed second primary, triple negative, clinical stage II B (T2, N0, M0) left breast carcinoma    04/26/22:   Cycle 1, Day 1 Keytruda/Carbo/Taxol    TODAY  06/27/22:     Presents to the clinic today for evaluation prior to Cycle 4, Day 1 of tx.  Tolerating treatment well; ready to move forward   Surgery 08/25/22 with Brenda Ruggiero/Annabelle, bilateral mastectomy with expanders/or implants to be determined.  Appetite good; hydrating well; mild nose bleeds  Denies any fevers, mouth sores, abdominal discomfort, N/V/D, etc.    Past Medical History:   Past Medical History:   Diagnosis Date    Allergy     Breast cancer 02/2016    left breast, neoadjuvant chemo, lumpectomy, and radiation    Breast cancer 04/2022    left breast 3:00 invasive ductal carcinoma     Bronchitis     COVID-19 08/2020    HLD (hyperlipidemia)     Hypertension     S/P chemotherapy, time since greater than 12 weeks     Skin cancer     resovled       Past Surgical HIstory:   Past Surgical History:   Procedure Laterality Date    ADENOIDECTOMY      BREAST BIOPSY Left 2016    BREAST BIOPSY Left 04/2022    left breast invasive ductal carcinoma    BREAST CYST EXCISION      BREAST LUMPECTOMY Left 2016    COLONOSCOPY N/A 06/21/2018    Procedure: COLONOSCOPY;  Surgeon: Hiro Billy Jr., MD;  Location: [facility];  Service: Endoscopy;  Laterality: N/A;    CYSTOCELE REPAIR      EXCISIONAL HEMORRHOIDECTOMY      HYSTERECTOMY      due to prolapse    INSERTION OF TUNNELED CENTRAL VENOUS CATHETER (CVC) WITH SUBCUTANEOUS PORT Right 04/22/2022    Procedure: [id number];  Surgeon: Haile Domingo MD;  Location[facility];  Service: General;  Laterality: Right;    ORBITAL RECONSTRUCTION      PORTACATH PLACEMENT      since removed    REMOVAL OF VASCULAR ACCESS PORT      RHINOPLASTY TIP      SIGMOIDOSCOPY         Family History:   Family History   Problem Relation Age of Onset    Breast cancer Mother 42    Cancer Mother     COPD Father     Colon cancer Sister     Breast cancer Sister 40       Social History:  reports that she has never smoked. She has never used smokeless tobacco. She reports current alcohol use of about 2.0 standard drinks of alcohol per week. She reports that she does not use drugs.    Allergies:  Review of patient's allergies indicates:  No Known Allergies    Medications:  Current Outpatient Medications   Medication Sig Dispense Refill    albuterol (PROAIR HFA) 90 mcg/actuation inhaler Inhale 2 puffs into the lungs every 6 (six) hours as needed for Wheezing. Rescue 18 g 11    B-complex with vitamin C (Z-BEC OR EQUIV) tablet Take 1 tablet by mouth once daily.      calcium carbonate-vitamin D3 600 mg-20 mcg (800 unit) Tab Take 2 tablets by mouth once daily. 200  tablet 3    cetirizine (ZYRTEC) 10 MG tablet Take 10 mg by mouth daily as needed.       chlorhexidine (PERIDEX) 0.12 % solution Swish in mouth & spit 10mL twice daily for 5 days, Start 4/20/2022. 473 mL 0    diphenhydrAMINE-LIDOcaine HCl 2%-nystatin-magnesium hydroxide 400 mg/5 ml Swish and swallow 5 ml every 4 hours as needed for mouth / throat pain 100 mL 5    fish oil-omega-3 fatty acids 300-1,000 mg capsule Take 2 g by mouth daily as needed.       fluticasone propionate (FLONASE) 50 mcg/actuation nasal spray use 1 spray (50 mcg total) by Each Nostril route daily as needed. 16 g 11    hydroCHLOROthiazide (HYDRODIURIL) 25 MG tablet Take 1 tablet (25 mg total) by mouth daily as needed. 90 tablet 1    LIDOcaine-prilocaine (EMLA) cream Apply topically as directed 30 g 5    mirabegron (MYRBETRIQ) 50 mg Tb24 Take 1 tablet (50 mg total) by mouth once daily. 30 tablet 11    multivitamin capsule Take 1 capsule by mouth once daily.      mupirocin (BACTROBAN) 2 % ointment Apply to each nostril with clean Q-Tip twice daily for 5 days. Start 4/20/2022. 22 g 0    prochlorperazine (COMPAZINE) 10 MG tablet Take 1 tablet (10 mg total) by mouth every 6 (six) hours as needed for nausea and vomiting 30 tablet 5    triamcinolone acetonide 0.025% (KENALOG) 0.025 % cream Apply topically 2 (two) times daily. 15 g 2    ALPRAZolam (XANAX) 0.5 MG tablet Take 1 tablet (0.5 mg total) by mouth 3 (three) times daily as needed for Insomnia or Anxiety. (Patient not taking: No sig reported) 60 tablet 0    aspirin (ECOTRIN) 81 MG EC tablet Take 81 mg by mouth once daily.      atorvastatin (LIPITOR) 40 MG tablet Take 1 tablet (40 mg total) by mouth once daily. (Patient not taking: No sig reported) 90 tablet 1     No current facility-administered medications for this visit.       Review of Systems   Constitutional: Negative for appetite change, fatigue and unexpected weight change.   HENT: Negative for mouth sores and sinus pain.   "  Eyes: Negative for visual disturbance.   Respiratory: Negative for cough and shortness of breath.    Cardiovascular: Negative for chest pain and leg swelling.   Gastrointestinal: Negative for abdominal pain and diarrhea.   Genitourinary: Negative for dysuria, frequency and hematuria.   Musculoskeletal: Negative for back pain.   Skin: Negative for rash.   Neurological: Negative for headaches.   Hematological: Negative for adenopathy.   Psychiatric/Behavioral: The patient is not nervous/anxious.        Objective:    Weight:  stable  Vitals:   Vitals:    06/27/22 0846   BP: (!) 164/83   BP Location: Left arm   Patient Position: Sitting   BP Method: Medium (Manual)   Pulse: 84   Resp: 18   Temp: 99.3 °F (37.4 °C)   TempSrc: Temporal   SpO2: 98%   Weight: 68.3 kg (150 lb 9.2 oz)   Height: 4' 11" (1.499 m)     BMI: Body mass index is 30.41 kg/m².    Physical Exam  Vitals reviewed.   Constitutional:       General: She is not in acute distress.     Appearance: She is not ill-appearing or diaphoretic.   HENT:      Head: Normocephalic and atraumatic.      Mouth/Throat:      Mouth: Mucous membranes are moist.      Pharynx: Oropharynx is clear.   Eyes:      General: No scleral icterus.     Conjunctiva/sclera: Conjunctivae normal.   Cardiovascular:      Rate and Rhythm: Normal rate and regular rhythm.      Pulses: Normal pulses.      Heart sounds: Normal heart sounds.   Pulmonary:      Effort: Pulmonary effort is normal. No respiratory distress.      Breath sounds: Normal breath sounds.   Abdominal:      General: Bowel sounds are normal. There is no distension.      Palpations: Abdomen is soft.   Musculoskeletal:         General: Normal range of motion.      Cervical back: Neck supple.      Right lower leg: No edema.      Left lower leg: No edema.   Lymphadenopathy:      Cervical: No cervical adenopathy.   Skin:     General: Skin is warm and dry.      Capillary Refill: Capillary refill takes less than 2 seconds.      Findings: " No bruising or rash.      Comments: POC to RCW without erythema          Neurological:      Mental Status: She is alert and oriented to person, place, and time.      Gait: Gait normal.   Psychiatric:         Mood and Affect: Mood normal.         Behavior: Behavior normal.         Judgment: Judgment normal.         Laboratory Data:  Lab Results   Component Value Date    WBC 3.33 (L) 06/27/2022    RBC 3.44 (L) 06/27/2022    HGB 11.0 (L) 06/27/2022    HCT 33.6 (L) 06/27/2022    MCV 98 06/27/2022    MCH 32.0 (H) 06/27/2022    MCHC 32.7 06/27/2022    RDW 17.0 (H) 06/27/2022     06/27/2022    MPV 9.3 06/27/2022    GRAN 1.6 (L) 06/27/2022    GRAN 48.1 06/27/2022    LYMPH 1.3 06/27/2022    LYMPH 40.2 06/27/2022    MONO 0.3 06/27/2022    MONO 9.9 06/27/2022    EOS 0.0 06/27/2022    BASO 0.03 06/27/2022    EOSINOPHIL 0.3 06/27/2022    BASOPHIL 0.9 06/27/2022     ANC = 1601    CMP  Sodium   Date Value Ref Range Status   06/27/2022 140 136 - 145 mmol/L Final     Potassium   Date Value Ref Range Status   06/27/2022 4.6 3.5 - 5.1 mmol/L Final     Chloride   Date Value Ref Range Status   06/27/2022 104 95 - 110 mmol/L Final     CO2   Date Value Ref Range Status   06/27/2022 27 23 - 29 mmol/L Final     Glucose   Date Value Ref Range Status   06/27/2022 91 70 - 110 mg/dL Final     BUN   Date Value Ref Range Status   06/27/2022 18 8 - 23 mg/dL Final     Creatinine   Date Value Ref Range Status   06/27/2022 0.7 0.5 - 1.4 mg/dL Final     Calcium   Date Value Ref Range Status   06/27/2022 9.6 8.7 - 10.5 mg/dL Final     Total Protein   Date Value Ref Range Status   06/27/2022 6.6 6.0 - 8.4 g/dL Final     Albumin   Date Value Ref Range Status   06/27/2022 3.5 3.5 - 5.2 g/dL Final     Total Bilirubin   Date Value Ref Range Status   06/27/2022 0.3 0.1 - 1.0 mg/dL Final     Comment:     For infants and newborns, interpretation of results should be based  on gestational age, weight and in agreement with  clinical  observations.    Premature Infant recommended reference ranges:  Up to 24 hours.............<8.0 mg/dL  Up to 48 hours............<12.0 mg/dL  3-5 days..................<15.0 mg/dL  6-29 days.................<15.0 mg/dL       Alkaline Phosphatase   Date Value Ref Range Status   06/27/2022 54 (L) 55 - 135 U/L Final     AST   Date Value Ref Range Status   06/27/2022 15 10 - 40 U/L Final     ALT   Date Value Ref Range Status   06/27/2022 14 10 - 44 U/L Final     Anion Gap   Date Value Ref Range Status   06/27/2022 9 8 - 16 mmol/L Final     eGFR if    Date Value Ref Range Status   06/27/2022 >60 >60 mL/min/1.73 m^2 Final     eGFR if non    Date Value Ref Range Status   06/27/2022 >60 >60 mL/min/1.73 m^2 Final     Comment:     Calculation used to obtain the estimated glomerular filtration  rate (eGFR) is the CKD-EPI equation.        LDH:  170  Magnesium:  2.2    Assessment:       1. Malignant neoplasm of nipple of left breast in female, estrogen receptor negative         Plan:     History of Malignant neoplasm of left breast, hormone receptor positive/HER2 ebrtram negative  -Completed 4 cycles AC followed by 12 weekly doses of Taxol. S/P lumpectomy and post lumpectomy radiation  -Completed full 60 months of AI therapy with Arimidex   -Mammogram done 10/14/21; birads 2, routine due one year from that date  -CXR 10/25/21 shows no radiographic abnormality     Malignant neoplasm of the left breast, triple negative, second primary  -Clinical stage II B (T2, N0, M0), left breast cancer   -Treatment plan of neoadjuvant Carbo/Taxol/Keytruda discussed with patient   * Proceed with Cycle 4 Day 1 today; f/u in 1 week with CBC, BMP, MG prior  -Nasal saline for nares; lubricate with neosporin or mupirocin  -maintain daily hydration     Anxiety  -discontinue Restoril; start Xanax 0.5 mg tid prn   -Dr. Ryder as needed    Route Chart for Scheduling    Med Onc Chart Routing      Follow up with  physician    Follow up with ANGIE 1 week. F/U with me on 07/05 with labs prior for eval C4D8 WITH cbc, cmp, mg prior   Infusion scheduling note    Injection scheduling note    Labs    Lab interval:      Imaging    Pharmacy appointment No pharmacy appointment needed      Other referrals No additional referrals needed         Treatment Plan Information   OP PEMBROLIZUMAB WITH WEEKLY CARBOPLATIN (AUC 1.5)  AND PACLITAXEL FOLLOWED BY PEMBROLIZUMAB 200 MG Q3W   Michael Bartlett MD   Upcoming Treatment Dates - OP PEMBROLIZUMAB WITH WEEKLY CARBOPLATIN (AUC 1.5)  AND PACLITAXEL FOLLOWED BY PEMBROLIZUMAB 200 MG Q3W    6/28/2022       Pre-Medications       acetaminophen tablet 1,000 mg       diphenhydramine (BENADRYL) 50 mg in NS 50 mL IVPB       famotidine (PF) injection 20 mg       Chemotherapy       PACLitaxeL (TAXOL) 80 mg/m2 = 132 mg in sodium chloride 0.9% 250 mL chemo infusion       CARBOplatin (PARAPLATIN) 125 mg in sodium chloride 0.9% 250 mL chemo infusion       Antiemetics       palonosetron 0.25mg/dexamethasone 10mg in NS IVPB 0.25 mg       Immunotherapy       pembrolizumab (KEYTRUDA) 200 mg in sodium chloride 0.9% 108 mL infusion  7/5/2022       Pre-Medications       diphenhydramine (BENADRYL) 50 mg in NS 50 mL IVPB       famotidine (PF) injection 20 mg       Chemotherapy       PACLitaxeL (TAXOL) 80 mg/m2 = 132 mg in sodium chloride 0.9% 250 mL chemo infusion       CARBOplatin (PARAPLATIN) 125 mg in sodium chloride 0.9% 250 mL chemo infusion       Antiemetics       palonosetron 0.25mg/dexamethasone 10mg in NS IVPB 0.25 mg  7/12/2022       Pre-Medications       diphenhydramine (BENADRYL) 50 mg in NS 50 mL IVPB       famotidine (PF) injection 20 mg       Chemotherapy       PACLitaxeL (TAXOL) 80 mg/m2 = 132 mg in sodium chloride 0.9% 250 mL chemo infusion       CARBOplatin (PARAPLATIN) 125 mg in sodium chloride 0.9% 250 mL chemo infusion       Antiemetics       palonosetron 0.25mg/dexamethasone 10mg in NS IVPB 0.25  mg  7/19/2022       Pre-Medications       acetaminophen tablet 1,000 mg       diphenydramine (BENADRYL) 25 mg in NS 50 mL IVPB       famotidine (PF) injection 20 mg       Immunotherapy       pembrolizumab (KEYTRUDA) 200 mg in sodium chloride 0.9% 108 mL infusion

## 2022-06-28 ENCOUNTER — INFUSION (OUTPATIENT)
Dept: INFUSION THERAPY | Facility: HOSPITAL | Age: 72
End: 2022-06-28
Attending: INTERNAL MEDICINE
Payer: MEDICARE

## 2022-06-28 VITALS
DIASTOLIC BLOOD PRESSURE: 84 MMHG | RESPIRATION RATE: 16 BRPM | WEIGHT: 150.56 LBS | SYSTOLIC BLOOD PRESSURE: 157 MMHG | BODY MASS INDEX: 30.35 KG/M2 | HEART RATE: 74 BPM | TEMPERATURE: 99 F | HEIGHT: 59 IN

## 2022-06-28 DIAGNOSIS — C50.012 MALIGNANT NEOPLASM OF NIPPLE OF LEFT BREAST IN FEMALE, ESTROGEN RECEPTOR NEGATIVE: Primary | ICD-10-CM

## 2022-06-28 DIAGNOSIS — Z17.1 MALIGNANT NEOPLASM OF NIPPLE OF LEFT BREAST IN FEMALE, ESTROGEN RECEPTOR NEGATIVE: Primary | ICD-10-CM

## 2022-06-28 PROCEDURE — 96413 CHEMO IV INFUSION 1 HR: CPT | Mod: PN

## 2022-06-28 PROCEDURE — A4216 STERILE WATER/SALINE, 10 ML: HCPCS | Mod: PN | Performed by: NURSE PRACTITIONER

## 2022-06-28 PROCEDURE — 96417 CHEMO IV INFUS EACH ADDL SEQ: CPT | Mod: PN

## 2022-06-28 PROCEDURE — 96367 TX/PROPH/DG ADDL SEQ IV INF: CPT | Mod: PN

## 2022-06-28 PROCEDURE — 25000003 PHARM REV CODE 250: Mod: PN | Performed by: NURSE PRACTITIONER

## 2022-06-28 PROCEDURE — 63600175 PHARM REV CODE 636 W HCPCS: Mod: PN | Performed by: NURSE PRACTITIONER

## 2022-06-28 PROCEDURE — 96375 TX/PRO/DX INJ NEW DRUG ADDON: CPT | Mod: PN

## 2022-06-28 RX ORDER — FAMOTIDINE 10 MG/ML
20 INJECTION INTRAVENOUS
Status: COMPLETED | OUTPATIENT
Start: 2022-06-28 | End: 2022-06-28

## 2022-06-28 RX ORDER — ACETAMINOPHEN 500 MG
1000 TABLET ORAL
Status: COMPLETED | OUTPATIENT
Start: 2022-06-28 | End: 2022-06-28

## 2022-06-28 RX ORDER — SODIUM CHLORIDE 0.9 % (FLUSH) 0.9 %
10 SYRINGE (ML) INJECTION
Status: DISCONTINUED | OUTPATIENT
Start: 2022-06-28 | End: 2022-06-28 | Stop reason: HOSPADM

## 2022-06-28 RX ADMIN — Medication 10 ML: at 05:06

## 2022-06-28 RX ADMIN — CARBOPLATIN 125 MG: 10 INJECTION, SOLUTION INTRAVENOUS at 04:06

## 2022-06-28 RX ADMIN — DIPHENHYDRAMINE HYDROCHLORIDE 50 MG: 50 INJECTION, SOLUTION INTRAMUSCULAR; INTRAVENOUS at 02:06

## 2022-06-28 RX ADMIN — SODIUM CHLORIDE: 0.9 INJECTION, SOLUTION INTRAVENOUS at 02:06

## 2022-06-28 RX ADMIN — SODIUM CHLORIDE 200 MG: 9 INJECTION, SOLUTION INTRAVENOUS at 02:06

## 2022-06-28 RX ADMIN — PALONOSETRON 0.25 MG: 0.05 INJECTION, SOLUTION INTRAVENOUS at 03:06

## 2022-06-28 RX ADMIN — FAMOTIDINE 20 MG: 10 INJECTION, SOLUTION INTRAVENOUS at 02:06

## 2022-06-28 RX ADMIN — ACETAMINOPHEN 1000 MG: 500 TABLET, FILM COATED ORAL at 02:06

## 2022-06-28 RX ADMIN — PACLITAXEL 132 MG: 6 INJECTION, SOLUTION, CONCENTRATE INTRAVENOUS at 03:06

## 2022-06-28 NOTE — PLAN OF CARE
Pt tolerated keytruda, taxol, carbo infusions well.  No s/s of infusion reaction noted.  Instructed to call MD with any problems .

## 2022-06-29 ENCOUNTER — PES CALL (OUTPATIENT)
Dept: ADMINISTRATIVE | Facility: CLINIC | Age: 72
End: 2022-06-29
Payer: MEDICARE

## 2022-07-05 ENCOUNTER — OFFICE VISIT (OUTPATIENT)
Dept: HEMATOLOGY/ONCOLOGY | Facility: CLINIC | Age: 72
End: 2022-07-05
Payer: MEDICARE

## 2022-07-05 ENCOUNTER — DOCUMENTATION ONLY (OUTPATIENT)
Dept: INFUSION THERAPY | Facility: HOSPITAL | Age: 72
End: 2022-07-05
Payer: MEDICARE

## 2022-07-05 ENCOUNTER — INFUSION (OUTPATIENT)
Dept: INFUSION THERAPY | Facility: HOSPITAL | Age: 72
End: 2022-07-05
Attending: INTERNAL MEDICINE
Payer: MEDICARE

## 2022-07-05 VITALS
RESPIRATION RATE: 16 BRPM | WEIGHT: 150.38 LBS | HEIGHT: 59 IN | SYSTOLIC BLOOD PRESSURE: 154 MMHG | HEART RATE: 88 BPM | BODY MASS INDEX: 30.32 KG/M2 | OXYGEN SATURATION: 98 % | DIASTOLIC BLOOD PRESSURE: 85 MMHG | TEMPERATURE: 99 F

## 2022-07-05 VITALS
DIASTOLIC BLOOD PRESSURE: 85 MMHG | TEMPERATURE: 99 F | HEART RATE: 88 BPM | OXYGEN SATURATION: 98 % | BODY MASS INDEX: 30.32 KG/M2 | WEIGHT: 150.38 LBS | HEIGHT: 59 IN | SYSTOLIC BLOOD PRESSURE: 154 MMHG | RESPIRATION RATE: 16 BRPM

## 2022-07-05 DIAGNOSIS — Z17.1 MALIGNANT NEOPLASM OF NIPPLE OF LEFT BREAST IN FEMALE, ESTROGEN RECEPTOR NEGATIVE: Primary | ICD-10-CM

## 2022-07-05 DIAGNOSIS — C50.012 MALIGNANT NEOPLASM OF NIPPLE OF LEFT BREAST IN FEMALE, ESTROGEN RECEPTOR NEGATIVE: Primary | ICD-10-CM

## 2022-07-05 PROCEDURE — 63600175 PHARM REV CODE 636 W HCPCS: Mod: PN | Performed by: NURSE PRACTITIONER

## 2022-07-05 PROCEDURE — 1160F PR REVIEW ALL MEDS BY PRESCRIBER/CLIN PHARMACIST DOCUMENTED: ICD-10-PCS | Mod: CPTII,S$GLB,, | Performed by: NURSE PRACTITIONER

## 2022-07-05 PROCEDURE — 1101F PR PT FALLS ASSESS DOC 0-1 FALLS W/OUT INJ PAST YR: ICD-10-PCS | Mod: CPTII,S$GLB,, | Performed by: NURSE PRACTITIONER

## 2022-07-05 PROCEDURE — A4216 STERILE WATER/SALINE, 10 ML: HCPCS | Mod: PN | Performed by: NURSE PRACTITIONER

## 2022-07-05 PROCEDURE — 1159F PR MEDICATION LIST DOCUMENTED IN MEDICAL RECORD: ICD-10-PCS | Mod: CPTII,S$GLB,, | Performed by: NURSE PRACTITIONER

## 2022-07-05 PROCEDURE — 3079F PR MOST RECENT DIASTOLIC BLOOD PRESSURE 80-89 MM HG: ICD-10-PCS | Mod: CPTII,S$GLB,, | Performed by: NURSE PRACTITIONER

## 2022-07-05 PROCEDURE — 96413 CHEMO IV INFUSION 1 HR: CPT | Mod: PN

## 2022-07-05 PROCEDURE — 1160F RVW MEDS BY RX/DR IN RCRD: CPT | Mod: CPTII,S$GLB,, | Performed by: NURSE PRACTITIONER

## 2022-07-05 PROCEDURE — 25000003 PHARM REV CODE 250: Mod: PN | Performed by: NURSE PRACTITIONER

## 2022-07-05 PROCEDURE — 3077F SYST BP >= 140 MM HG: CPT | Mod: CPTII,S$GLB,, | Performed by: NURSE PRACTITIONER

## 2022-07-05 PROCEDURE — 96417 CHEMO IV INFUS EACH ADDL SEQ: CPT | Mod: PN

## 2022-07-05 PROCEDURE — 1126F PR PAIN SEVERITY QUANTIFIED, NO PAIN PRESENT: ICD-10-PCS | Mod: CPTII,S$GLB,, | Performed by: NURSE PRACTITIONER

## 2022-07-05 PROCEDURE — 96375 TX/PRO/DX INJ NEW DRUG ADDON: CPT | Mod: PN

## 2022-07-05 PROCEDURE — 3288F PR FALLS RISK ASSESSMENT DOCUMENTED: ICD-10-PCS | Mod: CPTII,S$GLB,, | Performed by: NURSE PRACTITIONER

## 2022-07-05 PROCEDURE — 1126F AMNT PAIN NOTED NONE PRSNT: CPT | Mod: CPTII,S$GLB,, | Performed by: NURSE PRACTITIONER

## 2022-07-05 PROCEDURE — 99999 PR PBB SHADOW E&M-EST. PATIENT-LVL V: ICD-10-PCS | Mod: PBBFAC,,, | Performed by: NURSE PRACTITIONER

## 2022-07-05 PROCEDURE — 99214 PR OFFICE/OUTPT VISIT, EST, LEVL IV, 30-39 MIN: ICD-10-PCS | Mod: S$GLB,,, | Performed by: NURSE PRACTITIONER

## 2022-07-05 PROCEDURE — 3079F DIAST BP 80-89 MM HG: CPT | Mod: CPTII,S$GLB,, | Performed by: NURSE PRACTITIONER

## 2022-07-05 PROCEDURE — 3008F PR BODY MASS INDEX (BMI) DOCUMENTED: ICD-10-PCS | Mod: CPTII,S$GLB,, | Performed by: NURSE PRACTITIONER

## 2022-07-05 PROCEDURE — 96367 TX/PROPH/DG ADDL SEQ IV INF: CPT | Mod: PN

## 2022-07-05 PROCEDURE — 99214 OFFICE O/P EST MOD 30 MIN: CPT | Mod: S$GLB,,, | Performed by: NURSE PRACTITIONER

## 2022-07-05 PROCEDURE — 1101F PT FALLS ASSESS-DOCD LE1/YR: CPT | Mod: CPTII,S$GLB,, | Performed by: NURSE PRACTITIONER

## 2022-07-05 PROCEDURE — 3077F PR MOST RECENT SYSTOLIC BLOOD PRESSURE >= 140 MM HG: ICD-10-PCS | Mod: CPTII,S$GLB,, | Performed by: NURSE PRACTITIONER

## 2022-07-05 PROCEDURE — 3008F BODY MASS INDEX DOCD: CPT | Mod: CPTII,S$GLB,, | Performed by: NURSE PRACTITIONER

## 2022-07-05 PROCEDURE — 1159F MED LIST DOCD IN RCRD: CPT | Mod: CPTII,S$GLB,, | Performed by: NURSE PRACTITIONER

## 2022-07-05 PROCEDURE — 99999 PR PBB SHADOW E&M-EST. PATIENT-LVL V: CPT | Mod: PBBFAC,,, | Performed by: NURSE PRACTITIONER

## 2022-07-05 PROCEDURE — 3288F FALL RISK ASSESSMENT DOCD: CPT | Mod: CPTII,S$GLB,, | Performed by: NURSE PRACTITIONER

## 2022-07-05 RX ORDER — SODIUM CHLORIDE 0.9 % (FLUSH) 0.9 %
10 SYRINGE (ML) INJECTION
Status: CANCELLED | OUTPATIENT
Start: 2022-07-05

## 2022-07-05 RX ORDER — FAMOTIDINE 10 MG/ML
20 INJECTION INTRAVENOUS
Status: CANCELLED | OUTPATIENT
Start: 2022-07-05

## 2022-07-05 RX ORDER — SODIUM CHLORIDE 0.9 % (FLUSH) 0.9 %
10 SYRINGE (ML) INJECTION
Status: DISCONTINUED | OUTPATIENT
Start: 2022-07-05 | End: 2022-07-05 | Stop reason: HOSPADM

## 2022-07-05 RX ORDER — FAMOTIDINE 10 MG/ML
20 INJECTION INTRAVENOUS
Status: COMPLETED | OUTPATIENT
Start: 2022-07-05 | End: 2022-07-05

## 2022-07-05 RX ORDER — EPINEPHRINE 0.3 MG/.3ML
0.3 INJECTION SUBCUTANEOUS ONCE AS NEEDED
Status: CANCELLED | OUTPATIENT
Start: 2022-07-05

## 2022-07-05 RX ORDER — HEPARIN 100 UNIT/ML
500 SYRINGE INTRAVENOUS
Status: CANCELLED | OUTPATIENT
Start: 2022-07-05

## 2022-07-05 RX ORDER — DIPHENHYDRAMINE HYDROCHLORIDE 50 MG/ML
50 INJECTION INTRAMUSCULAR; INTRAVENOUS ONCE AS NEEDED
Status: CANCELLED | OUTPATIENT
Start: 2022-07-05

## 2022-07-05 RX ADMIN — DIPHENHYDRAMINE HYDROCHLORIDE 50 MG: 50 INJECTION, SOLUTION INTRAMUSCULAR; INTRAVENOUS at 02:07

## 2022-07-05 RX ADMIN — PACLITAXEL 132 MG: 6 INJECTION, SOLUTION, CONCENTRATE INTRAVENOUS at 03:07

## 2022-07-05 RX ADMIN — PALONOSETRON 0.25 MG: 0.05 INJECTION, SOLUTION INTRAVENOUS at 02:07

## 2022-07-05 RX ADMIN — CARBOPLATIN 125 MG: 10 INJECTION, SOLUTION INTRAVENOUS at 04:07

## 2022-07-05 RX ADMIN — SODIUM CHLORIDE: 0.9 INJECTION, SOLUTION INTRAVENOUS at 02:07

## 2022-07-05 RX ADMIN — Medication 10 ML: at 04:07

## 2022-07-05 RX ADMIN — FAMOTIDINE 20 MG: 10 INJECTION INTRAVENOUS at 03:07

## 2022-07-05 NOTE — PROGRESS NOTES
3:56 PM    This LCSW met with this pt at chairside to check in, however the pt was asleep at this time, so I was unable to speak to the pt.  The pt 's sister was at chairside and reported no needs at this time.

## 2022-07-05 NOTE — PLAN OF CARE
Pt tolerated Taxol/Carbo infusions well.  No s/s of infusion reaction noted.  Instructed to call MD with any problems

## 2022-07-05 NOTE — PROGRESS NOTES
PATIENT: Zayra Rodgers  MRN: [id number]  DATE: 7/5/2022    Diagnosis:   1 Malignant neoplasm of upper-outer quadrant of LEFT breast in female, estrogen receptor negative    2. Anxiety        Chief Complaint: Clearance for Cycle 4 Day 8    Subjective:       Interval History: Ms. Rodgers is a 72 y.o. female with depression, history of locally advanced hormone receptor positive/HER2 negative left breast cancer.  Newly diagnosed triple negative, clinical stage II B (T2, N0, M0), left breast cancer.   Planned treatment regimen for neoadjuvant Carboplatin/Taxol/Keytruda, followed by re-imaging, surgery and adjuvant Keytruda for an additional 9 cycles.     Prior History:   3/22/2016-5/24/2016: Completed 4 cycles AC  6/15/2016-8/31/2016: Completed 12 cycles weekly Taxol   10/20/2016: Lumpectomy that revealed residual 0.3 mm focus of metastatic carcinoma in a single lymph node  Completed postlumpectomy radiation   11/2016: Began Arimidex 1 mg po daily--> completed 60 month course of AI and biannual Prolia for prevention of AI induced bone loss.     4/5/22: Presented to clinic for evaluation of new left breast lump  4/6/22: Underwent biopsy of left breast mass which revealed second primary, triple negative, clinical stage II B (T2, N0, M0) left breast carcinoma    04/26/22:   Cycle 1, Day 1 Keytruda/Carbo/Taxol    TODAY  07/05/2022:     Presents to the clinic today for evaluation prior to Cycle 4, Day 8 of tx.  Tolerating treatment well  Nose bleeds decreased.  Appetite good; hydrating well.  Denies any fevers, mouth sores, abdominal discomfort, N/V/D, etc.    Past Medical History:   Past Medical History:   Diagnosis Date    Allergy     Breast cancer 02/2016    left breast, neoadjuvant chemo, lumpectomy, and radiation    Breast cancer 04/2022    left breast 3:00 invasive ductal carcinoma    Bronchitis     COVID-19 08/2020    HLD (hyperlipidemia)     Hypertension     S/P chemotherapy, time since greater than 12  weeks     Skin cancer     resovled       Past Surgical HIstory:   Past Surgical History:   Procedure Laterality Date    ADENOIDECTOMY      BREAST BIOPSY Left 2016    BREAST BIOPSY Left 04/2022    left breast invasive ductal carcinoma    BREAST CYST EXCISION      BREAST LUMPECTOMY Left 2016    COLONOSCOPY N/A 06/21/2018    Procedure: COLONOSCOPY;  Surgeon: Hiro Billy Jr., MD;  Location: [facility];  Service: Endoscopy;  Laterality: N/A;    CYSTOCELE REPAIR      EXCISIONAL HEMORRHOIDECTOMY      HYSTERECTOMY      due to prolapse    INSERTION OF TUNNELED CENTRAL VENOUS CATHETER (CVC) WITH SUBCUTANEOUS PORT Right 04/22/2022    Procedure: [id number]-[id number];  Surgeon: Haile Domingo MD;  Location: [facility];  Service: General;  Laterality: Right;    ORBITAL RECONSTRUCTION      PORTACATH PLACEMENT      since removed    REMOVAL OF VASCULAR ACCESS PORT      RHINOPLASTY TIP      SIGMOIDOSCOPY         Family History:   Family History   Problem Relation Age of Onset    Breast cancer Mother 42    Cancer Mother     COPD Father     Colon cancer Sister     Breast cancer Sister 40       Social History:  reports that she has never smoked. She has never used smokeless tobacco. She reports current alcohol use of about 2.0 standard drinks of alcohol per week. She reports that she does not use drugs.    Allergies:  Review of patient's allergies indicates:  No Known Allergies    Medications:  Current Outpatient Medications   Medication Sig Dispense Refill    albuterol (PROAIR HFA) 90 mcg/actuation inhaler Inhale 2 puffs into the lungs every 6 (six) hours as needed for Wheezing. Rescue 18 g 11    B-complex with vitamin C (Z-BEC OR EQUIV) tablet Take 1 tablet by mouth once daily.      calcium carbonate-vitamin D3 600 mg-20 mcg (800 unit) Tab Take 2 tablets by mouth once daily. 200 tablet 3    cetirizine (ZYRTEC) 10 MG tablet Take 10 mg by mouth daily as needed.       diphenhydrAMINE-LIDOcaine HCl  2%-nystatin-magnesium hydroxide 400 mg/5 ml Swish and swallow 5 ml every 4 hours as needed for mouth / throat pain 100 mL 5    fish oil-omega-3 fatty acids 300-1,000 mg capsule Take 2 g by mouth daily as needed.       fluticasone propionate (FLONASE) 50 mcg/actuation nasal spray use 1 spray (50 mcg total) by Each Nostril route daily as needed. 16 g 11    hydroCHLOROthiazide (HYDRODIURIL) 25 MG tablet Take 1 tablet (25 mg total) by mouth daily as needed. 90 tablet 1    LIDOcaine-prilocaine (EMLA) cream Apply topically as directed 30 g 5    mirabegron (MYRBETRIQ) 50 mg Tb24 Take 1 tablet (50 mg total) by mouth once daily. 30 tablet 11    multivitamin capsule Take 1 capsule by mouth once daily.      mupirocin (BACTROBAN) 2 % ointment Apply to each nostril with clean Q-Tip twice daily for 5 days. Start 4/20/2022. 22 g 0    prochlorperazine (COMPAZINE) 10 MG tablet Take 1 tablet (10 mg total) by mouth every 6 (six) hours as needed for nausea and vomiting 30 tablet 5    triamcinolone acetonide 0.025% (KENALOG) 0.025 % cream Apply topically 2 (two) times daily. 15 g 2    ALPRAZolam (XANAX) 0.5 MG tablet Take 1 tablet (0.5 mg total) by mouth 3 (three) times daily as needed for Insomnia or Anxiety. (Patient not taking: No sig reported) 60 tablet 0    aspirin (ECOTRIN) 81 MG EC tablet Take 81 mg by mouth once daily.      atorvastatin (LIPITOR) 40 MG tablet Take 1 tablet (40 mg total) by mouth once daily. (Patient not taking: No sig reported) 90 tablet 1     No current facility-administered medications for this visit.       Review of Systems   Constitutional: Negative for appetite change, fatigue and unexpected weight change.   HENT: Negative for mouth sores and sinus pain.    Eyes: Negative for visual disturbance.   Respiratory: Negative for cough and shortness of breath.    Cardiovascular: Negative for chest pain and leg swelling.   Gastrointestinal: Negative for abdominal pain and diarrhea.   Genitourinary:  "Negative for dysuria, frequency and hematuria.   Musculoskeletal: Negative for back pain.   Skin: Negative for rash.   Neurological: Negative for headaches.   Hematological: Negative for adenopathy.   Psychiatric/Behavioral: The patient is not nervous/anxious.        Objective:    Weight:  stable  Vitals:   Vitals:    07/05/22 1255   BP: (!) 154/85   Pulse: 88   Resp: 16   Temp: 99.1 °F (37.3 °C)   SpO2: 98%   Weight: 68.2 kg (150 lb 5.7 oz)   Height: 4' 11" (1.499 m)     BMI: Body mass index is 30.37 kg/m².    Physical Exam  Vitals reviewed.   Constitutional:       General: She is not in acute distress.     Appearance: She is not ill-appearing or diaphoretic.   HENT:      Head: Normocephalic and atraumatic.      Mouth/Throat:      Mouth: Mucous membranes are moist.      Pharynx: Oropharynx is clear.   Eyes:      General: No scleral icterus.     Conjunctiva/sclera: Conjunctivae normal.   Cardiovascular:      Rate and Rhythm: Normal rate and regular rhythm.      Pulses: Normal pulses.      Heart sounds: Normal heart sounds.   Pulmonary:      Effort: Pulmonary effort is normal. No respiratory distress.      Breath sounds: Normal breath sounds.   Abdominal:      General: Bowel sounds are normal. There is no distension.      Palpations: Abdomen is soft.   Musculoskeletal:         General: Normal range of motion.      Cervical back: Neck supple.      Right lower leg: No edema.      Left lower leg: No edema.   Lymphadenopathy:      Cervical: No cervical adenopathy.   Skin:     General: Skin is warm and dry.      Capillary Refill: Capillary refill takes less than 2 seconds.      Findings: No bruising or rash.      Comments: POC to RCW without erythema          Neurological:      Mental Status: She is alert and oriented to person, place, and time.      Gait: Gait normal.   Psychiatric:         Mood and Affect: Mood normal.         Behavior: Behavior normal.         Judgment: Judgment normal.         Laboratory Data:  Lab " Results   Component Value Date    WBC 4.06 07/05/2022    RBC 3.49 (L) 07/05/2022    HGB 11.5 (L) 07/05/2022    HCT 33.1 (L) 07/05/2022    MCV 95 07/05/2022    MCH 33.0 (H) 07/05/2022    MCHC 34.7 07/05/2022    RDW 17.2 (H) 07/05/2022     07/05/2022    MPV 9.4 07/05/2022    GRAN 2.2 07/05/2022    GRAN 55.2 07/05/2022    LYMPH 1.4 07/05/2022    LYMPH 33.5 07/05/2022    MONO 0.4 07/05/2022    MONO 10.1 07/05/2022    EOS 0.0 07/05/2022    BASO 0.02 07/05/2022    EOSINOPHIL 0.2 07/05/2022    BASOPHIL 0.5 07/05/2022     ANC =  2241    CMP  Sodium   Date Value Ref Range Status   07/05/2022 140 136 - 145 mmol/L Final     Potassium   Date Value Ref Range Status   07/05/2022 4.9 3.5 - 5.1 mmol/L Final     Chloride   Date Value Ref Range Status   07/05/2022 101 95 - 110 mmol/L Final     CO2   Date Value Ref Range Status   07/05/2022 29 23 - 29 mmol/L Final     Glucose   Date Value Ref Range Status   07/05/2022 93 70 - 110 mg/dL Final     BUN   Date Value Ref Range Status   07/05/2022 19 8 - 23 mg/dL Final     Creatinine   Date Value Ref Range Status   07/05/2022 0.8 0.5 - 1.4 mg/dL Final     Calcium   Date Value Ref Range Status   07/05/2022 10.5 8.7 - 10.5 mg/dL Final     Total Protein   Date Value Ref Range Status   07/05/2022 6.9 6.0 - 8.4 g/dL Final     Albumin   Date Value Ref Range Status   07/05/2022 3.8 3.5 - 5.2 g/dL Final     Total Bilirubin   Date Value Ref Range Status   07/05/2022 0.4 0.1 - 1.0 mg/dL Final     Comment:     For infants and newborns, interpretation of results should be based  on gestational age, weight and in agreement with clinical  observations.    Premature Infant recommended reference ranges:  Up to 24 hours.............<8.0 mg/dL  Up to 48 hours............<12.0 mg/dL  3-5 days..................<15.0 mg/dL  6-29 days.................<15.0 mg/dL       Alkaline Phosphatase   Date Value Ref Range Status   07/05/2022 55 55 - 135 U/L Final     AST   Date Value Ref Range Status   07/05/2022 18  10 - 40 U/L Final     ALT   Date Value Ref Range Status   07/05/2022 20 10 - 44 U/L Final     Anion Gap   Date Value Ref Range Status   07/05/2022 10 8 - 16 mmol/L Final     eGFR if    Date Value Ref Range Status   07/05/2022 >60 >60 mL/min/1.73 m^2 Final     eGFR if non    Date Value Ref Range Status   07/05/2022 >60 >60 mL/min/1.73 m^2 Final     Comment:     Calculation used to obtain the estimated glomerular filtration  rate (eGFR) is the CKD-EPI equation.        Magnesium:  2.4    Assessment:       1. Malignant neoplasm of nipple of left breast in female, estrogen receptor negative         Plan:     History of Malignant neoplasm of left breast, hormone receptor positive/HER2 bertram negative  -Completed 4 cycles AC followed by 12 weekly doses of Taxol. S/P lumpectomy and post lumpectomy radiation  -Completed full 60 months of AI therapy with Arimidex   -Mammogram done 10/14/21; birads 2, routine due one year from that date  -CXR 10/25/21 shows no radiographic abnormality     Malignant neoplasm of the left breast, triple negative, second primary  -Clinical stage II B (T2, N0, M0), left breast cancer   -Treatment plan of neoadjuvant Carbo/Taxol/Keytruda discussed with patient   * Proceed with Cycle 4 Day 8 today; f/u in 1 week with MD with CBC, CMP, LDH, MG prior for evaluation of C4D15  -maintain daily hydration     Anxiety  - Xanax 0.5 mg tid prn   - Dr. Ryder as needed    Route Chart for Scheduling    Med Onc Chart Routing      Follow up with physician 1 week. F/u in 1 wk with CBC, BMP, Mg PRIOR FOR C4D15 - has a md APPT on Monday prior - needs lab  appt before then   Follow up with ANGIE 1 week and 1 year. Proceed with C4D8 today   Infusion scheduling note    Injection scheduling note    Labs    Lab interval:      Imaging    Pharmacy appointment No pharmacy appointment needed      Other referrals No additional referrals needed         Treatment Plan Information   OP PEMBROLIZUMAB  WITH WEEKLY CARBOPLATIN (AUC 1.5)  AND PACLITAXEL FOLLOWED BY PEMBROLIZUMAB 200 MG Q3W   Michael Bartlett MD   Upcoming Treatment Dates - OP PEMBROLIZUMAB WITH WEEKLY CARBOPLATIN (AUC 1.5)  AND PACLITAXEL FOLLOWED BY PEMBROLIZUMAB 200 MG Q3W    7/5/2022       Pre-Medications       diphenhydramine (BENADRYL) 50 mg in NS 50 mL IVPB       famotidine (PF) injection 20 mg       Chemotherapy       PACLitaxeL (TAXOL) 80 mg/m2 = 132 mg in sodium chloride 0.9% 250 mL chemo infusion       CARBOplatin (PARAPLATIN) 125 mg in sodium chloride 0.9% 250 mL chemo infusion       Antiemetics       palonosetron 0.25mg/dexamethasone 10mg in NS IVPB 0.25 mg  7/12/2022       Pre-Medications       diphenhydramine (BENADRYL) 50 mg in NS 50 mL IVPB       famotidine (PF) injection 20 mg       Chemotherapy       PACLitaxeL (TAXOL) 80 mg/m2 = 132 mg in sodium chloride 0.9% 250 mL chemo infusion       CARBOplatin (PARAPLATIN) 125 mg in sodium chloride 0.9% 250 mL chemo infusion       Antiemetics       palonosetron 0.25mg/dexamethasone 10mg in NS IVPB 0.25 mg  7/19/2022       Pre-Medications       acetaminophen tablet 1,000 mg       diphenydramine (BENADRYL) 25 mg in NS 50 mL IVPB       famotidine (PF) injection 20 mg       Immunotherapy       pembrolizumab (KEYTRUDA) 200 mg in sodium chloride 0.9% 108 mL infusion  8/9/2022       Pre-Medications       acetaminophen tablet 1,000 mg       diphenydramine (BENADRYL) 25 mg in NS 50 mL IVPB       famotidine (PF) injection 20 mg       Immunotherapy       pembrolizumab (KEYTRUDA) 200 mg in sodium chloride 0.9% 108 mL infusion

## 2022-07-11 ENCOUNTER — OFFICE VISIT (OUTPATIENT)
Dept: HEMATOLOGY/ONCOLOGY | Facility: CLINIC | Age: 72
End: 2022-07-11
Payer: MEDICARE

## 2022-07-11 ENCOUNTER — LAB VISIT (OUTPATIENT)
Dept: LAB | Facility: HOSPITAL | Age: 72
End: 2022-07-11
Attending: NURSE PRACTITIONER
Payer: MEDICARE

## 2022-07-11 VITALS
RESPIRATION RATE: 16 BRPM | DIASTOLIC BLOOD PRESSURE: 102 MMHG | HEIGHT: 59 IN | WEIGHT: 149.25 LBS | HEART RATE: 83 BPM | BODY MASS INDEX: 30.09 KG/M2 | TEMPERATURE: 99 F | OXYGEN SATURATION: 99 % | SYSTOLIC BLOOD PRESSURE: 173 MMHG

## 2022-07-11 DIAGNOSIS — Z17.1 MALIGNANT NEOPLASM OF UPPER-OUTER QUADRANT OF RIGHT BREAST IN FEMALE, ESTROGEN RECEPTOR NEGATIVE: ICD-10-CM

## 2022-07-11 DIAGNOSIS — C50.012 MALIGNANT NEOPLASM OF NIPPLE OF LEFT BREAST IN FEMALE, ESTROGEN RECEPTOR NEGATIVE: ICD-10-CM

## 2022-07-11 DIAGNOSIS — C50.012 MALIGNANT NEOPLASM OF NIPPLE OF LEFT BREAST IN FEMALE, ESTROGEN RECEPTOR NEGATIVE: Primary | ICD-10-CM

## 2022-07-11 DIAGNOSIS — L30.9 DERMATITIS: ICD-10-CM

## 2022-07-11 DIAGNOSIS — F41.9 ANXIETY: ICD-10-CM

## 2022-07-11 DIAGNOSIS — Z17.1 MALIGNANT NEOPLASM OF NIPPLE OF LEFT BREAST IN FEMALE, ESTROGEN RECEPTOR NEGATIVE: ICD-10-CM

## 2022-07-11 DIAGNOSIS — F43.21 SITUATIONAL DEPRESSION: ICD-10-CM

## 2022-07-11 DIAGNOSIS — C50.411 MALIGNANT NEOPLASM OF UPPER-OUTER QUADRANT OF RIGHT BREAST IN FEMALE, ESTROGEN RECEPTOR NEGATIVE: ICD-10-CM

## 2022-07-11 DIAGNOSIS — Z17.1 MALIGNANT NEOPLASM OF NIPPLE OF LEFT BREAST IN FEMALE, ESTROGEN RECEPTOR NEGATIVE: Primary | ICD-10-CM

## 2022-07-11 LAB
ANION GAP SERPL CALC-SCNC: 6 MMOL/L (ref 8–16)
BASOPHILS # BLD AUTO: 0.02 K/UL (ref 0–0.2)
BASOPHILS NFR BLD: 0.5 % (ref 0–1.9)
BUN SERPL-MCNC: 17 MG/DL (ref 8–23)
CALCIUM SERPL-MCNC: 9.6 MG/DL (ref 8.7–10.5)
CHLORIDE SERPL-SCNC: 104 MMOL/L (ref 95–110)
CO2 SERPL-SCNC: 28 MMOL/L (ref 23–29)
CREAT SERPL-MCNC: 0.7 MG/DL (ref 0.5–1.4)
DIFFERENTIAL METHOD: ABNORMAL
EOSINOPHIL # BLD AUTO: 0 K/UL (ref 0–0.5)
EOSINOPHIL NFR BLD: 0.3 % (ref 0–8)
ERYTHROCYTE [DISTWIDTH] IN BLOOD BY AUTOMATED COUNT: 17 % (ref 11.5–14.5)
EST. GFR  (AFRICAN AMERICAN): >60 ML/MIN/1.73 M^2
EST. GFR  (NON AFRICAN AMERICAN): >60 ML/MIN/1.73 M^2
GLUCOSE SERPL-MCNC: 99 MG/DL (ref 70–110)
HCT VFR BLD AUTO: 32.9 % (ref 37–48.5)
HGB BLD-MCNC: 11.2 G/DL (ref 12–16)
IMM GRANULOCYTES # BLD AUTO: 0.04 K/UL (ref 0–0.04)
IMM GRANULOCYTES NFR BLD AUTO: 1 % (ref 0–0.5)
LYMPHOCYTES # BLD AUTO: 1.1 K/UL (ref 1–4.8)
LYMPHOCYTES NFR BLD: 28.3 % (ref 18–48)
MAGNESIUM SERPL-MCNC: 2.2 MG/DL (ref 1.6–2.6)
MCH RBC QN AUTO: 32.6 PG (ref 27–31)
MCHC RBC AUTO-ENTMCNC: 34 G/DL (ref 32–36)
MCV RBC AUTO: 96 FL (ref 82–98)
MONOCYTES # BLD AUTO: 0.3 K/UL (ref 0.3–1)
MONOCYTES NFR BLD: 7.1 % (ref 4–15)
NEUTROPHILS # BLD AUTO: 2.4 K/UL (ref 1.8–7.7)
NEUTROPHILS NFR BLD: 62.8 % (ref 38–73)
NRBC BLD-RTO: 0 /100 WBC
PLATELET # BLD AUTO: 180 K/UL (ref 150–450)
PMV BLD AUTO: 9.5 FL (ref 9.2–12.9)
POTASSIUM SERPL-SCNC: 4.5 MMOL/L (ref 3.5–5.1)
RBC # BLD AUTO: 3.44 M/UL (ref 4–5.4)
SODIUM SERPL-SCNC: 138 MMOL/L (ref 136–145)
WBC # BLD AUTO: 3.82 K/UL (ref 3.9–12.7)

## 2022-07-11 PROCEDURE — 3077F SYST BP >= 140 MM HG: CPT | Mod: CPTII,S$GLB,, | Performed by: INTERNAL MEDICINE

## 2022-07-11 PROCEDURE — 1125F AMNT PAIN NOTED PAIN PRSNT: CPT | Mod: CPTII,S$GLB,, | Performed by: INTERNAL MEDICINE

## 2022-07-11 PROCEDURE — 80048 BASIC METABOLIC PNL TOTAL CA: CPT | Mod: PN | Performed by: NURSE PRACTITIONER

## 2022-07-11 PROCEDURE — 83735 ASSAY OF MAGNESIUM: CPT | Mod: PN | Performed by: NURSE PRACTITIONER

## 2022-07-11 PROCEDURE — 1159F PR MEDICATION LIST DOCUMENTED IN MEDICAL RECORD: ICD-10-PCS | Mod: CPTII,S$GLB,, | Performed by: INTERNAL MEDICINE

## 2022-07-11 PROCEDURE — 1125F PR PAIN SEVERITY QUANTIFIED, PAIN PRESENT: ICD-10-PCS | Mod: CPTII,S$GLB,, | Performed by: INTERNAL MEDICINE

## 2022-07-11 PROCEDURE — 85025 COMPLETE CBC W/AUTO DIFF WBC: CPT | Mod: PN | Performed by: NURSE PRACTITIONER

## 2022-07-11 PROCEDURE — 99214 PR OFFICE/OUTPT VISIT, EST, LEVL IV, 30-39 MIN: ICD-10-PCS | Mod: S$GLB,,, | Performed by: INTERNAL MEDICINE

## 2022-07-11 PROCEDURE — 36415 COLL VENOUS BLD VENIPUNCTURE: CPT | Mod: PN | Performed by: NURSE PRACTITIONER

## 2022-07-11 PROCEDURE — 1101F PR PT FALLS ASSESS DOC 0-1 FALLS W/OUT INJ PAST YR: ICD-10-PCS | Mod: CPTII,S$GLB,, | Performed by: INTERNAL MEDICINE

## 2022-07-11 PROCEDURE — 1160F PR REVIEW ALL MEDS BY PRESCRIBER/CLIN PHARMACIST DOCUMENTED: ICD-10-PCS | Mod: CPTII,S$GLB,, | Performed by: INTERNAL MEDICINE

## 2022-07-11 PROCEDURE — 3077F PR MOST RECENT SYSTOLIC BLOOD PRESSURE >= 140 MM HG: ICD-10-PCS | Mod: CPTII,S$GLB,, | Performed by: INTERNAL MEDICINE

## 2022-07-11 PROCEDURE — 1160F RVW MEDS BY RX/DR IN RCRD: CPT | Mod: CPTII,S$GLB,, | Performed by: INTERNAL MEDICINE

## 2022-07-11 PROCEDURE — 3288F PR FALLS RISK ASSESSMENT DOCUMENTED: ICD-10-PCS | Mod: CPTII,S$GLB,, | Performed by: INTERNAL MEDICINE

## 2022-07-11 PROCEDURE — 3288F FALL RISK ASSESSMENT DOCD: CPT | Mod: CPTII,S$GLB,, | Performed by: INTERNAL MEDICINE

## 2022-07-11 PROCEDURE — 3008F PR BODY MASS INDEX (BMI) DOCUMENTED: ICD-10-PCS | Mod: CPTII,S$GLB,, | Performed by: INTERNAL MEDICINE

## 2022-07-11 PROCEDURE — 99999 PR PBB SHADOW E&M-EST. PATIENT-LVL V: CPT | Mod: PBBFAC,,, | Performed by: INTERNAL MEDICINE

## 2022-07-11 PROCEDURE — 99214 OFFICE O/P EST MOD 30 MIN: CPT | Mod: S$GLB,,, | Performed by: INTERNAL MEDICINE

## 2022-07-11 PROCEDURE — 3080F DIAST BP >= 90 MM HG: CPT | Mod: CPTII,S$GLB,, | Performed by: INTERNAL MEDICINE

## 2022-07-11 PROCEDURE — 1101F PT FALLS ASSESS-DOCD LE1/YR: CPT | Mod: CPTII,S$GLB,, | Performed by: INTERNAL MEDICINE

## 2022-07-11 PROCEDURE — 1159F MED LIST DOCD IN RCRD: CPT | Mod: CPTII,S$GLB,, | Performed by: INTERNAL MEDICINE

## 2022-07-11 PROCEDURE — 3008F BODY MASS INDEX DOCD: CPT | Mod: CPTII,S$GLB,, | Performed by: INTERNAL MEDICINE

## 2022-07-11 PROCEDURE — 3080F PR MOST RECENT DIASTOLIC BLOOD PRESSURE >= 90 MM HG: ICD-10-PCS | Mod: CPTII,S$GLB,, | Performed by: INTERNAL MEDICINE

## 2022-07-11 PROCEDURE — 99999 PR PBB SHADOW E&M-EST. PATIENT-LVL V: ICD-10-PCS | Mod: PBBFAC,,, | Performed by: INTERNAL MEDICINE

## 2022-07-11 RX ORDER — HEPARIN 100 UNIT/ML
500 SYRINGE INTRAVENOUS
Status: CANCELLED | OUTPATIENT
Start: 2022-07-12

## 2022-07-11 RX ORDER — DIPHENHYDRAMINE HYDROCHLORIDE 50 MG/ML
50 INJECTION INTRAMUSCULAR; INTRAVENOUS ONCE AS NEEDED
Status: CANCELLED | OUTPATIENT
Start: 2022-07-12

## 2022-07-11 RX ORDER — EPINEPHRINE 0.3 MG/.3ML
0.3 INJECTION SUBCUTANEOUS ONCE AS NEEDED
Status: CANCELLED | OUTPATIENT
Start: 2022-07-12

## 2022-07-11 RX ORDER — FAMOTIDINE 10 MG/ML
20 INJECTION INTRAVENOUS
Status: CANCELLED | OUTPATIENT
Start: 2022-07-12

## 2022-07-11 RX ORDER — SODIUM CHLORIDE 0.9 % (FLUSH) 0.9 %
10 SYRINGE (ML) INJECTION
Status: CANCELLED | OUTPATIENT
Start: 2022-07-12

## 2022-07-11 NOTE — PROGRESS NOTES
HISTORY OF PRESENT ILLNESS:    The patient is a 72-year-old white female well   known to me for locally advanced left breast carcinoma, who underwent   neoadjuvant therapy consisting of four cycles of Adriamycin/Cytoxan followed by 12 weekly doses of Taxol.  She had a residual 0.3 mm focus of metastatic carcinoma in a single lymph node.  Patient and postlumpectomy radiation.  She completed a 60 month course of Arimidex/biannual Prolia for prevention of aromatase inhibitor induced bone loss.  She was recently placed on annual follow-up.  She returns to clinic earlier than scheduled appointment.  Patient is having some anxiety symptoms around the possibility of recurrence.  She feels a little less certain of her course now that she is off active treatment and having less frequent surveillance.  She also continues to struggle caring for her  who became severely ill during the pandemic.  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  She presents seeking reassurance.  She declines medical therapy for sleep disturbance/situational depression.    Shortly after patient's last office evaluation, she began to experience pain in the other portions of her left breast.  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  However, the patient has actively been dieting, losing weight, and upon palpation noted a change in the breast parenchyma which she wished evaluated.  She was seen by morro Butler for additional imaging which reveals suspicious findings.  Patient has gone on to have breast biopsy and returns to review results.  Patient had visited with Dr. Ryder since her last visit in my clinic and found is assistance helpful.    Patient has initiated neoadjuvant therapy consisting of carboplatin/Taxol/Keytruda.  She returns to clinic for re-evaluation prior to cycle 4, day 15 dose of therapy.  Patient has had some difficulty with pruritic dermatitis  involving the anterior chest wall and all extremities.    PHYSICAL EXAMINATION:  GENERAL:  Well-developed, well-nourished white female in no acute distress, witha weight of 149 pounds (decreased by 1 pound).  VITAL SIGNS:  Documented in EMR and reviewed.  HEENT:  Normocephalic, atraumatic.  Oral mucosa pink and moist.  Lips without lesions.  Tongue midline.  Oropharynx clear.  Nonicteric sclerae.   NECK:  Supple, no adenopathy.  No carotid bruits, thyromegaly or thyroid nodule.  HEART:  Regular rate and rhythm without murmur, gallop or rub.                LUNGS:  Clear to auscultation bilaterally.  Normal respiratory effort.       ABDOMEN:  Soft, nontender, nondistended with positive normoactive bowel sounds, no hepatosplenomegaly.    EXTREMITIES:  No cyanosis, clubbing or edema.  Distal pulses are intact.                                           AXILLAE AND GROIN:  No palpable pathologic lymphadenopathy is appreciated.        SKIN:  Intact/turgor normal.  NEUROLOGIC:  Cranial nerves II-XII grossly intact.  Motor:  Good muscle bulk and tone.  Strength/sensory 5/5 throughout.  Gait stable.    Laboratory:  White count 3.8, hemoglobin 11.2, hematocrit 32.9, platelets 180, absolute neutrophil count 2400.  Sodium 138, potassium 4.5, chloride 104, CO2 28, BUN 17, creatinine 0.7, glucose 99, calcium 9.6, magnesium 2.2, GFR greater than 60.     IMPRESSION:    1.  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  2. Situational depression.  3. Second primary, triple negative, clinical stage II B (T2, N0, M0), left breast carcinoma.  4. Therapy associated dermatitis currently well palliated.    PLAN:  1. Proceed with cycle 4, day 15 of therapy.  Return to clinic in 1 week with interval CBC, CMP, LDH, and magnesium prior to appointment.  2. Triamcinolone cream topically 2 times per day as needed for control of dermatitis.      THIS NOTE WAS  CREATED USING VOICE RECOGNITION SOFTWARE AND MAY CONTAIN GRAMMATICAL ERRORS.         Route Chart for Scheduling    Med Onc Chart Routing      Follow up with physician 3 weeks.   Follow up with ANGIE    Infusion scheduling note    Injection scheduling note    Labs CBC, CMP, LDH, magnesium, TSH and free T4   Lab interval:     Imaging PET scan      Pharmacy appointment    Other referrals          Treatment Plan Information   OP PEMBROLIZUMAB WITH WEEKLY CARBOPLATIN (AUC 1.5)  AND PACLITAXEL FOLLOWED BY PEMBROLIZUMAB 200 MG Q3W   Micheal Bartlett MD   Upcoming Treatment Dates - OP PEMBROLIZUMAB WITH WEEKLY CARBOPLATIN (AUC 1.5)  AND PACLITAXEL FOLLOWED BY PEMBROLIZUMAB 200 MG Q3W    7/12/2022       Pre-Medications       diphenhydramine (BENADRYL) 50 mg in NS 50 mL IVPB       famotidine (PF) injection 20 mg       Chemotherapy       PACLitaxeL (TAXOL) 80 mg/m2 = 132 mg in sodium chloride 0.9% 250 mL chemo infusion       CARBOplatin (PARAPLATIN) 125 mg in sodium chloride 0.9% 250 mL chemo infusion       Antiemetics       palonosetron 0.25mg/dexamethasone 10mg in NS IVPB 0.25 mg  7/19/2022       Pre-Medications       acetaminophen tablet 1,000 mg       diphenydramine (BENADRYL) 25 mg in NS 50 mL IVPB       famotidine (PF) injection 20 mg       Immunotherapy       pembrolizumab (KEYTRUDA) 200 mg in sodium chloride 0.9% 108 mL infusion  8/9/2022       Pre-Medications       acetaminophen tablet 1,000 mg       diphenydramine (BENADRYL) 25 mg in NS 50 mL IVPB       famotidine (PF) injection 20 mg       Immunotherapy       pembrolizumab (KEYTRUDA) 200 mg in sodium chloride 0.9% 108 mL infusion  8/30/2022       Pre-Medications       acetaminophen tablet 1,000 mg       diphenydramine (BENADRYL) 25 mg in NS 50 mL IVPB       famotidine (PF) injection 20 mg       Immunotherapy       pembrolizumab (KEYTRUDA) 200 mg in sodium chloride 0.9% 108 mL infusion

## 2022-07-12 ENCOUNTER — INFUSION (OUTPATIENT)
Dept: INFUSION THERAPY | Facility: HOSPITAL | Age: 72
End: 2022-07-12
Attending: INTERNAL MEDICINE
Payer: MEDICARE

## 2022-07-12 VITALS
HEIGHT: 59 IN | BODY MASS INDEX: 30.09 KG/M2 | TEMPERATURE: 98 F | SYSTOLIC BLOOD PRESSURE: 140 MMHG | HEART RATE: 70 BPM | DIASTOLIC BLOOD PRESSURE: 85 MMHG | RESPIRATION RATE: 16 BRPM | WEIGHT: 149.25 LBS

## 2022-07-12 DIAGNOSIS — Z17.1 MALIGNANT NEOPLASM OF NIPPLE OF LEFT BREAST IN FEMALE, ESTROGEN RECEPTOR NEGATIVE: Primary | ICD-10-CM

## 2022-07-12 DIAGNOSIS — C50.012 MALIGNANT NEOPLASM OF NIPPLE OF LEFT BREAST IN FEMALE, ESTROGEN RECEPTOR NEGATIVE: Primary | ICD-10-CM

## 2022-07-12 PROCEDURE — A4216 STERILE WATER/SALINE, 10 ML: HCPCS | Mod: PN | Performed by: INTERNAL MEDICINE

## 2022-07-12 PROCEDURE — 96367 TX/PROPH/DG ADDL SEQ IV INF: CPT | Mod: PN

## 2022-07-12 PROCEDURE — 96375 TX/PRO/DX INJ NEW DRUG ADDON: CPT | Mod: PN

## 2022-07-12 PROCEDURE — 25000003 PHARM REV CODE 250: Mod: PN | Performed by: INTERNAL MEDICINE

## 2022-07-12 PROCEDURE — 96413 CHEMO IV INFUSION 1 HR: CPT | Mod: PN

## 2022-07-12 PROCEDURE — 96417 CHEMO IV INFUS EACH ADDL SEQ: CPT | Mod: PN

## 2022-07-12 PROCEDURE — 63600175 PHARM REV CODE 636 W HCPCS: Mod: PN | Performed by: INTERNAL MEDICINE

## 2022-07-12 RX ORDER — HEPARIN 100 UNIT/ML
500 SYRINGE INTRAVENOUS
Status: DISCONTINUED | OUTPATIENT
Start: 2022-07-12 | End: 2022-07-12 | Stop reason: HOSPADM

## 2022-07-12 RX ORDER — EPINEPHRINE 0.3 MG/.3ML
0.3 INJECTION SUBCUTANEOUS ONCE AS NEEDED
Status: DISCONTINUED | OUTPATIENT
Start: 2022-07-12 | End: 2022-07-12 | Stop reason: HOSPADM

## 2022-07-12 RX ORDER — SODIUM CHLORIDE 0.9 % (FLUSH) 0.9 %
10 SYRINGE (ML) INJECTION
Status: DISCONTINUED | OUTPATIENT
Start: 2022-07-12 | End: 2022-07-12 | Stop reason: HOSPADM

## 2022-07-12 RX ORDER — FAMOTIDINE 10 MG/ML
20 INJECTION INTRAVENOUS
Status: COMPLETED | OUTPATIENT
Start: 2022-07-12 | End: 2022-07-12

## 2022-07-12 RX ORDER — DIPHENHYDRAMINE HYDROCHLORIDE 50 MG/ML
50 INJECTION INTRAMUSCULAR; INTRAVENOUS ONCE AS NEEDED
Status: DISCONTINUED | OUTPATIENT
Start: 2022-07-12 | End: 2022-07-12 | Stop reason: HOSPADM

## 2022-07-12 RX ADMIN — PACLITAXEL 132 MG: 6 INJECTION, SOLUTION, CONCENTRATE INTRAVENOUS at 02:07

## 2022-07-12 RX ADMIN — FAMOTIDINE 20 MG: 10 INJECTION INTRAVENOUS at 01:07

## 2022-07-12 RX ADMIN — Medication 10 ML: at 01:07

## 2022-07-12 RX ADMIN — CARBOPLATIN 125 MG: 10 INJECTION, SOLUTION INTRAVENOUS at 03:07

## 2022-07-12 RX ADMIN — DIPHENHYDRAMINE HYDROCHLORIDE 50 MG: 50 INJECTION, SOLUTION INTRAMUSCULAR; INTRAVENOUS at 02:07

## 2022-07-12 RX ADMIN — SODIUM CHLORIDE: 0.9 INJECTION, SOLUTION INTRAVENOUS at 01:07

## 2022-07-12 RX ADMIN — PALONOSETRON 0.25 MG: 0.05 INJECTION, SOLUTION INTRAVENOUS at 01:07

## 2022-07-13 DIAGNOSIS — M89.9 BONE/CARTILAGE DISORDER: ICD-10-CM

## 2022-07-13 DIAGNOSIS — M94.9 BONE/CARTILAGE DISORDER: ICD-10-CM

## 2022-07-13 DIAGNOSIS — C50.012 MALIGNANT NEOPLASM OF NIPPLE OF LEFT BREAST IN FEMALE: ICD-10-CM

## 2022-07-13 RX ORDER — ACETAMINOPHEN 500 MG
2 TABLET ORAL DAILY
Qty: 200 TABLET | Refills: 3 | Status: CANCELLED | OUTPATIENT
Start: 2022-07-13

## 2022-07-14 RX ORDER — ACETAMINOPHEN 500 MG
2 TABLET ORAL DAILY
Qty: 200 TABLET | Refills: 3 | Status: SHIPPED | OUTPATIENT
Start: 2022-07-14

## 2022-07-18 ENCOUNTER — TELEPHONE (OUTPATIENT)
Dept: HEMATOLOGY/ONCOLOGY | Facility: CLINIC | Age: 72
End: 2022-07-18
Payer: MEDICARE

## 2022-07-18 NOTE — TELEPHONE ENCOUNTER
Per Dr Tri villalta on hold until Surgery. Will follow up 3-4 weeks after to discuss restarting. Infusion notified.

## 2022-07-29 ENCOUNTER — HOSPITAL ENCOUNTER (OUTPATIENT)
Dept: RADIOLOGY | Facility: HOSPITAL | Age: 72
Discharge: HOME | End: 2022-07-29
Attending: INTERNAL MEDICINE
Payer: MEDICARE

## 2022-07-29 ENCOUNTER — PATIENT MESSAGE (OUTPATIENT)
Dept: HEMATOLOGY/ONCOLOGY | Facility: CLINIC | Age: 72
End: 2022-07-29

## 2022-07-29 ENCOUNTER — OFFICE VISIT (OUTPATIENT)
Dept: HEMATOLOGY/ONCOLOGY | Facility: CLINIC | Age: 72
End: 2022-07-29
Payer: MEDICARE

## 2022-07-29 VITALS
SYSTOLIC BLOOD PRESSURE: 148 MMHG | WEIGHT: 148.56 LBS | OXYGEN SATURATION: 98 % | HEIGHT: 59 IN | RESPIRATION RATE: 16 BRPM | BODY MASS INDEX: 29.95 KG/M2 | DIASTOLIC BLOOD PRESSURE: 95 MMHG | HEART RATE: 92 BPM | TEMPERATURE: 97 F

## 2022-07-29 DIAGNOSIS — L30.9 DERMATITIS: ICD-10-CM

## 2022-07-29 DIAGNOSIS — F43.21 SITUATIONAL DEPRESSION: ICD-10-CM

## 2022-07-29 DIAGNOSIS — Z17.1 MALIGNANT NEOPLASM OF NIPPLE OF LEFT BREAST IN FEMALE, ESTROGEN RECEPTOR NEGATIVE: Primary | ICD-10-CM

## 2022-07-29 DIAGNOSIS — Z17.1 MALIGNANT NEOPLASM OF NIPPLE OF LEFT BREAST IN FEMALE, ESTROGEN RECEPTOR NEGATIVE: ICD-10-CM

## 2022-07-29 DIAGNOSIS — C50.012 MALIGNANT NEOPLASM OF NIPPLE OF LEFT BREAST IN FEMALE, ESTROGEN RECEPTOR NEGATIVE: ICD-10-CM

## 2022-07-29 DIAGNOSIS — C50.012 MALIGNANT NEOPLASM OF NIPPLE OF LEFT BREAST IN FEMALE, ESTROGEN RECEPTOR NEGATIVE: Primary | ICD-10-CM

## 2022-07-29 DIAGNOSIS — F41.9 ANXIETY: ICD-10-CM

## 2022-07-29 LAB — GLUCOSE SERPL-MCNC: 105 MG/DL (ref 70–110)

## 2022-07-29 PROCEDURE — 1125F AMNT PAIN NOTED PAIN PRSNT: CPT | Mod: CPTII,S$GLB,, | Performed by: INTERNAL MEDICINE

## 2022-07-29 PROCEDURE — 1101F PR PT FALLS ASSESS DOC 0-1 FALLS W/OUT INJ PAST YR: ICD-10-PCS | Mod: CPTII,S$GLB,, | Performed by: INTERNAL MEDICINE

## 2022-07-29 PROCEDURE — 78815 NM PET CT ROUTINE: ICD-10-PCS | Mod: 26,PS,, | Performed by: RADIOLOGY

## 2022-07-29 PROCEDURE — 99214 OFFICE O/P EST MOD 30 MIN: CPT | Mod: S$GLB,,, | Performed by: INTERNAL MEDICINE

## 2022-07-29 PROCEDURE — 3077F PR MOST RECENT SYSTOLIC BLOOD PRESSURE >= 140 MM HG: ICD-10-PCS | Mod: CPTII,S$GLB,, | Performed by: INTERNAL MEDICINE

## 2022-07-29 PROCEDURE — 3288F PR FALLS RISK ASSESSMENT DOCUMENTED: ICD-10-PCS | Mod: CPTII,S$GLB,, | Performed by: INTERNAL MEDICINE

## 2022-07-29 PROCEDURE — 1125F PR PAIN SEVERITY QUANTIFIED, PAIN PRESENT: ICD-10-PCS | Mod: CPTII,S$GLB,, | Performed by: INTERNAL MEDICINE

## 2022-07-29 PROCEDURE — 3288F FALL RISK ASSESSMENT DOCD: CPT | Mod: CPTII,S$GLB,, | Performed by: INTERNAL MEDICINE

## 2022-07-29 PROCEDURE — 78815 PET IMAGE W/CT SKULL-THIGH: CPT | Mod: 26,PS,, | Performed by: RADIOLOGY

## 2022-07-29 PROCEDURE — 3077F SYST BP >= 140 MM HG: CPT | Mod: CPTII,S$GLB,, | Performed by: INTERNAL MEDICINE

## 2022-07-29 PROCEDURE — 99999 PR PBB SHADOW E&M-EST. PATIENT-LVL IV: CPT | Mod: PBBFAC,,, | Performed by: INTERNAL MEDICINE

## 2022-07-29 PROCEDURE — 1160F RVW MEDS BY RX/DR IN RCRD: CPT | Mod: CPTII,S$GLB,, | Performed by: INTERNAL MEDICINE

## 2022-07-29 PROCEDURE — 99214 PR OFFICE/OUTPT VISIT, EST, LEVL IV, 30-39 MIN: ICD-10-PCS | Mod: S$GLB,,, | Performed by: INTERNAL MEDICINE

## 2022-07-29 PROCEDURE — 1101F PT FALLS ASSESS-DOCD LE1/YR: CPT | Mod: CPTII,S$GLB,, | Performed by: INTERNAL MEDICINE

## 2022-07-29 PROCEDURE — 1160F PR REVIEW ALL MEDS BY PRESCRIBER/CLIN PHARMACIST DOCUMENTED: ICD-10-PCS | Mod: CPTII,S$GLB,, | Performed by: INTERNAL MEDICINE

## 2022-07-29 PROCEDURE — 99999 PR PBB SHADOW E&M-EST. PATIENT-LVL IV: ICD-10-PCS | Mod: PBBFAC,,, | Performed by: INTERNAL MEDICINE

## 2022-07-29 PROCEDURE — A9552 F18 FDG: HCPCS | Mod: PN

## 2022-07-29 PROCEDURE — 3008F BODY MASS INDEX DOCD: CPT | Mod: CPTII,S$GLB,, | Performed by: INTERNAL MEDICINE

## 2022-07-29 PROCEDURE — 3008F PR BODY MASS INDEX (BMI) DOCUMENTED: ICD-10-PCS | Mod: CPTII,S$GLB,, | Performed by: INTERNAL MEDICINE

## 2022-07-29 PROCEDURE — 3080F PR MOST RECENT DIASTOLIC BLOOD PRESSURE >= 90 MM HG: ICD-10-PCS | Mod: CPTII,S$GLB,, | Performed by: INTERNAL MEDICINE

## 2022-07-29 PROCEDURE — 3080F DIAST BP >= 90 MM HG: CPT | Mod: CPTII,S$GLB,, | Performed by: INTERNAL MEDICINE

## 2022-07-29 PROCEDURE — 1159F MED LIST DOCD IN RCRD: CPT | Mod: CPTII,S$GLB,, | Performed by: INTERNAL MEDICINE

## 2022-07-29 PROCEDURE — 1159F PR MEDICATION LIST DOCUMENTED IN MEDICAL RECORD: ICD-10-PCS | Mod: CPTII,S$GLB,, | Performed by: INTERNAL MEDICINE

## 2022-07-29 NOTE — PROGRESS NOTES
PET Imaging Questionnaire    1. Are you a Diabetic? Recent Blood Sugar level? No    2. Are you anemic? Bone Marrow Stimulation Meds? No    3. Have you had a CT Scan, if so when & where was your last one? Yes -     4. Have you had a PET Scan, if so when & where was your last one? Yes -     5. Chemotherapy or currently on Chemotherapy? Yes    6. Radiation therapy? Yes    Surgical History:   Past Surgical History:   Procedure Laterality Date    ADENOIDECTOMY      BREAST BIOPSY Left 2016    BREAST BIOPSY Left 04/2022    left breast invasive ductal carcinoma    BREAST CYST EXCISION      BREAST LUMPECTOMY Left 2016    COLONOSCOPY N/A 06/21/2018    Procedure: COLONOSCOPY;  Surgeon: Hiro Billy Jr., MD;  [facility];  Service: Endoscopy;  Laterality: N/A;    CYSTOCELE REPAIR      EXCISIONAL HEMORRHOIDECTOMY      HYSTERECTOMY      due to prolapse    INSERTION OF TUNNELED CENTRAL VENOUS CATHETER (CVC) WITH SUBCUTANEOUS PORT Right 04/22/2022    Procedure: [id number];  Surgeon: Haile Domingo MD;  Location[facility];  Service: General;  Laterality: Right;    ORBITAL RECONSTRUCTION      PORTACATH PLACEMENT      since removed    REMOVAL OF VASCULAR ACCESS PORT      RHINOPLASTY TIP      SIGMOIDOSCOPY     7.      8. Have you been fasting for at least 6 hours? Yes    9. Is there any chance you may be pregnant or breastfeeding? No    Assay: 13.61 MCi@:9:41   Injection Site:RT AC    Residual: 1.013 mCi@: 9:43   Technologist: Delroy Vance Injected:12.6mCi

## 2022-07-29 NOTE — PROGRESS NOTES
HISTORY OF PRESENT ILLNESS:    The patient is a 72-year-old white female well   known to me for locally advanced left breast carcinoma, who underwent   neoadjuvant therapy consisting of four cycles of Adriamycin/Cytoxan followed by 12 weekly doses of Taxol.  She had a residual 0.3 mm focus of metastatic carcinoma in a single lymph node.  Patient and postlumpectomy radiation.  She completed a 60 month course of Arimidex/biannual Prolia for prevention of aromatase inhibitor induced bone loss.  She was recently placed on annual follow-up.  She returns to clinic earlier than scheduled appointment.  Patient is having some anxiety symptoms around the possibility of recurrence.  She feels a little less certain of her course now that she is off active treatment and having less frequent surveillance.  She also continues to struggle caring for her  who became severely ill during the pandemic.  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  She presents seeking reassurance.  She declines medical therapy for sleep disturbance/situational depression.    Shortly after patient's last office evaluation, she began to experience pain in the other portions of her left breast.  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  However, the patient has actively been dieting, losing weight, and upon palpation noted a change in the breast parenchyma which she wished evaluated.  She was seen by morro Butler for additional imaging which reveals suspicious findings.  Patient has gone on to have breast biopsy and returns to review results.  Patient had visited with Dr. Ryder since her last visit in my clinic and found is assistance helpful.    Patient has completed neoadjuvant therapy consisting of carboplatin/Taxol/Keytruda.  She returns to clinic for re-evaluation prior to definitive surgical therapy.  Patient is extremely anxious.  Her  has been admitted  to ICU after an episode of acute hypoxemic respiratory failure.    PHYSICAL EXAMINATION:  GENERAL:  Well-developed, well-nourished white female in no acute distress, witha weight of 148.5 pounds (decreased by 0.5 pound).  VITAL SIGNS:  Documented in EMR and reviewed.  HEENT:  Normocephalic, atraumatic.  Oral mucosa pink and moist.  Lips without lesions.  Tongue midline.  Oropharynx clear.  Nonicteric sclerae.   NECK:  Supple, no adenopathy.  No carotid bruits, thyromegaly or thyroid nodule.  HEART:  Regular rate and rhythm without murmur, gallop or rub.                LUNGS:  Clear to auscultation bilaterally.  Normal respiratory effort.       ABDOMEN:  Soft, nontender, nondistended with positive normoactive bowel sounds, no hepatosplenomegaly.    EXTREMITIES:  No cyanosis, clubbing or edema.  Distal pulses are intact.                                           AXILLAE AND GROIN:  No palpable pathologic lymphadenopathy is appreciated.        SKIN:  Intact/turgor normal.  NEUROLOGIC:  Cranial nerves II-XII grossly intact.  Motor:  Good muscle bulk and tone.  Strength/sensory 5/5 throughout.  Gait stable.    Laboratory:  White count 5.2, hemoglobin 12.1, hematocrit 36.3, platelets 192, absolute neutrophil count is 3100.  Sodium 139, potassium 4.1, chloride 104, CO2 24, BUN 15, creatinine 0.7, glucose 98, calcium 10.2, magnesium 2.2, liver function test within normal limits, GFR greater than 60.    CT PET scan:  Neck:   There is no abnormal FDG uptake in the neck.  No cervical adenopathy is present.  Chest:   - There is no abnormal FDG uptake in the thorax.  There are postsurgical and post radiation changes associated with the left breast.  In addition, there is a biopsy marking clip noted in the lateral aspect of the left breast from a more recent ultrasound-guided breast biopsy.  There is no discrete, residual mass identified in this location on CT, and there is no abnormal activity above that of background breast  "parenchyma.  - There is a right-sided medication port.  There is no mediastinal nor axillary adenopathy.  No pleural effusion.  - The lungs are clear.  There is a fluid-filled sub coracoid bursa on the right corresponding to the "cyst adjacent to the humerus" described on the breast MRI.  Abdomen/pelvis:   - There is no abnormal FDG uptake in the abdomen or pelvis.  Liver, gallbladder, spleen, adrenal glands, pancreas and kidneys are grossly normal.  Aorta normal in caliber with moderate calcific plaque.  There are fat containing inguinal hernias.  Uterus is absent.  Urinary bladder unremarkable.  Bowel normal aside from distal colonic diverticula.  No ascites or adenopathy.  Bones:   - The only activity associated with the bones is felt to be degenerative in etiology, involving the facet joints of the lower cervical spine as well as facet joints in the lumbar spine.  Mild asymmetric activity in the left shoulder girdle musculature is likely related to motion.  No suspicious findings noted on CT bone windows.    IMPRESSION:    1.  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  2. Situational depression.  3. Second primary, triple negative, clinical stage II B (T2, N0, M0), left breast carcinoma with complete clinical response to neoadjuvant therapy.  4. Therapy associated dermatitis currently well palliated.    PLAN:  1. Proceed with definitive breast surgery as planned.  2. Return to clinic postoperatively with interval CBC, CMP, LDH, magnesium, TSH, free T4 and surgical pathology.        THIS NOTE WAS CREATED USING VOICE RECOGNITION SOFTWARE AND MAY CONTAIN GRAMMATICAL ERRORS.           "

## 2022-07-29 NOTE — Clinical Note
Return to clinic following breast surgery to see me with interval CBC, CMP, LDH, TSH, free T4, and results from surgical pathology.

## 2022-08-02 ENCOUNTER — TELEPHONE (OUTPATIENT)
Dept: SURGERY | Facility: CLINIC | Age: 72
End: 2022-08-02
Payer: MEDICARE

## 2022-08-02 NOTE — TELEPHONE ENCOUNTER
Called the patient and told her about making a pre-op appt at the [facility], and phone number given.  She verbalized understanding.

## 2022-08-26 RX ORDER — CEFADROXIL 500 MG/1
500 CAPSULE ORAL 2 TIMES DAILY
Qty: 14 CAPSULE | Refills: 0 | OUTPATIENT
Start: 2022-08-26 | End: 2022-09-16 | Stop reason: ALTCHOICE

## 2022-09-01 ENCOUNTER — PATIENT MESSAGE (OUTPATIENT)
Dept: HEMATOLOGY/ONCOLOGY | Facility: CLINIC | Age: 72
End: 2022-09-01
Payer: MEDICARE

## 2022-09-07 ENCOUNTER — LAB VISIT (OUTPATIENT)
Dept: LAB | Facility: HOSPITAL | Age: 72
End: 2022-09-07
Attending: INTERNAL MEDICINE
Payer: MEDICARE

## 2022-09-07 DIAGNOSIS — C50.012 MALIGNANT NEOPLASM OF NIPPLE OF LEFT BREAST IN FEMALE, ESTROGEN RECEPTOR NEGATIVE: ICD-10-CM

## 2022-09-07 DIAGNOSIS — Z17.1 MALIGNANT NEOPLASM OF NIPPLE OF LEFT BREAST IN FEMALE, ESTROGEN RECEPTOR NEGATIVE: ICD-10-CM

## 2022-09-07 LAB
ALBUMIN SERPL BCP-MCNC: 3.6 G/DL (ref 3.5–5.2)
ALP SERPL-CCNC: 70 U/L (ref 55–135)
ALT SERPL W/O P-5'-P-CCNC: 11 U/L (ref 10–44)
ANION GAP SERPL CALC-SCNC: 11 MMOL/L (ref 8–16)
AST SERPL-CCNC: 15 U/L (ref 10–40)
BASOPHILS # BLD AUTO: 0.03 K/UL (ref 0–0.2)
BASOPHILS NFR BLD: 0.4 % (ref 0–1.9)
BILIRUB SERPL-MCNC: 0.2 MG/DL (ref 0.1–1)
BUN SERPL-MCNC: 22 MG/DL (ref 8–23)
CALCIUM SERPL-MCNC: 10.2 MG/DL (ref 8.7–10.5)
CHLORIDE SERPL-SCNC: 103 MMOL/L (ref 95–110)
CO2 SERPL-SCNC: 23 MMOL/L (ref 23–29)
CREAT SERPL-MCNC: 0.8 MG/DL (ref 0.5–1.4)
DIFFERENTIAL METHOD: ABNORMAL
EOSINOPHIL # BLD AUTO: 0 K/UL (ref 0–0.5)
EOSINOPHIL NFR BLD: 0.5 % (ref 0–8)
ERYTHROCYTE [DISTWIDTH] IN BLOOD BY AUTOMATED COUNT: 11.8 % (ref 11.5–14.5)
EST. GFR  (NO RACE VARIABLE): >60 ML/MIN/1.73 M^2
GLUCOSE SERPL-MCNC: 99 MG/DL (ref 70–110)
HCT VFR BLD AUTO: 36.9 % (ref 37–48.5)
HGB BLD-MCNC: 12.3 G/DL (ref 12–16)
IMM GRANULOCYTES # BLD AUTO: 0.02 K/UL (ref 0–0.04)
IMM GRANULOCYTES NFR BLD AUTO: 0.3 % (ref 0–0.5)
LDH SERPL L TO P-CCNC: 200 U/L (ref 110–260)
LYMPHOCYTES # BLD AUTO: 1.2 K/UL (ref 1–4.8)
LYMPHOCYTES NFR BLD: 15.6 % (ref 18–48)
MAGNESIUM SERPL-MCNC: 2.1 MG/DL (ref 1.6–2.6)
MCH RBC QN AUTO: 31.9 PG (ref 27–31)
MCHC RBC AUTO-ENTMCNC: 33.3 G/DL (ref 32–36)
MCV RBC AUTO: 96 FL (ref 82–98)
MONOCYTES # BLD AUTO: 0.6 K/UL (ref 0.3–1)
MONOCYTES NFR BLD: 7.7 % (ref 4–15)
NEUTROPHILS # BLD AUTO: 5.8 K/UL (ref 1.8–7.7)
NEUTROPHILS NFR BLD: 75.5 % (ref 38–73)
NRBC BLD-RTO: 0 /100 WBC
PLATELET # BLD AUTO: 243 K/UL (ref 150–450)
PMV BLD AUTO: 9.3 FL (ref 9.2–12.9)
POTASSIUM SERPL-SCNC: 5.2 MMOL/L (ref 3.5–5.1)
PROT SERPL-MCNC: 7.3 G/DL (ref 6–8.4)
RBC # BLD AUTO: 3.86 M/UL (ref 4–5.4)
SODIUM SERPL-SCNC: 137 MMOL/L (ref 136–145)
T4 FREE SERPL-MCNC: 0.89 NG/DL (ref 0.71–1.51)
TSH SERPL DL<=0.005 MIU/L-ACNC: 1.75 UIU/ML (ref 0.4–4)
WBC # BLD AUTO: 7.63 K/UL (ref 3.9–12.7)

## 2022-09-07 PROCEDURE — 85025 COMPLETE CBC W/AUTO DIFF WBC: CPT | Mod: PN | Performed by: INTERNAL MEDICINE

## 2022-09-07 PROCEDURE — 84439 ASSAY OF FREE THYROXINE: CPT | Performed by: INTERNAL MEDICINE

## 2022-09-07 PROCEDURE — 80053 COMPREHEN METABOLIC PANEL: CPT | Mod: PN | Performed by: INTERNAL MEDICINE

## 2022-09-07 PROCEDURE — 83735 ASSAY OF MAGNESIUM: CPT | Mod: PN | Performed by: INTERNAL MEDICINE

## 2022-09-07 PROCEDURE — 84443 ASSAY THYROID STIM HORMONE: CPT | Performed by: INTERNAL MEDICINE

## 2022-09-07 PROCEDURE — 83615 LACTATE (LD) (LDH) ENZYME: CPT | Mod: PN | Performed by: INTERNAL MEDICINE

## 2022-09-07 PROCEDURE — 36415 COLL VENOUS BLD VENIPUNCTURE: CPT | Mod: PN | Performed by: INTERNAL MEDICINE

## 2022-09-16 ENCOUNTER — OFFICE VISIT (OUTPATIENT)
Dept: HEMATOLOGY/ONCOLOGY | Facility: CLINIC | Age: 72
End: 2022-09-16
Payer: MEDICARE

## 2022-09-16 DIAGNOSIS — C50.012 MALIGNANT NEOPLASM OF NIPPLE OF LEFT BREAST IN FEMALE, UNSPECIFIED ESTROGEN RECEPTOR STATUS: ICD-10-CM

## 2022-09-16 DIAGNOSIS — C50.012 MALIGNANT NEOPLASM OF NIPPLE OF LEFT BREAST IN FEMALE, ESTROGEN RECEPTOR NEGATIVE: Primary | ICD-10-CM

## 2022-09-16 DIAGNOSIS — C50.411 MALIGNANT NEOPLASM OF UPPER-OUTER QUADRANT OF RIGHT BREAST IN FEMALE, ESTROGEN RECEPTOR NEGATIVE: ICD-10-CM

## 2022-09-16 DIAGNOSIS — Z17.1 MALIGNANT NEOPLASM OF UPPER-OUTER QUADRANT OF RIGHT BREAST IN FEMALE, ESTROGEN RECEPTOR NEGATIVE: ICD-10-CM

## 2022-09-16 DIAGNOSIS — F41.9 ANXIETY: ICD-10-CM

## 2022-09-16 DIAGNOSIS — T81.49XA WOUND INFECTION AFTER SURGERY: ICD-10-CM

## 2022-09-16 DIAGNOSIS — Z17.1 MALIGNANT NEOPLASM OF NIPPLE OF LEFT BREAST IN FEMALE, ESTROGEN RECEPTOR NEGATIVE: Primary | ICD-10-CM

## 2022-09-16 DIAGNOSIS — F43.21 SITUATIONAL DEPRESSION: ICD-10-CM

## 2022-09-16 PROBLEM — E78.01 FAMILIAL HYPERCHOLESTEROLEMIA: Status: ACTIVE | Noted: 2022-09-16

## 2022-09-16 PROCEDURE — 1159F PR MEDICATION LIST DOCUMENTED IN MEDICAL RECORD: ICD-10-PCS | Mod: CPTII,S$GLB,, | Performed by: INTERNAL MEDICINE

## 2022-09-16 PROCEDURE — 99999 PR PBB SHADOW E&M-EST. PATIENT-LVL V: ICD-10-PCS | Mod: PBBFAC,,, | Performed by: INTERNAL MEDICINE

## 2022-09-16 PROCEDURE — 1101F PR PT FALLS ASSESS DOC 0-1 FALLS W/OUT INJ PAST YR: ICD-10-PCS | Mod: CPTII,S$GLB,, | Performed by: INTERNAL MEDICINE

## 2022-09-16 PROCEDURE — 99214 OFFICE O/P EST MOD 30 MIN: CPT | Mod: S$GLB,,, | Performed by: INTERNAL MEDICINE

## 2022-09-16 PROCEDURE — 1160F PR REVIEW ALL MEDS BY PRESCRIBER/CLIN PHARMACIST DOCUMENTED: ICD-10-PCS | Mod: CPTII,S$GLB,, | Performed by: INTERNAL MEDICINE

## 2022-09-16 PROCEDURE — 1101F PT FALLS ASSESS-DOCD LE1/YR: CPT | Mod: CPTII,S$GLB,, | Performed by: INTERNAL MEDICINE

## 2022-09-16 PROCEDURE — 99999 PR PBB SHADOW E&M-EST. PATIENT-LVL V: CPT | Mod: PBBFAC,,, | Performed by: INTERNAL MEDICINE

## 2022-09-16 PROCEDURE — 99214 PR OFFICE/OUTPT VISIT, EST, LEVL IV, 30-39 MIN: ICD-10-PCS | Mod: S$GLB,,, | Performed by: INTERNAL MEDICINE

## 2022-09-16 PROCEDURE — 3288F PR FALLS RISK ASSESSMENT DOCUMENTED: ICD-10-PCS | Mod: CPTII,S$GLB,, | Performed by: INTERNAL MEDICINE

## 2022-09-16 PROCEDURE — 3288F FALL RISK ASSESSMENT DOCD: CPT | Mod: CPTII,S$GLB,, | Performed by: INTERNAL MEDICINE

## 2022-09-16 PROCEDURE — 1159F MED LIST DOCD IN RCRD: CPT | Mod: CPTII,S$GLB,, | Performed by: INTERNAL MEDICINE

## 2022-09-16 PROCEDURE — 1160F RVW MEDS BY RX/DR IN RCRD: CPT | Mod: CPTII,S$GLB,, | Performed by: INTERNAL MEDICINE

## 2022-09-16 RX ORDER — EPINEPHRINE 0.3 MG/.3ML
0.3 INJECTION SUBCUTANEOUS ONCE AS NEEDED
Status: CANCELLED | OUTPATIENT
Start: 2022-10-14

## 2022-09-16 RX ORDER — SODIUM CHLORIDE 0.9 % (FLUSH) 0.9 %
10 SYRINGE (ML) INJECTION
Status: CANCELLED | OUTPATIENT
Start: 2022-10-14

## 2022-09-16 RX ORDER — DIPHENHYDRAMINE HYDROCHLORIDE 50 MG/ML
50 INJECTION INTRAMUSCULAR; INTRAVENOUS ONCE AS NEEDED
Status: CANCELLED | OUTPATIENT
Start: 2022-10-14

## 2022-09-16 RX ORDER — HEPARIN 100 UNIT/ML
500 SYRINGE INTRAVENOUS
Status: CANCELLED | OUTPATIENT
Start: 2022-10-14

## 2022-09-16 RX ORDER — ACETAMINOPHEN 500 MG
1000 TABLET ORAL
Status: CANCELLED
Start: 2022-10-14

## 2022-09-16 NOTE — PROGRESS NOTES
HISTORY OF PRESENT ILLNESS:    The patient is a 72-year-old white female well   known to me for locally advanced left breast carcinoma, who underwent neoadjuvant therapy consisting of four cycles of Adriamycin/Cytoxan followed by 12 weekly doses of Taxol.  She had a residual 0.3 mm focus of metastatic carcinoma in a single lymph node.  Patient and postlumpectomy radiation.  She completed a 60 month course of Arimidex/biannual Prolia for prevention of aromatase inhibitor induced bone loss.  She was recently placed on annual follow-up.  She returns to clinic earlier than scheduled appointment.  Patient is having some anxiety symptoms around the possibility of recurrence.  She feels a little less certain of her course now that she is off active treatment and having less frequent surveillance.  She also continues to struggle caring for her  who became severely ill during the pandemic.  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  She presents seeking reassurance.  She declines medical therapy for sleep disturbance/situational depression.    Shortly after patient's last office evaluation, she began to experience pain in the other portions of her left breast.  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  However, the patient has actively been dieting, losing weight, and upon palpation noted a change in the breast parenchyma which she wished evaluated.  She was seen by morro Butler for additional imaging which reveals suspicious findings.  Patient has gone on to have breast biopsy and returns to review results.  Patient had visited with Dr. Ryder since her last visit in my clinic and found his assistance helpful.    Patient has completed neoadjuvant therapy consisting of carboplatin/Taxol/Keytruda.  She undergone bilateral mastectomy/lymph node sampling in returns to clinic to review path.  Unfortunately, her postoperative course has been  complicated by infection/seroma formation involving both tissue expanders.  Drains had to be replaced, and patient remains on antibiotics for at least another 2 weeks.  Her infection appears to be clearing.    PHYSICAL EXAMINATION:  GENERAL:  Well-developed, well-nourished white female in no acute distress, witha weight of 146.5 pounds (decreased by 2.5 pound).  VITAL SIGNS:  Documented in EMR and reviewed.  HEENT:  Normocephalic, atraumatic.  Oral mucosa pink and moist.  Lips without lesions.  Tongue midline.  Oropharynx clear.  Nonicteric sclerae.   NECK:  Supple, no adenopathy.  No carotid bruits, thyromegaly or thyroid nodule.  HEART:  Regular rate and rhythm without murmur, gallop or rub.                LUNGS:  Clear to auscultation bilaterally.  Normal respiratory effort.       ABDOMEN:  Soft, nontender, nondistended with positive normoactive bowel sounds, no hepatosplenomegaly.    EXTREMITIES:  No cyanosis, clubbing or edema.  Distal pulses are intact.                                           AXILLAE AND GROIN:  No palpable pathologic lymphadenopathy is appreciated.        SKIN:  Intact/turgor normal.  NEUROLOGIC:  Cranial nerves II-XII grossly intact.  Motor:  Good muscle bulk and tone.  Strength/sensory 5/5 throughout.  Gait stable.  BREASTS:  Bilateral tissue expanders in place.  Bilateral BLAIR drains intact with thin serous fluid in the reservoirs.    Laboratory:  White count 7.6, hemoglobin 12.3, hematocrit 36.9, platelets 243, absolute neutrophil count is 5800.    Sodium 137, potassium 5.2, chloride 103, CO2 23, BUN 22, creatinine 0.8, glucose 99, calcium 10.2, magnesium 2.1, liver function test within normal limits, , GFR is greater than 60.    TSH 1.746, free T4 0.89.    Surgical pathology:   1.  BREAST, LEFT, SIMPLE MASTECTOMY:   --INVASIVE CARCINOMA OF NO SPECIAL TYPE (DUCTAL), CYNDI HISTOLOGIC    GRADE 3 OUT OF 3.   --TUMOR IS PRESENT IN 3 O'CLOCK REGION AS MULTIPLE FOCI (RANGING FROM 1  "   MILLIMETER UP TO 6           MILLIMETERS) WITHIN FIBROTIC TUMOR BED.        --BIOPSY SITE CHANGES.   --RECEPTOR STUDIES REPORTED PREVIOUSLY ON NEEDLE CORE BIOPSY MATERIAL    (SEE COMMENT).        --FOCAL LYMPHOVASCULAR INVASION IDENTIFIED.   --PART 1 SPECIMEN ANTERIOR MARGIN IS POSITIVE FOR INVASIVE CARCINOMA;    ADDITIONAL MARGINS SENT   SEPARATELY (SEE PARTS 2 AND 3 BELOW); ALL REMAINING PART 1 SPECIMEN    MARGINS ARE NEGATIVE FOR   INVASIVE CARCINOMA (ALL REMAINING PART 1 SPECIMEN MARGINS GREATER THAN    10 MILLIMETERS AWAY).        --MICROCALCIFICATIONS PRESENT.   --NIPPLE-AREOLAR COMPLEX WITH NO MORPHOLOGIC EVIDENCE OF MALIGNANCY.        --ypT1b(m)/ypN0.     2. BREAST, LEFT, FURTHER DESIGNATED "MARGIN AT THE TUMOR (ANTERIOR    SUPERIOR LATERAL, SUTURE MARKS        NEW MARGIN)," EXCISION:   --SEVERAL MICROSCOPIC FOCI (EACH APPROXIMATELY 1 TO 2 MILLIMETERS) OF    INVASIVE CARCINOMA OF NO           SPECIAL TYPE (DUCTAL), CYNDI HISTOLOGIC GRADE 3 OUT OF 3.   --NEW MARGIN IS NEGATIVE FOR INVASIVE CARCINOMA (APPROXIMATELY 7    MILLIMETERS AWAY).     3. BREAST, LEFT, FURTHER DESIGNATED "DEEP MARGIN (SUTURE MARKS NEW    MARGIN)," EXCISION:        --NO MORPHOLOGIC EVIDENCE OF MALIGNANCY.     4.  LYMPH NODE, LEFT SENTINEL, EXCISIONAL BIOPSY:        --ONE LYMPH NODE WITH NO MORPHOLOGIC EVIDENCE OF MALIGNANCY (0/1).     5.  BREAST, RIGHT, SIMPLE MASTECTOMY:        --NO MORPHOLOGIC EVIDENCE OF MALIGNANCY.        --SKIN WITH CAPILLARY HEMANGIOMA (4 MILLIMETERS).   --NIPPLE-AREOLAR COMPLEX WITH NO SIGNIFICANT HISTOPATHOLOGIC    ABNORMALITY.     Comment:   Previous report of "invasive ductal carcinoma, moderately    differentiated" in ultrasound-guided core biopsy of left breast (3:00,    8 centimeters from nipple) at outside facility is noted in [facility]    medical record system (Pathology Report; Accession # [id number];    Received: 04/06/2022; [facility],    [facility], [facility] " [place], [place]). That case is    subsequently reviewed by [facility], with concurrence regarding    the reported diagnosis.     IMPRESSION:    1.  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  2. Situational depression.  3. Second primary, triple negative, clinical stage II B (T2, N0, M0), left breast carcinoma with interval disease/focal lymphovascular invasion breast but no evidence of no bowel involvement following neoadjuvant therapy.  4. Status post bilateral mastectomy/placement of tissue expanders-complicated by postop infection.    PLAN:  1. Patient will not require postoperative adjuvant XRT in light of excellent response to therapy.    2. Hold adjuvant pembrolizumab until resolution of infection.    3.  Return to clinic 3 weeks from now with interval CBC, CMP, LDH magnesium prior to appointment.  I hope to resume pembrolizumab at that visit.      THIS NOTE WAS CREATED USING VOICE RECOGNITION SOFTWARE AND MAY CONTAIN GRAMMATICAL ERRORS.

## 2022-09-16 NOTE — Clinical Note
Proceed with 1st cycle of postoperative adjuvant Keytruda.  May be done today if infusion can accommodate.   Return to clinic 3 weeks later to see me with interval CBC, CMP, LDH and magnesium prior to appointment.

## 2022-10-14 ENCOUNTER — DOCUMENTATION ONLY (OUTPATIENT)
Dept: INFUSION THERAPY | Facility: HOSPITAL | Age: 72
End: 2022-10-14
Payer: MEDICARE

## 2022-10-14 ENCOUNTER — INFUSION (OUTPATIENT)
Dept: INFUSION THERAPY | Facility: HOSPITAL | Age: 72
End: 2022-10-14
Attending: INTERNAL MEDICINE
Payer: MEDICARE

## 2022-10-14 ENCOUNTER — OFFICE VISIT (OUTPATIENT)
Dept: HEMATOLOGY/ONCOLOGY | Facility: CLINIC | Age: 72
End: 2022-10-14
Payer: MEDICARE

## 2022-10-14 VITALS
SYSTOLIC BLOOD PRESSURE: 122 MMHG | HEIGHT: 60 IN | WEIGHT: 146.63 LBS | OXYGEN SATURATION: 96 % | BODY MASS INDEX: 28.79 KG/M2 | RESPIRATION RATE: 16 BRPM | DIASTOLIC BLOOD PRESSURE: 79 MMHG | HEART RATE: 78 BPM | TEMPERATURE: 98 F

## 2022-10-14 VITALS — RESPIRATION RATE: 16 BRPM | DIASTOLIC BLOOD PRESSURE: 75 MMHG | SYSTOLIC BLOOD PRESSURE: 134 MMHG | HEART RATE: 76 BPM

## 2022-10-14 DIAGNOSIS — Z17.1 MALIGNANT NEOPLASM OF NIPPLE OF LEFT BREAST IN FEMALE, ESTROGEN RECEPTOR NEGATIVE: Primary | ICD-10-CM

## 2022-10-14 DIAGNOSIS — C50.012 MALIGNANT NEOPLASM OF NIPPLE OF LEFT BREAST IN FEMALE, ESTROGEN RECEPTOR NEGATIVE: Primary | ICD-10-CM

## 2022-10-14 DIAGNOSIS — C50.012 MALIGNANT NEOPLASM OF NIPPLE OF LEFT BREAST IN FEMALE, UNSPECIFIED ESTROGEN RECEPTOR STATUS: ICD-10-CM

## 2022-10-14 DIAGNOSIS — T81.49XA WOUND INFECTION AFTER SURGERY: ICD-10-CM

## 2022-10-14 DIAGNOSIS — F43.21 SITUATIONAL DEPRESSION: ICD-10-CM

## 2022-10-14 DIAGNOSIS — C50.411 MALIGNANT NEOPLASM OF UPPER-OUTER QUADRANT OF RIGHT BREAST IN FEMALE, ESTROGEN RECEPTOR NEGATIVE: ICD-10-CM

## 2022-10-14 DIAGNOSIS — Z17.1 MALIGNANT NEOPLASM OF UPPER-OUTER QUADRANT OF RIGHT BREAST IN FEMALE, ESTROGEN RECEPTOR NEGATIVE: ICD-10-CM

## 2022-10-14 DIAGNOSIS — F41.9 ANXIETY: ICD-10-CM

## 2022-10-14 PROCEDURE — 25000003 PHARM REV CODE 250: Mod: PN | Performed by: INTERNAL MEDICINE

## 2022-10-14 PROCEDURE — 3074F PR MOST RECENT SYSTOLIC BLOOD PRESSURE < 130 MM HG: ICD-10-PCS | Mod: CPTII,S$GLB,, | Performed by: INTERNAL MEDICINE

## 2022-10-14 PROCEDURE — 1160F RVW MEDS BY RX/DR IN RCRD: CPT | Mod: CPTII,S$GLB,, | Performed by: INTERNAL MEDICINE

## 2022-10-14 PROCEDURE — 96413 CHEMO IV INFUSION 1 HR: CPT | Mod: PN

## 2022-10-14 PROCEDURE — 1126F PR PAIN SEVERITY QUANTIFIED, NO PAIN PRESENT: ICD-10-PCS | Mod: CPTII,S$GLB,, | Performed by: INTERNAL MEDICINE

## 2022-10-14 PROCEDURE — 99499 UNLISTED E&M SERVICE: CPT | Mod: S$GLB,,, | Performed by: INTERNAL MEDICINE

## 2022-10-14 PROCEDURE — 3074F SYST BP LT 130 MM HG: CPT | Mod: CPTII,S$GLB,, | Performed by: INTERNAL MEDICINE

## 2022-10-14 PROCEDURE — 3078F PR MOST RECENT DIASTOLIC BLOOD PRESSURE < 80 MM HG: ICD-10-PCS | Mod: CPTII,S$GLB,, | Performed by: INTERNAL MEDICINE

## 2022-10-14 PROCEDURE — 99214 PR OFFICE/OUTPT VISIT, EST, LEVL IV, 30-39 MIN: ICD-10-PCS | Mod: S$GLB,,, | Performed by: INTERNAL MEDICINE

## 2022-10-14 PROCEDURE — 3288F FALL RISK ASSESSMENT DOCD: CPT | Mod: CPTII,S$GLB,, | Performed by: INTERNAL MEDICINE

## 2022-10-14 PROCEDURE — 1159F PR MEDICATION LIST DOCUMENTED IN MEDICAL RECORD: ICD-10-PCS | Mod: CPTII,S$GLB,, | Performed by: INTERNAL MEDICINE

## 2022-10-14 PROCEDURE — 3288F PR FALLS RISK ASSESSMENT DOCUMENTED: ICD-10-PCS | Mod: CPTII,S$GLB,, | Performed by: INTERNAL MEDICINE

## 2022-10-14 PROCEDURE — 99214 OFFICE O/P EST MOD 30 MIN: CPT | Mod: S$GLB,,, | Performed by: INTERNAL MEDICINE

## 2022-10-14 PROCEDURE — 99999 PR PBB SHADOW E&M-EST. PATIENT-LVL IV: ICD-10-PCS | Mod: PBBFAC,,, | Performed by: INTERNAL MEDICINE

## 2022-10-14 PROCEDURE — 3078F DIAST BP <80 MM HG: CPT | Mod: CPTII,S$GLB,, | Performed by: INTERNAL MEDICINE

## 2022-10-14 PROCEDURE — 1160F PR REVIEW ALL MEDS BY PRESCRIBER/CLIN PHARMACIST DOCUMENTED: ICD-10-PCS | Mod: CPTII,S$GLB,, | Performed by: INTERNAL MEDICINE

## 2022-10-14 PROCEDURE — 99499 RISK ADDL DX/OHS AUDIT: ICD-10-PCS | Mod: S$GLB,,, | Performed by: INTERNAL MEDICINE

## 2022-10-14 PROCEDURE — 1159F MED LIST DOCD IN RCRD: CPT | Mod: CPTII,S$GLB,, | Performed by: INTERNAL MEDICINE

## 2022-10-14 PROCEDURE — 96367 TX/PROPH/DG ADDL SEQ IV INF: CPT | Mod: PN

## 2022-10-14 PROCEDURE — 1101F PR PT FALLS ASSESS DOC 0-1 FALLS W/OUT INJ PAST YR: ICD-10-PCS | Mod: CPTII,S$GLB,, | Performed by: INTERNAL MEDICINE

## 2022-10-14 PROCEDURE — 1126F AMNT PAIN NOTED NONE PRSNT: CPT | Mod: CPTII,S$GLB,, | Performed by: INTERNAL MEDICINE

## 2022-10-14 PROCEDURE — 99999 PR PBB SHADOW E&M-EST. PATIENT-LVL IV: CPT | Mod: PBBFAC,,, | Performed by: INTERNAL MEDICINE

## 2022-10-14 PROCEDURE — 63600175 PHARM REV CODE 636 W HCPCS: Mod: PN | Performed by: INTERNAL MEDICINE

## 2022-10-14 PROCEDURE — A4216 STERILE WATER/SALINE, 10 ML: HCPCS | Mod: PN | Performed by: INTERNAL MEDICINE

## 2022-10-14 PROCEDURE — 1101F PT FALLS ASSESS-DOCD LE1/YR: CPT | Mod: CPTII,S$GLB,, | Performed by: INTERNAL MEDICINE

## 2022-10-14 RX ORDER — SODIUM CHLORIDE 0.9 % (FLUSH) 0.9 %
10 SYRINGE (ML) INJECTION
Status: DISCONTINUED | OUTPATIENT
Start: 2022-10-14 | End: 2022-10-14 | Stop reason: HOSPADM

## 2022-10-14 RX ORDER — ACETAMINOPHEN 500 MG
1000 TABLET ORAL
Status: COMPLETED | OUTPATIENT
Start: 2022-10-14 | End: 2022-10-14

## 2022-10-14 RX ORDER — ONDANSETRON 8 MG/1
8 TABLET, ORALLY DISINTEGRATING ORAL EVERY 8 HOURS PRN
COMMUNITY
Start: 2022-10-06 | End: 2023-03-10

## 2022-10-14 RX ADMIN — ACETAMINOPHEN 1000 MG: 500 TABLET, FILM COATED ORAL at 02:10

## 2022-10-14 RX ADMIN — DIPHENHYDRAMINE HYDROCHLORIDE 25 MG: 50 INJECTION, SOLUTION INTRAMUSCULAR; INTRAVENOUS at 02:10

## 2022-10-14 RX ADMIN — Medication 10 ML: at 03:10

## 2022-10-14 RX ADMIN — SODIUM CHLORIDE: 0.9 INJECTION, SOLUTION INTRAVENOUS at 02:10

## 2022-10-14 RX ADMIN — SODIUM CHLORIDE 200 MG: 9 INJECTION, SOLUTION INTRAVENOUS at 02:10

## 2022-10-14 NOTE — PLAN OF CARE
Pt tolerated Keytruda infusion well.  No s/s of infusion reaction noted.  Instructed to call MD with any problemsl

## 2022-10-14 NOTE — PROGRESS NOTES
HISTORY OF PRESENT ILLNESS:    The patient is a 72-year-old white female well   known to me for locally advanced left breast carcinoma, who underwent neoadjuvant therapy consisting of four cycles of Adriamycin/Cytoxan followed by 12 weekly doses of Taxol.  She had a residual 0.3 mm focus of metastatic carcinoma in a single lymph node.  Patient and postlumpectomy radiation.  She completed a 60 month course of Arimidex/biannual Prolia for prevention of aromatase inhibitor induced bone loss.  She was recently placed on annual follow-up.  She returns to clinic earlier than scheduled appointment.  Patient is having some anxiety symptoms around the possibility of recurrence.  She feels a little less certain of her course now that she is off active treatment and having less frequent surveillance.  She also continues to struggle caring for her  who became severely ill during the pandemic.  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  She presents seeking reassurance.  She declines medical therapy for sleep disturbance/situational depression.    Shortly after patient's last office evaluation, she began to experience pain in the other portions of her left breast.  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  However, the patient has actively been dieting, losing weight, and upon palpation noted a change in the breast parenchyma which she wished evaluated.  She was seen by morro Butler for additional imaging which reveals suspicious findings.  Patient has gone on to have breast biopsy and returns to review results.  Patient had visited with Dr. Ryder since her last visit in my clinic and found his assistance helpful.    Patient has completed neoadjuvant therapy consisting of carboplatin/Taxol/Keytruda.  She undergone bilateral mastectomy/lymph node sampling in returns to clinic to review path.  Unfortunately, her postoperative course has been  complicated by infection/seroma formation involving both tissue expanders.  Drains had to be replaced.  Patient has completed all of her antibiotics.  Surgical drains have now been removed.  She is scheduled to initiate adjuvant Keytruda.    PHYSICAL EXAMINATION:  GENERAL:  Well-developed, well-nourished white female in no acute distress, witha weight of 146.5 pounds (stable).  VITAL SIGNS:  Documented in EMR and reviewed.  HEENT:  Normocephalic, atraumatic.  Oral mucosa pink and moist.  Lips without lesions.  Tongue midline.  Oropharynx clear.  Nonicteric sclerae.   NECK:  Supple, no adenopathy.  No carotid bruits, thyromegaly or thyroid nodule.  HEART:  Regular rate and rhythm without murmur, gallop or rub.                LUNGS:  Clear to auscultation bilaterally.  Normal respiratory effort.       ABDOMEN:  Soft, nontender, nondistended with positive normoactive bowel sounds, no hepatosplenomegaly.    EXTREMITIES:  No cyanosis, clubbing or edema.  Distal pulses are intact.                                           AXILLAE AND GROIN:  No palpable pathologic lymphadenopathy is appreciated.        SKIN:  Intact/turgor normal.  NEUROLOGIC:  Cranial nerves II-XII grossly intact.  Motor:  Good muscle bulk and tone.  Strength/sensory 5/5 throughout.  Gait stable.  BREASTS:  Both surgically reconstructed with tissue expander.  No signs of infection or local recurrence.    Laboratory:  White count 7.5, hemoglobin 10.9, hematocrit 32.3, platelets 199, absolute neutrophil count 4700.    Sodium 139, potassium 4.1, chloride 104, CO2 26, BUN 17, creatinine 0.7, glucose 107, calcium 10.1, magnesium 2.2, liver function test within normal limits, GFR is greater than 60.    IMPRESSION:    1.  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  2. Situational depression.  3. Second primary, triple negative, clinical stage II B (T2, N0, M0), left  breast carcinoma with interval disease/focal lymphovascular invasion breast but no evidence of no bowel involvement following neoadjuvant therapy.  4. Status post bilateral mastectomy/placement of tissue expanders-complicated by postop infection.    PLAN:  1. Patient will not require postoperative adjuvant XRT in light of excellent response to therapy.    2. Proceed with adjuvant Keytruda as scheduled.  3.  Return to clinic in 3 weeks with CBC, CMP, LDH and magnesium prior to appointment.        THIS NOTE WAS CREATED USING VOICE RECOGNITION SOFTWARE AND MAY CONTAIN GRAMMATICAL ERRORS.

## 2022-10-14 NOTE — PROGRESS NOTES
Oncology Nutrition   Chemotherapy Infusion Visit    Nutrition Follow Up   RD met w/ pt at chairside during keytruda infusion. Pt reports healthy appetite. Denies any nutrition related side effects or any food intolerances. Pt is drinking plenty of fluids. Pt weight has been stable.     Wt Readings from Last 10 Encounters:   10/14/22 66.5 kg (146 lb 9.7 oz)   09/16/22 (P) 66.5 kg (146 lb 9.7 oz)   09/16/22 67 kg (147 lb 12.8 oz)   09/08/22 65.8 kg (145 lb)   08/26/22 67.1 kg (148 lb)   08/18/22 67.1 kg (148 lb)   08/11/22 67.3 kg (148 lb 5.9 oz)   07/29/22 67.4 kg (148 lb 9.4 oz)   07/12/22 67.7 kg (149 lb 4 oz)   07/11/22 67.7 kg (149 lb 4 oz)       All other nutrition questions/concerns addressed as appropriate. Will continue to monitor prn throughout treatment.     Keerthi Alaniz, PERRY, LDN  10/14/2022  2:28 PM

## 2022-10-18 ENCOUNTER — TELEPHONE (OUTPATIENT)
Dept: HEMATOLOGY/ONCOLOGY | Facility: CLINIC | Age: 72
End: 2022-10-18
Payer: MEDICARE

## 2022-10-18 NOTE — NURSING
Received call from patient.  Dr. Alanis's office called and has an opening for surgery on 11/2.  She is scheduled for Keytruda on 11/4.  She asked me to ask Dr. Bartlett if she could have surgery and then Keytruda a couple days apart.  Per Dr. Bartlett, Keytruda is the priority and she can have surgery in between cycles.  Confirmed specifically, she can have surgery on 11/2 and then Keytruda on 11/4.  Patient notified.  Patient very thankful and appreciative.  Encouraged her to call with any other questions or concerns.  Patient verbalized understanding.

## 2022-10-27 ENCOUNTER — OFFICE VISIT (OUTPATIENT)
Dept: FAMILY MEDICINE | Facility: CLINIC | Age: 72
End: 2022-10-27
Payer: MEDICARE

## 2022-10-27 VITALS
RESPIRATION RATE: 14 BRPM | SYSTOLIC BLOOD PRESSURE: 126 MMHG | BODY MASS INDEX: 27.68 KG/M2 | HEIGHT: 61 IN | DIASTOLIC BLOOD PRESSURE: 68 MMHG | HEART RATE: 91 BPM | WEIGHT: 146.63 LBS

## 2022-10-27 DIAGNOSIS — C50.012 MALIGNANT NEOPLASM OF NIPPLE OF LEFT BREAST IN FEMALE, ESTROGEN RECEPTOR NEGATIVE: Primary | ICD-10-CM

## 2022-10-27 DIAGNOSIS — G47.00 INSOMNIA, UNSPECIFIED TYPE: ICD-10-CM

## 2022-10-27 DIAGNOSIS — D64.9 ANEMIA, UNSPECIFIED TYPE: ICD-10-CM

## 2022-10-27 DIAGNOSIS — Z17.1 MALIGNANT NEOPLASM OF NIPPLE OF LEFT BREAST IN FEMALE, ESTROGEN RECEPTOR NEGATIVE: Primary | ICD-10-CM

## 2022-10-27 DIAGNOSIS — F43.21 SITUATIONAL DEPRESSION: ICD-10-CM

## 2022-10-27 PROCEDURE — 1101F PT FALLS ASSESS-DOCD LE1/YR: CPT | Mod: CPTII,S$GLB,, | Performed by: FAMILY MEDICINE

## 2022-10-27 PROCEDURE — 99999 PR PBB SHADOW E&M-EST. PATIENT-LVL III: ICD-10-PCS | Mod: PBBFAC,,, | Performed by: FAMILY MEDICINE

## 2022-10-27 PROCEDURE — 1126F AMNT PAIN NOTED NONE PRSNT: CPT | Mod: CPTII,S$GLB,, | Performed by: FAMILY MEDICINE

## 2022-10-27 PROCEDURE — 1101F PR PT FALLS ASSESS DOC 0-1 FALLS W/OUT INJ PAST YR: ICD-10-PCS | Mod: CPTII,S$GLB,, | Performed by: FAMILY MEDICINE

## 2022-10-27 PROCEDURE — 99214 OFFICE O/P EST MOD 30 MIN: CPT | Mod: S$GLB,,, | Performed by: FAMILY MEDICINE

## 2022-10-27 PROCEDURE — 3078F DIAST BP <80 MM HG: CPT | Mod: CPTII,S$GLB,, | Performed by: FAMILY MEDICINE

## 2022-10-27 PROCEDURE — 1126F PR PAIN SEVERITY QUANTIFIED, NO PAIN PRESENT: ICD-10-PCS | Mod: CPTII,S$GLB,, | Performed by: FAMILY MEDICINE

## 2022-10-27 PROCEDURE — 1160F RVW MEDS BY RX/DR IN RCRD: CPT | Mod: CPTII,S$GLB,, | Performed by: FAMILY MEDICINE

## 2022-10-27 PROCEDURE — 3288F PR FALLS RISK ASSESSMENT DOCUMENTED: ICD-10-PCS | Mod: CPTII,S$GLB,, | Performed by: FAMILY MEDICINE

## 2022-10-27 PROCEDURE — 1159F MED LIST DOCD IN RCRD: CPT | Mod: CPTII,S$GLB,, | Performed by: FAMILY MEDICINE

## 2022-10-27 PROCEDURE — 3078F PR MOST RECENT DIASTOLIC BLOOD PRESSURE < 80 MM HG: ICD-10-PCS | Mod: CPTII,S$GLB,, | Performed by: FAMILY MEDICINE

## 2022-10-27 PROCEDURE — 3074F SYST BP LT 130 MM HG: CPT | Mod: CPTII,S$GLB,, | Performed by: FAMILY MEDICINE

## 2022-10-27 PROCEDURE — 1160F PR REVIEW ALL MEDS BY PRESCRIBER/CLIN PHARMACIST DOCUMENTED: ICD-10-PCS | Mod: CPTII,S$GLB,, | Performed by: FAMILY MEDICINE

## 2022-10-27 PROCEDURE — 99214 PR OFFICE/OUTPT VISIT, EST, LEVL IV, 30-39 MIN: ICD-10-PCS | Mod: S$GLB,,, | Performed by: FAMILY MEDICINE

## 2022-10-27 PROCEDURE — 99999 PR PBB SHADOW E&M-EST. PATIENT-LVL III: CPT | Mod: PBBFAC,,, | Performed by: FAMILY MEDICINE

## 2022-10-27 PROCEDURE — 3288F FALL RISK ASSESSMENT DOCD: CPT | Mod: CPTII,S$GLB,, | Performed by: FAMILY MEDICINE

## 2022-10-27 PROCEDURE — 1159F PR MEDICATION LIST DOCUMENTED IN MEDICAL RECORD: ICD-10-PCS | Mod: CPTII,S$GLB,, | Performed by: FAMILY MEDICINE

## 2022-10-27 PROCEDURE — 3074F PR MOST RECENT SYSTOLIC BLOOD PRESSURE < 130 MM HG: ICD-10-PCS | Mod: CPTII,S$GLB,, | Performed by: FAMILY MEDICINE

## 2022-10-27 NOTE — PROGRESS NOTES
"  Subjective:       Patient ID: Zayra Rodgers is a 72 y.o. female.    Chief Complaint: Follow-up    HPI  Patient in clinic for f/u.  +triple neg breast cancer. Had simple mastectomy bilateral, issues with her tissue expanders (seroma, cellulitis; completed long-term abx). Scheduled for breast recon with azael/sim next week. She has f/u with onc/maylintte to discuss monitoring going forward; in particular since the ca changes didn't show up on the initial imaging, what needs to be done differently to monitor going forward. Will need to re-establish care with local oncology as onc/santinoe is moving.  Saw cards/giselle re calcific plaque noted on pet/ct in 7/2022. He switched to crestor and started zetia given +fam hx.   Labs 10/2022 rev. Discussed dip in h/h from previous.     Review of Systems:  Review of Systems   Constitutional:  Negative for activity change, fatigue (energy can wane as the day progresses) and unexpected weight change.   HENT:  Negative for hearing loss, rhinorrhea and trouble swallowing.    Eyes:  Negative for discharge and visual disturbance.   Respiratory:  Negative for cough, chest tightness and wheezing.    Cardiovascular:  Negative for chest pain and palpitations.   Gastrointestinal:  Negative for blood in stool, constipation, diarrhea and vomiting.   Endocrine: Negative for polydipsia and polyuria.   Genitourinary:  Negative for difficulty urinating, dysuria, hematuria and menstrual problem.   Musculoskeletal:  Negative for arthralgias, joint swelling and neck pain.   Neurological:  Negative for dizziness, weakness, light-headedness and headaches.   Psychiatric/Behavioral:  Positive for sleep disturbance (at least in part due to dog). Negative for confusion and dysphoric mood.      Objective:     Vitals:    10/27/22 1426   BP: 126/68   BP Location: Right arm   Patient Position: Sitting   BP Method: Medium (Manual)   Pulse: 91   Resp: 14   Weight: 66.5 kg (146 lb 9.7 oz)   Height: 5' 1" " (1.549 m)          Physical Exam  Vitals and nursing note reviewed.   Constitutional:       General: She is not in acute distress.     Appearance: Normal appearance. She is well-developed.   HENT:      Head: Normocephalic and atraumatic.      Right Ear: Tympanic membrane normal.      Left Ear: Tympanic membrane normal.   Eyes:      General: No scleral icterus.        Right eye: No discharge.         Left eye: No discharge.      Conjunctiva/sclera: Conjunctivae normal.   Cardiovascular:      Rate and Rhythm: Normal rate and regular rhythm.   Pulmonary:      Effort: Pulmonary effort is normal. No respiratory distress.      Breath sounds: Normal breath sounds.   Abdominal:      Palpations: Abdomen is soft.      Tenderness: There is no guarding.   Musculoskeletal:         General: No deformity or signs of injury.      Cervical back: Neck supple.   Skin:     General: Skin is warm and dry.      Findings: No rash.   Neurological:      General: No focal deficit present.      Mental Status: She is alert and oriented to person, place, and time.   Psychiatric:         Mood and Affect: Mood normal.         Behavior: Behavior normal.         Assessment & Plan:  Malignant neoplasm of nipple of left breast in female, estrogen receptor negative  Comments:  mgmt per onc/surg    Anemia, unspecified type  Comments:  dip in the more recent lab, working on dietary-contributors to vitamin needs  has lab scheduled next friday    Insomnia, unspecified type    Situational depression  Comments:  doing ok, all things considered    Will be interested in physical therapy at [facility] for postural support.     Will need clear interval and imaging recommendations as her prior imaging was not nec diagnostic.

## 2022-10-31 ENCOUNTER — PATIENT MESSAGE (OUTPATIENT)
Dept: FAMILY MEDICINE | Facility: CLINIC | Age: 72
End: 2022-10-31
Payer: MEDICARE

## 2022-11-04 ENCOUNTER — INFUSION (OUTPATIENT)
Dept: INFUSION THERAPY | Facility: HOSPITAL | Age: 72
End: 2022-11-04
Attending: INTERNAL MEDICINE
Payer: MEDICARE

## 2022-11-04 ENCOUNTER — OFFICE VISIT (OUTPATIENT)
Dept: HEMATOLOGY/ONCOLOGY | Facility: CLINIC | Age: 72
End: 2022-11-04
Payer: MEDICARE

## 2022-11-04 VITALS
BODY MASS INDEX: 29.3 KG/M2 | HEART RATE: 72 BPM | DIASTOLIC BLOOD PRESSURE: 85 MMHG | TEMPERATURE: 98 F | SYSTOLIC BLOOD PRESSURE: 153 MMHG | OXYGEN SATURATION: 98 % | HEIGHT: 60 IN | WEIGHT: 149.25 LBS | RESPIRATION RATE: 16 BRPM

## 2022-11-04 VITALS
WEIGHT: 149.25 LBS | SYSTOLIC BLOOD PRESSURE: 172 MMHG | TEMPERATURE: 98 F | DIASTOLIC BLOOD PRESSURE: 89 MMHG | HEART RATE: 72 BPM | HEIGHT: 60 IN | BODY MASS INDEX: 29.3 KG/M2 | OXYGEN SATURATION: 98 % | RESPIRATION RATE: 16 BRPM

## 2022-11-04 DIAGNOSIS — Z17.1 MALIGNANT NEOPLASM OF UPPER-OUTER QUADRANT OF RIGHT BREAST IN FEMALE, ESTROGEN RECEPTOR NEGATIVE: ICD-10-CM

## 2022-11-04 DIAGNOSIS — F41.9 ANXIETY: ICD-10-CM

## 2022-11-04 DIAGNOSIS — Z17.1 MALIGNANT NEOPLASM OF NIPPLE OF LEFT BREAST IN FEMALE, ESTROGEN RECEPTOR NEGATIVE: Primary | ICD-10-CM

## 2022-11-04 DIAGNOSIS — F43.21 SITUATIONAL DEPRESSION: ICD-10-CM

## 2022-11-04 DIAGNOSIS — C50.012 MALIGNANT NEOPLASM OF NIPPLE OF LEFT BREAST IN FEMALE, UNSPECIFIED ESTROGEN RECEPTOR STATUS: ICD-10-CM

## 2022-11-04 DIAGNOSIS — C50.411 MALIGNANT NEOPLASM OF UPPER-OUTER QUADRANT OF RIGHT BREAST IN FEMALE, ESTROGEN RECEPTOR NEGATIVE: ICD-10-CM

## 2022-11-04 DIAGNOSIS — C50.012 MALIGNANT NEOPLASM OF NIPPLE OF LEFT BREAST IN FEMALE, ESTROGEN RECEPTOR NEGATIVE: Primary | ICD-10-CM

## 2022-11-04 DIAGNOSIS — L30.9 DERMATITIS: ICD-10-CM

## 2022-11-04 DIAGNOSIS — T81.49XA WOUND INFECTION AFTER SURGERY: ICD-10-CM

## 2022-11-04 PROCEDURE — 1159F MED LIST DOCD IN RCRD: CPT | Mod: CPTII,S$GLB,, | Performed by: INTERNAL MEDICINE

## 2022-11-04 PROCEDURE — 3077F PR MOST RECENT SYSTOLIC BLOOD PRESSURE >= 140 MM HG: ICD-10-PCS | Mod: CPTII,S$GLB,, | Performed by: INTERNAL MEDICINE

## 2022-11-04 PROCEDURE — 1125F PR PAIN SEVERITY QUANTIFIED, PAIN PRESENT: ICD-10-PCS | Mod: CPTII,S$GLB,, | Performed by: INTERNAL MEDICINE

## 2022-11-04 PROCEDURE — 96367 TX/PROPH/DG ADDL SEQ IV INF: CPT | Mod: PN

## 2022-11-04 PROCEDURE — 1160F RVW MEDS BY RX/DR IN RCRD: CPT | Mod: CPTII,S$GLB,, | Performed by: INTERNAL MEDICINE

## 2022-11-04 PROCEDURE — 63600175 PHARM REV CODE 636 W HCPCS: Mod: PN | Performed by: INTERNAL MEDICINE

## 2022-11-04 PROCEDURE — 99499 RISK ADDL DX/OHS AUDIT: ICD-10-PCS | Mod: S$GLB,,, | Performed by: INTERNAL MEDICINE

## 2022-11-04 PROCEDURE — 99999 PR PBB SHADOW E&M-EST. PATIENT-LVL V: ICD-10-PCS | Mod: PBBFAC,,, | Performed by: INTERNAL MEDICINE

## 2022-11-04 PROCEDURE — 25000003 PHARM REV CODE 250: Mod: PN | Performed by: INTERNAL MEDICINE

## 2022-11-04 PROCEDURE — 96413 CHEMO IV INFUSION 1 HR: CPT | Mod: PN

## 2022-11-04 PROCEDURE — 1125F AMNT PAIN NOTED PAIN PRSNT: CPT | Mod: CPTII,S$GLB,, | Performed by: INTERNAL MEDICINE

## 2022-11-04 PROCEDURE — 3077F SYST BP >= 140 MM HG: CPT | Mod: CPTII,S$GLB,, | Performed by: INTERNAL MEDICINE

## 2022-11-04 PROCEDURE — 3079F PR MOST RECENT DIASTOLIC BLOOD PRESSURE 80-89 MM HG: ICD-10-PCS | Mod: CPTII,S$GLB,, | Performed by: INTERNAL MEDICINE

## 2022-11-04 PROCEDURE — 99499 UNLISTED E&M SERVICE: CPT | Mod: S$GLB,,, | Performed by: INTERNAL MEDICINE

## 2022-11-04 PROCEDURE — 99214 OFFICE O/P EST MOD 30 MIN: CPT | Mod: S$GLB,,, | Performed by: INTERNAL MEDICINE

## 2022-11-04 PROCEDURE — 1101F PR PT FALLS ASSESS DOC 0-1 FALLS W/OUT INJ PAST YR: ICD-10-PCS | Mod: CPTII,S$GLB,, | Performed by: INTERNAL MEDICINE

## 2022-11-04 PROCEDURE — 1101F PT FALLS ASSESS-DOCD LE1/YR: CPT | Mod: CPTII,S$GLB,, | Performed by: INTERNAL MEDICINE

## 2022-11-04 PROCEDURE — 1160F PR REVIEW ALL MEDS BY PRESCRIBER/CLIN PHARMACIST DOCUMENTED: ICD-10-PCS | Mod: CPTII,S$GLB,, | Performed by: INTERNAL MEDICINE

## 2022-11-04 PROCEDURE — 1159F PR MEDICATION LIST DOCUMENTED IN MEDICAL RECORD: ICD-10-PCS | Mod: CPTII,S$GLB,, | Performed by: INTERNAL MEDICINE

## 2022-11-04 PROCEDURE — 3079F DIAST BP 80-89 MM HG: CPT | Mod: CPTII,S$GLB,, | Performed by: INTERNAL MEDICINE

## 2022-11-04 PROCEDURE — 3288F FALL RISK ASSESSMENT DOCD: CPT | Mod: CPTII,S$GLB,, | Performed by: INTERNAL MEDICINE

## 2022-11-04 PROCEDURE — 99999 PR PBB SHADOW E&M-EST. PATIENT-LVL V: CPT | Mod: PBBFAC,,, | Performed by: INTERNAL MEDICINE

## 2022-11-04 PROCEDURE — 3288F PR FALLS RISK ASSESSMENT DOCUMENTED: ICD-10-PCS | Mod: CPTII,S$GLB,, | Performed by: INTERNAL MEDICINE

## 2022-11-04 PROCEDURE — 3008F PR BODY MASS INDEX (BMI) DOCUMENTED: ICD-10-PCS | Mod: CPTII,S$GLB,, | Performed by: INTERNAL MEDICINE

## 2022-11-04 PROCEDURE — 3008F BODY MASS INDEX DOCD: CPT | Mod: CPTII,S$GLB,, | Performed by: INTERNAL MEDICINE

## 2022-11-04 PROCEDURE — 99214 PR OFFICE/OUTPT VISIT, EST, LEVL IV, 30-39 MIN: ICD-10-PCS | Mod: S$GLB,,, | Performed by: INTERNAL MEDICINE

## 2022-11-04 RX ORDER — HEPARIN 100 UNIT/ML
500 SYRINGE INTRAVENOUS
Status: CANCELLED | OUTPATIENT
Start: 2022-11-04

## 2022-11-04 RX ORDER — ACETAMINOPHEN 500 MG
1000 TABLET ORAL
Status: CANCELLED
Start: 2022-11-04

## 2022-11-04 RX ORDER — ACETAMINOPHEN 500 MG
1000 TABLET ORAL
Status: COMPLETED | OUTPATIENT
Start: 2022-11-04 | End: 2022-11-04

## 2022-11-04 RX ORDER — SODIUM CHLORIDE 0.9 % (FLUSH) 0.9 %
10 SYRINGE (ML) INJECTION
Status: CANCELLED | OUTPATIENT
Start: 2022-11-04

## 2022-11-04 RX ORDER — EPINEPHRINE 0.3 MG/.3ML
0.3 INJECTION SUBCUTANEOUS ONCE AS NEEDED
Status: CANCELLED | OUTPATIENT
Start: 2022-11-04

## 2022-11-04 RX ORDER — EPINEPHRINE 0.3 MG/.3ML
0.3 INJECTION SUBCUTANEOUS ONCE AS NEEDED
Status: DISCONTINUED | OUTPATIENT
Start: 2022-11-04 | End: 2022-11-04 | Stop reason: HOSPADM

## 2022-11-04 RX ORDER — DIPHENHYDRAMINE HYDROCHLORIDE 50 MG/ML
50 INJECTION INTRAMUSCULAR; INTRAVENOUS ONCE AS NEEDED
Status: CANCELLED | OUTPATIENT
Start: 2022-11-04

## 2022-11-04 RX ORDER — SODIUM CHLORIDE 0.9 % (FLUSH) 0.9 %
10 SYRINGE (ML) INJECTION
Status: DISCONTINUED | OUTPATIENT
Start: 2022-11-04 | End: 2022-11-04 | Stop reason: HOSPADM

## 2022-11-04 RX ORDER — DIPHENHYDRAMINE HYDROCHLORIDE 50 MG/ML
50 INJECTION INTRAMUSCULAR; INTRAVENOUS ONCE AS NEEDED
Status: DISCONTINUED | OUTPATIENT
Start: 2022-11-04 | End: 2022-11-04 | Stop reason: HOSPADM

## 2022-11-04 RX ADMIN — ACETAMINOPHEN 1000 MG: 500 TABLET, FILM COATED ORAL at 02:11

## 2022-11-04 RX ADMIN — DIPHENHYDRAMINE HYDROCHLORIDE 25 MG: 50 INJECTION, SOLUTION INTRAMUSCULAR; INTRAVENOUS at 02:11

## 2022-11-04 RX ADMIN — SODIUM CHLORIDE: 0.9 INJECTION, SOLUTION INTRAVENOUS at 02:11

## 2022-11-04 RX ADMIN — SODIUM CHLORIDE 200 MG: 9 INJECTION, SOLUTION INTRAVENOUS at 03:11

## 2022-11-04 NOTE — PLAN OF CARE
Problem: Adult Inpatient Plan of Care  Goal: Plan of Care Review  Outcome: Ongoing, Progressing  Flowsheets (Taken 11/4/2022 1628)  Plan of Care Reviewed With: patient  Goal: Patient-Specific Goal (Individualized)  Outcome: Ongoing, Progressing  Flowsheets (Taken 11/4/2022 1628)  Anxieties, Fears or Concerns: which provider to stay with  Individualized Care Needs: conversation, recliner, dim lights, blanket, pillow, snacks  Patient-Specific Goals (Include Timeframe): infection prevention maintained during treatment  Goal: Optimal Comfort and Wellbeing  Outcome: Ongoing, Progressing  Intervention: Provide Person-Centered Care  Flowsheets (Taken 11/4/2022 1628)  Trust Relationship/Rapport:   care explained   questions encouraged   choices provided   reassurance provided   empathic listening provided   thoughts/feelings acknowledged   emotional support provided   questions answered     Problem: Fatigue  Goal: Improved Activity Tolerance  Outcome: Ongoing, Progressing  Intervention: Promote Improved Energy  Flowsheets (Taken 11/4/2022 1628)  Fatigue Management:   frequent rest breaks encouraged   paced activity encouraged   fatigue-related activity identified   activity assistance provided  Sleep/Rest Enhancement:   relaxation techniques promoted   room darkened   therapeutic touch utilized   noise level reduced   family presence promoted   consistent schedule promoted   natural light exposure provided   awakenings minimized  Activity Management: Ambulated -L4     Problem: Fall Injury Risk  Goal: Absence of Fall and Fall-Related Injury  Outcome: Ongoing, Progressing  Intervention: Promote Injury-Free Environment  Flowsheets (Taken 11/4/2022 1628)  Safety Promotion/Fall Prevention:   Fall Risk reviewed with patient/family   high risk medications identified   in recliner, wheels locked   lighting adjusted   room near unit station   supervised activity   instructed to call staff for mobility   medications reviewed   Pt  arrived to clinic today for Keytruda infusion and tolerated well with no changes throughout therapy. Pt aware of side effects and number to call for any needs and discharged to home in NAD. Pt aware of f/u appts.

## 2022-11-04 NOTE — Clinical Note
Return to clinic in 3 weeks with Dr. Machado and interval lab to include CBC, CMP, LDH and magnesium.

## 2022-11-04 NOTE — PROGRESS NOTES
HISTORY OF PRESENT ILLNESS:    The patient is a 72-year-old white female well   known to me for locally advanced left breast carcinoma, who underwent neoadjuvant therapy consisting of four cycles of Adriamycin/Cytoxan followed by 12 weekly doses of Taxol.  She had a residual 0.3 mm focus of metastatic carcinoma in a single lymph node.  Patient and postlumpectomy radiation.  She completed a 60 month course of Arimidex/biannual Prolia for prevention of aromatase inhibitor induced bone loss.  She was recently placed on annual follow-up.  She returns to clinic earlier than scheduled appointment.  Patient is having some anxiety symptoms around the possibility of recurrence.  She feels a little less certain of her course now that she is off active treatment and having less frequent surveillance.  She also continues to struggle caring for her  who became severely ill during the pandemic.  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  She presents seeking reassurance.  She declines medical therapy for sleep disturbance/situational depression.    Shortly after patient's last office evaluation, she began to experience pain in the other portions of her left breast.  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  However, the patient has actively been dieting, losing weight, and upon palpation noted a change in the breast parenchyma which she wished evaluated.  She was seen by morro Butler for additional imaging which reveals suspicious findings.  Patient has gone on to have breast biopsy and returns to review results.  Patient had visited with Dr. Ryder since her last visit in my clinic and found his assistance helpful.    Patient has completed neoadjuvant therapy consisting of carboplatin/Taxol/Keytruda.  She has undergone bilateral mastectomy/lymph node sampling.  Nodes were negative.  Patient had small volume residual carcinoma within the  breast.  Unfortunately, her postoperative course has been complicated by infection/seroma formation involving both tissue expanders.  Drains had to be replaced.  Patient has completed all of her antibiotics.  Surgical drains have now been removed.  She has initiated adjuvant Keytruda and returns to clinic for evaluation prior to 2nd of a planned 9 cycles of therapy.    PHYSICAL EXAMINATION:  GENERAL:  Well-developed, well-nourished white female in no acute distress, witha weight of 149 pounds (2.5 lb).  VITAL SIGNS:  Documented in EMR and reviewed.  HEENT:  Normocephalic, atraumatic.  Oral mucosa pink and moist.  Lips without lesions.  Tongue midline.  Oropharynx clear.  Nonicteric sclerae.   NECK:  Supple, no adenopathy.  No carotid bruits, thyromegaly or thyroid nodule.  HEART:  Regular rate and rhythm without murmur, gallop or rub.                LUNGS:  Clear to auscultation bilaterally.  Normal respiratory effort.       ABDOMEN:  Soft, nontender, nondistended with positive normoactive bowel sounds, no hepatosplenomegaly.    EXTREMITIES:  No cyanosis, clubbing or edema.  Distal pulses are intact.                                           AXILLAE AND GROIN:  No palpable pathologic lymphadenopathy is appreciated.        SKIN:  Intact/turgor normal.  NEUROLOGIC:  Cranial nerves II-XII grossly intact.  Motor:  Good muscle bulk and tone.  Strength/sensory 5/5 throughout.  Gait stable.  BREASTS:  Both surgically reconstructed with tissue expander.  No signs of infection or local recurrence.    Laboratory:  White count 9.3, hemoglobin 12, hematocrit 36.2, platelets 217, absolute neutrophil count is 6200.    Sodium 140, potassium 3.9, chloride 106, CO2 26, BUN 23, creatinine 0.8, glucose 133, calcium 9.3, magnesium 2.1, liver function tests are within normal limits, GFR greater than 60, .      IMPRESSION:    1.  History of locally advanced, hormone receptor positive/HER2 Lalitha negative left breast carcinoma without  evidence of recurrent disease.  2. Situational depression.  3. Second primary, triple negative, clinical stage II B (T2, N0, M0), left breast carcinoma with interval disease/focal lymphovascular invasion breast but no evidence of no bowel involvement following neoadjuvant therapy.  4. Status post bilateral mastectomy/placement of tissue expanders-complicated by postop infection.    PLAN:  1.  Proceed with 2nd of a planned 9 cycles of adjuvant Keytruda as scheduled.  3.  Return to clinic in 3 weeks with CBC, CMP, LDH and magnesium prior to appointment.        THIS NOTE WAS CREATED USING VOICE RECOGNITION SOFTWARE AND MAY CONTAIN GRAMMATICAL ERRORS.

## 2022-11-05 ENCOUNTER — PES CALL (OUTPATIENT)
Dept: ADMINISTRATIVE | Facility: CLINIC | Age: 72
End: 2022-11-05
Payer: MEDICARE

## 2022-11-24 PROBLEM — N63.20 LUMP OF LEFT BREAST: Status: RESOLVED | Noted: 2022-04-29 | Resolved: 2022-11-24

## 2022-11-25 ENCOUNTER — OFFICE VISIT (OUTPATIENT)
Dept: HEMATOLOGY/ONCOLOGY | Facility: CLINIC | Age: 72
End: 2022-11-25
Payer: MEDICARE

## 2022-11-25 ENCOUNTER — DOCUMENTATION ONLY (OUTPATIENT)
Dept: INFUSION THERAPY | Facility: HOSPITAL | Age: 72
End: 2022-11-25

## 2022-11-25 ENCOUNTER — INFUSION (OUTPATIENT)
Dept: INFUSION THERAPY | Facility: HOSPITAL | Age: 72
End: 2022-11-25
Attending: INTERNAL MEDICINE
Payer: MEDICARE

## 2022-11-25 VITALS
OXYGEN SATURATION: 99 % | TEMPERATURE: 98 F | DIASTOLIC BLOOD PRESSURE: 88 MMHG | HEIGHT: 60 IN | SYSTOLIC BLOOD PRESSURE: 155 MMHG | WEIGHT: 148.38 LBS | RESPIRATION RATE: 16 BRPM | HEART RATE: 77 BPM | BODY MASS INDEX: 29.13 KG/M2

## 2022-11-25 VITALS
BODY MASS INDEX: 29.13 KG/M2 | WEIGHT: 148.38 LBS | TEMPERATURE: 98 F | RESPIRATION RATE: 18 BRPM | DIASTOLIC BLOOD PRESSURE: 84 MMHG | HEIGHT: 60 IN | HEART RATE: 93 BPM | SYSTOLIC BLOOD PRESSURE: 138 MMHG | OXYGEN SATURATION: 99 %

## 2022-11-25 DIAGNOSIS — D84.821 IMMUNOCOMPROMISED STATE DUE TO DRUG THERAPY: ICD-10-CM

## 2022-11-25 DIAGNOSIS — Z17.1 MALIGNANT NEOPLASM OF NIPPLE OF LEFT BREAST IN FEMALE, ESTROGEN RECEPTOR NEGATIVE: Primary | ICD-10-CM

## 2022-11-25 DIAGNOSIS — L27.0 DRUG-INDUCED SKIN RASH: ICD-10-CM

## 2022-11-25 DIAGNOSIS — C50.012 MALIGNANT NEOPLASM OF NIPPLE OF LEFT BREAST IN FEMALE, ESTROGEN RECEPTOR NEGATIVE: Primary | ICD-10-CM

## 2022-11-25 DIAGNOSIS — Z79.899 IMMUNOCOMPROMISED STATE DUE TO DRUG THERAPY: ICD-10-CM

## 2022-11-25 DIAGNOSIS — C50.412 MALIGNANT NEOPLASM OF UPPER-OUTER QUADRANT OF LEFT BREAST IN FEMALE, ESTROGEN RECEPTOR NEGATIVE: Primary | ICD-10-CM

## 2022-11-25 DIAGNOSIS — Z17.1 MALIGNANT NEOPLASM OF UPPER-OUTER QUADRANT OF LEFT BREAST IN FEMALE, ESTROGEN RECEPTOR NEGATIVE: Primary | ICD-10-CM

## 2022-11-25 PROBLEM — Z79.811 AROMATASE INHIBITOR USE: Status: RESOLVED | Noted: 2018-06-06 | Resolved: 2022-11-25

## 2022-11-25 PROCEDURE — 96413 CHEMO IV INFUSION 1 HR: CPT | Mod: PN

## 2022-11-25 PROCEDURE — 99499 RISK ADDL DX/OHS AUDIT: ICD-10-PCS | Mod: S$GLB,,, | Performed by: INTERNAL MEDICINE

## 2022-11-25 PROCEDURE — 99215 PR OFFICE/OUTPT VISIT, EST, LEVL V, 40-54 MIN: ICD-10-PCS | Mod: S$GLB,,, | Performed by: INTERNAL MEDICINE

## 2022-11-25 PROCEDURE — 3075F PR MOST RECENT SYSTOLIC BLOOD PRESS GE 130-139MM HG: ICD-10-PCS | Mod: CPTII,S$GLB,, | Performed by: INTERNAL MEDICINE

## 2022-11-25 PROCEDURE — 96367 TX/PROPH/DG ADDL SEQ IV INF: CPT | Mod: PN

## 2022-11-25 PROCEDURE — 3008F BODY MASS INDEX DOCD: CPT | Mod: CPTII,S$GLB,, | Performed by: INTERNAL MEDICINE

## 2022-11-25 PROCEDURE — 3288F PR FALLS RISK ASSESSMENT DOCUMENTED: ICD-10-PCS | Mod: CPTII,S$GLB,, | Performed by: INTERNAL MEDICINE

## 2022-11-25 PROCEDURE — 99499 UNLISTED E&M SERVICE: CPT | Mod: S$GLB,,, | Performed by: INTERNAL MEDICINE

## 2022-11-25 PROCEDURE — 3075F SYST BP GE 130 - 139MM HG: CPT | Mod: CPTII,S$GLB,, | Performed by: INTERNAL MEDICINE

## 2022-11-25 PROCEDURE — 3079F PR MOST RECENT DIASTOLIC BLOOD PRESSURE 80-89 MM HG: ICD-10-PCS | Mod: CPTII,S$GLB,, | Performed by: INTERNAL MEDICINE

## 2022-11-25 PROCEDURE — 99215 OFFICE O/P EST HI 40 MIN: CPT | Mod: S$GLB,,, | Performed by: INTERNAL MEDICINE

## 2022-11-25 PROCEDURE — 1101F PR PT FALLS ASSESS DOC 0-1 FALLS W/OUT INJ PAST YR: ICD-10-PCS | Mod: CPTII,S$GLB,, | Performed by: INTERNAL MEDICINE

## 2022-11-25 PROCEDURE — 3288F FALL RISK ASSESSMENT DOCD: CPT | Mod: CPTII,S$GLB,, | Performed by: INTERNAL MEDICINE

## 2022-11-25 PROCEDURE — 1159F MED LIST DOCD IN RCRD: CPT | Mod: CPTII,S$GLB,, | Performed by: INTERNAL MEDICINE

## 2022-11-25 PROCEDURE — 3079F DIAST BP 80-89 MM HG: CPT | Mod: CPTII,S$GLB,, | Performed by: INTERNAL MEDICINE

## 2022-11-25 PROCEDURE — A4216 STERILE WATER/SALINE, 10 ML: HCPCS | Mod: PN | Performed by: INTERNAL MEDICINE

## 2022-11-25 PROCEDURE — 1160F RVW MEDS BY RX/DR IN RCRD: CPT | Mod: CPTII,S$GLB,, | Performed by: INTERNAL MEDICINE

## 2022-11-25 PROCEDURE — 1126F PR PAIN SEVERITY QUANTIFIED, NO PAIN PRESENT: ICD-10-PCS | Mod: CPTII,S$GLB,, | Performed by: INTERNAL MEDICINE

## 2022-11-25 PROCEDURE — 1159F PR MEDICATION LIST DOCUMENTED IN MEDICAL RECORD: ICD-10-PCS | Mod: CPTII,S$GLB,, | Performed by: INTERNAL MEDICINE

## 2022-11-25 PROCEDURE — 1101F PT FALLS ASSESS-DOCD LE1/YR: CPT | Mod: CPTII,S$GLB,, | Performed by: INTERNAL MEDICINE

## 2022-11-25 PROCEDURE — 1126F AMNT PAIN NOTED NONE PRSNT: CPT | Mod: CPTII,S$GLB,, | Performed by: INTERNAL MEDICINE

## 2022-11-25 PROCEDURE — 99999 PR PBB SHADOW E&M-EST. PATIENT-LVL V: ICD-10-PCS | Mod: PBBFAC,,, | Performed by: INTERNAL MEDICINE

## 2022-11-25 PROCEDURE — 99999 PR PBB SHADOW E&M-EST. PATIENT-LVL V: CPT | Mod: PBBFAC,,, | Performed by: INTERNAL MEDICINE

## 2022-11-25 PROCEDURE — 3008F PR BODY MASS INDEX (BMI) DOCUMENTED: ICD-10-PCS | Mod: CPTII,S$GLB,, | Performed by: INTERNAL MEDICINE

## 2022-11-25 PROCEDURE — 25000003 PHARM REV CODE 250: Mod: PN | Performed by: INTERNAL MEDICINE

## 2022-11-25 PROCEDURE — 63600175 PHARM REV CODE 636 W HCPCS: Mod: PN | Performed by: INTERNAL MEDICINE

## 2022-11-25 PROCEDURE — 1160F PR REVIEW ALL MEDS BY PRESCRIBER/CLIN PHARMACIST DOCUMENTED: ICD-10-PCS | Mod: CPTII,S$GLB,, | Performed by: INTERNAL MEDICINE

## 2022-11-25 RX ORDER — DIPHENHYDRAMINE HYDROCHLORIDE 50 MG/ML
50 INJECTION INTRAMUSCULAR; INTRAVENOUS ONCE AS NEEDED
Status: CANCELLED | OUTPATIENT
Start: 2022-11-25

## 2022-11-25 RX ORDER — EPINEPHRINE 0.3 MG/.3ML
0.3 INJECTION SUBCUTANEOUS ONCE AS NEEDED
Status: CANCELLED | OUTPATIENT
Start: 2022-11-25

## 2022-11-25 RX ORDER — ACETAMINOPHEN 500 MG
1000 TABLET ORAL
Status: CANCELLED
Start: 2022-11-25

## 2022-11-25 RX ORDER — ACETAMINOPHEN 500 MG
1000 TABLET ORAL
Status: COMPLETED | OUTPATIENT
Start: 2022-11-25 | End: 2022-11-25

## 2022-11-25 RX ORDER — SODIUM CHLORIDE 0.9 % (FLUSH) 0.9 %
10 SYRINGE (ML) INJECTION
Status: CANCELLED | OUTPATIENT
Start: 2022-11-25

## 2022-11-25 RX ORDER — HEPARIN 100 UNIT/ML
500 SYRINGE INTRAVENOUS
Status: CANCELLED | OUTPATIENT
Start: 2022-11-25

## 2022-11-25 RX ORDER — SODIUM CHLORIDE 0.9 % (FLUSH) 0.9 %
10 SYRINGE (ML) INJECTION
Status: DISCONTINUED | OUTPATIENT
Start: 2022-11-25 | End: 2022-11-25 | Stop reason: HOSPADM

## 2022-11-25 RX ADMIN — SODIUM CHLORIDE 200 MG: 9 INJECTION, SOLUTION INTRAVENOUS at 02:11

## 2022-11-25 RX ADMIN — SODIUM CHLORIDE: 0.9 INJECTION, SOLUTION INTRAVENOUS at 01:11

## 2022-11-25 RX ADMIN — ACETAMINOPHEN 1000 MG: 500 TABLET, FILM COATED ORAL at 02:11

## 2022-11-25 RX ADMIN — Medication 10 ML: at 03:11

## 2022-11-25 RX ADMIN — DIPHENHYDRAMINE HYDROCHLORIDE 25 MG: 50 INJECTION, SOLUTION INTRAMUSCULAR; INTRAVENOUS at 02:11

## 2022-11-25 NOTE — PROGRESS NOTES
Subjective:       Name: Zayra Rodgers  : 1950  MRN: [id number]    Chief Complaint   Patient presents with    Malignant neoplasm of nipple of left breast in female, estr          HPI: Zayra Rodgers is a 72 y.o. female presents for evaluation of Malignant neoplasm of nipple of left breast in female, estr        Oncology History   Breast cancer   3/14/2016 Initial Diagnosis    Breast cancer     2022 Cancer Staged    Staging form: Breast, AJCC 8th Edition  - Clinical stage from 2022: Stage IIB (cT2, cN0, cM0, G2, ER-, DE-, HER2-)       2022 Cancer Staged    Staging form: Breast, AJCC 8th Edition  - Pathologic stage from 2022: No Stage Recommended (ypT1b, pN0(sn), cM0, G3, ER-, DE-, HER2-)       Malignant neoplasm of nipple of left breast in female, estrogen receptor negative   2016 Initial Diagnosis    Malignant neoplasm of nipple of left breast in female     2022 -  Chemotherapy    Treatment Summary   Plan Name: OP PEMBROLIZUMAB WITH WEEKLY CARBOPLATIN (AUC 1.5)  AND PACLITAXEL FOLLOWED BY PEMBROLIZUMAB 200 MG Q3W  Treatment Goal: Curative  Status: Active  Start Date: 2022  End Date: 3/31/2023 (Planned)  Provider: Cindy Machado MD  Chemotherapy: CARBOplatin (PARAPLATIN) 125 mg in sodium chloride 0.9% 250 mL chemo infusion, 125 mg, Intravenous, Clinic[facility] 1 time, 4 of 4 cycles  Administration: 125 mg (2022), 125 mg (2022), 125 mg (5/3/2022), 125 mg (5/10/2022), 125 mg (2022), 125 mg (2022), 125 mg (2022), 125 mg (2022), 125 mg (2022), 125 mg (2022), 125 mg (2022), 125 mg (2022)  PACLitaxeL (TAXOL) 80 mg/m2 = 132 mg in sodium chloride 0.9% 250 mL chemo infusion, 80 mg/m2 = 132 mg, Intravenous, [facility] 1 time, 4 of 4 cycles  Dose modification: 80 mg/m2 (original dose 80 mg/m2, Cycle 2)  Administration: 132 mg (2022), 132 mg (5/3/2022), 132 mg (5/10/2022), 132 mg (2022), 132 mg (2022), 132 mg (2022),  132 mg (5/31/2022), 132 mg (6/14/2022), 132 mg (6/28/2022), 132 mg (6/21/2022), 132 mg (7/5/2022), 132 mg (7/12/2022)            Past Medical History:   Diagnosis Date    Allergy     Breast cancer 02/2016    left breast, neoadjuvant chemo, lumpectomy, and radiation    Breast cancer 04/2022    left breast 3:00 invasive ductal carcinoma    Bronchitis     COVID-19 08/2020    HLD (hyperlipidemia)     Hypertension     S/P chemotherapy, time since greater than 12 weeks     Skin cancer     resovled       Past Surgical History:   Procedure Laterality Date    ADENOIDECTOMY      BREAST BIOPSY Left 2016    BREAST BIOPSY Left 04/2022    left breast invasive ductal carcinoma    BREAST CYST EXCISION      BREAST LUMPECTOMY Left 2016    BREAST RECONSTRUCTION Bilateral 08/18/2022    Procedure: RECONSTRUCTION, BREAST;  Surgeon: Edi Alanis MD;  Location: [facility] OR;  Service: Plastics;  Laterality: Bilateral;    COLONOSCOPY N/A 06/21/2018    Procedure: COLONOSCOPY;  Surgeon: Hiro Billy Jr., MD;  Location: [facility];  Service: Endoscopy;  Laterality: N/A;    CYSTOCELE REPAIR      EXCISIONAL HEMORRHOIDECTOMY      HYSTERECTOMY      due to prolapse    INSERTION OF BREAST TISSUE EXPANDER Bilateral 08/18/2022    Procedure: INSERTION, TISSUE EXPANDER, BREAST;  Surgeon: Edi Alanis MD;  Location: [facility] OR;  Service: Plastics;  Laterality: Bilateral;    INSERTION OF TUNNELED CENTRAL VENOUS CATHETER (CVC) WITH SUBCUTANEOUS PORT Right 04/22/2022    Procedure: [id number]-[id number]-I-CATH;  Surgeon: Haile Domingo MD;  Location: [facility];  Service: General;  Laterality: Right;    ORBITAL RECONSTRUCTION      PORTACATH PLACEMENT      since removed    REMOVAL OF VASCULAR ACCESS PORT      RHINOPLASTY TIP      SENTINEL LYMPH NODE BIOPSY Left 08/18/2022    Procedure: BIOPSY, LYMPH NODE, SENTINEL;  Surgeon: Padma Ruggiero MD;  Location: [facility];  Service: General;  Laterality: Left;    SIGMOIDOSCOPY      SIMPLE MASTECTOMY Bilateral 08/18/2022     Procedure: MASTECTOMY, SIMPLE;  Surgeon: Padma Ruggiero MD;  Location: [facility] OR;  Service: General;  Laterality: Bilateral;    TISSUE EXPANDER REMOVAL Bilateral 11/3/2022    Procedure: REMOVAL, TISSUE EXPANDER;  Surgeon: Edi Alanis MD;  Location: [facility];  Service: Plastics;  Laterality: Bilateral;       Family History   Problem Relation Age of Onset    Breast cancer Mother 42    Cancer Mother     COPD Father     Colon cancer Sister     Cancer Sister     Breast cancer Sister        Social History     Socioeconomic History    Marital status:    Tobacco Use    Smoking status: Never    Smokeless tobacco: Never   Substance and Sexual Activity    Alcohol use: Yes     Alcohol/week: 2.0 standard drinks     Types: 2 Cans of beer per week     Comment: per  week    Drug use: No    Sexual activity: Yes     Partners: Male     Social Determinants of Health     Financial Resource Strain: Low Risk     Difficulty of Paying Living Expenses: Not very hard   Food Insecurity: No Food Insecurity    Worried About Running Out of Food in the Last Year: Never true    Ran Out of Food in the Last Year: Never true   Transportation Needs: No Transportation Needs    Lack of Transportation (Medical): No    Lack of Transportation (Non-Medical): No   Physical Activity: Insufficiently Active    Days of Exercise per Week: 2 days    Minutes of Exercise per Session: 20 min   Stress: No Stress Concern Present    Feeling of Stress : Only a little   Social Connections: Unknown    Frequency of Communication with Friends and Family: More than three times a week    Frequency of Social Gatherings with Friends and Family: More than three times a week    Active Member of Clubs or Organizations: Yes    Attends Club or Organization Meetings: 1 to 4 times per year    Marital Status:    Housing Stability: Low Risk     Unable to Pay for Housing in the Last Year: No    Number of Places Lived in the Last Year: 1    Unstable Housing in the Last  Year: No       Review of patient's allergies indicates:  No Known Allergies    Review of Systems   Constitutional:  Negative for appetite change and unexpected weight change.   HENT:  Negative for mouth sores.    Eyes:  Negative for visual disturbance.   Respiratory:  Negative for cough and shortness of breath.    Cardiovascular:  Negative for chest pain.   Gastrointestinal:  Negative for abdominal pain and diarrhea.   Genitourinary:  Negative for frequency.   Musculoskeletal:  Negative for back pain.   Integumentary:  Positive for rash.   Neurological:  Negative for headaches.   Hematological:  Negative for adenopathy.   Psychiatric/Behavioral:  The patient is not nervous/anxious.           Objective:     Vitals:    11/25/22 1240   BP: 138/84   BP Location: Right arm   Patient Position: Sitting   BP Method: Medium (Manual)   Pulse: 93   Resp: 18   Temp: 98.3 °F (36.8 °C)   TempSrc: Temporal   SpO2: 99%   Weight: 67.3 kg (148 lb 5.9 oz)   Height: 5' (1.524 m)        Physical Exam  Vitals reviewed.   Constitutional:       Appearance: Normal appearance.   HENT:      Head: Normocephalic and atraumatic.   Eyes:      General: No scleral icterus.     Pupils: Pupils are equal, round, and reactive to light.   Cardiovascular:      Rate and Rhythm: Normal rate and regular rhythm.      Pulses: Normal pulses.      Heart sounds: Normal heart sounds.   Pulmonary:      Effort: Pulmonary effort is normal.      Breath sounds: Normal breath sounds.   Abdominal:      General: Bowel sounds are normal. There is no distension.   Musculoskeletal:         General: No swelling.   Lymphadenopathy:      Cervical: No cervical adenopathy.   Skin:     General: Skin is warm.      Findings: No rash.   Neurological:      General: No focal deficit present.      Mental Status: She is alert and oriented to person, place, and time.      Cranial Nerves: No cranial nerve deficit.      Motor: No weakness.   Psychiatric:         Mood and Affect: Mood  normal.         Behavior: Behavior normal.              Current Outpatient Medications on File Prior to Visit   Medication Sig    acetaminophen (TYLENOL) 325 MG tablet Take 325 mg by mouth every 6 (six) hours as needed for Pain.    albuterol (PROAIR HFA) 90 mcg/actuation inhaler Inhale 2 puffs into the lungs every 6 (six) hours as needed for Wheezing. Rescue    B-complex with vitamin C (Z-BEC OR EQUIV) tablet Take 1 tablet by mouth once daily.    calcium carbonate-vitamin D3 600 mg-20 mcg (800 unit) Tab Take 2 tablets by mouth once daily.    cefadroxil (DURICEF) 500 MG Cap Take 1 capsule (500 mg total) by mouth 2 (two) times a day.    cetirizine (ZYRTEC) 10 MG tablet Take 10 mg by mouth daily as needed.     clobetasol 0.05% (TEMOVATE) 0.05 % Oint Apply a small amount to affected area twice a day    diphenhydrAMINE-LIDOcaine HCl 2%-nystatin-magnesium hydroxide 400 mg/5 ml Swish and swallow 5 ml every 4 hours as needed for mouth / throat pain    ezetimibe (ZETIA) 10 mg tablet Take 1 tablet (10 mg total) by mouth once daily.    fish oil-omega-3 fatty acids 300-1,000 mg capsule Take 2 g by mouth daily as needed.     fluticasone propionate (FLONASE) 50 mcg/actuation nasal spray use 1 spray (50 mcg total) by Each Nostril route daily as needed.    hydroCHLOROthiazide (HYDRODIURIL) 25 MG tablet Take 1 tablet (25 mg total) by mouth daily as needed.    LIDOcaine-prilocaine (EMLA) cream Apply topically as directed    mirabegron (MYRBETRIQ) 50 mg Tb24 Take 1 tablet (50 mg total) by mouth once daily.    multivitamin capsule Take 1 capsule by mouth once daily.    mupirocin (BACTROBAN) 2 % ointment Apply to each nostril with clean Q-Tip twice daily for 5 days. Start 4/20/2022. (Patient taking differently: 1 g by Nasal route 2 (two) times daily. 11/1-11/5)    mupirocin (BACTROBAN) 2 % ointment Apply to affected area twice a day    ondansetron (ZOFRAN-ODT) 8 MG TbDL 8 mg every 8 (eight) hours as needed.    rosuvastatin (CRESTOR) 10  MG tablet Take 1 tablet (10 mg total) by mouth every evening.     Current Facility-Administered Medications on File Prior to Visit   Medication    0.9%  NaCl infusion    HYDROmorphone injection 0.5 mg    LIDOcaine (PF) 10 mg/ml (1%) injection 10 mg    midazolam (VERSED) 1 mg/mL injection 2 mg    ondansetron injection 4 mg    prochlorperazine injection Soln 10 mg       CBC:  Lab Results   Component Value Date    WBC 7.42 11/25/2022    HGB 12.6 11/25/2022    HCT 39.1 11/25/2022    MCV 91 11/25/2022     11/25/2022         CMP:  Sodium   Date Value Ref Range Status   11/25/2022 141 136 - 145 mmol/L Final     Potassium   Date Value Ref Range Status   11/25/2022 4.2 3.5 - 5.1 mmol/L Final     Chloride   Date Value Ref Range Status   11/25/2022 105 95 - 110 mmol/L Final     CO2   Date Value Ref Range Status   11/25/2022 28 23 - 29 mmol/L Final     Glucose   Date Value Ref Range Status   11/25/2022 96 70 - 110 mg/dL Final     BUN   Date Value Ref Range Status   11/25/2022 18 8 - 23 mg/dL Final     Creatinine   Date Value Ref Range Status   11/25/2022 0.7 0.5 - 1.4 mg/dL Final     Calcium   Date Value Ref Range Status   11/25/2022 9.8 8.7 - 10.5 mg/dL Final     Total Protein   Date Value Ref Range Status   11/25/2022 7.2 6.0 - 8.4 g/dL Final     Albumin   Date Value Ref Range Status   11/25/2022 4.0 3.5 - 5.2 g/dL Final     Total Bilirubin   Date Value Ref Range Status   11/25/2022 0.3 0.1 - 1.0 mg/dL Final     Comment:     For infants and newborns, interpretation of results should be based  on gestational age, weight and in agreement with clinical  observations.    Premature Infant recommended reference ranges:  Up to 24 hours.............<8.0 mg/dL  Up to 48 hours............<12.0 mg/dL  3-5 days..................<15.0 mg/dL  6-29 days.................<15.0 mg/dL       Alkaline Phosphatase   Date Value Ref Range Status   11/25/2022 62 55 - 135 U/L Final     AST   Date Value Ref Range Status   11/25/2022 16 10 - 40  U/L Final     ALT   Date Value Ref Range Status   11/25/2022 12 10 - 44 U/L Final     Anion Gap   Date Value Ref Range Status   11/25/2022 8 8 - 16 mmol/L Final     eGFR if    Date Value Ref Range Status   07/29/2022 >60 >60 mL/min/1.73 m^2 Final     eGFR if non    Date Value Ref Range Status   07/29/2022 >60 >60 mL/min/1.73 m^2 Final     Comment:     Calculation used to obtain the estimated glomerular filtration  rate (eGFR) is the CKD-EPI equation.          US Guided Breast Seroma Aspiration  Result:   US Guided Breast Seroma Aspiration    A seroma aspiration was performed with sonographic guidance on the left   breast at 2 o'clock using a 18 gauge needle and approximately 125 ml of   clear reddish fluid was removed. Lesion did completely resolve. Fluid was   sent for culture and sensitivities.  US Guided Breast Seroma Aspiration  Result:   US Guided Breast Seroma Aspiration    A seroma aspiration was performed with sonographic guidance on the right   breast at 10 o'clock using a 18 gauge needle and approximately 100 ml of   reddish fluid was removed. Lesion did completely resolve. Fluid was sent   for culture and sensitivities.  US Breast Bilateral Complete  Narrative: Result:   US Breast Bilateral Complete     History:  Patient is 72 y.o. and is seen for bilateral redness, swelling and   tenderness, left worse than right. Status post bilateral mastectomies with   expanders placed 8/18/22 for left breast cancer. She has been on   antibiotics for 3 weeks.    Films Compared:  Compared to: 06/06/2022 US Breast Left Complete     Findings:    All four quadrants, the central, and the axillary regions were scanned.    There is a seroma outside the right expander in the upper outer measuring   14 x 14 x 77 mm.    There is a seroma outside the left expander measuring 60 x 8 x 25 mm.  Impression: There is a seroma outside the right expander in the upper outer measuring   14 x 14 x 77  mm.    There is a seroma outside the left expander measuring 60 x 8 x 25 mm.    BI-RADS Category:   Overall: 2 - Benign     Recommendation:  Aspiration is recommended for both breasts. Fluid will be sent for   cultures and sensitivities.    I discussed the finding and recommendation in detail with Zayra Rodgers at the time of the study.        ECOG SCORE    0 - Fully active-able to carry on all pre-disease performance without restriction              Assessment/Plan:       There are no diagnoses linked to this encounter.          Med Onc Route Chart for Scheduling     Plan was discussed with the patient at length, and she  verbalized understanding.    Recommend exercise, nutrition and weight management and health maintenance activities and follow-up with PCPs recommendations.    Signed:  Liz Woods MD   Hematology and Oncology  [facility] HEMATOLOGY/ONCOLOGY  [address] [address]

## 2022-11-25 NOTE — PROGRESS NOTES
Oncology Nutrition       Weight Check   Chart reviewed. Weight check completed on pt.       Wt Readings from Last 10 Encounters:   11/25/22 67.3 kg (148 lb 5.9 oz)   11/25/22 67.3 kg (148 lb 5.9 oz)   11/04/22 67.7 kg (149 lb 4 oz)   11/04/22 67.7 kg (149 lb 4 oz)   11/03/22 65.9 kg (145 lb 4.5 oz)   10/27/22 66.5 kg (146 lb 9.7 oz)   10/14/22 66.5 kg (146 lb 9.7 oz)   09/16/22 (P) 66.5 kg (146 lb 9.7 oz)   09/16/22 67 kg (147 lb 12.8 oz)   09/08/22 65.8 kg (145 lb)          RD plan of care: Weight is currently 148#. No significant change at this time. Per nursing nutrition risk report, pt denies any nutritional concerns or challenges at this time. RD to continue to monitor prn; no nutritional interventions are needed at this time.     Keerthi Alaniz, FRANKIN, LDN  11/25/2022  3:14 PM

## 2022-12-05 ENCOUNTER — TELEPHONE (OUTPATIENT)
Dept: FAMILY MEDICINE | Facility: CLINIC | Age: 72
End: 2022-12-05
Payer: MEDICARE

## 2022-12-05 ENCOUNTER — PATIENT MESSAGE (OUTPATIENT)
Dept: FAMILY MEDICINE | Facility: CLINIC | Age: 72
End: 2022-12-05
Payer: MEDICARE

## 2022-12-16 ENCOUNTER — OFFICE VISIT (OUTPATIENT)
Dept: HEMATOLOGY/ONCOLOGY | Facility: CLINIC | Age: 72
End: 2022-12-16
Payer: MEDICARE

## 2022-12-16 ENCOUNTER — INFUSION (OUTPATIENT)
Dept: INFUSION THERAPY | Facility: HOSPITAL | Age: 72
End: 2022-12-16
Attending: INTERNAL MEDICINE
Payer: MEDICARE

## 2022-12-16 VITALS
RESPIRATION RATE: 16 BRPM | HEIGHT: 60 IN | TEMPERATURE: 98 F | WEIGHT: 147.25 LBS | HEART RATE: 80 BPM | BODY MASS INDEX: 28.91 KG/M2 | OXYGEN SATURATION: 99 % | SYSTOLIC BLOOD PRESSURE: 146 MMHG | DIASTOLIC BLOOD PRESSURE: 75 MMHG

## 2022-12-16 VITALS
TEMPERATURE: 98 F | BODY MASS INDEX: 28.91 KG/M2 | OXYGEN SATURATION: 94 % | HEIGHT: 60 IN | DIASTOLIC BLOOD PRESSURE: 100 MMHG | RESPIRATION RATE: 16 BRPM | HEART RATE: 89 BPM | SYSTOLIC BLOOD PRESSURE: 160 MMHG | WEIGHT: 147.25 LBS

## 2022-12-16 DIAGNOSIS — C50.412 MALIGNANT NEOPLASM OF UPPER-OUTER QUADRANT OF LEFT BREAST IN FEMALE, ESTROGEN RECEPTOR NEGATIVE: Primary | ICD-10-CM

## 2022-12-16 DIAGNOSIS — E06.4 DRUG-INDUCED THYROIDITIS: ICD-10-CM

## 2022-12-16 DIAGNOSIS — C50.012 MALIGNANT NEOPLASM OF NIPPLE OF LEFT BREAST IN FEMALE, ESTROGEN RECEPTOR NEGATIVE: ICD-10-CM

## 2022-12-16 DIAGNOSIS — L27.0 DRUG-INDUCED SKIN RASH: ICD-10-CM

## 2022-12-16 DIAGNOSIS — Z79.899 IMMUNOCOMPROMISED STATE DUE TO DRUG THERAPY: ICD-10-CM

## 2022-12-16 DIAGNOSIS — Z17.1 MALIGNANT NEOPLASM OF NIPPLE OF LEFT BREAST IN FEMALE, ESTROGEN RECEPTOR NEGATIVE: ICD-10-CM

## 2022-12-16 DIAGNOSIS — Z78.0 OSTEOPENIA AFTER MENOPAUSE: ICD-10-CM

## 2022-12-16 DIAGNOSIS — Z17.1 MALIGNANT NEOPLASM OF NIPPLE OF LEFT BREAST IN FEMALE, ESTROGEN RECEPTOR NEGATIVE: Primary | ICD-10-CM

## 2022-12-16 DIAGNOSIS — C50.411 MALIGNANT NEOPLASM OF UPPER-OUTER QUADRANT OF RIGHT BREAST IN FEMALE, ESTROGEN RECEPTOR NEGATIVE: ICD-10-CM

## 2022-12-16 DIAGNOSIS — Z17.1 MALIGNANT NEOPLASM OF UPPER-OUTER QUADRANT OF LEFT BREAST IN FEMALE, ESTROGEN RECEPTOR NEGATIVE: Primary | ICD-10-CM

## 2022-12-16 DIAGNOSIS — T81.49XA WOUND INFECTION AFTER SURGERY: ICD-10-CM

## 2022-12-16 DIAGNOSIS — R53.83 FATIGUE: ICD-10-CM

## 2022-12-16 DIAGNOSIS — C50.012 MALIGNANT NEOPLASM OF NIPPLE OF LEFT BREAST IN FEMALE, ESTROGEN RECEPTOR NEGATIVE: Primary | ICD-10-CM

## 2022-12-16 DIAGNOSIS — Z17.1 MALIGNANT NEOPLASM OF UPPER-OUTER QUADRANT OF RIGHT BREAST IN FEMALE, ESTROGEN RECEPTOR NEGATIVE: ICD-10-CM

## 2022-12-16 DIAGNOSIS — C50.012 MALIGNANT NEOPLASM OF NIPPLE OF LEFT BREAST IN FEMALE, UNSPECIFIED ESTROGEN RECEPTOR STATUS: ICD-10-CM

## 2022-12-16 DIAGNOSIS — D84.821 IMMUNOCOMPROMISED STATE DUE TO DRUG THERAPY: ICD-10-CM

## 2022-12-16 DIAGNOSIS — M85.80 OSTEOPENIA AFTER MENOPAUSE: ICD-10-CM

## 2022-12-16 PROCEDURE — A4216 STERILE WATER/SALINE, 10 ML: HCPCS | Mod: PN | Performed by: STUDENT IN AN ORGANIZED HEALTH CARE EDUCATION/TRAINING PROGRAM

## 2022-12-16 PROCEDURE — 99215 OFFICE O/P EST HI 40 MIN: CPT | Mod: S$GLB,,, | Performed by: STUDENT IN AN ORGANIZED HEALTH CARE EDUCATION/TRAINING PROGRAM

## 2022-12-16 PROCEDURE — 3288F PR FALLS RISK ASSESSMENT DOCUMENTED: ICD-10-PCS | Mod: CPTII,S$GLB,, | Performed by: STUDENT IN AN ORGANIZED HEALTH CARE EDUCATION/TRAINING PROGRAM

## 2022-12-16 PROCEDURE — 96413 CHEMO IV INFUSION 1 HR: CPT | Mod: PN

## 2022-12-16 PROCEDURE — 25000003 PHARM REV CODE 250: Mod: PN | Performed by: STUDENT IN AN ORGANIZED HEALTH CARE EDUCATION/TRAINING PROGRAM

## 2022-12-16 PROCEDURE — 99999 PR PBB SHADOW E&M-EST. PATIENT-LVL V: CPT | Mod: PBBFAC,,, | Performed by: STUDENT IN AN ORGANIZED HEALTH CARE EDUCATION/TRAINING PROGRAM

## 2022-12-16 PROCEDURE — 3008F BODY MASS INDEX DOCD: CPT | Mod: CPTII,S$GLB,, | Performed by: STUDENT IN AN ORGANIZED HEALTH CARE EDUCATION/TRAINING PROGRAM

## 2022-12-16 PROCEDURE — 99999 PR PBB SHADOW E&M-EST. PATIENT-LVL V: ICD-10-PCS | Mod: PBBFAC,,, | Performed by: STUDENT IN AN ORGANIZED HEALTH CARE EDUCATION/TRAINING PROGRAM

## 2022-12-16 PROCEDURE — 3080F PR MOST RECENT DIASTOLIC BLOOD PRESSURE >= 90 MM HG: ICD-10-PCS | Mod: CPTII,S$GLB,, | Performed by: STUDENT IN AN ORGANIZED HEALTH CARE EDUCATION/TRAINING PROGRAM

## 2022-12-16 PROCEDURE — 3008F PR BODY MASS INDEX (BMI) DOCUMENTED: ICD-10-PCS | Mod: CPTII,S$GLB,, | Performed by: STUDENT IN AN ORGANIZED HEALTH CARE EDUCATION/TRAINING PROGRAM

## 2022-12-16 PROCEDURE — 1125F PR PAIN SEVERITY QUANTIFIED, PAIN PRESENT: ICD-10-PCS | Mod: CPTII,S$GLB,, | Performed by: STUDENT IN AN ORGANIZED HEALTH CARE EDUCATION/TRAINING PROGRAM

## 2022-12-16 PROCEDURE — 96367 TX/PROPH/DG ADDL SEQ IV INF: CPT | Mod: PN

## 2022-12-16 PROCEDURE — 99215 PR OFFICE/OUTPT VISIT, EST, LEVL V, 40-54 MIN: ICD-10-PCS | Mod: S$GLB,,, | Performed by: STUDENT IN AN ORGANIZED HEALTH CARE EDUCATION/TRAINING PROGRAM

## 2022-12-16 PROCEDURE — 1101F PR PT FALLS ASSESS DOC 0-1 FALLS W/OUT INJ PAST YR: ICD-10-PCS | Mod: CPTII,S$GLB,, | Performed by: STUDENT IN AN ORGANIZED HEALTH CARE EDUCATION/TRAINING PROGRAM

## 2022-12-16 PROCEDURE — 3077F SYST BP >= 140 MM HG: CPT | Mod: CPTII,S$GLB,, | Performed by: STUDENT IN AN ORGANIZED HEALTH CARE EDUCATION/TRAINING PROGRAM

## 2022-12-16 PROCEDURE — 1101F PT FALLS ASSESS-DOCD LE1/YR: CPT | Mod: CPTII,S$GLB,, | Performed by: STUDENT IN AN ORGANIZED HEALTH CARE EDUCATION/TRAINING PROGRAM

## 2022-12-16 PROCEDURE — 3077F PR MOST RECENT SYSTOLIC BLOOD PRESSURE >= 140 MM HG: ICD-10-PCS | Mod: CPTII,S$GLB,, | Performed by: STUDENT IN AN ORGANIZED HEALTH CARE EDUCATION/TRAINING PROGRAM

## 2022-12-16 PROCEDURE — 63600175 PHARM REV CODE 636 W HCPCS: Mod: PN | Performed by: STUDENT IN AN ORGANIZED HEALTH CARE EDUCATION/TRAINING PROGRAM

## 2022-12-16 PROCEDURE — 3288F FALL RISK ASSESSMENT DOCD: CPT | Mod: CPTII,S$GLB,, | Performed by: STUDENT IN AN ORGANIZED HEALTH CARE EDUCATION/TRAINING PROGRAM

## 2022-12-16 PROCEDURE — 1125F AMNT PAIN NOTED PAIN PRSNT: CPT | Mod: CPTII,S$GLB,, | Performed by: STUDENT IN AN ORGANIZED HEALTH CARE EDUCATION/TRAINING PROGRAM

## 2022-12-16 PROCEDURE — 3080F DIAST BP >= 90 MM HG: CPT | Mod: CPTII,S$GLB,, | Performed by: STUDENT IN AN ORGANIZED HEALTH CARE EDUCATION/TRAINING PROGRAM

## 2022-12-16 RX ORDER — SODIUM CHLORIDE 0.9 % (FLUSH) 0.9 %
10 SYRINGE (ML) INJECTION
Status: CANCELLED | OUTPATIENT
Start: 2022-12-16

## 2022-12-16 RX ORDER — SODIUM CHLORIDE 0.9 % (FLUSH) 0.9 %
10 SYRINGE (ML) INJECTION
Status: DISCONTINUED | OUTPATIENT
Start: 2022-12-16 | End: 2022-12-16 | Stop reason: HOSPADM

## 2022-12-16 RX ORDER — ACETAMINOPHEN 500 MG
1000 TABLET ORAL
Status: CANCELLED
Start: 2022-12-16

## 2022-12-16 RX ORDER — DIPHENHYDRAMINE HYDROCHLORIDE 50 MG/ML
50 INJECTION INTRAMUSCULAR; INTRAVENOUS ONCE AS NEEDED
Status: CANCELLED | OUTPATIENT
Start: 2022-12-16

## 2022-12-16 RX ORDER — HEPARIN 100 UNIT/ML
500 SYRINGE INTRAVENOUS
Status: CANCELLED | OUTPATIENT
Start: 2022-12-16

## 2022-12-16 RX ORDER — ACETAMINOPHEN 500 MG
1000 TABLET ORAL
Status: COMPLETED | OUTPATIENT
Start: 2022-12-16 | End: 2022-12-16

## 2022-12-16 RX ORDER — EPINEPHRINE 0.3 MG/.3ML
0.3 INJECTION SUBCUTANEOUS ONCE AS NEEDED
Status: CANCELLED | OUTPATIENT
Start: 2022-12-16

## 2022-12-16 RX ORDER — CHLORHEXIDINE GLUCONATE ORAL RINSE 1.2 MG/ML
15 SOLUTION DENTAL 2 TIMES DAILY
Qty: 473 ML | Refills: 5 | Status: SHIPPED | OUTPATIENT
Start: 2022-12-16 | End: 2023-11-14 | Stop reason: SDUPTHER

## 2022-12-16 RX ADMIN — SODIUM CHLORIDE: 9 INJECTION, SOLUTION INTRAVENOUS at 12:12

## 2022-12-16 RX ADMIN — DIPHENHYDRAMINE HYDROCHLORIDE 25 MG: 50 INJECTION, SOLUTION INTRAMUSCULAR; INTRAVENOUS at 12:12

## 2022-12-16 RX ADMIN — Medication 10 ML: at 01:12

## 2022-12-16 RX ADMIN — ACETAMINOPHEN 1000 MG: 500 TABLET, FILM COATED ORAL at 12:12

## 2022-12-16 RX ADMIN — SODIUM CHLORIDE 200 MG: 9 INJECTION, SOLUTION INTRAVENOUS at 12:12

## 2022-12-16 NOTE — PLAN OF CARE
Pt tolerated Keytruda infusion well.  No s/s of infusion reactions noted.  Instructed to call MD with any problems.

## 2022-12-16 NOTE — PROGRESS NOTES
Subjective:     Name: Zayra Rodgers  : 1950  MRN: [id number]    Chief Complaint   Patient presents with    Follow-up    Breast Cancer      HPI: Zayra Rodgers is a 72 y.o. female presents for evaluation of Follow-up and Breast Cancer    Previously seen Dr Bartlett; had previously ER/ND+ IDC s/p neoadjuvant chemo AC/T s/p lumpectomy followed by adjuvant radiation and adjuvant endocrine therapy; anastrozole. Was placed on annual follow up in 2022; pain at left breast leading to ultrasound,MRI, biopsies ER/ND negative, HER-2 negative KI 67% 80%, leading to  neoadjuvant therapy consisting of carboplatin/Taxol/Keytruda.  She has undergone bilateral mastectomy/lymph node sampling 2022; ypT1n (m),ypN0 (sn),cM0 (PET/CT in 2022 negative). Had complication post -op  by infection/seroma formation involving both tissue expanders.  Drains had to be replaced.  Patient has completed all of her antibiotics.  Surgical drains have now been removed.    She has initiated adjuvant Keytruda 10/14/22 , today presented for clearance prior to Cycle # 4. patient is doing well, discussion about the need for possible adjuvant capecitabine. No fever ,no chills, no SOB, no diarrhea, no abdominal pain     Oncology History   Malignant neoplasm of nipple of left breast in female   3/14/2016 Initial Diagnosis    Breast cancer     2022 Cancer Staged    Staging form: Breast, AJCC 8th Edition  - Clinical stage from 2022: Stage IIB (cT2, cN0, cM0, G2, ER-, ND-, HER2-)       2022 Cancer Staged    Staging form: Breast, AJCC 8th Edition  - Pathologic stage from 2022: No Stage Recommended (ypT1b, pN0(sn), cM0, G3, ER-, ND-, HER2-)       Malignant neoplasm of upper-outer quadrant of right breast in female, estrogen receptor negative   2016 Initial Diagnosis    Malignant neoplasm of nipple of left breast in female     2022 -  Chemotherapy    Treatment Summary   Plan Name: OP PEMBROLIZUMAB WITH WEEKLY  CARBOPLATIN (AUC 1.5)  AND PACLITAXEL FOLLOWED BY PEMBROLIZUMAB 200 MG Q3W  Treatment Goal: Curative  Status: Active  Start Date: 4/26/2022  End Date: 3/31/2023 (Planned)  Provider: Cindy Machado MD  Chemotherapy: CARBOplatin (PARAPLATIN) 125 mg in sodium chloride 0.9% 250 mL chemo infusion, 125 mg, Intravenous, Clinic/HOD 1 time, 4 of 4 cycles  Administration: 125 mg (4/26/2022), 125 mg (5/17/2022), 125 mg (5/3/2022), 125 mg (5/10/2022), 125 mg (5/24/2022), 125 mg (6/7/2022), 125 mg (5/31/2022), 125 mg (6/14/2022), 125 mg (6/28/2022), 125 mg (6/21/2022), 125 mg (7/5/2022), 125 mg (7/12/2022)  PACLitaxeL (TAXOL) 80 mg/m2 = 132 mg in sodium chloride 0.9% 250 mL chemo infusion, 80 mg/m2 = 132 mg, Intravenous, Clinic/HOD 1 time, 4 of 4 cycles  Dose modification: 80 mg/m2 (original dose 80 mg/m2, Cycle 2)  Administration: 132 mg (4/26/2022), 132 mg (5/3/2022), 132 mg (5/10/2022), 132 mg (5/17/2022), 132 mg (5/24/2022), 132 mg (6/7/2022), 132 mg (5/31/2022), 132 mg (6/14/2022), 132 mg (6/28/2022), 132 mg (6/21/2022), 132 mg (7/5/2022), 132 mg (7/12/2022)            Past Medical History:   Diagnosis Date    Allergy     Breast cancer 02/2016    left breast, neoadjuvant chemo, lumpectomy, and radiation    Breast cancer 04/2022    left breast 3:00 invasive ductal carcinoma    Bronchitis     COVID-19 08/2020    HLD (hyperlipidemia)     Hypertension     S/P chemotherapy, time since greater than 12 weeks     Skin cancer     resovled       Past Surgical History:   Procedure Laterality Date    ADENOIDECTOMY      BREAST BIOPSY Left 2016    BREAST BIOPSY Left 04/2022    left breast invasive ductal carcinoma    BREAST CYST EXCISION      BREAST LUMPECTOMY Left 2016    BREAST RECONSTRUCTION Bilateral 08/18/2022    Procedure: RECONSTRUCTION, BREAST;  Surgeon: Edi Alanis MD;  Location: [facility];  Service: Plastics;  Laterality: Bilateral;    COLONOSCOPY N/A 06/21/2018    Procedure: COLONOSCOPY;  Surgeon: Hiro Billy Jr., MD;   Location: [facility] ENDO;  Service: Endoscopy;  Laterality: N/A;    CYSTOCELE REPAIR      EXCISIONAL HEMORRHOIDECTOMY      HYSTERECTOMY      due to prolapse    INSERTION OF BREAST TISSUE EXPANDER Bilateral 08/18/2022    Procedure: INSERTION, TISSUE EXPANDER, BREAST;  Surgeon: Edi Alanis MD;  Location: [facility];  Service: Plastics;  Laterality: Bilateral;    INSERTION OF TUNNELED CENTRAL VENOUS CATHETER (CVC) WITH SUBCUTANEOUS PORT Right 04/22/2022    Procedure: [id number];  Surgeon: Haile Domingo MD;  Location: [facility];  Service: General;  Laterality: Right;    ORBITAL RECONSTRUCTION      PORTACATH PLACEMENT      since removed    REMOVAL OF VASCULAR ACCESS PORT      RHINOPLASTY TIP      SENTINEL LYMPH NODE BIOPSY Left 08/18/2022    Procedure: BIOPSY, LYMPH NODE, SENTINEL;  Surgeon: Padma Ruggiero MD;  Location: [facility];  Service: General;  Laterality: Left;    SIGMOIDOSCOPY      SIMPLE MASTECTOMY Bilateral 08/18/2022    Procedure: MASTECTOMY, SIMPLE;  Surgeon: Padma Ruggiero MD;  Location: [facility];  Service: General;  Laterality: Bilateral;    TISSUE EXPANDER REMOVAL Bilateral 11/3/2022    Procedure: REMOVAL, TISSUE EXPANDER;  Surgeon: Edi Alanis MD;  Location: [facility];  Service: Plastics;  Laterality: Bilateral;       Family History   Problem Relation Age of Onset    Breast cancer Mother 42    Cancer Mother     COPD Father     Colon cancer Sister     Cancer Sister     Breast cancer Sister        Social History     Socioeconomic History    Marital status:    Tobacco Use    Smoking status: Never    Smokeless tobacco: Never   Substance and Sexual Activity    Alcohol use: Yes     Alcohol/week: 2.0 standard drinks     Types: 2 Cans of beer per week     Comment: per  week    Drug use: No    Sexual activity: Yes     Partners: Male     Social Determinants of Health     Financial Resource Strain: Low Risk     Difficulty of Paying Living Expenses: Not hard at all   Food Insecurity: No Food  Insecurity    Worried About Running Out of Food in the Last Year: Never true    Ran Out of Food in the Last Year: Never true   Transportation Needs: No Transportation Needs    Lack of Transportation (Medical): No    Lack of Transportation (Non-Medical): No   Physical Activity: Insufficiently Active    Days of Exercise per Week: 1 day    Minutes of Exercise per Session: 20 min   Stress: No Stress Concern Present    Feeling of Stress : Only a little   Social Connections: Unknown    Frequency of Communication with Friends and Family: More than three times a week    Frequency of Social Gatherings with Friends and Family: More than three times a week    Active Member of Clubs or Organizations: No    Attends Club or Organization Meetings: Never    Marital Status:    Housing Stability: Low Risk     Unable to Pay for Housing in the Last Year: No    Number of Places Lived in the Last Year: 1    Unstable Housing in the Last Year: No       Review of patient's allergies indicates:  No Known Allergies    Review of Systems   Constitutional:  Negative for appetite change and unexpected weight change.   HENT:  Negative for mouth sores.    Eyes:  Negative for visual disturbance.   Respiratory:  Negative for cough and shortness of breath.    Cardiovascular:  Negative for chest pain.   Gastrointestinal:  Negative for abdominal pain and diarrhea.   Genitourinary:  Negative for frequency.   Musculoskeletal:  Negative for back pain.   Integumentary:  Positive for rash.   Neurological:  Negative for headaches.   Hematological:  Negative for adenopathy.   Psychiatric/Behavioral:  The patient is not nervous/anxious.           Objective:     Vitals:    12/16/22 1018   BP: (!) 160/100   BP Location: Right arm   Patient Position: Sitting   BP Method: Medium (Automatic)   Pulse: 89   Resp: 16   Temp: 97.9 °F (36.6 °C)   TempSrc: Temporal   SpO2: (!) 94%   Weight: 66.8 kg (147 lb 4.3 oz)   Height: 5' (1.524 m)        Physical Exam  Vitals  reviewed.   Constitutional:       Appearance: Normal appearance.   HENT:      Head: Normocephalic and atraumatic.   Eyes:      General: No scleral icterus.     Pupils: Pupils are equal, round, and reactive to light.   Cardiovascular:      Rate and Rhythm: Normal rate and regular rhythm.      Pulses: Normal pulses.      Heart sounds: Normal heart sounds.   Pulmonary:      Effort: Pulmonary effort is normal.      Breath sounds: Normal breath sounds.   Chest:      Comments: Bilateral mastectomy, reconstruction, no lymphadenopathy appreciated   Abdominal:      General: Bowel sounds are normal. There is no distension.   Musculoskeletal:         General: No swelling or tenderness.   Lymphadenopathy:      Cervical: No cervical adenopathy.   Skin:     General: Skin is warm.      Findings: No rash.   Neurological:      General: No focal deficit present.      Mental Status: She is alert and oriented to person, place, and time.      Cranial Nerves: No cranial nerve deficit.      Motor: No weakness.   Psychiatric:         Mood and Affect: Mood normal.         Behavior: Behavior normal.              Current Outpatient Medications on File Prior to Visit   Medication Sig    acetaminophen (TYLENOL) 325 MG tablet Take 325 mg by mouth every 6 (six) hours as needed for Pain.    B-complex with vitamin C (Z-BEC OR EQUIV) tablet Take 1 tablet by mouth once daily.    calcium carbonate-vitamin D3 600 mg-20 mcg (800 unit) Tab Take 2 tablets by mouth once daily.    cetirizine (ZYRTEC) 10 MG tablet Take 10 mg by mouth daily as needed.     clobetasol 0.05% (TEMOVATE) 0.05 % Oint Apply a small amount to affected area twice a day    ezetimibe (ZETIA) 10 mg tablet Take 1 tablet (10 mg total) by mouth once daily.    fish oil-omega-3 fatty acids 300-1,000 mg capsule Take 2 g by mouth daily as needed.     fluticasone propionate (FLONASE) 50 mcg/actuation nasal spray use 1 spray (50 mcg total) by Each Nostril route daily as needed.     hydroCHLOROthiazide (HYDRODIURIL) 25 MG tablet Take 1 tablet (25 mg total) by mouth daily as needed.    LIDOcaine-prilocaine (EMLA) cream Apply topically as directed    mirabegron (MYRBETRIQ) 50 mg Tb24 Take 1 tablet (50 mg total) by mouth once daily.    multivitamin capsule Take 1 capsule by mouth once daily.    ondansetron (ZOFRAN-ODT) 8 MG TbDL 8 mg every 8 (eight) hours as needed.    rosuvastatin (CRESTOR) 10 MG tablet Take 1 tablet (10 mg total) by mouth every evening.    triamcinolone acetonide 0.025% (KENALOG) 0.025 % cream Apply topically 2 (two) times daily.     Current Facility-Administered Medications on File Prior to Visit   Medication    0.9%  NaCl infusion    HYDROmorphone injection 0.5 mg    LIDOcaine (PF) 10 mg/ml (1%) injection 10 mg    midazolam (VERSED) 1 mg/mL injection 2 mg    ondansetron injection 4 mg    prochlorperazine injection Soln 10 mg       CBC:  Lab Results   Component Value Date    WBC 9.87 12/16/2022    HGB 13.4 12/16/2022    HCT 40.3 12/16/2022    MCV 90 12/16/2022     12/16/2022         CMP:  Sodium   Date Value Ref Range Status   12/16/2022 139 136 - 145 mmol/L Final     Potassium   Date Value Ref Range Status   12/16/2022 4.5 3.5 - 5.1 mmol/L Final     Chloride   Date Value Ref Range Status   12/16/2022 103 95 - 110 mmol/L Final     CO2   Date Value Ref Range Status   12/16/2022 26 23 - 29 mmol/L Final     Glucose   Date Value Ref Range Status   12/16/2022 109 70 - 110 mg/dL Final     BUN   Date Value Ref Range Status   12/16/2022 17 8 - 23 mg/dL Final     Creatinine   Date Value Ref Range Status   12/16/2022 0.7 0.5 - 1.4 mg/dL Final     Calcium   Date Value Ref Range Status   12/16/2022 10.6 (H) 8.7 - 10.5 mg/dL Final     Total Protein   Date Value Ref Range Status   12/16/2022 7.4 6.0 - 8.4 g/dL Final     Albumin   Date Value Ref Range Status   12/16/2022 4.3 3.5 - 5.2 g/dL Final     Total Bilirubin   Date Value Ref Range Status   12/16/2022 0.6 0.1 - 1.0 mg/dL Final      Comment:     For infants and newborns, interpretation of results should be based  on gestational age, weight and in agreement with clinical  observations.    Premature Infant recommended reference ranges:  Up to 24 hours.............<8.0 mg/dL  Up to 48 hours............<12.0 mg/dL  3-5 days..................<15.0 mg/dL  6-29 days.................<15.0 mg/dL       Alkaline Phosphatase   Date Value Ref Range Status   12/16/2022 60 55 - 135 U/L Final     AST   Date Value Ref Range Status   12/16/2022 16 10 - 40 U/L Final     ALT   Date Value Ref Range Status   12/16/2022 12 10 - 44 U/L Final     Anion Gap   Date Value Ref Range Status   12/16/2022 10 8 - 16 mmol/L Final     eGFR if    Date Value Ref Range Status   07/29/2022 >60 >60 mL/min/1.73 m^2 Final     eGFR if non    Date Value Ref Range Status   07/29/2022 >60 >60 mL/min/1.73 m^2 Final     Comment:     Calculation used to obtain the estimated glomerular filtration  rate (eGFR) is the CKD-EPI equation.          Echo  · The left ventricle is normal in size with normal systolic function.  · Grade I left ventricular diastolic dysfunction.  · The estimated PA systolic pressure is 12 mmHg.  · Normal right ventricular size with normal right ventricular systolic   function.  · Normal central venous pressure (3 mmHg).  · The estimated ejection fraction is 55%.  · Mild tricuspid regurgitation.          ECOG SCORE    1 - Restricted in strenuous activity-ambulatory and able to carry out work of a light nature, 0 - Fully active-able to carry on all pre-disease performance without restriction          Assessment/Plan:     1. Malignant neoplasm of upper-outer quadrant of left breast in female, estrogen receptor negative    2. Osteopenia after menopause    3. Drug-induced thyroiditis    4. Immunocompromised state due to drug therapy    5. Malignant neoplasm of nipple of left breast in female, estrogen receptor negative    6. Malignant neoplasm  of upper-outer quadrant of right breast in female, estrogen receptor negative    7. Malignant neoplasm of nipple of left breast in female, unspecified estrogen receptor status    8. Wound infection after surgery    9. Drug-induced skin rash      # History of locally advanced, hormone receptor positive/HER2 Lalitha negative left breast carcinoma without evidence of recurrent disease. Now with second primary, triple negative, clinical stage II B (T2, N0, M0), left breast carcinoma   - s/p bilateral mastectomy ypT1b,pN0 (sn),cM0 ; received only carbo/taxol/keytryda neoadjuvant   - now cont with adjuvant keytruda, will need to be on for at least 1 year from initial treatment   - discussion with patient since she has some breast cancer left from the surgical path ypT1b, she will need adjuvant capecitabine (with or after keytruda), patient would like to think about it   - proceed with infusion today, CBC/CMP/Mag/TSH prior to next cycle    #Status post bilateral mastectomy/placement of tissue expanders-complicated by postop infection.: following up with plastic surgery      # Osteopenia: will get DEXA scan for baseline  # Mild hypercalcemia: will cont monitroing if still elevated on next blood work we will need further work up      Med Onc Chart Routing      Follow up with physician 3 weeks. CBC/CMP/Mag/TSH   Follow up with ANGIE 6 weeks.   Infusion scheduling note proceed with infusion   Injection scheduling note    Labs    Imaging    Pharmacy appointment    Other referrals         Plan was discussed with the patient at length, and she  verbalized understanding.    Recommend exercise, nutrition and weight management and health maintenance activities and follow-up with PCPs recommendations.    Signed:  Cindy Machado MD  Hematology and Oncology  [facility]  [facility] [place] [facility]

## 2022-12-19 ENCOUNTER — PATIENT MESSAGE (OUTPATIENT)
Dept: FAMILY MEDICINE | Facility: CLINIC | Age: 72
End: 2022-12-19
Payer: MEDICARE

## 2022-12-19 ENCOUNTER — E-VISIT (OUTPATIENT)
Dept: FAMILY MEDICINE | Facility: CLINIC | Age: 72
End: 2022-12-19
Payer: MEDICARE

## 2022-12-19 DIAGNOSIS — J06.9 UPPER RESPIRATORY TRACT INFECTION, UNSPECIFIED TYPE: Primary | ICD-10-CM

## 2022-12-20 ENCOUNTER — OFFICE VISIT (OUTPATIENT)
Dept: FAMILY MEDICINE | Facility: CLINIC | Age: 72
End: 2022-12-20
Payer: MEDICARE

## 2022-12-20 VITALS
OXYGEN SATURATION: 97 % | TEMPERATURE: 99 F | HEART RATE: 87 BPM | SYSTOLIC BLOOD PRESSURE: 132 MMHG | BODY MASS INDEX: 29.19 KG/M2 | DIASTOLIC BLOOD PRESSURE: 78 MMHG | WEIGHT: 148.69 LBS | RESPIRATION RATE: 18 BRPM | HEIGHT: 60 IN

## 2022-12-20 DIAGNOSIS — J06.9 UPPER RESPIRATORY TRACT INFECTION, UNSPECIFIED TYPE: Primary | ICD-10-CM

## 2022-12-20 LAB
CTP QC/QA: YES
CTP QC/QA: YES
POC MOLECULAR INFLUENZA A AGN: NEGATIVE
POC MOLECULAR INFLUENZA B AGN: NEGATIVE
SARS-COV-2 RDRP RESP QL NAA+PROBE: NEGATIVE

## 2022-12-20 PROCEDURE — 1101F PR PT FALLS ASSESS DOC 0-1 FALLS W/OUT INJ PAST YR: ICD-10-PCS | Mod: CPTII,S$GLB,, | Performed by: STUDENT IN AN ORGANIZED HEALTH CARE EDUCATION/TRAINING PROGRAM

## 2022-12-20 PROCEDURE — 1160F RVW MEDS BY RX/DR IN RCRD: CPT | Mod: CPTII,S$GLB,, | Performed by: STUDENT IN AN ORGANIZED HEALTH CARE EDUCATION/TRAINING PROGRAM

## 2022-12-20 PROCEDURE — 99213 OFFICE O/P EST LOW 20 MIN: CPT | Mod: S$GLB,,, | Performed by: STUDENT IN AN ORGANIZED HEALTH CARE EDUCATION/TRAINING PROGRAM

## 2022-12-20 PROCEDURE — 87502 INFLUENZA DNA AMP PROBE: CPT | Mod: QW,S$GLB,, | Performed by: STUDENT IN AN ORGANIZED HEALTH CARE EDUCATION/TRAINING PROGRAM

## 2022-12-20 PROCEDURE — 3075F SYST BP GE 130 - 139MM HG: CPT | Mod: CPTII,S$GLB,, | Performed by: STUDENT IN AN ORGANIZED HEALTH CARE EDUCATION/TRAINING PROGRAM

## 2022-12-20 PROCEDURE — 99999 PR PBB SHADOW E&M-EST. PATIENT-LVL V: ICD-10-PCS | Mod: PBBFAC,,, | Performed by: STUDENT IN AN ORGANIZED HEALTH CARE EDUCATION/TRAINING PROGRAM

## 2022-12-20 PROCEDURE — 1126F PR PAIN SEVERITY QUANTIFIED, NO PAIN PRESENT: ICD-10-PCS | Mod: CPTII,S$GLB,, | Performed by: STUDENT IN AN ORGANIZED HEALTH CARE EDUCATION/TRAINING PROGRAM

## 2022-12-20 PROCEDURE — 3078F PR MOST RECENT DIASTOLIC BLOOD PRESSURE < 80 MM HG: ICD-10-PCS | Mod: CPTII,S$GLB,, | Performed by: STUDENT IN AN ORGANIZED HEALTH CARE EDUCATION/TRAINING PROGRAM

## 2022-12-20 PROCEDURE — 99421 PR E&M, ONLINE DIGIT, EST, < 7 DAYS, 5-10 MINS: ICD-10-PCS | Mod: S$GLB,,, | Performed by: FAMILY MEDICINE

## 2022-12-20 PROCEDURE — 1160F PR REVIEW ALL MEDS BY PRESCRIBER/CLIN PHARMACIST DOCUMENTED: ICD-10-PCS | Mod: CPTII,S$GLB,, | Performed by: STUDENT IN AN ORGANIZED HEALTH CARE EDUCATION/TRAINING PROGRAM

## 2022-12-20 PROCEDURE — 1159F MED LIST DOCD IN RCRD: CPT | Mod: CPTII,S$GLB,, | Performed by: STUDENT IN AN ORGANIZED HEALTH CARE EDUCATION/TRAINING PROGRAM

## 2022-12-20 PROCEDURE — 87635: ICD-10-PCS | Mod: QW,S$GLB,, | Performed by: STUDENT IN AN ORGANIZED HEALTH CARE EDUCATION/TRAINING PROGRAM

## 2022-12-20 PROCEDURE — 1159F PR MEDICATION LIST DOCUMENTED IN MEDICAL RECORD: ICD-10-PCS | Mod: CPTII,S$GLB,, | Performed by: STUDENT IN AN ORGANIZED HEALTH CARE EDUCATION/TRAINING PROGRAM

## 2022-12-20 PROCEDURE — 3008F BODY MASS INDEX DOCD: CPT | Mod: CPTII,S$GLB,, | Performed by: STUDENT IN AN ORGANIZED HEALTH CARE EDUCATION/TRAINING PROGRAM

## 2022-12-20 PROCEDURE — 1126F AMNT PAIN NOTED NONE PRSNT: CPT | Mod: CPTII,S$GLB,, | Performed by: STUDENT IN AN ORGANIZED HEALTH CARE EDUCATION/TRAINING PROGRAM

## 2022-12-20 PROCEDURE — 99213 PR OFFICE/OUTPT VISIT, EST, LEVL III, 20-29 MIN: ICD-10-PCS | Mod: S$GLB,,, | Performed by: STUDENT IN AN ORGANIZED HEALTH CARE EDUCATION/TRAINING PROGRAM

## 2022-12-20 PROCEDURE — 3078F DIAST BP <80 MM HG: CPT | Mod: CPTII,S$GLB,, | Performed by: STUDENT IN AN ORGANIZED HEALTH CARE EDUCATION/TRAINING PROGRAM

## 2022-12-20 PROCEDURE — 99999 PR PBB SHADOW E&M-EST. PATIENT-LVL V: CPT | Mod: PBBFAC,,, | Performed by: STUDENT IN AN ORGANIZED HEALTH CARE EDUCATION/TRAINING PROGRAM

## 2022-12-20 PROCEDURE — 3288F PR FALLS RISK ASSESSMENT DOCUMENTED: ICD-10-PCS | Mod: CPTII,S$GLB,, | Performed by: STUDENT IN AN ORGANIZED HEALTH CARE EDUCATION/TRAINING PROGRAM

## 2022-12-20 PROCEDURE — 3008F PR BODY MASS INDEX (BMI) DOCUMENTED: ICD-10-PCS | Mod: CPTII,S$GLB,, | Performed by: STUDENT IN AN ORGANIZED HEALTH CARE EDUCATION/TRAINING PROGRAM

## 2022-12-20 PROCEDURE — 87635 SARS-COV-2 COVID-19 AMP PRB: CPT | Mod: QW,S$GLB,, | Performed by: STUDENT IN AN ORGANIZED HEALTH CARE EDUCATION/TRAINING PROGRAM

## 2022-12-20 PROCEDURE — 87502 POCT INFLUENZA A/B MOLECULAR: ICD-10-PCS | Mod: QW,S$GLB,, | Performed by: STUDENT IN AN ORGANIZED HEALTH CARE EDUCATION/TRAINING PROGRAM

## 2022-12-20 PROCEDURE — 3288F FALL RISK ASSESSMENT DOCD: CPT | Mod: CPTII,S$GLB,, | Performed by: STUDENT IN AN ORGANIZED HEALTH CARE EDUCATION/TRAINING PROGRAM

## 2022-12-20 PROCEDURE — 1101F PT FALLS ASSESS-DOCD LE1/YR: CPT | Mod: CPTII,S$GLB,, | Performed by: STUDENT IN AN ORGANIZED HEALTH CARE EDUCATION/TRAINING PROGRAM

## 2022-12-20 PROCEDURE — 99421 OL DIG E/M SVC 5-10 MIN: CPT | Mod: S$GLB,,, | Performed by: FAMILY MEDICINE

## 2022-12-20 PROCEDURE — 3075F PR MOST RECENT SYSTOLIC BLOOD PRESS GE 130-139MM HG: ICD-10-PCS | Mod: CPTII,S$GLB,, | Performed by: STUDENT IN AN ORGANIZED HEALTH CARE EDUCATION/TRAINING PROGRAM

## 2022-12-20 RX ORDER — FLUTICASONE PROPIONATE 44 UG/1
1 AEROSOL, METERED RESPIRATORY (INHALATION) 2 TIMES DAILY
Qty: 10.6 G | Refills: 0 | Status: SHIPPED | OUTPATIENT
Start: 2022-12-20 | End: 2023-05-09

## 2022-12-20 RX ORDER — ALBUTEROL SULFATE 90 UG/1
2 AEROSOL, METERED RESPIRATORY (INHALATION) EVERY 6 HOURS PRN
Qty: 8.5 G | Refills: 11 | Status: SHIPPED | OUTPATIENT
Start: 2022-12-20 | End: 2023-07-05 | Stop reason: CLARIF

## 2022-12-20 NOTE — PROGRESS NOTES
Patient ID: Zayra oRdgers is a 72 y.o. female.    Chief Complaint: No chief complaint on file.    The patient initiated a request through [employer] on 12/19/2022 for evaluation and management with a chief complaint of No chief complaint on file.     I evaluated the questionnaire submission on 12/20/22.    Ohs Peq Evisit Upper Respitatory/Cough Questionnaire    12/19/2022 11:57 AM CST - Filed by Patient   Do you agree to participate in an E-Visit? Yes   If you have any of the following symptoms, please present to your local ER or call 911:  I acknowledge   What is the main issue that you would like for your doctor to address today? Sinusitis and coughing   Are you able to take your vital signs? Yes   Systolic Blood Pressure: 140   Diastolic Blood Pressure: 76   Weight: 143   Height: 60   Pulse: 72   Temp: 98.6   Resp: 18   Pulse Ox: 98   What symptoms do you currently have?  Cough;  Nasal Congestion;  Runny nose   Have you had a fever? No   When did your symptoms first appear? 12/16/2022   In the last two weeks, have you been in close contact with someone who has COVID-19 or the Flu? No   In the last two weeks, have you worked or volunteered in a healthcare facility or as a ? Healthcare facilities include a hospital, medical or dental clinic, long-term care facility, or nursing home No   Do you live in a long-term care facility, nursing home, or homeless shelter? No   List what you have done or taken to help your symptoms. Tylenol,Zyrtec and Albuterol inhaler   How severe are your symptoms? Moderate   Have you taken an at home Covid test? No   Have you taken a Flu test? No   Have you been fully vaccinated for COVID? (2 Pfizer, 2 Moderna or 1 Jerad & Jerad vaccine injections) Yes   Have you received a booster? No   Have you recieved a Flu shot? Yes   When did you recieve your Flu shot? 11/21/2022   Do you have transportation to get tested for COVID if it is indicated and ordered for you at an  [facility] location? Yes   Are you currently pregnant, could you be pregnant, or are you breast feeding? None of the above   Provide any information you feel is important to your history not asked above COVID Tested on 9-16-22 @ [facility]. Received Keytruda inf 9-16-22. Dr Saini throat looked fine advised to take Tylenol. Completed 10 day course of Zyvox on 12-17-22 for mngt of cellulitis RBreast   Please attach any relevant images or files           Active Problem List with Overview Notes    Diagnosis Date Noted    Osteopenia after menopause 12/16/2022    Drug-induced thyroiditis 12/16/2022    Wound infection after surgery 12/16/2022    Drug-induced skin rash 11/25/2022    Immunocompromised state due to drug therapy 11/25/2022    Familial hypercholesterolemia 09/16/2022    Anxiety 06/13/2022    Situational depression 04/29/2022    Atelectasis of left lung 12/11/2020    Malignant neoplasm of upper-outer quadrant of right breast in female, estrogen receptor negative 11/04/2016    Bone/cartilage disorder 11/04/2016    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 The analytical   sensitivity   (limit of detection) is 500 copies/swab.     A POSITIVE result is indicative of the presence of SARS-CoV-2 RNA;   clinical   correlation with patient history and other diagnostic information is   necessary to determine patient infection status.    A NEGATIVE result means that SARS-CoV-2 nucleic acids are not present   above   the limit of  detection. A NEGATIVE result should be treated as   presumptive.   It does not rule out the possibility of COVID-19 and should not be   the sole   basis for treatment decisions. If COVID-19 is strongly suspected   based on   clinical and exposure history, re-testing using an alternate   molecular assay   should be considered.     This test is only for use under the Food and Drug Administration s   Emergency   Use Authorization (EUA).     Commercial kits are provided by [employer]. Performanc                           e   characteristics of the EUA have been independently verified by   [facility] Department of Pathology and Laboratory Medicine.   _________________________________________________________________   The authorized Fact Sheet for Healthcare Providers and the authorized   Fact   Sheet for Patients of the ID NOW COVID-19 are available on the FDA   website:   https://www.fda.gov/media/700580/download   https://www.fda.gov/media/830236/download     Lab Visit on 12/16/2022   Component Date Value Ref Range Status    WBC 12/16/2022 9.87  3.90 - 12.70 K/uL Final    RBC 12/16/2022 4.46  4.00 - 5.40 M/uL Final    Hemoglobin 12/16/2022 13.4  12.0 - 16.0 g/dL Final    Hematocrit 12/16/2022 40.3  37.0 - 48.5 % Final    MCV 12/16/2022 90  82 - 98 fL Final    MCH 12/16/2022 30.0  27.0 - 31.0 pg Final    MCHC 12/16/2022 33.3  32.0 - 36.0 g/dL Final    RDW 12/16/2022 14.6 (H)  11.5 - 14.5 % Final    Platelets 12/16/2022 162  150 - 450 K/uL Final    MPV 12/16/2022 9.2  9.2 - 12.9 fL Final    Gran # (ANC) 12/16/2022 7.8 (H)  1.8 - 7.7 K/uL Final    Comment: The ANC is based on a white cell differential from an   automated cell counter. It has not been microscopically   reviewed for the presence of abnormal cells. Clinical   correlation is required.      Immature Grans (Abs) 12/16/2022 0.04  0.00 - 0.04 K/uL Final    Comment: Mild elevation in immature granulocytes is non specific and   can be seen in  a variety of conditions including stress response,   acute inflammation, trauma and pregnancy. Correlation with other   laboratory and clinical findings is essential.      Sodium 12/16/2022 139  136 - 145 mmol/L Final    Potassium 12/16/2022 4.5  3.5 - 5.1 mmol/L Final    Chloride 12/16/2022 103  95 - 110 mmol/L Final    CO2 12/16/2022 26  23 - 29 mmol/L Final    Glucose 12/16/2022 109  70 - 110 mg/dL Final    BUN 12/16/2022 17  8 - 23 mg/dL Final    Creatinine 12/16/2022 0.7  0.5 - 1.4 mg/dL Final    Calcium 12/16/2022 10.6 (H)  8.7 - 10.5 mg/dL Final    Total Protein 12/16/2022 7.4  6.0 - 8.4 g/dL Final    Albumin 12/16/2022 4.3  3.5 - 5.2 g/dL Final    Total Bilirubin 12/16/2022 0.6  0.1 - 1.0 mg/dL Final    Comment: For infants and newborns, interpretation of results should be based  on gestational age, weight and in agreement with clinical  observations.    Premature Infant recommended reference ranges:  Up to 24 hours.............<8.0 mg/dL  Up to 48 hours............<12.0 mg/dL  3-5 days..................<15.0 mg/dL  6-29 days.................<15.0 mg/dL      Alkaline Phosphatase 12/16/2022 60  55 - 135 U/L Final    AST 12/16/2022 16  10 - 40 U/L Final    ALT 12/16/2022 12  10 - 44 U/L Final    Anion Gap 12/16/2022 10  8 - 16 mmol/L Final    eGFR 12/16/2022 >60.0  >60 mL/min/1.73 m^2 Final    TSH 12/16/2022 1.477  0.400 - 4.000 uIU/mL Final       No diagnosis found.     No orders of the defined types were placed in this encounter.           E-Visit Time Tracking:

## 2022-12-20 NOTE — PROGRESS NOTES
Subjective:      Patient ID: Zayra Rodgers is a 72 y.o. female    Chief Complaint:  Upper respiratory symptoms    HPI  72 y.o. female with a PMHx as documented below presents to clinic today for URI symptoms.     She reports that symptoms first started 4 days ago. Covid test on Friday negative.     She has been having sore throat, runny nose, congestion, cough, chills, sinus pain, and ear pressure. She denies having any shortness of breath, wheezing, and fever.    Past Medical History:   Diagnosis Date    Allergy     Breast cancer 02/2016    left breast, neoadjuvant chemo, lumpectomy, and radiation    Breast cancer 04/2022    left breast 3:00 invasive ductal carcinoma    Bronchitis     COVID-19 08/2020    HLD (hyperlipidemia)     Hypertension     S/P chemotherapy, time since greater than 12 weeks     Skin cancer     resovled      ROS completed and negative unless otherwise mentioned above in HPI.    Objective:      Vitals:    12/20/22 0748 12/20/22 0843   BP: (!) 144/82 132/78   BP Location:  Right arm   Patient Position:  Sitting   Pulse: 87    Resp: 18    Temp: 99.1 °F (37.3 °C)    SpO2: 97%    Weight: 67.4 kg (148 lb 11.2 oz)    Height: 5' (1.524 m)      Physical Exam  Vitals reviewed.   Constitutional:       General: She is not in acute distress.  HENT:      Head: Normocephalic and atraumatic.      Right Ear: A middle ear effusion is present. Tympanic membrane is not erythematous or bulging.      Left Ear: A middle ear effusion is present. Tympanic membrane is not erythematous or bulging.      Nose: Congestion present.   Cardiovascular:      Rate and Rhythm: Normal rate.   Pulmonary:      Effort: Pulmonary effort is normal. No respiratory distress.      Breath sounds: Normal breath sounds. No wheezing or rales.   Neurological:      General: No focal deficit present.      Mental Status: She is alert and oriented to person, place, and time. Mental status is at baseline.      Assessment:       1. Upper  respiratory tract infection, unspecified type      Plan:       Zayra was seen today for cough and nasal congestion.    Diagnoses and all orders for this visit:    Upper respiratory tract infection, unspecified type  -     fluticasone propionate (FLOVENT HFA) 44 mcg/actuation inhaler; Inhale 1 puff into the lungs 2 (two) times daily. Controller  -     albuterol (PROAIR HFA) 90 mcg/actuation inhaler; Inhale 2 puffs into the lungs every 6 (six) hours as needed for Wheezing. Rescue  -     X-Ray Chest PA And Lateral; Future  -     POCT Influenza A/B Molecular  -     POCT COVID-19 Rapid Screening    Pt requests refill of Flovent and Albuterol. COVID negative, flu negative. Xray down today in clinic, advised patient to notify if symptoms worsen or fail to improve and we can re-assess at that time.     Patient presenting with symptoms of typical viral URI. No signs or symptoms of bacterial superinfection to suggest need for antibiotics. Discussed the reason for not prescribing antibiotics at this time. Discussed symptomatic management with OTC meds and saline rinses (prescription medications as indicated). Patient advised to let us know if symptoms persist or if she develops any new or worsening symptoms.      Beatrice Randolph MD  [facility]  Office: [phone]   Fax: [phone]  12/20/2022      Disclaimer: This note was partly generated using dictation software which may occasionally result in transcription errors.

## 2022-12-27 ENCOUNTER — PES CALL (OUTPATIENT)
Dept: ADMINISTRATIVE | Facility: OTHER | Age: 72
End: 2022-12-27
Payer: MEDICARE

## 2023-01-06 ENCOUNTER — DOCUMENTATION ONLY (OUTPATIENT)
Dept: INFUSION THERAPY | Facility: HOSPITAL | Age: 73
End: 2023-01-06

## 2023-01-06 ENCOUNTER — INFUSION (OUTPATIENT)
Dept: INFUSION THERAPY | Facility: HOSPITAL | Age: 73
End: 2023-01-06
Attending: INTERNAL MEDICINE
Payer: MEDICARE

## 2023-01-06 ENCOUNTER — OFFICE VISIT (OUTPATIENT)
Dept: HEMATOLOGY/ONCOLOGY | Facility: CLINIC | Age: 73
End: 2023-01-06
Payer: MEDICARE

## 2023-01-06 VITALS
TEMPERATURE: 98 F | RESPIRATION RATE: 16 BRPM | OXYGEN SATURATION: 98 % | HEIGHT: 60 IN | SYSTOLIC BLOOD PRESSURE: 144 MMHG | WEIGHT: 148.81 LBS | HEART RATE: 73 BPM | DIASTOLIC BLOOD PRESSURE: 74 MMHG | BODY MASS INDEX: 29.22 KG/M2

## 2023-01-06 VITALS
HEART RATE: 82 BPM | DIASTOLIC BLOOD PRESSURE: 84 MMHG | SYSTOLIC BLOOD PRESSURE: 135 MMHG | HEIGHT: 60 IN | BODY MASS INDEX: 29.22 KG/M2 | OXYGEN SATURATION: 98 % | TEMPERATURE: 98 F | WEIGHT: 148.81 LBS | RESPIRATION RATE: 16 BRPM

## 2023-01-06 DIAGNOSIS — C50.412 MALIGNANT NEOPLASM OF UPPER-OUTER QUADRANT OF LEFT BREAST IN FEMALE, ESTROGEN RECEPTOR NEGATIVE: Primary | ICD-10-CM

## 2023-01-06 DIAGNOSIS — Z17.1 MALIGNANT NEOPLASM OF UPPER-OUTER QUADRANT OF RIGHT BREAST IN FEMALE, ESTROGEN RECEPTOR NEGATIVE: Primary | ICD-10-CM

## 2023-01-06 DIAGNOSIS — D84.821 IMMUNOCOMPROMISED STATE DUE TO DRUG THERAPY: ICD-10-CM

## 2023-01-06 DIAGNOSIS — C50.012 MALIGNANT NEOPLASM OF NIPPLE OF LEFT BREAST IN FEMALE, ESTROGEN RECEPTOR NEGATIVE: ICD-10-CM

## 2023-01-06 DIAGNOSIS — C50.411 MALIGNANT NEOPLASM OF UPPER-OUTER QUADRANT OF RIGHT BREAST IN FEMALE, ESTROGEN RECEPTOR NEGATIVE: Primary | ICD-10-CM

## 2023-01-06 DIAGNOSIS — Z17.1 MALIGNANT NEOPLASM OF UPPER-OUTER QUADRANT OF LEFT BREAST IN FEMALE, ESTROGEN RECEPTOR NEGATIVE: Primary | ICD-10-CM

## 2023-01-06 DIAGNOSIS — E06.4 DRUG-INDUCED THYROIDITIS: ICD-10-CM

## 2023-01-06 DIAGNOSIS — M85.80 OSTEOPENIA AFTER MENOPAUSE: ICD-10-CM

## 2023-01-06 DIAGNOSIS — I70.0 AORTIC ATHEROSCLEROSIS: ICD-10-CM

## 2023-01-06 DIAGNOSIS — C77.3 MALIGNANT NEOPLASM METASTATIC TO LYMPH NODE OF AXILLA: ICD-10-CM

## 2023-01-06 DIAGNOSIS — Z79.899 IMMUNOCOMPROMISED STATE DUE TO DRUG THERAPY: ICD-10-CM

## 2023-01-06 DIAGNOSIS — Z17.1 MALIGNANT NEOPLASM OF NIPPLE OF LEFT BREAST IN FEMALE, ESTROGEN RECEPTOR NEGATIVE: ICD-10-CM

## 2023-01-06 DIAGNOSIS — Z78.0 OSTEOPENIA AFTER MENOPAUSE: ICD-10-CM

## 2023-01-06 PROCEDURE — 1101F PR PT FALLS ASSESS DOC 0-1 FALLS W/OUT INJ PAST YR: ICD-10-PCS | Mod: CPTII,S$GLB,, | Performed by: STUDENT IN AN ORGANIZED HEALTH CARE EDUCATION/TRAINING PROGRAM

## 2023-01-06 PROCEDURE — 3288F FALL RISK ASSESSMENT DOCD: CPT | Mod: CPTII,S$GLB,, | Performed by: STUDENT IN AN ORGANIZED HEALTH CARE EDUCATION/TRAINING PROGRAM

## 2023-01-06 PROCEDURE — 99215 PR OFFICE/OUTPT VISIT, EST, LEVL V, 40-54 MIN: ICD-10-PCS | Mod: S$GLB,,, | Performed by: STUDENT IN AN ORGANIZED HEALTH CARE EDUCATION/TRAINING PROGRAM

## 2023-01-06 PROCEDURE — 3288F PR FALLS RISK ASSESSMENT DOCUMENTED: ICD-10-PCS | Mod: CPTII,S$GLB,, | Performed by: STUDENT IN AN ORGANIZED HEALTH CARE EDUCATION/TRAINING PROGRAM

## 2023-01-06 PROCEDURE — 99215 OFFICE O/P EST HI 40 MIN: CPT | Mod: S$GLB,,, | Performed by: STUDENT IN AN ORGANIZED HEALTH CARE EDUCATION/TRAINING PROGRAM

## 2023-01-06 PROCEDURE — 99999 PR PBB SHADOW E&M-EST. PATIENT-LVL IV: CPT | Mod: PBBFAC,,, | Performed by: STUDENT IN AN ORGANIZED HEALTH CARE EDUCATION/TRAINING PROGRAM

## 2023-01-06 PROCEDURE — 1126F PR PAIN SEVERITY QUANTIFIED, NO PAIN PRESENT: ICD-10-PCS | Mod: CPTII,S$GLB,, | Performed by: STUDENT IN AN ORGANIZED HEALTH CARE EDUCATION/TRAINING PROGRAM

## 2023-01-06 PROCEDURE — 96367 TX/PROPH/DG ADDL SEQ IV INF: CPT | Mod: PN

## 2023-01-06 PROCEDURE — 3075F PR MOST RECENT SYSTOLIC BLOOD PRESS GE 130-139MM HG: ICD-10-PCS | Mod: CPTII,S$GLB,, | Performed by: STUDENT IN AN ORGANIZED HEALTH CARE EDUCATION/TRAINING PROGRAM

## 2023-01-06 PROCEDURE — 63600175 PHARM REV CODE 636 W HCPCS: Mod: JG,PN | Performed by: STUDENT IN AN ORGANIZED HEALTH CARE EDUCATION/TRAINING PROGRAM

## 2023-01-06 PROCEDURE — 3008F BODY MASS INDEX DOCD: CPT | Mod: CPTII,S$GLB,, | Performed by: STUDENT IN AN ORGANIZED HEALTH CARE EDUCATION/TRAINING PROGRAM

## 2023-01-06 PROCEDURE — 96413 CHEMO IV INFUSION 1 HR: CPT | Mod: PN

## 2023-01-06 PROCEDURE — 1126F AMNT PAIN NOTED NONE PRSNT: CPT | Mod: CPTII,S$GLB,, | Performed by: STUDENT IN AN ORGANIZED HEALTH CARE EDUCATION/TRAINING PROGRAM

## 2023-01-06 PROCEDURE — 3075F SYST BP GE 130 - 139MM HG: CPT | Mod: CPTII,S$GLB,, | Performed by: STUDENT IN AN ORGANIZED HEALTH CARE EDUCATION/TRAINING PROGRAM

## 2023-01-06 PROCEDURE — 99999 PR PBB SHADOW E&M-EST. PATIENT-LVL IV: ICD-10-PCS | Mod: PBBFAC,,, | Performed by: STUDENT IN AN ORGANIZED HEALTH CARE EDUCATION/TRAINING PROGRAM

## 2023-01-06 PROCEDURE — 3008F PR BODY MASS INDEX (BMI) DOCUMENTED: ICD-10-PCS | Mod: CPTII,S$GLB,, | Performed by: STUDENT IN AN ORGANIZED HEALTH CARE EDUCATION/TRAINING PROGRAM

## 2023-01-06 PROCEDURE — 3079F PR MOST RECENT DIASTOLIC BLOOD PRESSURE 80-89 MM HG: ICD-10-PCS | Mod: CPTII,S$GLB,, | Performed by: STUDENT IN AN ORGANIZED HEALTH CARE EDUCATION/TRAINING PROGRAM

## 2023-01-06 PROCEDURE — 1101F PT FALLS ASSESS-DOCD LE1/YR: CPT | Mod: CPTII,S$GLB,, | Performed by: STUDENT IN AN ORGANIZED HEALTH CARE EDUCATION/TRAINING PROGRAM

## 2023-01-06 PROCEDURE — 3079F DIAST BP 80-89 MM HG: CPT | Mod: CPTII,S$GLB,, | Performed by: STUDENT IN AN ORGANIZED HEALTH CARE EDUCATION/TRAINING PROGRAM

## 2023-01-06 PROCEDURE — A4216 STERILE WATER/SALINE, 10 ML: HCPCS | Mod: PN | Performed by: STUDENT IN AN ORGANIZED HEALTH CARE EDUCATION/TRAINING PROGRAM

## 2023-01-06 PROCEDURE — 25000003 PHARM REV CODE 250: Mod: PN | Performed by: STUDENT IN AN ORGANIZED HEALTH CARE EDUCATION/TRAINING PROGRAM

## 2023-01-06 RX ORDER — ACETAMINOPHEN 500 MG
1000 TABLET ORAL
Status: COMPLETED | OUTPATIENT
Start: 2023-01-06 | End: 2023-01-06

## 2023-01-06 RX ORDER — EPINEPHRINE 0.3 MG/.3ML
0.3 INJECTION SUBCUTANEOUS ONCE AS NEEDED
Status: CANCELLED | OUTPATIENT
Start: 2023-01-06

## 2023-01-06 RX ORDER — TRIAMCINOLONE ACETONIDE 1 MG/G
CREAM TOPICAL 2 TIMES DAILY
Qty: 80 G | Refills: 2 | Status: SHIPPED | OUTPATIENT
Start: 2023-01-06

## 2023-01-06 RX ORDER — SODIUM CHLORIDE 0.9 % (FLUSH) 0.9 %
10 SYRINGE (ML) INJECTION
Status: CANCELLED | OUTPATIENT
Start: 2023-01-06

## 2023-01-06 RX ORDER — DIPHENHYDRAMINE HYDROCHLORIDE 50 MG/ML
50 INJECTION INTRAMUSCULAR; INTRAVENOUS ONCE AS NEEDED
Status: CANCELLED | OUTPATIENT
Start: 2023-01-06

## 2023-01-06 RX ORDER — SODIUM CHLORIDE 0.9 % (FLUSH) 0.9 %
10 SYRINGE (ML) INJECTION
Status: DISCONTINUED | OUTPATIENT
Start: 2023-01-06 | End: 2023-01-06 | Stop reason: HOSPADM

## 2023-01-06 RX ORDER — ACETAMINOPHEN 500 MG
1000 TABLET ORAL
Status: CANCELLED
Start: 2023-01-06

## 2023-01-06 RX ORDER — HEPARIN 100 UNIT/ML
500 SYRINGE INTRAVENOUS
Status: CANCELLED | OUTPATIENT
Start: 2023-01-06

## 2023-01-06 RX ADMIN — ACETAMINOPHEN 1000 MG: 500 TABLET ORAL at 02:01

## 2023-01-06 RX ADMIN — Medication 10 ML: at 03:01

## 2023-01-06 RX ADMIN — SODIUM CHLORIDE 200 MG: 9 INJECTION, SOLUTION INTRAVENOUS at 03:01

## 2023-01-06 RX ADMIN — DIPHENHYDRAMINE HYDROCHLORIDE 25 MG: 50 INJECTION, SOLUTION INTRAMUSCULAR; INTRAVENOUS at 02:01

## 2023-01-06 RX ADMIN — SODIUM CHLORIDE: 9 INJECTION, SOLUTION INTRAVENOUS at 02:01

## 2023-01-06 NOTE — PROGRESS NOTES
Oncology Nutrition   Chemotherapy Infusion Visit    Nutrition Follow Up   RD met with pt at chairside during infusion tx. Pt doing well nutritionally. Denies any nutrition related side effects or any food intolerances. Pt weight has been stable.     Wt Readings from Last 10 Encounters:   01/06/23 67.5 kg (148 lb 13 oz)   01/06/23 67.5 kg (148 lb 13 oz)   12/20/22 67.4 kg (148 lb 11.2 oz)   12/16/22 66.8 kg (147 lb 4.3 oz)   12/16/22 66.8 kg (147 lb 4.3 oz)   12/09/22 64.9 kg (143 lb)   12/05/22 65.1 kg (143 lb 9.6 oz)   11/25/22 67.3 kg (148 lb 5.9 oz)   11/25/22 67.3 kg (148 lb 5.9 oz)   11/04/22 67.7 kg (149 lb 4 oz)       All other nutrition questions/concerns addressed as appropriate. Will continue to monitor prn throughout treatment.     Keerthi Alaniz, PERRY, LDN  01/06/2023  2:55 PM

## 2023-01-06 NOTE — PLAN OF CARE
Pt tolerated Keytruda infusion well.  No s/s of infusion reaction noted.  Instructed to call MD with any problems

## 2023-01-06 NOTE — PROGRESS NOTES
Subjective:     Name: Zayra Rodgers  : 1950  MRN: [id number]    Chief Complaint   Patient presents with    Follow-up    Breast Cancer      HPI: Zayra Rodgers is a 72 y.o. female presents for evaluation of Follow-up and Breast Cancer    Previously seen Dr Bartlett; had previously ER/AL+ IDC s/p neoadjuvant chemo AC/T s/p lumpectomy followed by adjuvant radiation and adjuvant endocrine therapy; anastrozole. Was placed on annual follow up in 2022; pain at left breast leading to ultrasound,MRI, biopsies ER/AL negative, HER-2 negative KI 67% 80%, leading to  neoadjuvant therapy consisting of carboplatin/Taxol/Keytruda.  She has undergone bilateral mastectomy/lymph node sampling 2022; ypT1n (m),ypN0 (sn),cM0 (PET/CT in 2022 negative). Had complication post -op  by infection/seroma formation involving both tissue expanders.  Drains had to be replaced.  Patient has completed all of her antibiotics.  Surgical drains have now been removed.    She has initiated adjuvant Keytruda 10/14/22.     Today; presented for clearance prior to Cycle # 10. patient is doing well, previously discussion about the need for possible adjuvant capecitabine, patient will think about it,  No fever ,no chills, no SOB, no diarrhea, no abdominal pain, skin rash on her chest, taking local cream, no indication for steroids     Oncology History   Malignant neoplasm of nipple of left breast in female   3/14/2016 Initial Diagnosis    Breast cancer     2022 Cancer Staged    Staging form: Breast, AJCC 8th Edition  - Clinical stage from 2022: Stage IIB (cT2, cN0, cM0, G2, ER-, AL-, HER2-)       2022 Cancer Staged    Staging form: Breast, AJCC 8th Edition  - Pathologic stage from 2022: No Stage Recommended (ypT1b, pN0(sn), cM0, G3, ER-, AL-, HER2-)       Malignant neoplasm of upper-outer quadrant of left breast in female, estrogen receptor negative   2016 Initial Diagnosis    Malignant neoplasm of nipple of left  breast in female     4/26/2022 -  Chemotherapy    Treatment Summary   Plan Name: OP PEMBROLIZUMAB WITH WEEKLY CARBOPLATIN (AUC 1.5)  AND PACLITAXEL FOLLOWED BY PEMBROLIZUMAB 200 MG Q3W  Treatment Goal: Curative  Status: Active  Start Date: 4/26/2022  End Date: 6/23/2023 (Planned)  Provider: Cindy Machado MD  Chemotherapy: CARBOplatin (PARAPLATIN) 125 mg in sodium chloride 0.9% 250 mL chemo infusion, 125 mg, Intravenous, Clinic/HOD 1 time, 4 of 4 cycles  Administration: 125 mg (4/26/2022), 125 mg (5/17/2022), 125 mg (5/3/2022), 125 mg (5/10/2022), 125 mg (5/24/2022), 125 mg (6/7/2022), 125 mg (5/31/2022), 125 mg (6/14/2022), 125 mg (6/28/2022), 125 mg (6/21/2022), 125 mg (7/5/2022), 125 mg (7/12/2022)  PACLitaxeL (TAXOL) 80 mg/m2 = 132 mg in sodium chloride 0.9% 250 mL chemo infusion, 80 mg/m2 = 132 mg, Intravenous, Clinic/HOD 1 time, 4 of 4 cycles  Dose modification: 80 mg/m2 (original dose 80 mg/m2, Cycle 2)  Administration: 132 mg (4/26/2022), 132 mg (5/3/2022), 132 mg (5/10/2022), 132 mg (5/17/2022), 132 mg (5/24/2022), 132 mg (6/7/2022), 132 mg (5/31/2022), 132 mg (6/14/2022), 132 mg (6/28/2022), 132 mg (6/21/2022), 132 mg (7/5/2022), 132 mg (7/12/2022)            Past Medical History:   Diagnosis Date    Allergy     Breast cancer 02/2016    left breast, neoadjuvant chemo, lumpectomy, and radiation    Breast cancer 04/2022    left breast 3:00 invasive ductal carcinoma    Bronchitis     COVID-19 08/2020    HLD (hyperlipidemia)     Hypertension     S/P chemotherapy, time since greater than 12 weeks     Skin cancer     resovled       Past Surgical History:   Procedure Laterality Date    ADENOIDECTOMY      BREAST BIOPSY Left 2016    BREAST BIOPSY Left 04/2022    left breast invasive ductal carcinoma    BREAST CYST EXCISION      BREAST LUMPECTOMY Left 2016    BREAST RECONSTRUCTION Bilateral 08/18/2022    Procedure: RECONSTRUCTION, BREAST;  Surgeon: Edi Alanis MD;  Location: [facility];  Service: Plastics;  Laterality:  Bilateral;    COLONOSCOPY N/A 06/21/2018    Procedure: COLONOSCOPY;  Surgeon: Hiro Billy Jr., MD;  Location: [facility];  Service: Endoscopy;  Laterality: N/A;    CYSTOCELE REPAIR      EXCISIONAL HEMORRHOIDECTOMY      HYSTERECTOMY      due to prolapse    INSERTION OF BREAST TISSUE EXPANDER Bilateral 08/18/2022    Procedure: INSERTION, TISSUE EXPANDER, BREAST;  Surgeon: Edi Alanis MD;  Location: [facility];  Service: Plastics;  Laterality: Bilateral;    INSERTION OF TUNNELED CENTRAL VENOUS CATHETER (CVC) WITH SUBCUTANEOUS PORT Right 04/22/2022    Procedure: [id number];  Surgeon: Haile Domingo MD;  Location: [facility];  Service: General;  Laterality: Right;    ORBITAL RECONSTRUCTION      PORTACATH PLACEMENT      since removed    REMOVAL OF VASCULAR ACCESS PORT      RHINOPLASTY TIP      SENTINEL LYMPH NODE BIOPSY Left 08/18/2022    Procedure: BIOPSY, LYMPH NODE, SENTINEL;  Surgeon: Padma Ruggiero MD;  Location: [facility];  Service: General;  Laterality: Left;    SIGMOIDOSCOPY      SIMPLE MASTECTOMY Bilateral 08/18/2022    Procedure: MASTECTOMY, SIMPLE;  Surgeon: Padma Ruggiero MD;  Location: [facility];  Service: General;  Laterality: Bilateral;    TISSUE EXPANDER REMOVAL Bilateral 11/3/2022    Procedure: REMOVAL, TISSUE EXPANDER;  Surgeon: Edi Alanis MD;  Location: [facility];  Service: Plastics;  Laterality: Bilateral;       Family History   Problem Relation Age of Onset    Breast cancer Mother 42    Cancer Mother     COPD Father     Colon cancer Sister     Cancer Sister     Breast cancer Sister        Social History     Socioeconomic History    Marital status:    Tobacco Use    Smoking status: Never    Smokeless tobacco: Never   Substance and Sexual Activity    Alcohol use: Yes     Alcohol/week: 2.0 standard drinks     Types: 2 Cans of beer per week     Comment: per  week    Drug use: No    Sexual activity: Yes     Partners: Male     Social Determinants of Health     Financial Resource  Strain: Low Risk     Difficulty of Paying Living Expenses: Not hard at all   Food Insecurity: No Food Insecurity    Worried About Running Out of Food in the Last Year: Never true    Ran Out of Food in the Last Year: Never true   Transportation Needs: No Transportation Needs    Lack of Transportation (Medical): No    Lack of Transportation (Non-Medical): No   Physical Activity: Insufficiently Active    Days of Exercise per Week: 1 day    Minutes of Exercise per Session: 20 min   Stress: No Stress Concern Present    Feeling of Stress : Only a little   Social Connections: Unknown    Frequency of Communication with Friends and Family: More than three times a week    Frequency of Social Gatherings with Friends and Family: More than three times a week    Active Member of Clubs or Organizations: No    Attends Club or Organization Meetings: Never    Marital Status:    Housing Stability: Low Risk     Unable to Pay for Housing in the Last Year: No    Number of Places Lived in the Last Year: 1    Unstable Housing in the Last Year: No       Review of patient's allergies indicates:  No Known Allergies    Review of Systems   Constitutional:  Negative for appetite change and unexpected weight change.   HENT:  Negative for mouth sores.    Eyes:  Negative for visual disturbance.   Respiratory:  Negative for cough and shortness of breath.    Cardiovascular:  Negative for chest pain.   Gastrointestinal:  Negative for abdominal pain and diarrhea.   Genitourinary:  Negative for frequency.   Musculoskeletal:  Negative for back pain.   Integumentary:  Positive for rash.   Neurological:  Negative for headaches.   Hematological:  Negative for adenopathy.   Psychiatric/Behavioral:  The patient is not nervous/anxious.           Objective:     Vitals:    01/06/23 1327   BP: 135/84   BP Location: Right arm   Patient Position: Sitting   BP Method: Medium (Automatic)   Pulse: 82   Resp: 16   Temp: 98.1 °F (36.7 °C)   TempSrc: Temporal    SpO2: 98%   Weight: 67.5 kg (148 lb 13 oz)   Height: 5' (1.524 m)        Physical Exam  Vitals reviewed.   Constitutional:       Appearance: Normal appearance.   HENT:      Head: Normocephalic and atraumatic.   Eyes:      General: No scleral icterus.     Pupils: Pupils are equal, round, and reactive to light.   Cardiovascular:      Rate and Rhythm: Normal rate and regular rhythm.      Pulses: Normal pulses.      Heart sounds: Normal heart sounds.   Pulmonary:      Effort: Pulmonary effort is normal.      Breath sounds: Normal breath sounds.   Chest:      Comments: Bilateral mastectomy, reconstruction, no lymphadenopathy appreciated   Abdominal:      General: Bowel sounds are normal. There is no distension.   Musculoskeletal:         General: No swelling or tenderness.   Lymphadenopathy:      Cervical: No cervical adenopathy.   Skin:     General: Skin is warm.      Findings: No rash.   Neurological:      General: No focal deficit present.      Mental Status: She is alert and oriented to person, place, and time.      Cranial Nerves: No cranial nerve deficit.      Motor: No weakness.   Psychiatric:         Mood and Affect: Mood normal.         Behavior: Behavior normal.              Current Outpatient Medications on File Prior to Visit   Medication Sig    acetaminophen (TYLENOL EX STR RAPID RELEASE ORAL) Take 1,000 mg by mouth every 8 (eight) hours.    acetaminophen (TYLENOL) 325 MG tablet Take 325 mg by mouth every 6 (six) hours as needed for Pain.    albuterol (PROAIR HFA) 90 mcg/actuation inhaler Inhale 2 puffs into the lungs every 6 (six) hours as needed for Wheezing. Rescue    B-complex with vitamin C (Z-BEC OR EQUIV) tablet Take 1 tablet by mouth once daily.    calcium carbonate-vitamin D3 600 mg-20 mcg (800 unit) Tab Take 2 tablets by mouth once daily.    cetirizine (ZYRTEC) 10 MG tablet Take 10 mg by mouth daily as needed.     chlorhexidine (PERIDEX) 0.12 % solution Use as directed swish and spit 15 mLs in  the mouth or throat 2 (two) times daily. for 14 days    clobetasol 0.05% (TEMOVATE) 0.05 % Oint Apply a small amount to affected area twice a day    ezetimibe (ZETIA) 10 mg tablet Take 1 tablet (10 mg total) by mouth once daily.    fish oil-omega-3 fatty acids 300-1,000 mg capsule Take 2 g by mouth daily as needed.     fluticasone propionate (FLONASE) 50 mcg/actuation nasal spray use 1 spray (50 mcg total) by Each Nostril route daily as needed.    fluticasone propionate (FLOVENT HFA) 44 mcg/actuation inhaler Inhale 1 puff into the lungs 2 (two) times daily. Controller    hydroCHLOROthiazide (HYDRODIURIL) 25 MG tablet Take 1 tablet (25 mg total) by mouth daily as needed.    LIDOcaine-prilocaine (EMLA) cream Apply topically as directed    mirabegron (MYRBETRIQ) 50 mg Tb24 Take 1 tablet (50 mg total) by mouth once daily.    multivitamin capsule Take 1 capsule by mouth once daily.    ondansetron (ZOFRAN-ODT) 8 MG TbDL 8 mg every 8 (eight) hours as needed.    rosuvastatin (CRESTOR) 10 MG tablet Take 1 tablet (10 mg total) by mouth every evening.     Current Facility-Administered Medications on File Prior to Visit   Medication    0.9%  NaCl infusion    HYDROmorphone injection 0.5 mg    LIDOcaine (PF) 10 mg/ml (1%) injection 10 mg    midazolam (VERSED) 1 mg/mL injection 2 mg    ondansetron injection 4 mg    prochlorperazine injection Soln 10 mg       CBC:  Lab Results   Component Value Date    WBC 6.16 01/06/2023    HGB 13.3 01/06/2023    HCT 40.6 01/06/2023    MCV 91 01/06/2023     01/06/2023         CMP:  Sodium   Date Value Ref Range Status   01/06/2023 142 136 - 145 mmol/L Final     Potassium   Date Value Ref Range Status   01/06/2023 4.5 3.5 - 5.1 mmol/L Final     Chloride   Date Value Ref Range Status   01/06/2023 104 95 - 110 mmol/L Final     CO2   Date Value Ref Range Status   01/06/2023 29 23 - 29 mmol/L Final     Glucose   Date Value Ref Range Status   01/06/2023 146 (H) 70 - 110 mg/dL Final     BUN   Date  Value Ref Range Status   01/06/2023 13 8 - 23 mg/dL Final     Creatinine   Date Value Ref Range Status   01/06/2023 0.8 0.5 - 1.4 mg/dL Final     Calcium   Date Value Ref Range Status   01/06/2023 10.2 8.7 - 10.5 mg/dL Final     Total Protein   Date Value Ref Range Status   01/06/2023 7.4 6.0 - 8.4 g/dL Final     Albumin   Date Value Ref Range Status   01/06/2023 4.1 3.5 - 5.2 g/dL Final     Total Bilirubin   Date Value Ref Range Status   01/06/2023 0.4 0.1 - 1.0 mg/dL Final     Comment:     For infants and newborns, interpretation of results should be based  on gestational age, weight and in agreement with clinical  observations.    Premature Infant recommended reference ranges:  Up to 24 hours.............<8.0 mg/dL  Up to 48 hours............<12.0 mg/dL  3-5 days..................<15.0 mg/dL  6-29 days.................<15.0 mg/dL       Alkaline Phosphatase   Date Value Ref Range Status   01/06/2023 69 55 - 135 U/L Final     AST   Date Value Ref Range Status   01/06/2023 19 10 - 40 U/L Final     ALT   Date Value Ref Range Status   01/06/2023 15 10 - 44 U/L Final     Anion Gap   Date Value Ref Range Status   01/06/2023 9 8 - 16 mmol/L Final     eGFR if    Date Value Ref Range Status   07/29/2022 >60 >60 mL/min/1.73 m^2 Final     eGFR if non    Date Value Ref Range Status   07/29/2022 >60 >60 mL/min/1.73 m^2 Final     Comment:     Calculation used to obtain the estimated glomerular filtration  rate (eGFR) is the CKD-EPI equation.          Echo  · The left ventricle is normal in size with normal systolic function.  · Grade I left ventricular diastolic dysfunction.  · The estimated PA systolic pressure is 12 mmHg.  · Normal right ventricular size with normal right ventricular systolic   function.  · Normal central venous pressure (3 mmHg).  · The estimated ejection fraction is 55%.  · Mild tricuspid regurgitation.          ECOG SCORE    1 - Restricted in strenuous activity-ambulatory  and able to carry out work of a light nature          Assessment/Plan:     1. Malignant neoplasm of upper-outer quadrant of left breast in female, estrogen receptor negative    2. Malignant neoplasm of nipple of left breast in female, estrogen receptor negative    3. Immunocompromised state due to drug therapy    4. Wound infection after surgery    5. Drug-induced thyroiditis    6. Osteopenia after menopause    7. Aortic atherosclerosis    8. Malignant neoplasm metastatic to lymph node of axilla      # History of locally advanced, hormone receptor positive/HER2 Lalitha negative left breast carcinoma without evidence of recurrent disease. Now with second primary, triple negative, clinical stage II B (T2, N0, M0), left breast carcinoma   - s/p bilateral mastectomy ypT1b,pN0 (sn),cM0 ; received only carbo/taxol/keytryda neoadjuvant   - now cont with adjuvant keytruda, will need to be on for at least 1 year from initial treatment   - discussion with patient since she has some breast cancer left from the surgical path ypT1b, she will need adjuvant capecitabine (with or after keytruda), patient would like to think about it   - proceed with infusion today, CBC/CMP/Mag/TSH prior to next cycle    #Status post bilateral mastectomy/placement of tissue expanders-complicated by postop infection.: following up with plastic surgery,stable       # Osteopenia: will get DEXA scan for baseline  # Mild hypercalcemia: tending down,cont monitoring blood work   # Aortic atherosclerosis: following up with cardiology, stable     Med Onc Chart Routing      Follow up with physician . Has follow up with Dr Foster   Follow up with ANGIE    Infusion scheduling note proceed with infusion today   Injection scheduling note    Labs    Imaging    Pharmacy appointment    Other referrals         Plan was discussed with the patient at length, and she  verbalized understanding.    Recommend exercise, nutrition and weight management and health maintenance  activities and follow-up with PCPs recommendations.    Signed:  Cindy Machado MD  Hematology and Oncology  [facility]  [facility] [place] [facility]

## 2023-01-11 ENCOUNTER — TELEPHONE (OUTPATIENT)
Dept: HEMATOLOGY/ONCOLOGY | Facility: CLINIC | Age: 73
End: 2023-01-11
Payer: MEDICARE

## 2023-01-11 NOTE — NURSING
Spoke to pt.  The sitter for her  has a scheduling conflict on 2/17/23.  Pt is asking if her infusion can be moved to 830am.  Spoke with Zuly in Infusion.  Appt moved to 830am.  Pt notified.  Asked her to call with any other questions or concerns.  Pt thanked me and verbalized understanding.

## 2023-01-12 PROBLEM — I70.0 AORTIC ATHEROSCLEROSIS: Status: ACTIVE | Noted: 2023-01-12

## 2023-01-26 ENCOUNTER — OFFICE VISIT (OUTPATIENT)
Dept: HEMATOLOGY/ONCOLOGY | Facility: CLINIC | Age: 73
End: 2023-01-26
Payer: MEDICARE

## 2023-01-26 ENCOUNTER — LAB VISIT (OUTPATIENT)
Dept: LAB | Facility: HOSPITAL | Age: 73
End: 2023-01-26
Attending: INTERNAL MEDICINE
Payer: MEDICARE

## 2023-01-26 ENCOUNTER — OFFICE VISIT (OUTPATIENT)
Dept: FAMILY MEDICINE | Facility: CLINIC | Age: 73
End: 2023-01-26
Payer: MEDICARE

## 2023-01-26 ENCOUNTER — INFUSION (OUTPATIENT)
Dept: INFUSION THERAPY | Facility: HOSPITAL | Age: 73
End: 2023-01-26
Attending: INTERNAL MEDICINE
Payer: MEDICARE

## 2023-01-26 VITALS
SYSTOLIC BLOOD PRESSURE: 165 MMHG | BODY MASS INDEX: 28.96 KG/M2 | RESPIRATION RATE: 18 BRPM | HEIGHT: 60 IN | TEMPERATURE: 99 F | DIASTOLIC BLOOD PRESSURE: 100 MMHG | WEIGHT: 147.5 LBS | OXYGEN SATURATION: 98 % | HEART RATE: 80 BPM

## 2023-01-26 VITALS
SYSTOLIC BLOOD PRESSURE: 146 MMHG | DIASTOLIC BLOOD PRESSURE: 77 MMHG | WEIGHT: 149.13 LBS | RESPIRATION RATE: 14 BRPM | HEIGHT: 60 IN | OXYGEN SATURATION: 97 % | BODY MASS INDEX: 29.28 KG/M2 | HEART RATE: 87 BPM

## 2023-01-26 VITALS
DIASTOLIC BLOOD PRESSURE: 77 MMHG | BODY MASS INDEX: 28.96 KG/M2 | HEART RATE: 74 BPM | RESPIRATION RATE: 18 BRPM | SYSTOLIC BLOOD PRESSURE: 146 MMHG | TEMPERATURE: 99 F | OXYGEN SATURATION: 98 % | HEIGHT: 60 IN | WEIGHT: 147.5 LBS

## 2023-01-26 DIAGNOSIS — I70.0 AORTIC ATHEROSCLEROSIS: ICD-10-CM

## 2023-01-26 DIAGNOSIS — Z17.1 MALIGNANT NEOPLASM OF UPPER-OUTER QUADRANT OF LEFT BREAST IN FEMALE, ESTROGEN RECEPTOR NEGATIVE: ICD-10-CM

## 2023-01-26 DIAGNOSIS — C77.3 MALIGNANT NEOPLASM METASTATIC TO LYMPH NODE OF AXILLA: ICD-10-CM

## 2023-01-26 DIAGNOSIS — R53.83 FATIGUE: ICD-10-CM

## 2023-01-26 DIAGNOSIS — I10 ESSENTIAL HYPERTENSION: Primary | ICD-10-CM

## 2023-01-26 DIAGNOSIS — D89.89 OTHER SPECIFIED DISORDERS INVOLVING THE IMMUNE MECHANISM, NOT ELSEWHERE CLASSIFIED: ICD-10-CM

## 2023-01-26 DIAGNOSIS — C50.012 MALIGNANT NEOPLASM OF NIPPLE OF LEFT BREAST IN FEMALE, ESTROGEN RECEPTOR NEGATIVE: Primary | ICD-10-CM

## 2023-01-26 DIAGNOSIS — F41.9 ANXIETY: ICD-10-CM

## 2023-01-26 DIAGNOSIS — C50.012 MALIGNANT NEOPLASM OF NIPPLE OF LEFT BREAST IN FEMALE, UNSPECIFIED ESTROGEN RECEPTOR STATUS: ICD-10-CM

## 2023-01-26 DIAGNOSIS — C50.412 MALIGNANT NEOPLASM OF UPPER-OUTER QUADRANT OF LEFT BREAST IN FEMALE, ESTROGEN RECEPTOR NEGATIVE: ICD-10-CM

## 2023-01-26 DIAGNOSIS — Z78.0 OSTEOPENIA AFTER MENOPAUSE: ICD-10-CM

## 2023-01-26 DIAGNOSIS — Z17.1 MALIGNANT NEOPLASM OF NIPPLE OF LEFT BREAST IN FEMALE, ESTROGEN RECEPTOR NEGATIVE: Primary | ICD-10-CM

## 2023-01-26 DIAGNOSIS — E61.1 IRON DEFICIENCY: ICD-10-CM

## 2023-01-26 DIAGNOSIS — Z79.899 HIGH RISK MEDICATIONS (NOT ANTICOAGULANTS) LONG-TERM USE: ICD-10-CM

## 2023-01-26 DIAGNOSIS — M85.80 OSTEOPENIA AFTER MENOPAUSE: ICD-10-CM

## 2023-01-26 LAB
ALBUMIN SERPL BCP-MCNC: 4.1 G/DL (ref 3.5–5.2)
ALP SERPL-CCNC: 57 U/L (ref 55–135)
ALT SERPL W/O P-5'-P-CCNC: 13 U/L (ref 10–44)
ANION GAP SERPL CALC-SCNC: 11 MMOL/L (ref 8–16)
AST SERPL-CCNC: 15 U/L (ref 10–40)
BASOPHILS # BLD AUTO: 0.04 K/UL (ref 0–0.2)
BASOPHILS NFR BLD: 0.6 % (ref 0–1.9)
BILIRUB SERPL-MCNC: 0.3 MG/DL (ref 0.1–1)
BUN SERPL-MCNC: 20 MG/DL (ref 8–23)
CALCIUM SERPL-MCNC: 9.6 MG/DL (ref 8.7–10.5)
CHLORIDE SERPL-SCNC: 105 MMOL/L (ref 95–110)
CO2 SERPL-SCNC: 25 MMOL/L (ref 23–29)
CREAT SERPL-MCNC: 0.8 MG/DL (ref 0.5–1.4)
DIFFERENTIAL METHOD: NORMAL
EOSINOPHIL # BLD AUTO: 0 K/UL (ref 0–0.5)
EOSINOPHIL NFR BLD: 0.4 % (ref 0–8)
ERYTHROCYTE [DISTWIDTH] IN BLOOD BY AUTOMATED COUNT: 14.1 % (ref 11.5–14.5)
EST. GFR  (NO RACE VARIABLE): >60 ML/MIN/1.73 M^2
GLUCOSE SERPL-MCNC: 96 MG/DL (ref 70–110)
HCT VFR BLD AUTO: 40 % (ref 37–48.5)
HGB BLD-MCNC: 13.3 G/DL (ref 12–16)
IMM GRANULOCYTES # BLD AUTO: 0.02 K/UL (ref 0–0.04)
IMM GRANULOCYTES NFR BLD AUTO: 0.3 % (ref 0–0.5)
LYMPHOCYTES # BLD AUTO: 1.8 K/UL (ref 1–4.8)
LYMPHOCYTES NFR BLD: 25.1 % (ref 18–48)
MAGNESIUM SERPL-MCNC: 2.2 MG/DL (ref 1.6–2.6)
MCH RBC QN AUTO: 30.4 PG (ref 27–31)
MCHC RBC AUTO-ENTMCNC: 33.3 G/DL (ref 32–36)
MCV RBC AUTO: 91 FL (ref 82–98)
MONOCYTES # BLD AUTO: 0.5 K/UL (ref 0.3–1)
MONOCYTES NFR BLD: 6.7 % (ref 4–15)
NEUTROPHILS # BLD AUTO: 4.7 K/UL (ref 1.8–7.7)
NEUTROPHILS NFR BLD: 66.9 % (ref 38–73)
NRBC BLD-RTO: 0 /100 WBC
PLATELET # BLD AUTO: 237 K/UL (ref 150–450)
PMV BLD AUTO: 9.7 FL (ref 9.2–12.9)
POTASSIUM SERPL-SCNC: 4.2 MMOL/L (ref 3.5–5.1)
PROT SERPL-MCNC: 7.3 G/DL (ref 6–8.4)
RBC # BLD AUTO: 4.38 M/UL (ref 4–5.4)
SODIUM SERPL-SCNC: 141 MMOL/L (ref 136–145)
WBC # BLD AUTO: 7.02 K/UL (ref 3.9–12.7)

## 2023-01-26 PROCEDURE — 1101F PR PT FALLS ASSESS DOC 0-1 FALLS W/OUT INJ PAST YR: ICD-10-PCS | Mod: CPTII,S$GLB,, | Performed by: FAMILY MEDICINE

## 2023-01-26 PROCEDURE — 3080F DIAST BP >= 90 MM HG: CPT | Mod: CPTII,S$GLB,, | Performed by: INTERNAL MEDICINE

## 2023-01-26 PROCEDURE — 1126F PR PAIN SEVERITY QUANTIFIED, NO PAIN PRESENT: ICD-10-PCS | Mod: CPTII,S$GLB,, | Performed by: FAMILY MEDICINE

## 2023-01-26 PROCEDURE — 99215 OFFICE O/P EST HI 40 MIN: CPT | Mod: S$GLB,,, | Performed by: INTERNAL MEDICINE

## 2023-01-26 PROCEDURE — 96413 CHEMO IV INFUSION 1 HR: CPT | Mod: PN

## 2023-01-26 PROCEDURE — 1159F PR MEDICATION LIST DOCUMENTED IN MEDICAL RECORD: ICD-10-PCS | Mod: CPTII,S$GLB,, | Performed by: FAMILY MEDICINE

## 2023-01-26 PROCEDURE — 3288F PR FALLS RISK ASSESSMENT DOCUMENTED: ICD-10-PCS | Mod: CPTII,S$GLB,, | Performed by: INTERNAL MEDICINE

## 2023-01-26 PROCEDURE — 84443 ASSAY THYROID STIM HORMONE: CPT | Performed by: STUDENT IN AN ORGANIZED HEALTH CARE EDUCATION/TRAINING PROGRAM

## 2023-01-26 PROCEDURE — 3077F PR MOST RECENT SYSTOLIC BLOOD PRESSURE >= 140 MM HG: ICD-10-PCS | Mod: CPTII,S$GLB,, | Performed by: FAMILY MEDICINE

## 2023-01-26 PROCEDURE — 99999 PR PBB SHADOW E&M-EST. PATIENT-LVL IV: ICD-10-PCS | Mod: PBBFAC,,, | Performed by: FAMILY MEDICINE

## 2023-01-26 PROCEDURE — 80053 COMPREHEN METABOLIC PANEL: CPT | Mod: PN | Performed by: STUDENT IN AN ORGANIZED HEALTH CARE EDUCATION/TRAINING PROGRAM

## 2023-01-26 PROCEDURE — 3078F DIAST BP <80 MM HG: CPT | Mod: CPTII,S$GLB,, | Performed by: FAMILY MEDICINE

## 2023-01-26 PROCEDURE — 1160F RVW MEDS BY RX/DR IN RCRD: CPT | Mod: CPTII,S$GLB,, | Performed by: FAMILY MEDICINE

## 2023-01-26 PROCEDURE — 83735 ASSAY OF MAGNESIUM: CPT | Mod: PN | Performed by: STUDENT IN AN ORGANIZED HEALTH CARE EDUCATION/TRAINING PROGRAM

## 2023-01-26 PROCEDURE — 99214 PR OFFICE/OUTPT VISIT, EST, LEVL IV, 30-39 MIN: ICD-10-PCS | Mod: S$GLB,,, | Performed by: FAMILY MEDICINE

## 2023-01-26 PROCEDURE — 85025 COMPLETE CBC W/AUTO DIFF WBC: CPT | Mod: PN | Performed by: STUDENT IN AN ORGANIZED HEALTH CARE EDUCATION/TRAINING PROGRAM

## 2023-01-26 PROCEDURE — 3078F PR MOST RECENT DIASTOLIC BLOOD PRESSURE < 80 MM HG: ICD-10-PCS | Mod: CPTII,S$GLB,, | Performed by: FAMILY MEDICINE

## 2023-01-26 PROCEDURE — 36415 COLL VENOUS BLD VENIPUNCTURE: CPT | Mod: PN | Performed by: STUDENT IN AN ORGANIZED HEALTH CARE EDUCATION/TRAINING PROGRAM

## 2023-01-26 PROCEDURE — 99215 PR OFFICE/OUTPT VISIT, EST, LEVL V, 40-54 MIN: ICD-10-PCS | Mod: S$GLB,,, | Performed by: INTERNAL MEDICINE

## 2023-01-26 PROCEDURE — 1126F AMNT PAIN NOTED NONE PRSNT: CPT | Mod: CPTII,S$GLB,, | Performed by: FAMILY MEDICINE

## 2023-01-26 PROCEDURE — 1160F PR REVIEW ALL MEDS BY PRESCRIBER/CLIN PHARMACIST DOCUMENTED: ICD-10-PCS | Mod: CPTII,S$GLB,, | Performed by: FAMILY MEDICINE

## 2023-01-26 PROCEDURE — 25000003 PHARM REV CODE 250: Mod: PN | Performed by: INTERNAL MEDICINE

## 2023-01-26 PROCEDURE — 3077F PR MOST RECENT SYSTOLIC BLOOD PRESSURE >= 140 MM HG: ICD-10-PCS | Mod: CPTII,S$GLB,, | Performed by: INTERNAL MEDICINE

## 2023-01-26 PROCEDURE — 3288F PR FALLS RISK ASSESSMENT DOCUMENTED: ICD-10-PCS | Mod: CPTII,S$GLB,, | Performed by: FAMILY MEDICINE

## 2023-01-26 PROCEDURE — 3008F PR BODY MASS INDEX (BMI) DOCUMENTED: ICD-10-PCS | Mod: CPTII,S$GLB,, | Performed by: FAMILY MEDICINE

## 2023-01-26 PROCEDURE — 1126F PR PAIN SEVERITY QUANTIFIED, NO PAIN PRESENT: ICD-10-PCS | Mod: CPTII,S$GLB,, | Performed by: INTERNAL MEDICINE

## 2023-01-26 PROCEDURE — 63600175 PHARM REV CODE 636 W HCPCS: Mod: JG,PN | Performed by: INTERNAL MEDICINE

## 2023-01-26 PROCEDURE — 3077F SYST BP >= 140 MM HG: CPT | Mod: CPTII,S$GLB,, | Performed by: FAMILY MEDICINE

## 2023-01-26 PROCEDURE — 1101F PT FALLS ASSESS-DOCD LE1/YR: CPT | Mod: CPTII,S$GLB,, | Performed by: INTERNAL MEDICINE

## 2023-01-26 PROCEDURE — 1101F PT FALLS ASSESS-DOCD LE1/YR: CPT | Mod: CPTII,S$GLB,, | Performed by: FAMILY MEDICINE

## 2023-01-26 PROCEDURE — 99999 PR PBB SHADOW E&M-EST. PATIENT-LVL IV: ICD-10-PCS | Mod: PBBFAC,,, | Performed by: INTERNAL MEDICINE

## 2023-01-26 PROCEDURE — 99999 PR PBB SHADOW E&M-EST. PATIENT-LVL IV: CPT | Mod: PBBFAC,,, | Performed by: INTERNAL MEDICINE

## 2023-01-26 PROCEDURE — 1126F AMNT PAIN NOTED NONE PRSNT: CPT | Mod: CPTII,S$GLB,, | Performed by: INTERNAL MEDICINE

## 2023-01-26 PROCEDURE — 1159F MED LIST DOCD IN RCRD: CPT | Mod: CPTII,S$GLB,, | Performed by: FAMILY MEDICINE

## 2023-01-26 PROCEDURE — 3288F FALL RISK ASSESSMENT DOCD: CPT | Mod: CPTII,S$GLB,, | Performed by: FAMILY MEDICINE

## 2023-01-26 PROCEDURE — 3077F SYST BP >= 140 MM HG: CPT | Mod: CPTII,S$GLB,, | Performed by: INTERNAL MEDICINE

## 2023-01-26 PROCEDURE — 96367 TX/PROPH/DG ADDL SEQ IV INF: CPT | Mod: PN

## 2023-01-26 PROCEDURE — 1101F PR PT FALLS ASSESS DOC 0-1 FALLS W/OUT INJ PAST YR: ICD-10-PCS | Mod: CPTII,S$GLB,, | Performed by: INTERNAL MEDICINE

## 2023-01-26 PROCEDURE — 99214 OFFICE O/P EST MOD 30 MIN: CPT | Mod: S$GLB,,, | Performed by: FAMILY MEDICINE

## 2023-01-26 PROCEDURE — 3008F BODY MASS INDEX DOCD: CPT | Mod: CPTII,S$GLB,, | Performed by: INTERNAL MEDICINE

## 2023-01-26 PROCEDURE — 99999 PR PBB SHADOW E&M-EST. PATIENT-LVL IV: CPT | Mod: PBBFAC,,, | Performed by: FAMILY MEDICINE

## 2023-01-26 PROCEDURE — 3008F BODY MASS INDEX DOCD: CPT | Mod: CPTII,S$GLB,, | Performed by: FAMILY MEDICINE

## 2023-01-26 PROCEDURE — 3288F FALL RISK ASSESSMENT DOCD: CPT | Mod: CPTII,S$GLB,, | Performed by: INTERNAL MEDICINE

## 2023-01-26 PROCEDURE — 3008F PR BODY MASS INDEX (BMI) DOCUMENTED: ICD-10-PCS | Mod: CPTII,S$GLB,, | Performed by: INTERNAL MEDICINE

## 2023-01-26 PROCEDURE — 3080F PR MOST RECENT DIASTOLIC BLOOD PRESSURE >= 90 MM HG: ICD-10-PCS | Mod: CPTII,S$GLB,, | Performed by: INTERNAL MEDICINE

## 2023-01-26 RX ORDER — DIPHENHYDRAMINE HYDROCHLORIDE 50 MG/ML
50 INJECTION INTRAMUSCULAR; INTRAVENOUS ONCE AS NEEDED
Status: CANCELLED | OUTPATIENT
Start: 2023-01-27

## 2023-01-26 RX ORDER — EPINEPHRINE 0.3 MG/.3ML
0.3 INJECTION SUBCUTANEOUS ONCE AS NEEDED
Status: DISCONTINUED | OUTPATIENT
Start: 2023-01-26 | End: 2023-01-26 | Stop reason: HOSPADM

## 2023-01-26 RX ORDER — ACETAMINOPHEN 500 MG
1000 TABLET ORAL
Status: CANCELLED
Start: 2023-01-27

## 2023-01-26 RX ORDER — EPINEPHRINE 0.3 MG/.3ML
0.3 INJECTION SUBCUTANEOUS ONCE AS NEEDED
Status: CANCELLED | OUTPATIENT
Start: 2023-01-27

## 2023-01-26 RX ORDER — ACETAMINOPHEN 500 MG
1000 TABLET ORAL
Status: COMPLETED | OUTPATIENT
Start: 2023-01-26 | End: 2023-01-26

## 2023-01-26 RX ORDER — DIPHENHYDRAMINE HYDROCHLORIDE 50 MG/ML
50 INJECTION INTRAMUSCULAR; INTRAVENOUS ONCE AS NEEDED
Status: DISCONTINUED | OUTPATIENT
Start: 2023-01-26 | End: 2023-01-26 | Stop reason: HOSPADM

## 2023-01-26 RX ORDER — HEPARIN 100 UNIT/ML
500 SYRINGE INTRAVENOUS
Status: DISCONTINUED | OUTPATIENT
Start: 2023-01-26 | End: 2023-01-26 | Stop reason: HOSPADM

## 2023-01-26 RX ORDER — SODIUM CHLORIDE 0.9 % (FLUSH) 0.9 %
10 SYRINGE (ML) INJECTION
Status: CANCELLED | OUTPATIENT
Start: 2023-01-27

## 2023-01-26 RX ORDER — HEPARIN 100 UNIT/ML
500 SYRINGE INTRAVENOUS
Status: CANCELLED | OUTPATIENT
Start: 2023-01-27

## 2023-01-26 RX ORDER — SODIUM CHLORIDE 0.9 % (FLUSH) 0.9 %
10 SYRINGE (ML) INJECTION
Status: DISCONTINUED | OUTPATIENT
Start: 2023-01-26 | End: 2023-01-26 | Stop reason: HOSPADM

## 2023-01-26 RX ADMIN — ACETAMINOPHEN 1000 MG: 500 TABLET ORAL at 12:01

## 2023-01-26 RX ADMIN — DIPHENHYDRAMINE HYDROCHLORIDE 25 MG: 50 INJECTION, SOLUTION INTRAMUSCULAR; INTRAVENOUS at 12:01

## 2023-01-26 RX ADMIN — SODIUM CHLORIDE 200 MG: 9 INJECTION, SOLUTION INTRAVENOUS at 12:01

## 2023-01-26 RX ADMIN — SODIUM CHLORIDE: 9 INJECTION, SOLUTION INTRAVENOUS at 12:01

## 2023-01-26 NOTE — PROGRESS NOTES
Subjective:     Name: Zayra Rodgers  : 1950  MRN: [id number]    Chief Complaint   Patient presents with    Breast Cancer      HPI: Zayra Rodgers is a 72 y.o. female presents for evaluation of Breast Cancer    Previously seen Dr Bartlett; had previously ER/WA+ IDC s/p neoadjuvant chemo AC/T s/p lumpectomy followed by adjuvant radiation and adjuvant endocrine therapy; anastrozole. Was placed on annual follow up in 2022; pain at left breast leading to ultrasound,MRI, biopsies ER/WA negative, HER-2 negative KI 67% 80%, leading to  neoadjuvant therapy consisting of carboplatin/Taxol/Keytruda.  She has undergone bilateral mastectomy/lymph node sampling 2022; ypT1n (m),ypN0 (sn),cM0 (PET/CT in 2022 negative). Had complication post -op  by infection/seroma formation involving both tissue expanders.  Drains had to be replaced.  Patient has completed all of her antibiotics.  Surgical drains have now been removed.    She has initiated adjuvant Keytruda 10/14/22.        Interim history:  Establishing care with me today.   She had initial cancer in , lumpectomy, adjuvant chemotherapy,  adjuvant RT and endocrine therapy.   Had a new primary in the same breast in April. See above history  She presents to clinic today to receive clearance for Pembrolizumab.     Tolerating Keytruda well.   Discussed adjuvant capecitabine.     Oncology History   Malignant neoplasm of nipple of left breast in female   3/14/2016 Initial Diagnosis    Breast cancer     2022 Cancer Staged    Staging form: Breast, AJCC 8th Edition  - Clinical stage from 2022: Stage IIB (cT2, cN0, cM0, G2, ER-, WA-, HER2-)       2022 Cancer Staged    Staging form: Breast, AJCC 8th Edition  - Pathologic stage from 2022: No Stage Recommended (ypT1b, pN0(sn), cM0, G3, ER-, WA-, HER2-)       Malignant neoplasm of upper-outer quadrant of left breast in female, estrogen receptor negative   2016 Initial Diagnosis    Malignant  neoplasm of nipple of left breast in female     4/26/2022 -  Chemotherapy    Treatment Summary   Plan Name: OP PEMBROLIZUMAB WITH WEEKLY CARBOPLATIN (AUC 1.5)  AND PACLITAXEL FOLLOWED BY PEMBROLIZUMAB 200 MG Q3W  Treatment Goal: Curative  Status: Active  Start Date: 4/26/2022  End Date: 6/23/2023 (Planned)  Provider: Cindy Machado MD  Chemotherapy: CARBOplatin (PARAPLATIN) 125 mg in sodium chloride 0.9% 250 mL chemo infusion, 125 mg, Intravenous, Clinic/HOD 1 time, 4 of 4 cycles  Administration: 125 mg (4/26/2022), 125 mg (5/17/2022), 125 mg (5/3/2022), 125 mg (5/10/2022), 125 mg (5/24/2022), 125 mg (6/7/2022), 125 mg (5/31/2022), 125 mg (6/14/2022), 125 mg (6/28/2022), 125 mg (6/21/2022), 125 mg (7/5/2022), 125 mg (7/12/2022)  PACLitaxeL (TAXOL) 80 mg/m2 = 132 mg in sodium chloride 0.9% 250 mL chemo infusion, 80 mg/m2 = 132 mg, Intravenous, Clinic/HOD 1 time, 4 of 4 cycles  Dose modification: 80 mg/m2 (original dose 80 mg/m2, Cycle 2)  Administration: 132 mg (4/26/2022), 132 mg (5/3/2022), 132 mg (5/10/2022), 132 mg (5/17/2022), 132 mg (5/24/2022), 132 mg (6/7/2022), 132 mg (5/31/2022), 132 mg (6/14/2022), 132 mg (6/28/2022), 132 mg (6/21/2022), 132 mg (7/5/2022), 132 mg (7/12/2022)            Past Medical History:   Diagnosis Date    Allergy     Breast cancer 02/2016    left breast, neoadjuvant chemo, lumpectomy, and radiation    Breast cancer 04/2022    left breast 3:00 invasive ductal carcinoma    Bronchitis     COVID-19 08/2020    HLD (hyperlipidemia)     Hypertension     S/P chemotherapy, time since greater than 12 weeks     Skin cancer     resovled       Past Surgical History:   Procedure Laterality Date    ADENOIDECTOMY      BREAST BIOPSY Left 2016    BREAST BIOPSY Left 04/2022    left breast invasive ductal carcinoma    BREAST CYST EXCISION      BREAST LUMPECTOMY Left 2016    BREAST RECONSTRUCTION Bilateral 08/18/2022    Procedure: RECONSTRUCTION, BREAST;  Surgeon: Edi Alanis MD;  Location: [facility];   Service: Plastics;  Laterality: Bilateral;    COLONOSCOPY N/A 06/21/2018    Procedure: COLONOSCOPY;  Surgeon: Hiro Billy Jr., MD;  Location: [facility];  Service: Endoscopy;  Laterality: N/A;    CYSTOCELE REPAIR      EXCISIONAL HEMORRHOIDECTOMY      HYSTERECTOMY      due to prolapse    INSERTION OF BREAST TISSUE EXPANDER Bilateral 08/18/2022    Procedure: INSERTION, TISSUE EXPANDER, BREAST;  Surgeon: Edi Alanis MD;  Location: [facility];  Service: Plastics;  Laterality: Bilateral;    INSERTION OF TUNNELED CENTRAL VENOUS CATHETER (CVC) WITH SUBCUTANEOUS PORT Right 04/22/2022    Procedure: [id number];  Surgeon: Haile Domingo MD;  Location: [facility];  Service: General;  Laterality: Right;    ORBITAL RECONSTRUCTION      PORTACATH PLACEMENT      since removed    REMOVAL OF VASCULAR ACCESS PORT      RHINOPLASTY TIP      SENTINEL LYMPH NODE BIOPSY Left 08/18/2022    Procedure: BIOPSY, LYMPH NODE, SENTINEL;  Surgeon: Padma Ruggiero MD;  Location: [facility];  Service: General;  Laterality: Left;    SIGMOIDOSCOPY      SIMPLE MASTECTOMY Bilateral 08/18/2022    Procedure: MASTECTOMY, SIMPLE;  Surgeon: Padma Ruggiero MD;  Location: [facility];  Service: General;  Laterality: Bilateral;    TISSUE EXPANDER REMOVAL Bilateral 11/3/2022    Procedure: REMOVAL, TISSUE EXPANDER;  Surgeon: Edi Alanis MD;  Location: [facility];  Service: Plastics;  Laterality: Bilateral;       Family History   Problem Relation Age of Onset    Breast cancer Mother 42    Cancer Mother     COPD Father     Colon cancer Sister     Cancer Sister     Breast cancer Sister        Social History     Socioeconomic History    Marital status:    Tobacco Use    Smoking status: Never    Smokeless tobacco: Never   Substance and Sexual Activity    Alcohol use: Yes     Alcohol/week: 2.0 standard drinks     Types: 2 Cans of beer per week     Comment: per  week    Drug use: No    Sexual activity: Yes     Partners: Male     Social Determinants of  Health     Financial Resource Strain: Low Risk     Difficulty of Paying Living Expenses: Not hard at all   Food Insecurity: No Food Insecurity    Worried About Running Out of Food in the Last Year: Never true    Ran Out of Food in the Last Year: Never true   Transportation Needs: No Transportation Needs    Lack of Transportation (Medical): No    Lack of Transportation (Non-Medical): No   Physical Activity: Insufficiently Active    Days of Exercise per Week: 2 days    Minutes of Exercise per Session: 10 min   Stress: No Stress Concern Present    Feeling of Stress : Only a little   Social Connections: Unknown    Frequency of Communication with Friends and Family: More than three times a week    Frequency of Social Gatherings with Friends and Family: More than three times a week    Active Member of Clubs or Organizations: No    Attends Club or Organization Meetings: Never    Marital Status:    Housing Stability: Low Risk     Unable to Pay for Housing in the Last Year: No    Number of Places Lived in the Last Year: 1    Unstable Housing in the Last Year: No       Review of patient's allergies indicates:  No Known Allergies    Review of Systems   Constitutional:  Negative for appetite change and unexpected weight change.   HENT:  Negative for mouth sores.    Eyes:  Negative for visual disturbance.   Respiratory:  Negative for cough and shortness of breath.    Cardiovascular:  Negative for chest pain.   Gastrointestinal:  Negative for abdominal pain and diarrhea.   Genitourinary:  Negative for frequency.   Musculoskeletal:  Negative for back pain.   Neurological:  Negative for headaches.   Hematological:  Negative for adenopathy.   Psychiatric/Behavioral:  The patient is not nervous/anxious.           Objective:     Vitals:    01/26/23 1042   BP: (!) 165/100   BP Location: Right arm   Patient Position: Sitting   BP Method: Medium (Automatic)   Pulse: 80   Resp: 18   Temp: 98.7 °F (37.1 °C)   TempSrc: Temporal    SpO2: 98%   Weight: 66.9 kg (147 lb 7.8 oz)   Height: 5' (1.524 m)          Physical Exam  Vitals reviewed.   Constitutional:       Appearance: Normal appearance.   HENT:      Head: Normocephalic and atraumatic.   Eyes:      General: No scleral icterus.     Pupils: Pupils are equal, round, and reactive to light.   Cardiovascular:      Rate and Rhythm: Normal rate and regular rhythm.      Pulses: Normal pulses.      Heart sounds: Normal heart sounds.   Pulmonary:      Effort: Pulmonary effort is normal.      Breath sounds: Normal breath sounds.   Chest:      Comments: Bilateral mastectomy, reconstruction, no lymphadenopathy appreciated   Abdominal:      General: Bowel sounds are normal. There is no distension.   Musculoskeletal:         General: No swelling or tenderness.   Lymphadenopathy:      Cervical: No cervical adenopathy.   Skin:     General: Skin is warm.      Findings: No rash.   Neurological:      General: No focal deficit present.      Mental Status: She is alert and oriented to person, place, and time.      Cranial Nerves: No cranial nerve deficit.      Motor: No weakness.   Psychiatric:         Mood and Affect: Mood normal.         Behavior: Behavior normal.              Current Outpatient Medications on File Prior to Visit   Medication Sig    acetaminophen (TYLENOL EX STR RAPID RELEASE ORAL) Take 1,000 mg by mouth every 8 (eight) hours.    albuterol (PROAIR HFA) 90 mcg/actuation inhaler Inhale 2 puffs into the lungs every 6 (six) hours as needed for Wheezing. Rescue    B-complex with vitamin C (Z-BEC OR EQUIV) tablet Take 1 tablet by mouth once a week.    calcium carbonate-vitamin D3 600 mg-20 mcg (800 unit) Tab Take 2 tablets by mouth once daily.    cetirizine (ZYRTEC) 10 MG tablet Take 10 mg by mouth daily as needed.     clobetasol 0.05% (TEMOVATE) 0.05 % Oint Apply a small amount to affected area twice a day    ezetimibe (ZETIA) 10 mg tablet Take 1 tablet (10 mg total) by mouth once daily.    fish  oil-omega-3 fatty acids 300-1,000 mg capsule Take 2 g by mouth daily as needed.     fluticasone propionate (FLONASE) 50 mcg/actuation nasal spray use 1 spray (50 mcg total) by Each Nostril route daily as needed.    fluticasone propionate (FLOVENT HFA) 44 mcg/actuation inhaler Inhale 1 puff into the lungs 2 (two) times daily. Controller    hydroCHLOROthiazide (HYDRODIURIL) 25 MG tablet Take 1 tablet (25 mg total) by mouth daily as needed.    LIDOcaine-prilocaine (EMLA) cream Apply topically as directed    mirabegron (MYRBETRIQ) 50 mg Tb24 Take 1 tablet (50 mg total) by mouth once daily.    multivitamin capsule Take 1 capsule by mouth once daily.    ondansetron (ZOFRAN-ODT) 8 MG TbDL 8 mg every 8 (eight) hours as needed.    rosuvastatin (CRESTOR) 10 MG tablet Take 1 tablet (10 mg total) by mouth every evening.    triamcinolone acetonide 0.1% (KENALOG) 0.1 % cream Apply topically 2 (two) times daily.     Current Facility-Administered Medications on File Prior to Visit   Medication    0.9%  NaCl infusion    HYDROmorphone injection 0.5 mg    LIDOcaine (PF) 10 mg/ml (1%) injection 10 mg    midazolam (VERSED) 1 mg/mL injection 2 mg    ondansetron injection 4 mg    prochlorperazine injection Soln 10 mg       CBC:  Lab Results   Component Value Date    WBC 7.02 01/26/2023    HGB 13.3 01/26/2023    HCT 40.0 01/26/2023    MCV 91 01/26/2023     01/26/2023         CMP:  Sodium   Date Value Ref Range Status   01/26/2023 141 136 - 145 mmol/L Final     Potassium   Date Value Ref Range Status   01/26/2023 4.2 3.5 - 5.1 mmol/L Final     Chloride   Date Value Ref Range Status   01/26/2023 105 95 - 110 mmol/L Final     CO2   Date Value Ref Range Status   01/26/2023 25 23 - 29 mmol/L Final     Glucose   Date Value Ref Range Status   01/26/2023 96 70 - 110 mg/dL Final     BUN   Date Value Ref Range Status   01/26/2023 20 8 - 23 mg/dL Final     Creatinine   Date Value Ref Range Status   01/26/2023 0.8 0.5 - 1.4 mg/dL Final      Calcium   Date Value Ref Range Status   01/26/2023 9.6 8.7 - 10.5 mg/dL Final     Total Protein   Date Value Ref Range Status   01/26/2023 7.3 6.0 - 8.4 g/dL Final     Albumin   Date Value Ref Range Status   01/26/2023 4.1 3.5 - 5.2 g/dL Final     Total Bilirubin   Date Value Ref Range Status   01/26/2023 0.3 0.1 - 1.0 mg/dL Final     Comment:     For infants and newborns, interpretation of results should be based  on gestational age, weight and in agreement with clinical  observations.    Premature Infant recommended reference ranges:  Up to 24 hours.............<8.0 mg/dL  Up to 48 hours............<12.0 mg/dL  3-5 days..................<15.0 mg/dL  6-29 days.................<15.0 mg/dL       Alkaline Phosphatase   Date Value Ref Range Status   01/26/2023 57 55 - 135 U/L Final     AST   Date Value Ref Range Status   01/26/2023 15 10 - 40 U/L Final     ALT   Date Value Ref Range Status   01/26/2023 13 10 - 44 U/L Final     Anion Gap   Date Value Ref Range Status   01/26/2023 11 8 - 16 mmol/L Final     eGFR if    Date Value Ref Range Status   07/29/2022 >60 >60 mL/min/1.73 m^2 Final     eGFR if non    Date Value Ref Range Status   07/29/2022 >60 >60 mL/min/1.73 m^2 Final     Comment:     Calculation used to obtain the estimated glomerular filtration  rate (eGFR) is the CKD-EPI equation.          Echo  · The left ventricle is normal in size with normal systolic function.  · Grade I left ventricular diastolic dysfunction.  · The estimated PA systolic pressure is 12 mmHg.  · Normal right ventricular size with normal right ventricular systolic   function.  · Normal central venous pressure (3 mmHg).  · The estimated ejection fraction is 55%.  · Mild tricuspid regurgitation.          ECOG SCORE              Assessment/Plan:     1. Malignant neoplasm of nipple of left breast in female, estrogen receptor negative    2. Osteopenia after menopause    3. Aortic atherosclerosis    4. Other  specified disorders involving the immune mechanism, not elsewhere classified        1.History of locally advanced, hormone receptor positive/HER2 Lalitha negative left breast carcinoma without evidence of recurrent disease. Now with second primary, triple negative, clinical stage II B (T2, N0, M0), left breast carcinoma   She is s/p bilateral mastectomy ypT1b,pN0 (sn),cM0 ; received only carbo/taxol/keytryda neoadjuvant .   Residual tumor had multiple foci, ranging 1-6 mm.     On adjuvant keytruda, plan to complete a total of one year. Ok to proceed today.     We discussed adjuvant capecitabine and the CREATEx trial. Discussed that residual tumor was 1 cm in the trial and that of all the groups, TNBC patients had the most benefit, about 15% risk reduction. Given multi focal residual tumor, capecitabine can be considered. She will think about it. Meanwhile, check pharmaco genomic panel.     Status post bilateral mastectomy/placement of tissue expanders-complicated by postop infection.: following up with plastic surgery,stable       Osteopenia: will get DEXA scan for baseline  Mild hypercalcemia: tending down,cont monitoring blood work   Aortic atherosclerosis: following up with cardiology, stable     I spent 62 min on this encounter.     Med Onc Chart Routing      Follow up with physician . 2/16   Follow up with ANGIE    Infusion scheduling note    Injection scheduling note 2/16   Labs CBC, CMP and TSH   Lab interval:     Imaging    Pharmacy appointment    Other referrals         Plan was discussed with the patient at length, and she  verbalized understanding.    Recommend exercise, nutrition and weight management and health maintenance activities and follow-up with PCPs recommendations.

## 2023-01-26 NOTE — PLAN OF CARE
Problem: Adult Inpatient Plan of Care  Goal: Plan of Care Review  Outcome: Ongoing, Progressing  Flowsheets (Taken 1/26/2023 1343)  Plan of Care Reviewed With: patient  Goal: Patient-Specific Goal (Individualized)  Outcome: Ongoing, Progressing  Flowsheets (Taken 1/26/2023 1343)  Anxieties, Fears or Concerns: none  Individualized Care Needs: recliner, dim lights, blankets, pillow, apple juice     Problem: Fatigue  Goal: Improved Activity Tolerance  Outcome: Ongoing, Progressing  Intervention: Promote Improved Energy  Flowsheets (Taken 1/26/2023 1343)  Fatigue Management:   frequent rest breaks encouraged   paced activity encouraged  Sleep/Rest Enhancement:   noise level reduced   room darkened  Activity Management:   Ambulated -L4   Ambulated to bathroom - L4   Ambulated in yi - L4   Up in chair - L3   Pt tolerated keytruda infusion well.   No adverse reaction noted.  PAC flushed with NS and de-accessed per protocol.   Pt left clinic in no acute distress.

## 2023-01-26 NOTE — PROGRESS NOTES
Subjective:       Patient ID: Zayra Rodgers is a 73 y.o. female.    Chief Complaint: Follow-up    Follow-up  Pertinent negatives include no arthralgias, chest pain, coughing, fatigue (energy can wane as the day progresses), headaches or joint swelling.     Patient in clinic for f/u.  Discussed some of her recent experiences in oncology and in the ICU for her 's recent stay.   Issues with her anxiety, disrupting sleep. Takes 1/4 tablet of xanax sparingly prn. She's discussed adjunctive regimens if needed, but will be working with her coping mechanisms for now.     Review of Systems:  Review of Systems   Constitutional:  Negative for fatigue (energy can wane as the day progresses) and unexpected weight change.   HENT:  Negative for rhinorrhea.    Respiratory:  Negative for cough and shortness of breath.    Cardiovascular:  Negative for chest pain and palpitations.   Gastrointestinal:  Negative for constipation and diarrhea.   Endocrine: Negative for polydipsia and polyuria.   Genitourinary:  Negative for difficulty urinating and dysuria.   Musculoskeletal:  Negative for arthralgias and joint swelling.   Neurological:  Negative for dizziness and headaches.   Psychiatric/Behavioral:  Positive for sleep disturbance (at least in part due to dog). Negative for dysphoric mood. The patient is nervous/anxious.        Objective:     Vitals:    01/26/23 1441   BP: (!) 146/77   Pulse: 87   Resp: 14   SpO2: 97%   Weight: 67.7 kg (149 lb 2.3 oz)   Height: 5' (1.524 m)          Physical Exam  Vitals and nursing note reviewed.   Constitutional:       General: She is not in acute distress.     Appearance: Normal appearance. She is well-developed.   HENT:      Head: Normocephalic and atraumatic.      Right Ear: Tympanic membrane normal.      Left Ear: Tympanic membrane normal.   Eyes:      General: No scleral icterus.        Right eye: No discharge.         Left eye: No discharge.      Conjunctiva/sclera: Conjunctivae  normal.   Cardiovascular:      Rate and Rhythm: Normal rate and regular rhythm.   Pulmonary:      Effort: Pulmonary effort is normal. No respiratory distress.      Breath sounds: Normal breath sounds.   Abdominal:      Palpations: Abdomen is soft.      Tenderness: There is no guarding.   Musculoskeletal:         General: No deformity or signs of injury.      Cervical back: Neck supple.   Lymphadenopathy:      Cervical: No cervical adenopathy.   Skin:     General: Skin is warm and dry.      Findings: No rash.   Neurological:      General: No focal deficit present.      Mental Status: She is alert and oriented to person, place, and time.   Psychiatric:         Mood and Affect: Mood normal.         Behavior: Behavior normal.         Assessment & Plan:  Essential hypertension  Comments:  suboptimal, needs recheck    Malignant neoplasm of upper-outer quadrant of left breast in female, estrogen receptor negative  Comments:  upcoming appt with onc/michelle to est care  on keytruda per oncology currently    Anxiety  Comments:  seeing outside provider prn    High risk medications (not anticoagulants) long-term use  -     Vitamin B12; Future; Expected date: 02/16/2023  -     Folate; Future; Expected date: 02/16/2023  -     Iron; Future; Expected date: 02/16/2023  -     Magnesium; Future; Expected date: 02/16/2023    Iron deficiency  -     Iron; Future; Expected date: 02/16/2023    Rehab dynamics for breast tissue issues. Planning recon with surg/sim. Will do spine/core work afterwards.  Needs vitamin labs updated.

## 2023-01-27 LAB — TSH SERPL DL<=0.005 MIU/L-ACNC: 1.55 UIU/ML (ref 0.4–4)

## 2023-02-03 LAB
ONEOME COMMENT: NORMAL
ONEOME METHOD: NORMAL

## 2023-03-01 ENCOUNTER — TELEPHONE (OUTPATIENT)
Dept: SURGERY | Facility: CLINIC | Age: 73
End: 2023-03-01
Payer: MEDICARE

## 2023-03-01 NOTE — TELEPHONE ENCOUNTER
She called to say she felt a soft movable mass like area in her left groin during the shower this morning.  Told Dr Ruggiero and she said to refer her to [facility].  Called [facility] and made an appt with Dr Domingo for next Friday, 3/10 at 12:50pm.  Gave her the appt date and time and she said she will go then.  Told her to call if anything worsens between now and then.  She verbalized understanding.

## 2023-03-14 RX ORDER — MIRABEGRON 50 MG/1
1 TABLET, FILM COATED, EXTENDED RELEASE ORAL DAILY
Qty: 30 TABLET | Refills: 11 | Status: SHIPPED | OUTPATIENT
Start: 2023-03-14 | End: 2024-03-13

## 2023-03-30 ENCOUNTER — TELEPHONE (OUTPATIENT)
Dept: FAMILY MEDICINE | Facility: CLINIC | Age: 73
End: 2023-03-30
Payer: MEDICARE

## 2023-05-09 ENCOUNTER — OFFICE VISIT (OUTPATIENT)
Dept: FAMILY MEDICINE | Facility: CLINIC | Age: 73
End: 2023-05-09
Payer: MEDICARE

## 2023-05-09 VITALS
WEIGHT: 148.81 LBS | HEART RATE: 82 BPM | OXYGEN SATURATION: 97 % | HEIGHT: 60 IN | RESPIRATION RATE: 14 BRPM | SYSTOLIC BLOOD PRESSURE: 120 MMHG | DIASTOLIC BLOOD PRESSURE: 74 MMHG | BODY MASS INDEX: 29.22 KG/M2

## 2023-05-09 DIAGNOSIS — G89.29 CHRONIC BILATERAL LOW BACK PAIN WITHOUT SCIATICA: ICD-10-CM

## 2023-05-09 DIAGNOSIS — I10 ESSENTIAL HYPERTENSION: Primary | ICD-10-CM

## 2023-05-09 DIAGNOSIS — M54.50 CHRONIC BILATERAL LOW BACK PAIN WITHOUT SCIATICA: ICD-10-CM

## 2023-05-09 DIAGNOSIS — Z12.11 SPECIAL SCREENING FOR MALIGNANT NEOPLASMS, COLON: ICD-10-CM

## 2023-05-09 DIAGNOSIS — R93.3 ABNORMAL FINDINGS ON DIAGNOSTIC IMAGING OF OTHER PARTS OF DIGESTIVE TRACT: ICD-10-CM

## 2023-05-09 PROBLEM — T81.49XA WOUND INFECTION AFTER SURGERY: Status: RESOLVED | Noted: 2022-12-16 | Resolved: 2023-05-09

## 2023-05-09 PROCEDURE — 3008F PR BODY MASS INDEX (BMI) DOCUMENTED: ICD-10-PCS | Mod: CPTII,S$GLB,, | Performed by: FAMILY MEDICINE

## 2023-05-09 PROCEDURE — 1126F AMNT PAIN NOTED NONE PRSNT: CPT | Mod: CPTII,S$GLB,, | Performed by: FAMILY MEDICINE

## 2023-05-09 PROCEDURE — 1101F PR PT FALLS ASSESS DOC 0-1 FALLS W/OUT INJ PAST YR: ICD-10-PCS | Mod: CPTII,S$GLB,, | Performed by: FAMILY MEDICINE

## 2023-05-09 PROCEDURE — 1160F RVW MEDS BY RX/DR IN RCRD: CPT | Mod: CPTII,S$GLB,, | Performed by: FAMILY MEDICINE

## 2023-05-09 PROCEDURE — 1159F PR MEDICATION LIST DOCUMENTED IN MEDICAL RECORD: ICD-10-PCS | Mod: CPTII,S$GLB,, | Performed by: FAMILY MEDICINE

## 2023-05-09 PROCEDURE — 99499 UNLISTED E&M SERVICE: CPT | Mod: S$GLB,,, | Performed by: FAMILY MEDICINE

## 2023-05-09 PROCEDURE — 3074F PR MOST RECENT SYSTOLIC BLOOD PRESSURE < 130 MM HG: ICD-10-PCS | Mod: CPTII,S$GLB,, | Performed by: FAMILY MEDICINE

## 2023-05-09 PROCEDURE — 3074F SYST BP LT 130 MM HG: CPT | Mod: CPTII,S$GLB,, | Performed by: FAMILY MEDICINE

## 2023-05-09 PROCEDURE — 3008F BODY MASS INDEX DOCD: CPT | Mod: CPTII,S$GLB,, | Performed by: FAMILY MEDICINE

## 2023-05-09 PROCEDURE — 1126F PR PAIN SEVERITY QUANTIFIED, NO PAIN PRESENT: ICD-10-PCS | Mod: CPTII,S$GLB,, | Performed by: FAMILY MEDICINE

## 2023-05-09 PROCEDURE — 3078F PR MOST RECENT DIASTOLIC BLOOD PRESSURE < 80 MM HG: ICD-10-PCS | Mod: CPTII,S$GLB,, | Performed by: FAMILY MEDICINE

## 2023-05-09 PROCEDURE — 99999 PR PBB SHADOW E&M-EST. PATIENT-LVL III: CPT | Mod: PBBFAC,,, | Performed by: FAMILY MEDICINE

## 2023-05-09 PROCEDURE — 1101F PT FALLS ASSESS-DOCD LE1/YR: CPT | Mod: CPTII,S$GLB,, | Performed by: FAMILY MEDICINE

## 2023-05-09 PROCEDURE — 3078F DIAST BP <80 MM HG: CPT | Mod: CPTII,S$GLB,, | Performed by: FAMILY MEDICINE

## 2023-05-09 PROCEDURE — 99214 PR OFFICE/OUTPT VISIT, EST, LEVL IV, 30-39 MIN: ICD-10-PCS | Mod: S$GLB,,, | Performed by: FAMILY MEDICINE

## 2023-05-09 PROCEDURE — 3288F PR FALLS RISK ASSESSMENT DOCUMENTED: ICD-10-PCS | Mod: CPTII,S$GLB,, | Performed by: FAMILY MEDICINE

## 2023-05-09 PROCEDURE — 1159F MED LIST DOCD IN RCRD: CPT | Mod: CPTII,S$GLB,, | Performed by: FAMILY MEDICINE

## 2023-05-09 PROCEDURE — 99999 PR PBB SHADOW E&M-EST. PATIENT-LVL III: ICD-10-PCS | Mod: PBBFAC,,, | Performed by: FAMILY MEDICINE

## 2023-05-09 PROCEDURE — 3288F FALL RISK ASSESSMENT DOCD: CPT | Mod: CPTII,S$GLB,, | Performed by: FAMILY MEDICINE

## 2023-05-09 PROCEDURE — 99214 OFFICE O/P EST MOD 30 MIN: CPT | Mod: S$GLB,,, | Performed by: FAMILY MEDICINE

## 2023-05-09 PROCEDURE — 99499 RISK ADDL DX/OHS AUDIT: ICD-10-PCS | Mod: S$GLB,,, | Performed by: FAMILY MEDICINE

## 2023-05-09 PROCEDURE — 1160F PR REVIEW ALL MEDS BY PRESCRIBER/CLIN PHARMACIST DOCUMENTED: ICD-10-PCS | Mod: CPTII,S$GLB,, | Performed by: FAMILY MEDICINE

## 2023-05-09 RX ORDER — CAPECITABINE 500 MG/1
1 TABLET, FILM COATED ORAL
COMMUNITY
Start: 2023-03-20 | End: 2023-07-05 | Stop reason: CLARIF

## 2023-05-09 NOTE — PROGRESS NOTES
Subjective:       Patient ID: Zayra Rodgers is a 73 y.o. female.    Chief Complaint: Follow-up    Follow-up  Pertinent negatives include no arthralgias, chest pain, coughing, fatigue (energy can wane as the day progresses), headaches (occ) or joint swelling.     Patient seen for f/u.   Oncology care now under Marakila.   Combination therapy with keytruda an xeluda. Pedal ulcers required dose adjustments, treats the skin with urea topically.   Planning recon with surg/sim later this year.   Anticipates port removal after completing keytruda as well.   Inguinal hernia - seeing gen azael/dione. Currently just monitoring.   Busy during the day helping with her 's care (they have a CNA that assists as well). Mid-afternoon nap.     Review of Systems:  Review of Systems   Constitutional:  Negative for fatigue (energy can wane as the day progresses) and unexpected weight change.   HENT:  Negative for rhinorrhea.    Respiratory:  Negative for cough and shortness of breath.    Cardiovascular:  Negative for chest pain and palpitations.   Gastrointestinal:  Negative for constipation and diarrhea.        Sometimes challenging, managing with diet.    Genitourinary:  Negative for difficulty urinating, dysuria and frequency (nighttime x 2-3).   Musculoskeletal:  Negative for arthralgias and joint swelling.   Neurological:  Negative for dizziness and headaches (occ).   Psychiatric/Behavioral:  Positive for sleep disturbance (at least in part due to dog). Negative for dysphoric mood. The patient is nervous/anxious.      Objective:     Vitals:    05/09/23 1145   BP: 120/74   BP Location: Right arm   Patient Position: Sitting   BP Method: Medium (Manual)   Pulse: 82   Resp: 14   SpO2: 97%   Weight: 67.5 kg (148 lb 13 oz)   Height: 5' (1.524 m)          Physical Exam  Vitals and nursing note reviewed.   Constitutional:       General: She is not in acute distress.     Appearance: Normal appearance. She is well-developed.    HENT:      Head: Normocephalic and atraumatic.   Eyes:      General: No scleral icterus.        Right eye: No discharge.         Left eye: No discharge.      Conjunctiva/sclera: Conjunctivae normal.   Cardiovascular:      Rate and Rhythm: Normal rate.   Pulmonary:      Effort: Pulmonary effort is normal. No respiratory distress.   Abdominal:      Palpations: Abdomen is soft.      Tenderness: There is no guarding.   Musculoskeletal:         General: No deformity or signs of injury.      Cervical back: Neck supple.   Skin:     General: Skin is warm and dry.      Findings: No rash.   Neurological:      General: No focal deficit present.      Mental Status: She is alert and oriented to person, place, and time. Mental status is at baseline.   Psychiatric:         Mood and Affect: Mood normal.         Behavior: Behavior normal.         Assessment & Plan:  Essential hypertension  Comments:  controlled, cont regimen    Special screening for malignant neoplasms, colon  -     Case Request Endoscopy: COLONOSCOPY    Abnormal findings on diagnostic imaging of other parts of digestive tract  -     Case Request Endoscopy: COLONOSCOPY    Chronic bilateral low back pain without sciatica  Comments:  planning to start PT once she's done with current chemotx

## 2023-06-05 ENCOUNTER — PES CALL (OUTPATIENT)
Dept: ADMINISTRATIVE | Facility: CLINIC | Age: 73
End: 2023-06-05
Payer: MEDICARE

## 2023-06-06 ENCOUNTER — PATIENT MESSAGE (OUTPATIENT)
Dept: DERMATOLOGY | Facility: CLINIC | Age: 73
End: 2023-06-06
Payer: MEDICARE

## 2023-06-08 ENCOUNTER — TELEPHONE (OUTPATIENT)
Dept: GASTROENTEROLOGY | Facility: CLINIC | Age: 73
End: 2023-06-08
Payer: MEDICARE

## 2023-06-08 NOTE — TELEPHONE ENCOUNTER
Spoke to pt in regards to scheduling c-scope. Pt states she has an inguinal hernia and needs surgery. States she does not wish to have the colonoscopy at this time and will call back in the future when she is ready.

## 2023-07-14 ENCOUNTER — PES CALL (OUTPATIENT)
Dept: ADMINISTRATIVE | Facility: CLINIC | Age: 73
End: 2023-07-14
Payer: MEDICARE

## 2023-08-31 ENCOUNTER — OFFICE VISIT (OUTPATIENT)
Dept: FAMILY MEDICINE | Facility: CLINIC | Age: 73
End: 2023-08-31
Payer: MEDICARE

## 2023-08-31 VITALS
WEIGHT: 139.56 LBS | DIASTOLIC BLOOD PRESSURE: 72 MMHG | HEART RATE: 74 BPM | HEIGHT: 60 IN | OXYGEN SATURATION: 98 % | BODY MASS INDEX: 27.4 KG/M2 | SYSTOLIC BLOOD PRESSURE: 114 MMHG

## 2023-08-31 DIAGNOSIS — E61.1 IRON DEFICIENCY: ICD-10-CM

## 2023-08-31 DIAGNOSIS — G47.00 INSOMNIA, UNSPECIFIED TYPE: ICD-10-CM

## 2023-08-31 DIAGNOSIS — D64.9 ANEMIA, UNSPECIFIED TYPE: Primary | ICD-10-CM

## 2023-08-31 DIAGNOSIS — I10 ESSENTIAL HYPERTENSION: ICD-10-CM

## 2023-08-31 DIAGNOSIS — Z79.899 HIGH RISK MEDICATIONS (NOT ANTICOAGULANTS) LONG-TERM USE: ICD-10-CM

## 2023-08-31 PROCEDURE — 3078F PR MOST RECENT DIASTOLIC BLOOD PRESSURE < 80 MM HG: ICD-10-PCS | Mod: CPTII,S$GLB,, | Performed by: FAMILY MEDICINE

## 2023-08-31 PROCEDURE — 1159F MED LIST DOCD IN RCRD: CPT | Mod: CPTII,S$GLB,, | Performed by: FAMILY MEDICINE

## 2023-08-31 PROCEDURE — 99214 OFFICE O/P EST MOD 30 MIN: CPT | Mod: S$GLB,,, | Performed by: FAMILY MEDICINE

## 2023-08-31 PROCEDURE — 99999 PR PBB SHADOW E&M-EST. PATIENT-LVL IV: ICD-10-PCS | Mod: PBBFAC,,, | Performed by: FAMILY MEDICINE

## 2023-08-31 PROCEDURE — 3288F PR FALLS RISK ASSESSMENT DOCUMENTED: ICD-10-PCS | Mod: CPTII,S$GLB,, | Performed by: FAMILY MEDICINE

## 2023-08-31 PROCEDURE — 3074F SYST BP LT 130 MM HG: CPT | Mod: CPTII,S$GLB,, | Performed by: FAMILY MEDICINE

## 2023-08-31 PROCEDURE — 1126F PR PAIN SEVERITY QUANTIFIED, NO PAIN PRESENT: ICD-10-PCS | Mod: CPTII,S$GLB,, | Performed by: FAMILY MEDICINE

## 2023-08-31 PROCEDURE — 1159F PR MEDICATION LIST DOCUMENTED IN MEDICAL RECORD: ICD-10-PCS | Mod: CPTII,S$GLB,, | Performed by: FAMILY MEDICINE

## 2023-08-31 PROCEDURE — 1160F RVW MEDS BY RX/DR IN RCRD: CPT | Mod: CPTII,S$GLB,, | Performed by: FAMILY MEDICINE

## 2023-08-31 PROCEDURE — 3008F PR BODY MASS INDEX (BMI) DOCUMENTED: ICD-10-PCS | Mod: CPTII,S$GLB,, | Performed by: FAMILY MEDICINE

## 2023-08-31 PROCEDURE — 99999 PR PBB SHADOW E&M-EST. PATIENT-LVL IV: CPT | Mod: PBBFAC,,, | Performed by: FAMILY MEDICINE

## 2023-08-31 PROCEDURE — 1160F PR REVIEW ALL MEDS BY PRESCRIBER/CLIN PHARMACIST DOCUMENTED: ICD-10-PCS | Mod: CPTII,S$GLB,, | Performed by: FAMILY MEDICINE

## 2023-08-31 PROCEDURE — 3288F FALL RISK ASSESSMENT DOCD: CPT | Mod: CPTII,S$GLB,, | Performed by: FAMILY MEDICINE

## 2023-08-31 PROCEDURE — 1101F PR PT FALLS ASSESS DOC 0-1 FALLS W/OUT INJ PAST YR: ICD-10-PCS | Mod: CPTII,S$GLB,, | Performed by: FAMILY MEDICINE

## 2023-08-31 PROCEDURE — 1126F AMNT PAIN NOTED NONE PRSNT: CPT | Mod: CPTII,S$GLB,, | Performed by: FAMILY MEDICINE

## 2023-08-31 PROCEDURE — 99214 PR OFFICE/OUTPT VISIT, EST, LEVL IV, 30-39 MIN: ICD-10-PCS | Mod: S$GLB,,, | Performed by: FAMILY MEDICINE

## 2023-08-31 PROCEDURE — 3008F BODY MASS INDEX DOCD: CPT | Mod: CPTII,S$GLB,, | Performed by: FAMILY MEDICINE

## 2023-08-31 PROCEDURE — 1101F PT FALLS ASSESS-DOCD LE1/YR: CPT | Mod: CPTII,S$GLB,, | Performed by: FAMILY MEDICINE

## 2023-08-31 PROCEDURE — 3074F PR MOST RECENT SYSTOLIC BLOOD PRESSURE < 130 MM HG: ICD-10-PCS | Mod: CPTII,S$GLB,, | Performed by: FAMILY MEDICINE

## 2023-08-31 PROCEDURE — 3078F DIAST BP <80 MM HG: CPT | Mod: CPTII,S$GLB,, | Performed by: FAMILY MEDICINE

## 2023-08-31 NOTE — PROGRESS NOTES
Subjective:       Patient ID: Zayra Rodgers is a 73 y.o. female.    Chief Complaint: Follow-up (From chemo therapy)    Follow-up  Associated symptoms include fatigue. Pertinent negatives include no arthralgias, chest pain, headaches, joint swelling, neck pain, vomiting or weakness.     Completed chemotx and doing better.   She had a allergy response to one of the meds and had some peripheral complaints to the peripheral edges of her tongue. Did not fully resolve with peridex/baking soda rinses.   F/u q3mos with lab. Maintains routine onc f/u a with michelle.   Ongoing stressors related to the care of her . He had an episode of respiratory failure this week, now under pulm/decker care and monitoring closely.   She feels that between praying and continuing f/u with psychology, she's managing as well as can be expected. Sleep was ok prior to Mr Loaiza's recent episode, she finds that she cannot always go back to sleep at 3am when she wakes up.    Last night took 1/2 benadryl and today feels groggy.     Review of Systems:  Review of Systems   Constitutional:  Positive for fatigue. Negative for activity change and unexpected weight change.   HENT:  Negative for hearing loss, rhinorrhea and trouble swallowing.    Eyes:  Negative for discharge and visual disturbance.   Respiratory:  Negative for chest tightness and wheezing.    Cardiovascular:  Negative for chest pain, palpitations and leg swelling.   Gastrointestinal:  Negative for blood in stool, constipation, diarrhea and vomiting.   Endocrine: Negative for polydipsia and polyuria.   Genitourinary:  Negative for difficulty urinating, dysuria, frequency (nighttime x 1-2), hematuria and menstrual problem.        Off myrbetriq x 2 mos and doing well   Musculoskeletal:  Negative for arthralgias, joint swelling and neck pain.   Neurological:  Negative for weakness and headaches.   Psychiatric/Behavioral:  Negative for confusion and dysphoric mood.        Objective:      Vitals:    08/31/23 1400   BP: 114/72   Pulse: 74   SpO2: 98%   Weight: 63.3 kg (139 lb 8.8 oz)   Height: 5' (1.524 m)          Physical Exam  Vitals and nursing note reviewed.   Constitutional:       General: She is not in acute distress.     Appearance: Normal appearance. She is well-developed.   HENT:      Head: Normocephalic and atraumatic.   Eyes:      General: No scleral icterus.        Right eye: No discharge.         Left eye: No discharge.      Conjunctiva/sclera: Conjunctivae normal.   Cardiovascular:      Rate and Rhythm: Normal rate.   Pulmonary:      Effort: Pulmonary effort is normal. No respiratory distress.   Abdominal:      Palpations: Abdomen is soft.      Tenderness: There is no guarding.   Musculoskeletal:         General: No deformity or signs of injury.      Cervical back: Neck supple.   Skin:     General: Skin is warm and dry.      Findings: No rash.   Neurological:      General: No focal deficit present.      Mental Status: She is alert and oriented to person, place, and time.   Psychiatric:         Mood and Affect: Mood normal.         Behavior: Behavior normal.           Assessment & Plan:  Anemia, unspecified type  Comments:  stable, update cbc with iron level  Orders:  -     Iron; Future; Expected date: 08/31/2023    Essential hypertension  Comments:  controlled, cont regimen    Iron deficiency  Comments:  cont routine lab f/u  Orders:  -     Iron; Future; Expected date: 08/31/2023    High risk medications (not anticoagulants) long-term use  Comments:  chemotx completed  Orders:  -     CBC Without Differential; Future; Expected date: 08/31/2023  -     Comprehensive Metabolic Panel; Future; Expected date: 08/31/2023  -     Hemoglobin A1C; Future; Expected date: 08/31/2023  -     Iron; Future; Expected date: 08/31/2023  -     Lipid Panel; Future; Expected date: 08/31/2023  -     TSH; Future; Expected date: 08/31/2023  -     Urinalysis, Reflex to Urine Culture Urine, Clean Catch;  Future  -     Vitamin B12; Future; Expected date: 08/31/2023  -     Folate; Future; Expected date: 08/31/2023  -     Magnesium; Future; Expected date: 08/31/2023    Insomnia, unspecified type  Comments:  ok to try lower dose of benadryl as needed

## 2023-10-24 ENCOUNTER — OFFICE VISIT (OUTPATIENT)
Dept: FAMILY MEDICINE | Facility: CLINIC | Age: 73
End: 2023-10-24
Payer: MEDICARE

## 2023-10-24 VITALS
SYSTOLIC BLOOD PRESSURE: 118 MMHG | HEIGHT: 60 IN | DIASTOLIC BLOOD PRESSURE: 74 MMHG | BODY MASS INDEX: 27.83 KG/M2 | WEIGHT: 141.75 LBS | HEART RATE: 82 BPM | OXYGEN SATURATION: 97 %

## 2023-10-24 DIAGNOSIS — I10 ESSENTIAL HYPERTENSION: Primary | ICD-10-CM

## 2023-10-24 DIAGNOSIS — G47.00 INSOMNIA, UNSPECIFIED TYPE: ICD-10-CM

## 2023-10-24 DIAGNOSIS — E78.5 HYPERLIPIDEMIA, UNSPECIFIED HYPERLIPIDEMIA TYPE: ICD-10-CM

## 2023-10-24 DIAGNOSIS — Z63.4 BEREAVEMENT: ICD-10-CM

## 2023-10-24 PROCEDURE — 99214 OFFICE O/P EST MOD 30 MIN: CPT | Mod: 25,S$GLB,, | Performed by: FAMILY MEDICINE

## 2023-10-24 PROCEDURE — 3008F PR BODY MASS INDEX (BMI) DOCUMENTED: ICD-10-PCS | Mod: CPTII,S$GLB,, | Performed by: FAMILY MEDICINE

## 2023-10-24 PROCEDURE — 1159F PR MEDICATION LIST DOCUMENTED IN MEDICAL RECORD: ICD-10-PCS | Mod: CPTII,S$GLB,, | Performed by: FAMILY MEDICINE

## 2023-10-24 PROCEDURE — 3074F SYST BP LT 130 MM HG: CPT | Mod: CPTII,S$GLB,, | Performed by: FAMILY MEDICINE

## 2023-10-24 PROCEDURE — 1160F PR REVIEW ALL MEDS BY PRESCRIBER/CLIN PHARMACIST DOCUMENTED: ICD-10-PCS | Mod: CPTII,S$GLB,, | Performed by: FAMILY MEDICINE

## 2023-10-24 PROCEDURE — G0008 ADMIN INFLUENZA VIRUS VAC: HCPCS | Mod: S$GLB,,, | Performed by: FAMILY MEDICINE

## 2023-10-24 PROCEDURE — 1160F RVW MEDS BY RX/DR IN RCRD: CPT | Mod: CPTII,S$GLB,, | Performed by: FAMILY MEDICINE

## 2023-10-24 PROCEDURE — G0008 FLU VACCINE - QUADRIVALENT - ADJUVANTED: ICD-10-PCS | Mod: S$GLB,,, | Performed by: FAMILY MEDICINE

## 2023-10-24 PROCEDURE — 3078F DIAST BP <80 MM HG: CPT | Mod: CPTII,S$GLB,, | Performed by: FAMILY MEDICINE

## 2023-10-24 PROCEDURE — 3288F PR FALLS RISK ASSESSMENT DOCUMENTED: ICD-10-PCS | Mod: CPTII,S$GLB,, | Performed by: FAMILY MEDICINE

## 2023-10-24 PROCEDURE — 3008F BODY MASS INDEX DOCD: CPT | Mod: CPTII,S$GLB,, | Performed by: FAMILY MEDICINE

## 2023-10-24 PROCEDURE — 1126F PR PAIN SEVERITY QUANTIFIED, NO PAIN PRESENT: ICD-10-PCS | Mod: CPTII,S$GLB,, | Performed by: FAMILY MEDICINE

## 2023-10-24 PROCEDURE — 99999 PR PBB SHADOW E&M-EST. PATIENT-LVL IV: ICD-10-PCS | Mod: PBBFAC,,, | Performed by: FAMILY MEDICINE

## 2023-10-24 PROCEDURE — 1101F PR PT FALLS ASSESS DOC 0-1 FALLS W/OUT INJ PAST YR: ICD-10-PCS | Mod: CPTII,S$GLB,, | Performed by: FAMILY MEDICINE

## 2023-10-24 PROCEDURE — 3078F PR MOST RECENT DIASTOLIC BLOOD PRESSURE < 80 MM HG: ICD-10-PCS | Mod: CPTII,S$GLB,, | Performed by: FAMILY MEDICINE

## 2023-10-24 PROCEDURE — 90694 FLU VACCINE - QUADRIVALENT - ADJUVANTED: ICD-10-PCS | Mod: S$GLB,,, | Performed by: FAMILY MEDICINE

## 2023-10-24 PROCEDURE — 3288F FALL RISK ASSESSMENT DOCD: CPT | Mod: CPTII,S$GLB,, | Performed by: FAMILY MEDICINE

## 2023-10-24 PROCEDURE — 99999 PR PBB SHADOW E&M-EST. PATIENT-LVL IV: CPT | Mod: PBBFAC,,, | Performed by: FAMILY MEDICINE

## 2023-10-24 PROCEDURE — 1159F MED LIST DOCD IN RCRD: CPT | Mod: CPTII,S$GLB,, | Performed by: FAMILY MEDICINE

## 2023-10-24 PROCEDURE — 90694 VACC AIIV4 NO PRSRV 0.5ML IM: CPT | Mod: S$GLB,,, | Performed by: FAMILY MEDICINE

## 2023-10-24 PROCEDURE — 1126F AMNT PAIN NOTED NONE PRSNT: CPT | Mod: CPTII,S$GLB,, | Performed by: FAMILY MEDICINE

## 2023-10-24 PROCEDURE — 3074F PR MOST RECENT SYSTOLIC BLOOD PRESSURE < 130 MM HG: ICD-10-PCS | Mod: CPTII,S$GLB,, | Performed by: FAMILY MEDICINE

## 2023-10-24 PROCEDURE — 1101F PT FALLS ASSESS-DOCD LE1/YR: CPT | Mod: CPTII,S$GLB,, | Performed by: FAMILY MEDICINE

## 2023-10-24 PROCEDURE — 99214 PR OFFICE/OUTPT VISIT, EST, LEVL IV, 30-39 MIN: ICD-10-PCS | Mod: 25,S$GLB,, | Performed by: FAMILY MEDICINE

## 2023-10-24 SDOH — SOCIAL DETERMINANTS OF HEALTH (SDOH): DISSAPEARANCE AND DEATH OF FAMILY MEMBER: Z63.4

## 2023-10-24 NOTE — PROGRESS NOTES
Subjective:       Patient ID: Zayra Rodgers is a 73 y.o. female.    Chief Complaint: Follow-up    Follow-up  Pertinent negatives include no chest pain, headaches, joint swelling, neck pain, vomiting or weakness.     Patient in clinic for f/u.   Since lov, her  passed away unexpectedly in his care facility. She is waiting for a 's review to consider autopsy. She is keeping busy as able.   Sleep is ok - has sparing xanax to use prn. She is seeing psych/baron - they do not feel medication is necessary right now as she is handling her grief appropriately.   Breast recon/revision with sim scheduled for 11/15. Planning to see onc/maronge a few weeks following surgery.   Scheduled for cards f/u with [place] next spring. Has not yet started crestor due to concerns re risk factors.     Review of Systems:  Review of Systems   Constitutional:  Negative for activity change and unexpected weight change.   HENT:  Negative for hearing loss, rhinorrhea and trouble swallowing.    Eyes:  Negative for discharge and visual disturbance.   Respiratory:  Negative for chest tightness and wheezing.    Cardiovascular:  Negative for chest pain and palpitations.   Gastrointestinal:  Negative for blood in stool, constipation, diarrhea and vomiting.   Endocrine: Negative for polydipsia and polyuria.   Genitourinary:  Negative for difficulty urinating, dysuria, hematuria and menstrual problem.   Musculoskeletal:  Negative for joint swelling and neck pain.   Neurological:  Negative for weakness and headaches.   Psychiatric/Behavioral:  Negative for confusion, dysphoric mood and sleep disturbance.        Objective:     Vitals:    10/24/23 1418   BP: 118/74   Pulse: 82   SpO2: 97%   Weight: 64.3 kg (141 lb 12.1 oz)   Height: 5' (1.524 m)          Physical Exam  Vitals and nursing note reviewed.   Constitutional:       General: She is not in acute distress.     Appearance: Normal appearance. She is well-developed.   HENT:      Head:  Normocephalic and atraumatic.   Eyes:      General: No scleral icterus.        Right eye: No discharge.         Left eye: No discharge.      Conjunctiva/sclera: Conjunctivae normal.   Cardiovascular:      Rate and Rhythm: Normal rate.   Pulmonary:      Effort: Pulmonary effort is normal. No respiratory distress.   Abdominal:      Palpations: Abdomen is soft.      Tenderness: There is no guarding.   Musculoskeletal:         General: No deformity or signs of injury.      Cervical back: Neck supple.   Skin:     General: Skin is warm and dry.      Findings: No rash.   Neurological:      General: No focal deficit present.      Mental Status: She is alert and oriented to person, place, and time.   Psychiatric:         Mood and Affect: Mood normal.         Behavior: Behavior normal.           Assessment & Plan:  Essential hypertension  Comments:  controlled, cont regimen    Insomnia, unspecified type  Comments:  stable, cont f/u with psych/baron    Bereavement  Comments:  doing well considering circumstances    Hyperlipidemia, unspecified hyperlipidemia type  Comments:  has not yet started crestor, but will hold off until after surgery given timing

## 2024-01-26 ENCOUNTER — PATIENT MESSAGE (OUTPATIENT)
Dept: FAMILY MEDICINE | Facility: CLINIC | Age: 74
End: 2024-01-26
Payer: MEDICARE

## 2024-01-30 ENCOUNTER — LAB VISIT (OUTPATIENT)
Dept: LAB | Facility: HOSPITAL | Age: 74
End: 2024-01-30
Attending: FAMILY MEDICINE
Payer: MEDICARE

## 2024-01-30 DIAGNOSIS — I10 ESSENTIAL HYPERTENSION: ICD-10-CM

## 2024-01-30 DIAGNOSIS — I70.0 AORTIC ATHEROSCLEROSIS: ICD-10-CM

## 2024-01-30 DIAGNOSIS — E78.01 FAMILIAL HYPERCHOLESTEROLEMIA: ICD-10-CM

## 2024-01-30 DIAGNOSIS — C50.012 MALIGNANT NEOPLASM OF NIPPLE OF LEFT BREAST IN FEMALE, ESTROGEN RECEPTOR NEGATIVE: ICD-10-CM

## 2024-01-30 DIAGNOSIS — Z17.1 MALIGNANT NEOPLASM OF NIPPLE OF LEFT BREAST IN FEMALE, ESTROGEN RECEPTOR NEGATIVE: ICD-10-CM

## 2024-01-30 DIAGNOSIS — Z79.899 HIGH RISK MEDICATIONS (NOT ANTICOAGULANTS) LONG-TERM USE: ICD-10-CM

## 2024-01-30 DIAGNOSIS — E61.1 IRON DEFICIENCY: ICD-10-CM

## 2024-01-30 DIAGNOSIS — D64.9 ANEMIA, UNSPECIFIED TYPE: ICD-10-CM

## 2024-01-30 LAB
ALBUMIN SERPL BCP-MCNC: 4 G/DL (ref 3.5–5.2)
ALBUMIN SERPL BCP-MCNC: 4 G/DL (ref 3.5–5.2)
ALP SERPL-CCNC: 61 U/L (ref 55–135)
ALP SERPL-CCNC: 61 U/L (ref 55–135)
ALT SERPL W/O P-5'-P-CCNC: 12 U/L (ref 10–44)
ALT SERPL W/O P-5'-P-CCNC: 12 U/L (ref 10–44)
ANION GAP SERPL CALC-SCNC: 7 MMOL/L (ref 8–16)
ANION GAP SERPL CALC-SCNC: 7 MMOL/L (ref 8–16)
AST SERPL-CCNC: 19 U/L (ref 10–40)
AST SERPL-CCNC: 19 U/L (ref 10–40)
BASOPHILS # BLD AUTO: 0.04 K/UL (ref 0–0.2)
BASOPHILS NFR BLD: 0.5 % (ref 0–1.9)
BILIRUB SERPL-MCNC: 0.6 MG/DL (ref 0.1–1)
BILIRUB SERPL-MCNC: 0.6 MG/DL (ref 0.1–1)
BUN SERPL-MCNC: 20 MG/DL (ref 8–23)
BUN SERPL-MCNC: 20 MG/DL (ref 8–23)
CALCIUM SERPL-MCNC: 9.7 MG/DL (ref 8.7–10.5)
CALCIUM SERPL-MCNC: 9.7 MG/DL (ref 8.7–10.5)
CHLORIDE SERPL-SCNC: 103 MMOL/L (ref 95–110)
CHLORIDE SERPL-SCNC: 103 MMOL/L (ref 95–110)
CHOLEST SERPL-MCNC: 303 MG/DL (ref 120–199)
CHOLEST SERPL-MCNC: 303 MG/DL (ref 120–199)
CHOLEST/HDLC SERPL: 4.2 {RATIO} (ref 2–5)
CHOLEST/HDLC SERPL: 4.2 {RATIO} (ref 2–5)
CO2 SERPL-SCNC: 26 MMOL/L (ref 23–29)
CO2 SERPL-SCNC: 26 MMOL/L (ref 23–29)
CREAT SERPL-MCNC: 0.7 MG/DL (ref 0.5–1.4)
CREAT SERPL-MCNC: 0.7 MG/DL (ref 0.5–1.4)
DIFFERENTIAL METHOD BLD: ABNORMAL
EOSINOPHIL # BLD AUTO: 0.1 K/UL (ref 0–0.5)
EOSINOPHIL NFR BLD: 0.7 % (ref 0–8)
ERYTHROCYTE [DISTWIDTH] IN BLOOD BY AUTOMATED COUNT: 12.8 % (ref 11.5–14.5)
ERYTHROCYTE [DISTWIDTH] IN BLOOD BY AUTOMATED COUNT: 12.8 % (ref 11.5–14.5)
EST. GFR  (NO RACE VARIABLE): >60 ML/MIN/1.73 M^2
EST. GFR  (NO RACE VARIABLE): >60 ML/MIN/1.73 M^2
ESTIMATED AVG GLUCOSE: 100 MG/DL (ref 68–131)
FOLATE SERPL-MCNC: 16.9 NG/ML (ref 4–24)
GLUCOSE SERPL-MCNC: 94 MG/DL (ref 70–110)
GLUCOSE SERPL-MCNC: 94 MG/DL (ref 70–110)
HBA1C MFR BLD: 5.1 % (ref 4–5.6)
HCT VFR BLD AUTO: 44.9 % (ref 37–48.5)
HCT VFR BLD AUTO: 44.9 % (ref 37–48.5)
HDLC SERPL-MCNC: 72 MG/DL (ref 40–75)
HDLC SERPL-MCNC: 72 MG/DL (ref 40–75)
HDLC SERPL: 23.8 % (ref 20–50)
HDLC SERPL: 23.8 % (ref 20–50)
HGB BLD-MCNC: 14.6 G/DL (ref 12–16)
HGB BLD-MCNC: 14.6 G/DL (ref 12–16)
IMM GRANULOCYTES # BLD AUTO: 0.01 K/UL (ref 0–0.04)
IMM GRANULOCYTES NFR BLD AUTO: 0.1 % (ref 0–0.5)
IRON SERPL-MCNC: 102 UG/DL (ref 30–160)
IRON SERPL-MCNC: 102 UG/DL (ref 30–160)
LDLC SERPL CALC-MCNC: 217 MG/DL (ref 63–159)
LDLC SERPL CALC-MCNC: 217 MG/DL (ref 63–159)
LYMPHOCYTES # BLD AUTO: 1.7 K/UL (ref 1–4.8)
LYMPHOCYTES NFR BLD: 22.1 % (ref 18–48)
MAGNESIUM SERPL-MCNC: 2.4 MG/DL (ref 1.6–2.6)
MCH RBC QN AUTO: 31.1 PG (ref 27–31)
MCH RBC QN AUTO: 31.1 PG (ref 27–31)
MCHC RBC AUTO-ENTMCNC: 32.5 G/DL (ref 32–36)
MCHC RBC AUTO-ENTMCNC: 32.5 G/DL (ref 32–36)
MCV RBC AUTO: 96 FL (ref 82–98)
MCV RBC AUTO: 96 FL (ref 82–98)
MONOCYTES # BLD AUTO: 0.5 K/UL (ref 0.3–1)
MONOCYTES NFR BLD: 7.1 % (ref 4–15)
NEUTROPHILS # BLD AUTO: 5.2 K/UL (ref 1.8–7.7)
NEUTROPHILS NFR BLD: 69.5 % (ref 38–73)
NONHDLC SERPL-MCNC: 231 MG/DL
NONHDLC SERPL-MCNC: 231 MG/DL
NRBC BLD-RTO: 0 /100 WBC
PLATELET # BLD AUTO: 228 K/UL (ref 150–450)
PLATELET # BLD AUTO: 228 K/UL (ref 150–450)
PMV BLD AUTO: 12.1 FL (ref 9.2–12.9)
PMV BLD AUTO: 12.1 FL (ref 9.2–12.9)
POTASSIUM SERPL-SCNC: 4.5 MMOL/L (ref 3.5–5.1)
POTASSIUM SERPL-SCNC: 4.5 MMOL/L (ref 3.5–5.1)
PROT SERPL-MCNC: 7.4 G/DL (ref 6–8.4)
PROT SERPL-MCNC: 7.4 G/DL (ref 6–8.4)
RBC # BLD AUTO: 4.69 M/UL (ref 4–5.4)
RBC # BLD AUTO: 4.69 M/UL (ref 4–5.4)
SODIUM SERPL-SCNC: 136 MMOL/L (ref 136–145)
SODIUM SERPL-SCNC: 136 MMOL/L (ref 136–145)
TRIGL SERPL-MCNC: 70 MG/DL (ref 30–150)
TRIGL SERPL-MCNC: 70 MG/DL (ref 30–150)
TSH SERPL DL<=0.005 MIU/L-ACNC: 1.71 UIU/ML (ref 0.4–4)
VIT B12 SERPL-MCNC: 977 PG/ML (ref 210–950)
VIT B12 SERPL-MCNC: 977 PG/ML (ref 210–950)
WBC # BLD AUTO: 7.45 K/UL (ref 3.9–12.7)
WBC # BLD AUTO: 7.45 K/UL (ref 3.9–12.7)

## 2024-01-30 PROCEDURE — 36415 COLL VENOUS BLD VENIPUNCTURE: CPT | Mod: PN | Performed by: INTERNAL MEDICINE

## 2024-01-30 PROCEDURE — 82746 ASSAY OF FOLIC ACID SERUM: CPT | Performed by: FAMILY MEDICINE

## 2024-01-30 PROCEDURE — 83735 ASSAY OF MAGNESIUM: CPT | Performed by: FAMILY MEDICINE

## 2024-01-30 PROCEDURE — 82607 VITAMIN B-12: CPT | Performed by: FAMILY MEDICINE

## 2024-01-30 PROCEDURE — 84443 ASSAY THYROID STIM HORMONE: CPT | Performed by: FAMILY MEDICINE

## 2024-01-30 PROCEDURE — 83540 ASSAY OF IRON: CPT | Performed by: FAMILY MEDICINE

## 2024-01-30 PROCEDURE — 80053 COMPREHEN METABOLIC PANEL: CPT | Performed by: INTERNAL MEDICINE

## 2024-01-30 PROCEDURE — 83036 HEMOGLOBIN GLYCOSYLATED A1C: CPT | Performed by: FAMILY MEDICINE

## 2024-01-30 PROCEDURE — 85025 COMPLETE CBC W/AUTO DIFF WBC: CPT | Performed by: INTERNAL MEDICINE

## 2024-01-30 PROCEDURE — 80061 LIPID PANEL: CPT | Performed by: INTERNAL MEDICINE

## 2024-02-02 ENCOUNTER — OFFICE VISIT (OUTPATIENT)
Dept: FAMILY MEDICINE | Facility: CLINIC | Age: 74
End: 2024-02-02
Payer: MEDICARE

## 2024-02-02 VITALS
BODY MASS INDEX: 30.77 KG/M2 | HEART RATE: 83 BPM | SYSTOLIC BLOOD PRESSURE: 120 MMHG | WEIGHT: 156.75 LBS | DIASTOLIC BLOOD PRESSURE: 72 MMHG | HEIGHT: 60 IN | OXYGEN SATURATION: 97 %

## 2024-02-02 DIAGNOSIS — C77.3 MALIGNANT NEOPLASM METASTATIC TO LYMPH NODE OF AXILLA: ICD-10-CM

## 2024-02-02 DIAGNOSIS — I10 ESSENTIAL HYPERTENSION: ICD-10-CM

## 2024-02-02 DIAGNOSIS — I10 HYPERTENSION, UNSPECIFIED TYPE: ICD-10-CM

## 2024-02-02 DIAGNOSIS — Z17.1 MALIGNANT NEOPLASM OF UPPER-OUTER QUADRANT OF LEFT BREAST IN FEMALE, ESTROGEN RECEPTOR NEGATIVE: ICD-10-CM

## 2024-02-02 DIAGNOSIS — Z00.00 ROUTINE GENERAL MEDICAL EXAMINATION AT A HEALTH CARE FACILITY: Primary | ICD-10-CM

## 2024-02-02 DIAGNOSIS — C50.412 MALIGNANT NEOPLASM OF UPPER-OUTER QUADRANT OF LEFT BREAST IN FEMALE, ESTROGEN RECEPTOR NEGATIVE: ICD-10-CM

## 2024-02-02 DIAGNOSIS — I70.0 AORTIC ATHEROSCLEROSIS: ICD-10-CM

## 2024-02-02 DIAGNOSIS — D89.89 OTHER SPECIFIED DISORDERS INVOLVING THE IMMUNE MECHANISM, NOT ELSEWHERE CLASSIFIED: ICD-10-CM

## 2024-02-02 PROCEDURE — 99999 PR PBB SHADOW E&M-EST. PATIENT-LVL IV: CPT | Mod: PBBFAC,,, | Performed by: FAMILY MEDICINE

## 2024-02-02 PROCEDURE — 99397 PER PM REEVAL EST PAT 65+ YR: CPT | Mod: S$GLB,,, | Performed by: FAMILY MEDICINE

## 2024-02-02 RX ORDER — ROSUVASTATIN CALCIUM 10 MG/1
10 TABLET, COATED ORAL NIGHTLY
Qty: 90 TABLET | Refills: 3 | Status: SHIPPED | OUTPATIENT
Start: 2024-02-02 | End: 2024-04-16

## 2024-02-02 RX ORDER — HYDROCHLOROTHIAZIDE 12.5 MG/1
12.5 TABLET ORAL DAILY
Qty: 90 TABLET | Refills: 1 | Status: SHIPPED | OUTPATIENT
Start: 2024-02-02

## 2024-02-02 NOTE — PROGRESS NOTES
Subjective:       Patient ID: Zayra Rodgers is a 73 y.o. female.    Chief Complaint: Follow-up      Zayra Rodgers is in the office for routine f/u.    HPI  Medical hx reviewed.   Handling things ok following her 's passing. She maintains f/u with outside psychiatry/friend to review meds. Currently, no ssri in favor of xanax prn. Has cont f/u to review.  Past Medical History:   Diagnosis Date    Allergy     Breast cancer 02/2016    left breast, neoadjuvant chemo, lumpectomy, and radiation    Breast cancer 04/2022    left breast 3:00 invasive ductal carcinoma    Bronchitis     COVID-19 08/2020    HLD (hyperlipidemia)     Hypertension     NOT USING MEDICATIONS CURRENTLY    S/P chemotherapy, time since greater than 12 weeks     Skin cancer     resovled     Rejoined the gym, plan to go 3 days/week.  Surprised by weight gain 16#. Has made some dietary changes.   Home BP checks noted for some diastolic elevations.     Current Outpatient Medications:     acetaminophen (TYLENOL EX STR RAPID RELEASE ORAL), Take 1,000 mg by mouth every 8 (eight) hours., Disp: , Rfl:     B-complex with vitamin C (Z-BEC OR EQUIV) tablet, Take 1 tablet by mouth once a week., Disp: , Rfl:     cetirizine (ZYRTEC) 10 MG tablet, Take 10 mg by mouth daily as needed. , Disp: , Rfl:     chlorhexidine (PERIDEX) 0.12 % solution, Swish 10 ML's for 30 seconds and spit twice daily for 5 days. Start date 11/13/23, Disp: 473 mL, Rfl: 0    clobetasol 0.05% (TEMOVATE) 0.05 % Oint, Apply a small amount to affected area twice a day, Disp: 45 g, Rfl: 0    fish oil-omega-3 fatty acids 300-1,000 mg capsule, Take 2 g by mouth daily as needed. , Disp: , Rfl:     fluticasone propionate (FLONASE) 50 mcg/actuation nasal spray, use 1 spray (50 mcg total) by Each Nostril route daily as needed., Disp: 16 g, Rfl: 11    mirabegron (MYRBETRIQ) 50 mg Tb24, Take 1 tablet (50 mg total) by mouth once daily., Disp: 30 tablet, Rfl: 11    multivitamin capsule, Take 1  capsule by mouth once daily., Disp: , Rfl:     mupirocin (BACTROBAN) 2 % ointment, Apply to affected area(s) twice daily, Disp: 22 g, Rfl: 0    triamcinolone acetonide 0.1% (KENALOG) 0.1 % cream, Apply topically 2 (two) times daily., Disp: 80 g, Rfl: 2    calcium carbonate-vitamin D3 600 mg-20 mcg (800 unit) Tab, Take 2 tablets by mouth once daily. (Patient not taking: Reported on 2/2/2024), Disp: 200 tablet, Rfl: 3    ezetimibe (ZETIA) 10 mg tablet, Take 1 tablet (10 mg total) by mouth once daily. (Patient not taking: Reported on 11/13/2023), Disp: 90 tablet, Rfl: 3    hydroCHLOROthiazide (HYDRODIURIL) 12.5 MG Tab, Take 1 tablet (12.5 mg total) by mouth once daily., Disp: 90 tablet, Rfl: 1    rosuvastatin (CRESTOR) 10 MG tablet, Take 1 tablet (10 mg total) by mouth every evening., Disp: 90 tablet, Rfl: 3  No current facility-administered medications for this visit.    Facility-Administered Medications Ordered in Other Visits:     0.9%  NaCl infusion, , Intravenous, Continuous, Padma Ruggiero MD    HYDROmorphone injection 0.5 mg, 0.5 mg, Intravenous, Q5 Min PRN, Bola Herndon MD, 0.5 mg at 11/03/22 0948    HYDROmorphone injection 0.5 mg, 0.5 mg, Intravenous, Q5 Min PRN, Bola Herndon MD, 0.5 mg at 07/06/23 1145    LIDOcaine (PF) 10 mg/ml (1%) injection 10 mg, 1 mL, Intradermal, Once, Padma Ruggiero MD    midazolam (VERSED) 1 mg/mL injection 2 mg, 2 mg, Intravenous, See admin instructions, Padma Ruggiero MD, 2 mg at 08/18/22 0821    ondansetron injection 4 mg, 4 mg, Intravenous, Daily PRN, Bola Herndon MD    ondansetron injection 4 mg, 4 mg, Intravenous, Daily PRN, Bola Herndon MD, 4 mg at 07/06/23 1126    prochlorperazine injection Soln 10 mg, 10 mg, Intravenous, Q30 Min PRYanick GONZALES Michael A., MD    prochlorperazine injection Soln 10 mg, 10 mg, Intravenous, Q30 Min PRN, Bola Herndon MD    The 10-year ASCVD risk score (Batool CASTELLANOS, et al., 2019) is: 13.2%     Values used to calculate the score:      Age: 73 years      Sex: Female      Is Non- : No      Diabetic: No      Tobacco smoker: No      Systolic Blood Pressure: 120 mmHg      Is BP treated: Yes      HDL Cholesterol: 72 mg/dL      Total Cholesterol: 303 mg/dL     Lab Results   Component Value Date    HGBA1C 5.1 01/30/2024    HGBA1C 5.4 05/03/2018     Lab Results   Component Value Date    LDLCALC 217.0 (H) 01/30/2024    LDLCALC 217.0 (H) 01/30/2024    CREATININE 0.7 01/30/2024    CREATININE 0.7 01/30/2024   Labs 2024 rev.     Review of Systems   Constitutional:  Negative for activity change and unexpected weight change.   HENT:  Negative for hearing loss, rhinorrhea and trouble swallowing.    Eyes:  Negative for discharge and visual disturbance.   Respiratory:  Negative for chest tightness and wheezing.    Cardiovascular:  Negative for chest pain and palpitations.   Gastrointestinal:  Negative for blood in stool, constipation, diarrhea and vomiting.   Endocrine: Negative for polydipsia and polyuria.   Genitourinary:  Negative for difficulty urinating, dysuria, hematuria and menstrual problem.   Musculoskeletal:  Negative for joint swelling and neck pain.   Neurological:  Negative for weakness and headaches.   Psychiatric/Behavioral:  Negative for confusion, dysphoric mood and sleep disturbance.            Objective:      Physical Exam  Vitals and nursing note reviewed.   Constitutional:       General: She is not in acute distress.     Appearance: Normal appearance. She is well-developed.   HENT:      Head: Normocephalic and atraumatic.   Eyes:      General: No scleral icterus.        Right eye: No discharge.         Left eye: No discharge.      Conjunctiva/sclera: Conjunctivae normal.   Cardiovascular:      Rate and Rhythm: Normal rate and regular rhythm.   Pulmonary:      Effort: Pulmonary effort is normal. No respiratory distress.      Breath sounds: Normal breath sounds.   Abdominal:       Palpations: Abdomen is soft.      Tenderness: There is no guarding.   Musculoskeletal:         General: No deformity or signs of injury.      Cervical back: Neck supple.   Skin:     General: Skin is warm and dry.      Findings: No rash.   Neurological:      General: No focal deficit present.      Mental Status: She is alert and oriented to person, place, and time.   Psychiatric:         Mood and Affect: Mood normal.         Behavior: Behavior normal.             Screening recommendations appropriate to age and health status were reviewed.    Assessment & Plan:    Routine general medical examination at a health care facility  Comments:  anticipatory guidance reviewed    Malignant neoplasm metastatic to lymph node of axilla  Comments:  cont f/u with oncology  Orders:  -     Ambulatory referral/consult to General Surgery; Future; Expected date: 02/09/2024    Other specified disorders involving the immune mechanism, not elsewhere classified  Comments:  stable, cont monitoring    Aortic atherosclerosis  Comments:  cont crestor and routine monitoring  Orders:  -     rosuvastatin (CRESTOR) 10 MG tablet; Take 1 tablet (10 mg total) by mouth every evening.  Dispense: 90 tablet; Refill: 3  -     Lipid Panel; Future; Expected date: 02/02/2024  -     Comprehensive Metabolic Panel; Future; Expected date: 02/02/2024    Essential hypertension  Comments:  controlled, cont regimen    Hypertension, unspecified type  -     hydroCHLOROthiazide (HYDRODIURIL) 12.5 MG Tab; Take 1 tablet (12.5 mg total) by mouth once daily.  Dispense: 90 tablet; Refill: 1    Malignant neoplasm of upper-outer quadrant of left breast in female, estrogen receptor negative  -     Ambulatory referral/consult to General Surgery; Future; Expected date: 02/09/2024

## 2024-02-05 ENCOUNTER — PATIENT OUTREACH (OUTPATIENT)
Dept: ADMINISTRATIVE | Facility: OTHER | Age: 74
End: 2024-02-05
Payer: MEDICARE

## 2024-02-05 NOTE — PROGRESS NOTES
CHW - Case Closure    This Community Health Worker spoke to patient via telephone today.     Pt/Caregiver reported: CHW explain case management referrals help patients with high risk needs in regards to the SDOH questionnaire  Pt stated she doesn't need community resources at this time    Pt/Caregiver denied any additional needs at this time and agrees with episode closure at this time.  Provided patient with Community Health Worker's contact information and encouraged him/her to contact this Community Health Worker if additional needs arise.

## 2024-03-26 ENCOUNTER — OFFICE VISIT (OUTPATIENT)
Dept: FAMILY MEDICINE | Facility: CLINIC | Age: 74
End: 2024-03-26
Payer: MEDICARE

## 2024-03-26 VITALS
WEIGHT: 154.56 LBS | BODY MASS INDEX: 30.18 KG/M2 | RESPIRATION RATE: 18 BRPM | SYSTOLIC BLOOD PRESSURE: 150 MMHG | HEART RATE: 79 BPM | OXYGEN SATURATION: 97 % | DIASTOLIC BLOOD PRESSURE: 90 MMHG

## 2024-03-26 DIAGNOSIS — F43.21 SITUATIONAL DEPRESSION: ICD-10-CM

## 2024-03-26 DIAGNOSIS — F43.0 STRESS REACTION: ICD-10-CM

## 2024-03-26 DIAGNOSIS — I10 ESSENTIAL HYPERTENSION: Primary | ICD-10-CM

## 2024-03-26 PROBLEM — E78.2 MIXED HYPERLIPIDEMIA: Status: ACTIVE | Noted: 2022-09-16

## 2024-03-26 PROCEDURE — 3008F BODY MASS INDEX DOCD: CPT | Mod: CPTII,S$GLB,, | Performed by: FAMILY MEDICINE

## 2024-03-26 PROCEDURE — 1101F PT FALLS ASSESS-DOCD LE1/YR: CPT | Mod: CPTII,S$GLB,, | Performed by: FAMILY MEDICINE

## 2024-03-26 PROCEDURE — 99999 PR PBB SHADOW E&M-EST. PATIENT-LVL IV: CPT | Mod: PBBFAC,,, | Performed by: FAMILY MEDICINE

## 2024-03-26 PROCEDURE — 3044F HG A1C LEVEL LT 7.0%: CPT | Mod: CPTII,S$GLB,, | Performed by: FAMILY MEDICINE

## 2024-03-26 PROCEDURE — 3080F DIAST BP >= 90 MM HG: CPT | Mod: CPTII,S$GLB,, | Performed by: FAMILY MEDICINE

## 2024-03-26 PROCEDURE — 3288F FALL RISK ASSESSMENT DOCD: CPT | Mod: CPTII,S$GLB,, | Performed by: FAMILY MEDICINE

## 2024-03-26 PROCEDURE — 3077F SYST BP >= 140 MM HG: CPT | Mod: CPTII,S$GLB,, | Performed by: FAMILY MEDICINE

## 2024-03-26 PROCEDURE — 1126F AMNT PAIN NOTED NONE PRSNT: CPT | Mod: CPTII,S$GLB,, | Performed by: FAMILY MEDICINE

## 2024-03-26 PROCEDURE — 99214 OFFICE O/P EST MOD 30 MIN: CPT | Mod: S$GLB,,, | Performed by: FAMILY MEDICINE

## 2024-03-26 RX ORDER — AMLODIPINE BESYLATE 5 MG/1
5 TABLET ORAL DAILY
Qty: 30 TABLET | Refills: 11 | Status: SHIPPED | OUTPATIENT
Start: 2024-03-26 | End: 2025-03-26

## 2024-03-26 RX ORDER — CLONIDINE HYDROCHLORIDE 0.1 MG/1
0.1 TABLET ORAL
Status: COMPLETED | OUTPATIENT
Start: 2024-03-26 | End: 2024-03-26

## 2024-03-26 RX ADMIN — CLONIDINE HYDROCHLORIDE 0.1 MG: 0.1 TABLET ORAL at 02:03

## 2024-03-26 NOTE — PATIENT INSTRUCTIONS
Take the hctz 12.5mg daily.     Amlodipine 5mg AM if BP >150 OR >100. First dose tonight if needed by readings. Can take 2x/day.    BP log 2x/day for monitoring - send to me in 1-2 weeks.     [email or web address]

## 2024-03-26 NOTE — PROGRESS NOTES
Subjective:       Patient ID: Zayra Rodgers is a 74 y.o. female.    Chief Complaint: Hypertension and Follow-up    Hypertension  This is a new problem. Associated symptoms include anxiety, headaches and malaise/fatigue. Pertinent negatives include no blurred vision, chest pain, neck pain, orthopnea, palpitations, peripheral edema, PND, shortness of breath or sweats. There are no associated agents to hypertension. Risk factors for coronary artery disease include dyslipidemia and family history. Past treatments include diuretics. The current treatment provides no improvement.     Patient seen for /fu.   She notes that she is seeing elevated BP readings she feels is consistent with previous when she has weight gain. Not taking her hctz daily, in particular if pressures are running normal.   Recently started wellbutrin for sx related to grieving. Of note, she did see an improvement in her mood and motivation. Seeing psych every 2 weeks. She's not sure if her pressure elevated first, or if it increased after starting the wellbutrin.   Switched recently to lexapro, but she did not like the way that she felt. Both fatigue and muscle aches and stopped after a few days.  Is very salt conscious.   Started at the gym and working with a .   Has not needed xanax in 2-3 weeks, usually takes 1/2 tablet prn.     Review of Systems:  Review of Systems   Constitutional:  Positive for malaise/fatigue.   Eyes:  Negative for blurred vision.   Respiratory:  Negative for shortness of breath.    Cardiovascular:  Negative for chest pain, palpitations, orthopnea and PND.   Musculoskeletal:  Negative for neck pain.   Neurological:  Positive for headaches.       Objective:     Vitals:    03/26/24 1408 03/26/24 1438   BP: (!) 170/100 (!) 150/90   BP Location: Right arm    Patient Position: Sitting    Pulse: 79    Resp: 18    SpO2: 97%    Weight: 70.1 kg (154 lb 8.7 oz)           Physical Exam  Vitals and nursing note reviewed.    Constitutional:       General: She is not in acute distress.     Appearance: Normal appearance. She is well-developed.   HENT:      Head: Normocephalic and atraumatic.   Eyes:      General: No scleral icterus.        Right eye: No discharge.         Left eye: No discharge.      Conjunctiva/sclera: Conjunctivae normal.   Cardiovascular:      Rate and Rhythm: Normal rate and regular rhythm.   Pulmonary:      Effort: Pulmonary effort is normal. No respiratory distress.      Breath sounds: Normal breath sounds.   Skin:     General: Skin is warm and dry.   Neurological:      General: No focal deficit present.      Mental Status: She is alert and oriented to person, place, and time.   Psychiatric:         Mood and Affect: Mood normal.         Behavior: Behavior normal.           Assessment & Plan:  Essential hypertension  Comments:  uncontrolled - start hctz daily, BP checks 2x/day, continue activity as tolerated  Orders:  -     cloNIDine tablet 0.1 mg  -     amLODIPine (NORVASC) 5 MG tablet; Take 1 tablet (5 mg total) by mouth once daily.  Dispense: 30 tablet; Refill: 11    Stress reaction    Situational depression    Pt seeing outside psychiatry for med mgmt and f/u.

## 2024-04-15 ENCOUNTER — TELEPHONE (OUTPATIENT)
Dept: FAMILY MEDICINE | Facility: CLINIC | Age: 74
End: 2024-04-15
Payer: MEDICARE

## 2024-04-15 ENCOUNTER — PATIENT MESSAGE (OUTPATIENT)
Dept: FAMILY MEDICINE | Facility: CLINIC | Age: 74
End: 2024-04-15
Payer: MEDICARE

## 2024-04-16 ENCOUNTER — OFFICE VISIT (OUTPATIENT)
Dept: FAMILY MEDICINE | Facility: CLINIC | Age: 74
End: 2024-04-16
Payer: MEDICARE

## 2024-04-16 ENCOUNTER — LAB VISIT (OUTPATIENT)
Dept: LAB | Facility: HOSPITAL | Age: 74
End: 2024-04-16
Attending: FAMILY MEDICINE
Payer: MEDICARE

## 2024-04-16 VITALS
WEIGHT: 158.81 LBS | HEIGHT: 60 IN | OXYGEN SATURATION: 98 % | SYSTOLIC BLOOD PRESSURE: 150 MMHG | BODY MASS INDEX: 31.18 KG/M2 | DIASTOLIC BLOOD PRESSURE: 86 MMHG | HEART RATE: 76 BPM

## 2024-04-16 DIAGNOSIS — M79.10 MYALGIA DUE TO STATIN: Primary | ICD-10-CM

## 2024-04-16 DIAGNOSIS — R93.3 ABNORMAL FINDINGS ON DIAGNOSTIC IMAGING OF OTHER PARTS OF DIGESTIVE TRACT: ICD-10-CM

## 2024-04-16 DIAGNOSIS — Z23 IMMUNIZATION DUE: ICD-10-CM

## 2024-04-16 DIAGNOSIS — Z78.0 POSTMENOPAUSAL STATE: ICD-10-CM

## 2024-04-16 DIAGNOSIS — I70.0 AORTIC ATHEROSCLEROSIS: ICD-10-CM

## 2024-04-16 DIAGNOSIS — T46.6X5A MYALGIA DUE TO STATIN: Primary | ICD-10-CM

## 2024-04-16 DIAGNOSIS — J30.9 ALLERGIC RHINITIS, UNSPECIFIED SEASONALITY, UNSPECIFIED TRIGGER: ICD-10-CM

## 2024-04-16 DIAGNOSIS — Z12.11 SPECIAL SCREENING FOR MALIGNANT NEOPLASMS, COLON: ICD-10-CM

## 2024-04-16 DIAGNOSIS — E78.5 HYPERLIPIDEMIA, UNSPECIFIED HYPERLIPIDEMIA TYPE: ICD-10-CM

## 2024-04-16 LAB
ALBUMIN SERPL BCP-MCNC: 3.9 G/DL (ref 3.5–5.2)
ALP SERPL-CCNC: 59 U/L (ref 55–135)
ALT SERPL W/O P-5'-P-CCNC: 12 U/L (ref 10–44)
ANION GAP SERPL CALC-SCNC: 10 MMOL/L (ref 8–16)
AST SERPL-CCNC: 15 U/L (ref 10–40)
BILIRUB SERPL-MCNC: 0.4 MG/DL (ref 0.1–1)
BUN SERPL-MCNC: 21 MG/DL (ref 8–23)
CALCIUM SERPL-MCNC: 9.7 MG/DL (ref 8.7–10.5)
CHLORIDE SERPL-SCNC: 102 MMOL/L (ref 95–110)
CHOLEST SERPL-MCNC: 234 MG/DL (ref 120–199)
CHOLEST/HDLC SERPL: 3.8 {RATIO} (ref 2–5)
CO2 SERPL-SCNC: 27 MMOL/L (ref 23–29)
CREAT SERPL-MCNC: 0.7 MG/DL (ref 0.5–1.4)
EST. GFR  (NO RACE VARIABLE): >60 ML/MIN/1.73 M^2
GLUCOSE SERPL-MCNC: 93 MG/DL (ref 70–110)
HDLC SERPL-MCNC: 61 MG/DL (ref 40–75)
HDLC SERPL: 26.1 % (ref 20–50)
LDLC SERPL CALC-MCNC: 156.2 MG/DL (ref 63–159)
NONHDLC SERPL-MCNC: 173 MG/DL
POTASSIUM SERPL-SCNC: 4.5 MMOL/L (ref 3.5–5.1)
PROT SERPL-MCNC: 7.1 G/DL (ref 6–8.4)
SODIUM SERPL-SCNC: 139 MMOL/L (ref 136–145)
TRIGL SERPL-MCNC: 84 MG/DL (ref 30–150)

## 2024-04-16 PROCEDURE — 80061 LIPID PANEL: CPT | Performed by: FAMILY MEDICINE

## 2024-04-16 PROCEDURE — 1160F RVW MEDS BY RX/DR IN RCRD: CPT | Mod: CPTII,S$GLB,, | Performed by: FAMILY MEDICINE

## 2024-04-16 PROCEDURE — 3008F BODY MASS INDEX DOCD: CPT | Mod: CPTII,S$GLB,, | Performed by: FAMILY MEDICINE

## 2024-04-16 PROCEDURE — 3044F HG A1C LEVEL LT 7.0%: CPT | Mod: CPTII,S$GLB,, | Performed by: FAMILY MEDICINE

## 2024-04-16 PROCEDURE — 3077F SYST BP >= 140 MM HG: CPT | Mod: CPTII,S$GLB,, | Performed by: FAMILY MEDICINE

## 2024-04-16 PROCEDURE — 1126F AMNT PAIN NOTED NONE PRSNT: CPT | Mod: CPTII,S$GLB,, | Performed by: FAMILY MEDICINE

## 2024-04-16 PROCEDURE — 36415 COLL VENOUS BLD VENIPUNCTURE: CPT | Mod: PN | Performed by: FAMILY MEDICINE

## 2024-04-16 PROCEDURE — 3288F FALL RISK ASSESSMENT DOCD: CPT | Mod: CPTII,S$GLB,, | Performed by: FAMILY MEDICINE

## 2024-04-16 PROCEDURE — 99999 PR PBB SHADOW E&M-EST. PATIENT-LVL IV: CPT | Mod: PBBFAC,,, | Performed by: FAMILY MEDICINE

## 2024-04-16 PROCEDURE — 3079F DIAST BP 80-89 MM HG: CPT | Mod: CPTII,S$GLB,, | Performed by: FAMILY MEDICINE

## 2024-04-16 PROCEDURE — 1159F MED LIST DOCD IN RCRD: CPT | Mod: CPTII,S$GLB,, | Performed by: FAMILY MEDICINE

## 2024-04-16 PROCEDURE — 1101F PT FALLS ASSESS-DOCD LE1/YR: CPT | Mod: CPTII,S$GLB,, | Performed by: FAMILY MEDICINE

## 2024-04-16 PROCEDURE — 99214 OFFICE O/P EST MOD 30 MIN: CPT | Mod: S$GLB,,, | Performed by: FAMILY MEDICINE

## 2024-04-16 PROCEDURE — 80053 COMPREHEN METABOLIC PANEL: CPT | Performed by: FAMILY MEDICINE

## 2024-04-16 RX ORDER — FLUTICASONE PROPIONATE 50 MCG
1 SPRAY, SUSPENSION (ML) NASAL DAILY PRN
Qty: 48 G | Refills: 3 | Status: SHIPPED | OUTPATIENT
Start: 2024-04-16

## 2024-04-16 RX ORDER — EZETIMIBE 10 MG/1
10 TABLET ORAL DAILY
Qty: 90 TABLET | Refills: 3 | Status: SHIPPED | OUTPATIENT
Start: 2024-04-16 | End: 2025-04-16

## 2024-04-16 NOTE — PROGRESS NOTES
Subjective:       Patient ID: Zayra Rodgers is a 74 y.o. female.    Chief Complaint: Follow-up (Side effects of crestor)    Follow-up  Associated symptoms include fatigue. Pertinent negatives include no chest pain, headaches or neck pain.   Hypertension  This is a recurrent problem. The current episode started more than 1 month ago. The problem has been rapidly improving since onset. The problem is controlled. Pertinent negatives include no anxiety, blurred vision, chest pain, headaches, malaise/fatigue, neck pain, orthopnea, palpitations, peripheral edema, PND, shortness of breath or sweats. There are no associated agents to hypertension. Risk factors for coronary artery disease include dyslipidemia. Past treatments include diuretics. The current treatment provides significant improvement. There are no compliance problems.      Patient seen for sx review.   She noticed some muscle fatigue and ache. After discussing with specialty, she/they are concerned that her sx are from the statin. She does notice some improvement in previous HA patterns and muscle fatigue. She has not taken zetia previously. She is trying to ensure exercise 3-4 days/week and monitoring her diet more aggressively for cholesterol and weight loss.   Home BP log indicates good control.   Restarted wellbutrin 150mg. Not needing a lot of xanax recently. Maintains outside psych f/u.  Lab drawn this morning. Stopped crestor 3 days prior.     Review of Systems:  Review of Systems   Constitutional:  Positive for fatigue. Negative for malaise/fatigue.   Eyes:  Negative for blurred vision.   Respiratory:  Negative for shortness of breath.    Cardiovascular:  Negative for chest pain, palpitations, orthopnea and PND.   Musculoskeletal:  Negative for neck pain.   Neurological:  Negative for headaches.       Objective:     Vitals:    04/16/24 1434   BP: (!) 150/86   Pulse: 76   SpO2: 98%   Weight: 72 kg (158 lb 13.5 oz)   Height: 5' (1.524 m)           Physical Exam  Vitals and nursing note reviewed.   Constitutional:       General: She is not in acute distress.     Appearance: Normal appearance. She is well-developed.   HENT:      Head: Normocephalic and atraumatic.   Eyes:      General: No scleral icterus.        Right eye: No discharge.         Left eye: No discharge.      Conjunctiva/sclera: Conjunctivae normal.   Cardiovascular:      Rate and Rhythm: Normal rate.   Pulmonary:      Effort: Pulmonary effort is normal. No respiratory distress.   Abdominal:      Palpations: Abdomen is soft.      Tenderness: There is no guarding.   Musculoskeletal:         General: No deformity or signs of injury.      Cervical back: Neck supple.   Skin:     General: Skin is warm and dry.      Findings: No rash.   Neurological:      General: No focal deficit present.      Mental Status: She is alert and oriented to person, place, and time.   Psychiatric:         Mood and Affect: Mood normal.         Behavior: Behavior normal.           Assessment & Plan:  Myalgia due to statin  Comments:  off crestor, resume zetia (prev rx from cards/giselle)  Orders:  -     ezetimibe (ZETIA) 10 mg tablet; Take 1 tablet (10 mg total) by mouth once daily.  Dispense: 90 tablet; Refill: 3  -     Comprehensive Metabolic Panel; Future; Expected date: 04/16/2024    Special screening for malignant neoplasms, colon  -     Case Request Endoscopy: COLONOSCOPY    Postmenopausal state  -     DXA Bone Density Axial Skeleton 1 or more sites; Future; Expected date: 04/16/2024    Abnormal findings on diagnostic imaging of other parts of digestive tract  -     Case Request Endoscopy: COLONOSCOPY    Immunization due  -     DIPH,PERTUSS,ACEL,,TET VAC,PF, ADULT (ADACEL) 2 Lf-(2.5-5-3-5 mcg)-5Lf/0.5 mL Syrg; Inject 0.5 mLs into the muscle once. for 1 dose  Dispense: 0.5 mL; Refill: 0    Allergic rhinitis, unspecified seasonality, unspecified trigger  -     fluticasone propionate (FLONASE) 50 mcg/actuation nasal spray;  use 1 spray (50 mcg total) by Each Nostril route daily as needed.  Dispense: 48 g; Refill: 3    Hyperlipidemia, unspecified hyperlipidemia type  -     Lipid Panel; Future; Expected date: 04/16/2024      Labs for f/u, repeat lipid

## 2024-04-18 ENCOUNTER — PATIENT MESSAGE (OUTPATIENT)
Dept: FAMILY MEDICINE | Facility: CLINIC | Age: 74
End: 2024-04-18
Payer: MEDICARE

## 2024-04-18 DIAGNOSIS — Z13.6 ENCOUNTER FOR SCREENING FOR CARDIOVASCULAR DISORDERS: Primary | ICD-10-CM

## 2024-04-18 DIAGNOSIS — I10 HYPERTENSION, UNSPECIFIED TYPE: ICD-10-CM

## 2024-04-18 DIAGNOSIS — E78.5 HYPERLIPIDEMIA, UNSPECIFIED HYPERLIPIDEMIA TYPE: ICD-10-CM

## 2024-04-19 ENCOUNTER — TELEPHONE (OUTPATIENT)
Dept: FAMILY MEDICINE | Facility: CLINIC | Age: 74
End: 2024-04-19
Payer: MEDICARE

## 2024-04-22 ENCOUNTER — HOSPITAL ENCOUNTER (OUTPATIENT)
Dept: RADIOLOGY | Facility: HOSPITAL | Age: 74
Discharge: HOME | End: 2024-04-22
Attending: FAMILY MEDICINE
Payer: MEDICARE

## 2024-04-22 DIAGNOSIS — Z13.6 ENCOUNTER FOR SCREENING FOR CARDIOVASCULAR DISORDERS: ICD-10-CM

## 2024-04-22 DIAGNOSIS — Z78.0 POSTMENOPAUSAL STATE: ICD-10-CM

## 2024-04-22 DIAGNOSIS — I10 HYPERTENSION, UNSPECIFIED TYPE: ICD-10-CM

## 2024-04-22 DIAGNOSIS — E78.5 HYPERLIPIDEMIA, UNSPECIFIED HYPERLIPIDEMIA TYPE: ICD-10-CM

## 2024-04-22 PROCEDURE — 77080 DXA BONE DENSITY AXIAL: CPT | Mod: TC,PO

## 2024-04-22 PROCEDURE — 75571 CT HRT W/O DYE W/CA TEST: CPT | Mod: 26,,, | Performed by: RADIOLOGY

## 2024-04-22 PROCEDURE — 77080 DXA BONE DENSITY AXIAL: CPT | Mod: 26,,, | Performed by: RADIOLOGY

## 2024-04-22 PROCEDURE — 75571 CT HRT W/O DYE W/CA TEST: CPT | Mod: TC,PO

## 2024-04-23 ENCOUNTER — TELEPHONE (OUTPATIENT)
Dept: FAMILY MEDICINE | Facility: CLINIC | Age: 74
End: 2024-04-23
Payer: MEDICARE

## 2024-04-23 NOTE — TELEPHONE ENCOUNTER
Pt stated she wanted to know which lung was the 5mm nodule is on since it didn't say in her report. Can you Please advise.

## 2024-04-23 NOTE — TELEPHONE ENCOUNTER
----- Message from Carolynn Pierce sent at 4/23/2024  2:54 PM CDT -----  Contact: [phone] Patient  Caller is requesting an earlier appointment then we can schedule.  Caller is requesting a message be sent to the provider.  If this is for urgent care symptoms, did you offer other providers at this location, providers at other locations, or [facility]? (yes, no, n/a):    If this is for the patients physical, did you offer to schedule next available and put on wait list, or to see NP or PA for their physical?  (yes, no, n/a):    When is the next available appointment with their provider:  05/13/2024  Reason for the appointment:  Discuss findings on CT Scan  Patient preference of timeframe to be scheduled:  ASAP  Would the patient like a call back, or a response through their [facility] portal?:   Call Back Please. Thank you  Comments:

## 2024-04-23 NOTE — TELEPHONE ENCOUNTER
----- Message from Nahum Dallas sent at 4/23/2024  3:02 PM CDT -----  Contact: pt  Type: Needs Medical Advice  Who Called:  pt  Best Call Back Number: [phone]    Additional Information: Pt is calling the office trying to speak with nurse regarding CT scan.Please call back and advise.

## 2024-04-26 ENCOUNTER — OFFICE VISIT (OUTPATIENT)
Dept: FAMILY MEDICINE | Facility: CLINIC | Age: 74
End: 2024-04-26
Payer: MEDICARE

## 2024-04-26 VITALS
BODY MASS INDEX: 31.45 KG/M2 | SYSTOLIC BLOOD PRESSURE: 150 MMHG | HEART RATE: 81 BPM | OXYGEN SATURATION: 98 % | DIASTOLIC BLOOD PRESSURE: 90 MMHG | HEIGHT: 60 IN | WEIGHT: 160.19 LBS

## 2024-04-26 DIAGNOSIS — G47.00 INSOMNIA, UNSPECIFIED TYPE: ICD-10-CM

## 2024-04-26 DIAGNOSIS — R91.1 SOLITARY PULMONARY NODULE: Primary | ICD-10-CM

## 2024-04-26 DIAGNOSIS — I10 ESSENTIAL HYPERTENSION: ICD-10-CM

## 2024-04-26 PROCEDURE — 3044F HG A1C LEVEL LT 7.0%: CPT | Mod: CPTII,S$GLB,, | Performed by: FAMILY MEDICINE

## 2024-04-26 PROCEDURE — 3077F SYST BP >= 140 MM HG: CPT | Mod: CPTII,S$GLB,, | Performed by: FAMILY MEDICINE

## 2024-04-26 PROCEDURE — 3080F DIAST BP >= 90 MM HG: CPT | Mod: CPTII,S$GLB,, | Performed by: FAMILY MEDICINE

## 2024-04-26 PROCEDURE — 1101F PT FALLS ASSESS-DOCD LE1/YR: CPT | Mod: CPTII,S$GLB,, | Performed by: FAMILY MEDICINE

## 2024-04-26 PROCEDURE — 99999 PR PBB SHADOW E&M-EST. PATIENT-LVL IV: CPT | Mod: PBBFAC,,, | Performed by: FAMILY MEDICINE

## 2024-04-26 PROCEDURE — 3288F FALL RISK ASSESSMENT DOCD: CPT | Mod: CPTII,S$GLB,, | Performed by: FAMILY MEDICINE

## 2024-04-26 PROCEDURE — 3008F BODY MASS INDEX DOCD: CPT | Mod: CPTII,S$GLB,, | Performed by: FAMILY MEDICINE

## 2024-04-26 PROCEDURE — 1126F AMNT PAIN NOTED NONE PRSNT: CPT | Mod: CPTII,S$GLB,, | Performed by: FAMILY MEDICINE

## 2024-04-26 PROCEDURE — 99213 OFFICE O/P EST LOW 20 MIN: CPT | Mod: S$GLB,,, | Performed by: FAMILY MEDICINE

## 2024-04-26 PROCEDURE — 1159F MED LIST DOCD IN RCRD: CPT | Mod: CPTII,S$GLB,, | Performed by: FAMILY MEDICINE

## 2024-04-26 PROCEDURE — 1160F RVW MEDS BY RX/DR IN RCRD: CPT | Mod: CPTII,S$GLB,, | Performed by: FAMILY MEDICINE

## 2024-04-26 NOTE — PROGRESS NOTES
Subjective:       Patient ID: Zayra Rodgers is a 74 y.o. female.    Chief Complaint: Follow-up (Test results)    HPI  Patient seen for f/u of test results.   Calcium score checked re lipid levels.   She did discuss the fluid noticed around her implant and addl imaging with u/s indicated not concerning.   Reviewed lung nodule. We reviewed her CT scan and noted 5mm nodule radiology identified on the LLL, posterior.     Review of Systems:  Review of Systems   Constitutional:  Positive for fatigue.   Respiratory:  Negative for shortness of breath.    Cardiovascular:  Negative for chest pain and palpitations.   Musculoskeletal:  Negative for neck pain.   Neurological:  Negative for headaches.       Objective:     Vitals:    04/26/24 1116   BP: (!) 150/90   Pulse: 81   SpO2: 98%   Weight: 72.7 kg (160 lb 2.6 oz)   Height: 5' (1.524 m)          Physical Exam  Vitals and nursing note reviewed.   Constitutional:       General: She is not in acute distress.     Appearance: Normal appearance. She is well-developed.   HENT:      Head: Normocephalic and atraumatic.   Eyes:      General: No scleral icterus.        Right eye: No discharge.         Left eye: No discharge.      Conjunctiva/sclera: Conjunctivae normal.   Cardiovascular:      Rate and Rhythm: Normal rate.   Pulmonary:      Effort: Pulmonary effort is normal. No respiratory distress.   Skin:     General: Skin is warm and dry.   Neurological:      General: No focal deficit present.      Mental Status: She is alert and oriented to person, place, and time.   Psychiatric:         Mood and Affect: Mood normal.         Behavior: Behavior normal.           Assessment & Plan:  Solitary pulmonary nodule  -     CT Chest Without Contrast; Future; Expected date: 04/26/2024    Essential hypertension  Comments:  controlled, cont regimen    Insomnia, unspecified type  Comments:  improved/stabilized, cont self-care, sx monitoring and review with psych

## 2024-05-13 NOTE — PROGRESS NOTES
Subjective:       Name: Zayra Rodgers  : 1950  MRN: [id number]    Chief Complaint   Patient presents with    Malignant neoplasm of nipple of left breast in female, estr          HPI: Zayra Rodgers is a 72 y.o. female presents for evaluation of Malignant neoplasm of nipple of left breast in female, estrogene/W[place] and [id number] negative currently receiving Keytruda.    Patient denies any fever chills night sweats weight loss recurrent infection diarrhea shortness of breath cough or phlegm.  She has been having a skin rash affecting her bilateral upper extremities for which she has been using topical steroids with improvement of her symptoms.        Oncology History   Breast cancer   3/14/2016 Initial Diagnosis    Breast cancer     2022 Cancer Staged    Staging form: Breast, AJCC 8th Edition  - Clinical stage from 2022: Stage IIB (cT2, cN0, cM0, G2, ER-, WY-, HER2-)       2022 Cancer Staged    Staging form: Breast, AJCC 8th Edition  - Pathologic stage from 2022: No Stage Recommended (ypT1b, pN0(sn), cM0, G3, ER-, WY-, HER2-)       Malignant neoplasm of nipple of left breast in female, estrogen receptor negative   2016 Initial Diagnosis    Malignant neoplasm of nipple of left breast in female     2022 -  Chemotherapy    Treatment Summary   Plan Name: OP PEMBROLIZUMAB WITH WEEKLY CARBOPLATIN (AUC 1.5)  AND PACLITAXEL FOLLOWED BY PEMBROLIZUMAB 200 MG Q3W  Treatment Goal: Curative  Status: Active  Start Date: 2022  End Date: 3/31/2023 (Planned)  Provider: Cindy Machado MD  Chemotherapy: CARBOplatin (PARAPLATIN) 125 mg in sodium chloride 0.9% 250 mL chemo infusion, 125 mg, Intravenous, Clinic[facility]\"" 1 time, 4 of 4 cycles  Administration: 125 mg (2022), 125 mg (2022), 125 mg (5/3/2022), 125 mg (5/10/2022), 125 mg (2022), 125 mg (2022), 125 mg (2022), 125 mg (2022), 125 mg (2022), 125 mg (2022), 125 mg (2022), 125 mg  ----- Message from Prosper Holden sent at 5/13/2024 10:15 AM CDT -----  Contact: Pt [phone]  Requesting an RX refill or new RX.  Is this a refill or new RX: Refill  RX name and strength BREO ELLIPTA 200-25 mcg/dose DsDv diskus inhaler  Is this a 30 day or 90 day RX:   Pharmacy name and phone # [id number]  [address] - [id number] AT [facility] [address] Phone: [phone]  Fax: [phone]   (7/12/2022)  PACLitaxeL (TAXOL) 80 mg/m2 = 132 mg in sodium chloride 0.9% 250 mL chemo infusion, 80 mg/m2 = 132 mg, Intravenous, [facility] 1 time, 4 of 4 cycles  Dose modification: 80 mg/m2 (original dose 80 mg/m2, Cycle 2)  Administration: 132 mg (4/26/2022), 132 mg (5/3/2022), 132 mg (5/10/2022), 132 mg (5/17/2022), 132 mg (5/24/2022), 132 mg (6/7/2022), 132 mg (5/31/2022), 132 mg (6/14/2022), 132 mg (6/28/2022), 132 mg (6/21/2022), 132 mg (7/5/2022), 132 mg (7/12/2022)            Past Medical History:   Diagnosis Date    Allergy     Breast cancer 02/2016    left breast, neoadjuvant chemo, lumpectomy, and radiation    Breast cancer 04/2022    left breast 3:00 invasive ductal carcinoma    Bronchitis     COVID-19 08/2020    HLD (hyperlipidemia)     Hypertension     S/P chemotherapy, time since greater than 12 weeks     Skin cancer     resovled       Past Surgical History:   Procedure Laterality Date    ADENOIDECTOMY      BREAST BIOPSY Left 2016    BREAST BIOPSY Left 04/2022    left breast invasive ductal carcinoma    BREAST CYST EXCISION      BREAST LUMPECTOMY Left 2016    BREAST RECONSTRUCTION Bilateral 08/18/2022    Procedure: RECONSTRUCTION, BREAST;  Surgeon: Edi Alanis MD;  Location: [facility] OR;  Service: Plastics;  Laterality: Bilateral;    COLONOSCOPY N/A 06/21/2018    Procedure: COLONOSCOPY;  Surgeon: Hiro Billy Jr., MD;  Location: [facility] ENDO;  Service: Endoscopy;  Laterality: N/A;    CYSTOCELE REPAIR      EXCISIONAL HEMORRHOIDECTOMY      HYSTERECTOMY      due to prolapse    INSERTION OF BREAST TISSUE EXPANDER Bilateral 08/18/2022    Procedure: INSERTION, TISSUE EXPANDER, BREAST;  Surgeon: Edi Alanis MD;  Location: [facility] OR;  Service: Plastics;  Laterality: Bilateral;    INSERTION OF TUNNELED CENTRAL VENOUS CATHETER (CVC) WITH SUBCUTANEOUS PORT Right 04/22/2022    Procedure: EVBZHYOKA-MISY-T-CATH;  Surgeon: Haile Domingo MD;  Location: [facility];  Service: General;  Laterality: Right;    ORBITAL  RECONSTRUCTION      PORTACATH PLACEMENT      since removed    REMOVAL OF VASCULAR ACCESS PORT      RHINOPLASTY TIP      SENTINEL LYMPH NODE BIOPSY Left 08/18/2022    Procedure: BIOPSY, LYMPH NODE, SENTINEL;  Surgeon: Padma Ruggiero MD;  Location: [facility] OR;  Service: General;  Laterality: Left;    SIGMOIDOSCOPY      SIMPLE MASTECTOMY Bilateral 08/18/2022    Procedure: MASTECTOMY, SIMPLE;  Surgeon: Padma Ruggiero MD;  Location: [facility] OR;  Service: General;  Laterality: Bilateral;    TISSUE EXPANDER REMOVAL Bilateral 11/3/2022    Procedure: REMOVAL, TISSUE EXPANDER;  Surgeon: Edi Alanis MD;  Location: [facility];  Service: Plastics;  Laterality: Bilateral;       Family History   Problem Relation Age of Onset    Breast cancer Mother 42    Cancer Mother     COPD Father     Colon cancer Sister     Cancer Sister     Breast cancer Sister        Social History     Socioeconomic History    Marital status:    Tobacco Use    Smoking status: Never    Smokeless tobacco: Never   Substance and Sexual Activity    Alcohol use: Yes     Alcohol/week: 2.0 standard drinks     Types: 2 Cans of beer per week     Comment: per  week    Drug use: No    Sexual activity: Yes     Partners: Male     Social Determinants of Health     Financial Resource Strain: Low Risk     Difficulty of Paying Living Expenses: Not very hard   Food Insecurity: No Food Insecurity    Worried About Running Out of Food in the Last Year: Never true    Ran Out of Food in the Last Year: Never true   Transportation Needs: No Transportation Needs    Lack of Transportation (Medical): No    Lack of Transportation (Non-Medical): No   Physical Activity: Insufficiently Active    Days of Exercise per Week: 2 days    Minutes of Exercise per Session: 20 min   Stress: No Stress Concern Present    Feeling of Stress : Only a little   Social Connections: Unknown    Frequency of Communication with Friends and Family: More than three times a week    Frequency of Social  Gatherings with Friends and Family: More than three times a week    Active Member of Clubs or Organizations: Yes    Attends Club or Organization Meetings: 1 to 4 times per year    Marital Status:    Housing Stability: Low Risk     Unable to Pay for Housing in the Last Year: No    Number of Places Lived in the Last Year: 1    Unstable Housing in the Last Year: No       Review of patient's allergies indicates:  No Known Allergies    Review of Systems   Constitutional:  Negative for appetite change and unexpected weight change.   HENT:  Negative for mouth sores.    Eyes:  Negative for visual disturbance.   Respiratory:  Negative for cough and shortness of breath.    Cardiovascular:  Negative for chest pain.   Gastrointestinal:  Negative for abdominal pain and diarrhea.   Genitourinary:  Negative for frequency.   Musculoskeletal:  Negative for back pain.   Integumentary:  Positive for rash.   Neurological:  Negative for headaches.   Hematological:  Negative for adenopathy.   Psychiatric/Behavioral:  The patient is not nervous/anxious.           Objective:     Vitals:    11/25/22 1240   BP: 138/84   BP Location: Right arm   Patient Position: Sitting   BP Method: Medium (Manual)   Pulse: 93   Resp: 18   Temp: 98.3 °F (36.8 °C)   TempSrc: Temporal   SpO2: 99%   Weight: 67.3 kg (148 lb 5.9 oz)   Height: 5' (1.524 m)        Physical Exam  Vitals reviewed.   Constitutional:       Appearance: Normal appearance.   HENT:      Head: Normocephalic and atraumatic.   Eyes:      General: No scleral icterus.     Pupils: Pupils are equal, round, and reactive to light.   Cardiovascular:      Rate and Rhythm: Normal rate and regular rhythm.      Pulses: Normal pulses.      Heart sounds: Normal heart sounds.   Pulmonary:      Effort: Pulmonary effort is normal.      Breath sounds: Normal breath sounds.   Abdominal:      General: Bowel sounds are normal. There is no distension.   Musculoskeletal:         General: No swelling.    Lymphadenopathy:      Cervical: No cervical adenopathy.   Skin:     General: Skin is warm.      Findings: No rash.   Neurological:      General: No focal deficit present.      Mental Status: She is alert and oriented to person, place, and time.      Cranial Nerves: No cranial nerve deficit.      Motor: No weakness.   Psychiatric:         Mood and Affect: Mood normal.         Behavior: Behavior normal.              Current Outpatient Medications on File Prior to Visit   Medication Sig    acetaminophen (TYLENOL) 325 MG tablet Take 325 mg by mouth every 6 (six) hours as needed for Pain.    albuterol (PROAIR HFA) 90 mcg/actuation inhaler Inhale 2 puffs into the lungs every 6 (six) hours as needed for Wheezing. Rescue    B-complex with vitamin C (Z-BEC OR EQUIV) tablet Take 1 tablet by mouth once daily.    calcium carbonate-vitamin D3 600 mg-20 mcg (800 unit) Tab Take 2 tablets by mouth once daily.    cefadroxil (DURICEF) 500 MG Cap Take 1 capsule (500 mg total) by mouth 2 (two) times a day.    cetirizine (ZYRTEC) 10 MG tablet Take 10 mg by mouth daily as needed.     clobetasol 0.05% (TEMOVATE) 0.05 % Oint Apply a small amount to affected area twice a day    diphenhydrAMINE-LIDOcaine HCl 2%-nystatin-magnesium hydroxide 400 mg/5 ml Swish and swallow 5 ml every 4 hours as needed for mouth / throat pain    ezetimibe (ZETIA) 10 mg tablet Take 1 tablet (10 mg total) by mouth once daily.    fish oil-omega-3 fatty acids 300-1,000 mg capsule Take 2 g by mouth daily as needed.     fluticasone propionate (FLONASE) 50 mcg/actuation nasal spray use 1 spray (50 mcg total) by Each Nostril route daily as needed.    hydroCHLOROthiazide (HYDRODIURIL) 25 MG tablet Take 1 tablet (25 mg total) by mouth daily as needed.    LIDOcaine-prilocaine (EMLA) cream Apply topically as directed    mirabegron (MYRBETRIQ) 50 mg Tb24 Take 1 tablet (50 mg total) by mouth once daily.    multivitamin capsule Take 1 capsule by mouth once daily.    mupirocin  (BACTROBAN) 2 % ointment Apply to each nostril with clean Q-Tip twice daily for 5 days. Start 4/20/2022. (Patient taking differently: 1 g by Nasal route 2 (two) times daily. 11/1-11/5)    mupirocin (BACTROBAN) 2 % ointment Apply to affected area twice a day    ondansetron (ZOFRAN-ODT) 8 MG TbDL 8 mg every 8 (eight) hours as needed.    rosuvastatin (CRESTOR) 10 MG tablet Take 1 tablet (10 mg total) by mouth every evening.     Current Facility-Administered Medications on File Prior to Visit   Medication    0.9%  NaCl infusion    HYDROmorphone injection 0.5 mg    LIDOcaine (PF) 10 mg/ml (1%) injection 10 mg    midazolam (VERSED) 1 mg/mL injection 2 mg    ondansetron injection 4 mg    prochlorperazine injection Soln 10 mg       CBC:  Lab Results   Component Value Date    WBC 7.42 11/25/2022    HGB 12.6 11/25/2022    HCT 39.1 11/25/2022    MCV 91 11/25/2022     11/25/2022         CMP:  Sodium   Date Value Ref Range Status   11/25/2022 141 136 - 145 mmol/L Final     Potassium   Date Value Ref Range Status   11/25/2022 4.2 3.5 - 5.1 mmol/L Final     Chloride   Date Value Ref Range Status   11/25/2022 105 95 - 110 mmol/L Final     CO2   Date Value Ref Range Status   11/25/2022 28 23 - 29 mmol/L Final     Glucose   Date Value Ref Range Status   11/25/2022 96 70 - 110 mg/dL Final     BUN   Date Value Ref Range Status   11/25/2022 18 8 - 23 mg/dL Final     Creatinine   Date Value Ref Range Status   11/25/2022 0.7 0.5 - 1.4 mg/dL Final     Calcium   Date Value Ref Range Status   11/25/2022 9.8 8.7 - 10.5 mg/dL Final     Total Protein   Date Value Ref Range Status   11/25/2022 7.2 6.0 - 8.4 g/dL Final     Albumin   Date Value Ref Range Status   11/25/2022 4.0 3.5 - 5.2 g/dL Final     Total Bilirubin   Date Value Ref Range Status   11/25/2022 0.3 0.1 - 1.0 mg/dL Final     Comment:     For infants and newborns, interpretation of results should be based  on gestational age, weight and in agreement with  clinical  observations.    Premature Infant recommended reference ranges:  Up to 24 hours.............<8.0 mg/dL  Up to 48 hours............<12.0 mg/dL  3-5 days..................<15.0 mg/dL  6-29 days.................<15.0 mg/dL       Alkaline Phosphatase   Date Value Ref Range Status   11/25/2022 62 55 - 135 U/L Final     AST   Date Value Ref Range Status   11/25/2022 16 10 - 40 U/L Final     ALT   Date Value Ref Range Status   11/25/2022 12 10 - 44 U/L Final     Anion Gap   Date Value Ref Range Status   11/25/2022 8 8 - 16 mmol/L Final     eGFR if    Date Value Ref Range Status   07/29/2022 >60 >60 mL/min/1.73 m^2 Final     eGFR if non    Date Value Ref Range Status   07/29/2022 >60 >60 mL/min/1.73 m^2 Final     Comment:     Calculation used to obtain the estimated glomerular filtration  rate (eGFR) is the CKD-EPI equation.          US Guided Breast Seroma Aspiration  Result:   US Guided Breast Seroma Aspiration    A seroma aspiration was performed with sonographic guidance on the left   breast at 2 o'clock using a 18 gauge needle and approximately 125 ml of   clear reddish fluid was removed. Lesion did completely resolve. Fluid was   sent for culture and sensitivities.  US Guided Breast Seroma Aspiration  Result:   US Guided Breast Seroma Aspiration    A seroma aspiration was performed with sonographic guidance on the right   breast at 10 o'clock using a 18 gauge needle and approximately 100 ml of   reddish fluid was removed. Lesion did completely resolve. Fluid was sent   for culture and sensitivities.  US Breast Bilateral Complete  Narrative: Result:   US Breast Bilateral Complete     History:  Patient is 72 y.o. and is seen for bilateral redness, swelling and   tenderness, left worse than right. Status post bilateral mastectomies with   expanders placed 8/18/22 for left breast cancer. She has been on   antibiotics for 3 weeks.    Films Compared:  Compared to: 06/06/2022 US  Breast Left Complete     Findings:    All four quadrants, the central, and the axillary regions were scanned.    There is a seroma outside the right expander in the upper outer measuring   14 x 14 x 77 mm.    There is a seroma outside the left expander measuring 60 x 8 x 25 mm.  Impression: There is a seroma outside the right expander in the upper outer measuring   14 x 14 x 77 mm.    There is a seroma outside the left expander measuring 60 x 8 x 25 mm.    BI-RADS Category:   Overall: 2 - Benign     Recommendation:  Aspiration is recommended for both breasts. Fluid will be sent for   cultures and sensitivities.    I discussed the finding and recommendation in detail with Zayra Rodgers at the time of the study.        ECOG SCORE    0 - Fully active-able to carry on all pre-disease performance without restriction            Assessment:       1. Malignant neoplasm of upper-outer quadrant of left breast in female, estrogen receptor negative    2. Drug-induced skin rash    3. Immunocompromised state due to drug therapy         Plan:   1. Malignant neoplasm of upper-outer quadrant of left breast in female, estrogen receptor negative        -  -     Stable to proceed with her next treatment with Keytruda  -     CBC Oncology; Future; Expected date: 11/25/2022  -     Comprehensive Metabolic Panel; Future; Expected date: 11/25/2022  -     TSH; Future; Expected date: 11/25/2022    2. Drug-induced skin rash  -She was advised to continue to use topical steroid.      3. Immunocompromised state due to drug therapy:  -Recommend COVID guidelines being followed.    -      Other orders  -     acetaminophen tablet 1,000 mg  -     diphenhydrAMINE (BENADRYL) 25 mg in NS 50 mL IVPB  -     pembrolizumab (KEYTRUDA) 200 mg in sodium chloride 0.9% 108 mL infusion  -     EPINEPHrine (EPIPEN) 0.3 mg/0.3 mL pen injection 0.3 mg  -     diphenhydrAMINE injection 50 mg  -     hydrocortisone sodium succinate injection 100 mg  -     sodium  chloride 0.9% 100 mL flush bag  -     sodium chloride 0.9% flush 10 mL  -     heparin, porcine (PF) 100 unit/mL injection flush 500 Units  -     alteplase injection 2 mg           Med Onc Chart Routing      Follow up with physician 3 weeks. with Dr. Machado with repeat CBC CMP and TSH. She is cleared to proceed with Keytruda   Follow up with ANGIE    Infusion scheduling note    Injection scheduling note    Labs    Imaging    Pharmacy appointment    Other referrals           Plan was discussed with the patient at length, and she  verbalized understanding.  Advance Care Planning During this visit, I engaged the patient in the advance care planning process.  The patient and I reviewed the role for advance directives and their purpose in directing future healthcare if the patients unable to speak for him/herself.  At this point in time, the patient does have full decision-making capacity.  We discussed different extreme health states that she could experience, and reviewed what kind of medical care she would want in those situations.  The patient communicated that if she were comatose and had little chance of a meaningful recovery, she would want machines/life-sustaining treatments used. .  The patient has not completed a living will to reflect these preferences.  The patient  has not already designated a healthcare power of  to make decisions on her behalf.  She will be working on having the legal documents done as soon as possible.  A verbal designation of her healthcare proxy and her wishes may not be honored during an admission to the hospital.        Recommend exercise, nutrition and weight management and health maintenance activities and follow-up with PCPs recommendations.    Signed:  Liz Woods MD   Hematology and Oncology  [facility] - HEMATOLOGY ONCOLOGY  [facility], [place]

## 2024-05-16 ENCOUNTER — TELEPHONE (OUTPATIENT)
Dept: GASTROENTEROLOGY | Facility: CLINIC | Age: 74
End: 2024-05-16
Payer: MEDICARE

## 2024-05-20 ENCOUNTER — TELEPHONE (OUTPATIENT)
Dept: GASTROENTEROLOGY | Facility: CLINIC | Age: 74
End: 2024-05-20
Payer: MEDICARE

## 2024-05-20 NOTE — TELEPHONE ENCOUNTER
Spoke to pt in regards to scheduling c-scope. Pt states she would like to hold off at the moment and will call when ready to schedule. Case request canceled at this time.

## 2024-07-02 DIAGNOSIS — I10 HYPERTENSION, UNSPECIFIED TYPE: ICD-10-CM

## 2024-07-02 RX ORDER — HYDROCHLOROTHIAZIDE 12.5 MG/1
12.5 TABLET ORAL DAILY
Qty: 90 TABLET | Refills: 1 | Status: CANCELLED | OUTPATIENT
Start: 2024-07-02

## 2024-07-02 NOTE — TELEPHONE ENCOUNTER
No care due was identified.  [facility] Embedded Care Due Messages. Reference number: [id number].   7/02/2024 1:33:12 PM CDT

## 2024-07-02 NOTE — TELEPHONE ENCOUNTER
Please approve for hydroCHLOROthiazide (HYDRODIURIL) 12.5 MG Tab     Last OV 04/26/24  Last refill date 02/02/24  Last labs 04/22/24    Next appt ---

## 2024-07-02 NOTE — TELEPHONE ENCOUNTER
No care due was identified.  [facility] Embedded Care Due Messages. Reference number: [id number].   7/02/2024 3:29:30 PM CDT

## 2024-07-04 RX ORDER — HYDROCHLOROTHIAZIDE 12.5 MG/1
12.5 TABLET ORAL DAILY
Qty: 90 TABLET | Refills: 0 | Status: SHIPPED | OUTPATIENT
Start: 2024-07-04

## 2024-08-30 ENCOUNTER — OFFICE VISIT (OUTPATIENT)
Dept: FAMILY MEDICINE | Facility: CLINIC | Age: 74
End: 2024-08-30
Payer: MEDICARE

## 2024-08-30 VITALS
BODY MASS INDEX: 33.47 KG/M2 | HEIGHT: 60 IN | SYSTOLIC BLOOD PRESSURE: 122 MMHG | OXYGEN SATURATION: 98 % | DIASTOLIC BLOOD PRESSURE: 80 MMHG | HEART RATE: 76 BPM | WEIGHT: 170.5 LBS

## 2024-08-30 DIAGNOSIS — J20.8 ACUTE BACTERIAL BRONCHITIS: Primary | ICD-10-CM

## 2024-08-30 DIAGNOSIS — B96.89 ACUTE BACTERIAL BRONCHITIS: Primary | ICD-10-CM

## 2024-08-30 DIAGNOSIS — Z79.899 IMMUNOCOMPROMISED STATE DUE TO DRUG THERAPY: ICD-10-CM

## 2024-08-30 DIAGNOSIS — F43.21 SITUATIONAL DEPRESSION: ICD-10-CM

## 2024-08-30 DIAGNOSIS — D84.821 IMMUNOCOMPROMISED STATE DUE TO DRUG THERAPY: ICD-10-CM

## 2024-08-30 PROCEDURE — 99999 PR PBB SHADOW E&M-EST. PATIENT-LVL IV: CPT | Mod: PBBFAC,,, | Performed by: FAMILY MEDICINE

## 2024-08-30 RX ORDER — DOXYCYCLINE HYCLATE 100 MG
100 TABLET ORAL 2 TIMES DAILY
Qty: 14 TABLET | Refills: 0 | Status: SHIPPED | OUTPATIENT
Start: 2024-08-30

## 2024-08-30 RX ORDER — BETAMETHASONE SODIUM PHOSPHATE AND BETAMETHASONE ACETATE 3; 3 MG/ML; MG/ML
6 INJECTION, SUSPENSION INTRA-ARTICULAR; INTRALESIONAL; INTRAMUSCULAR; SOFT TISSUE
Status: COMPLETED | OUTPATIENT
Start: 2024-08-30 | End: 2024-08-30

## 2024-08-30 RX ORDER — ALBUTEROL SULFATE 90 UG/1
2 INHALANT RESPIRATORY (INHALATION) EVERY 6 HOURS PRN
Qty: 8.5 G | Refills: 3 | Status: SHIPPED | OUTPATIENT
Start: 2024-08-30

## 2024-08-30 RX ORDER — METHYLPREDNISOLONE 4 MG/1
TABLET ORAL
Qty: 21 EACH | Refills: 0 | Status: SHIPPED | OUTPATIENT
Start: 2024-08-30 | End: 2024-09-20

## 2024-08-30 RX ADMIN — BETAMETHASONE SODIUM PHOSPHATE AND BETAMETHASONE ACETATE 6 MG: 3; 3 INJECTION, SUSPENSION INTRA-ARTICULAR; INTRALESIONAL; INTRAMUSCULAR; SOFT TISSUE at 10:08

## 2024-08-30 NOTE — PROGRESS NOTES
Subjective:       Patient ID: Zayra Rodgers is a 74 y.o. female.    Chief Complaint: Follow-up (6 month)    Follow-up  Associated symptoms include congestion, coughing and fatigue. Pertinent negatives include no chest pain, headaches, neck pain or sore throat.     Patient in for 6mo f/u.  6 weeks of persistent/nagging croupy cough. She has not seen any consistent improvement. Needs inh refill. She did try an antihistamine, but still with some persistent drip.     Bone bruise under the bottom of the little toe when she stepped into a shower with a rock floor.     Off wellbutrin and seeing psych/baron q3 weeks - plans to maintain. Feels she's doing ok. Notes rare use of xanax.   Wrongful death suit re her 's passing will be starting shortly.     Review of Systems:  Review of Systems   Constitutional:  Positive for fatigue.   HENT:  Positive for congestion and postnasal drip. Negative for sore throat.    Respiratory:  Positive for cough. Negative for shortness of breath.    Cardiovascular:  Negative for chest pain and palpitations.   Musculoskeletal:  Negative for neck pain.   Neurological:  Negative for headaches.       Objective:     Vitals:    08/30/24 0938   BP: 122/80   Pulse: 76   SpO2: 98%   Weight: 77.3 kg (170 lb 8.4 oz)   Height: 5' (1.524 m)          Physical Exam  Vitals and nursing note reviewed.   Constitutional:       General: She is not in acute distress.     Appearance: Normal appearance. She is well-developed.   HENT:      Head: Normocephalic and atraumatic.      Right Ear: Tympanic membrane normal.      Left Ear: Tympanic membrane normal.   Eyes:      General: No scleral icterus.        Right eye: No discharge.         Left eye: No discharge.      Conjunctiva/sclera: Conjunctivae normal.   Cardiovascular:      Rate and Rhythm: Normal rate and regular rhythm.   Pulmonary:      Effort: Pulmonary effort is normal. No respiratory distress.      Breath sounds: Decreased breath sounds present.  No wheezing.   Musculoskeletal:      Cervical back: Neck supple.   Lymphadenopathy:      Cervical: No cervical adenopathy.   Skin:     General: Skin is warm and dry.   Neurological:      General: No focal deficit present.      Mental Status: She is alert and oriented to person, place, and time.   Psychiatric:         Mood and Affect: Mood normal.         Behavior: Behavior normal.           Assessment & Plan:  Acute bacterial bronchitis  -     methylPREDNISolone (MEDROL DOSEPACK) 4 mg tablet; use as directed  Dispense: 21 each; Refill: 0  -     doxycycline (VIBRA-TABS) 100 MG tablet; Take 1 tablet (100 mg total) by mouth 2 (two) times daily.  Dispense: 14 tablet; Refill: 0  -     betamethasone acetate-betamethasone sodium phosphate injection 6 mg  -     albuterol (PROVENTIL HFA) 90 mcg/actuation inhaler; Inhale 2 puffs into the lungs every 6 (six) hours as needed for Wheezing. Rescue  Dispense: 8.5 g; Refill: 3    Immunocompromised state due to drug therapy  Comments:  pt maintains f/u with onc/maronge    Situational depression  Comments:  doing well overall, maintaining psych f/u    Can add symbicort for continued resp sx.

## 2024-10-22 DIAGNOSIS — I10 HYPERTENSION, UNSPECIFIED TYPE: ICD-10-CM

## 2024-10-22 RX ORDER — HYDROCHLOROTHIAZIDE 12.5 MG/1
12.5 TABLET ORAL DAILY
Qty: 90 TABLET | Refills: 0 | Status: CANCELLED | OUTPATIENT
Start: 2024-10-22

## 2024-10-22 RX ORDER — HYDROCHLOROTHIAZIDE 12.5 MG/1
12.5 TABLET ORAL DAILY
Qty: 90 TABLET | Refills: 0 | Status: SHIPPED | OUTPATIENT
Start: 2024-10-22

## 2024-10-22 NOTE — TELEPHONE ENCOUNTER
No care due was identified.  [facility] Embedded Care Due Messages. Reference number: [id number].   10/22/2024 3:41:36 PM CDT

## 2024-10-22 NOTE — TELEPHONE ENCOUNTER
No care due was identified.  [facility] Embedded Care Due Messages. Reference number: [id number].   10/22/2024 3:35:19 PM CDT

## 2024-12-18 NOTE — TELEPHONE ENCOUNTER
"Called patient to confirm that Calcium with D was refilled at [facility] today as authorized via phone, patient stated "Yes"  " Spironolactone Pregnancy And Lactation Text: This medication can cause feminization of the male fetus and should be avoided during pregnancy. The active metabolite is also found in breast milk. Prednisone Counseling:  I discussed with the patient the risks of prolonged use of prednisone including but not limited to weight gain, insomnia, osteoporosis, mood changes, diabetes, susceptibility to infection, glaucoma and high blood pressure.  In cases where prednisone use is prolonged, patients should be monitored with blood pressure checks, serum glucose levels and an eye exam.  Additionally, the patient may need to be placed on GI prophylaxis, PCP prophylaxis, and calcium and vitamin D supplementation and/or a bisphosphonate.  The patient verbalized understanding of the proper use and the possible adverse effects of prednisone.  All of the patient's questions and concerns were addressed. Dupixent Counseling: I discussed with the patient the risks of dupilumab including but not limited to eye infection and irritation, cold sores, injection site reactions, worsening of asthma, allergic reactions and increased risk of parasitic infection.  Live vaccines should be avoided while taking dupilumab. Dupilumab will also interact with certain medications such as warfarin and cyclosporine. The patient understands that monitoring is required and they must alert us or the primary physician if symptoms of infection or other concerning signs are noted. Topical Clindamycin Counseling: Patient counseled that this medication may cause skin irritation or allergic reactions.  In the event of skin irritation, the patient was advised to reduce the amount of the drug applied or use it less frequently.   The patient verbalized understanding of the proper use and possible adverse effects of clindamycin.  All of the patient's questions and concerns were addressed. Low Dose Naltrexone Counseling- I discussed with the patient the potential risks and side effects of low dose naltrexone including but not limited to: more vivid dreams, headaches, nausea, vomiting, abdominal pain, fatigue, dizziness, and anxiety. Doxepin Pregnancy And Lactation Text: This medication is Pregnancy Category C and it isn't known if it is safe during pregnancy. It is also excreted in breast milk and breast feeding isn't recommended. Ilumya Pregnancy And Lactation Text: The risk during pregnancy and breastfeeding is uncertain with this medication. Topical Sulfur Applications Pregnancy And Lactation Text: This medication is considered safe during pregnancy and breast feeding secondary to limited systemic absorption. Rinvoq Pregnancy And Lactation Text: Based on animal studies, Rinvoq may cause embryo-fetal harm when administered to pregnant women.  The medication should not be used in pregnancy.  Breastfeeding is not recommended during treatment and for 6 days after the last dose. Oral Minoxidil Pregnancy And Lactation Text: This medication should only be used when clearly needed if you are pregnant, attempting to become pregnant or breast feeding. Sski Pregnancy And Lactation Text: This medication is Pregnancy Category D and isn't considered safe during pregnancy. It is excreted in breast milk. Dutasteride Male Counseling: Dustasteride Counseling:  I discussed with the patient the risks of use of dutasteride including but not limited to decreased libido, decreased ejaculate volume, and gynecomastia. Women who can become pregnant should not handle medication.  All of the patient's questions and concerns were addressed. Sarecycline Counseling: Patient advised regarding possible photosensitivity and discoloration of the teeth, skin, lips, tongue and gums.  Patient instructed to avoid sunlight, if possible.  When exposed to sunlight, patients should wear protective clothing, sunglasses, and sunscreen.  The patient was instructed to call the office immediately if the following severe adverse effects occur:  hearing changes, easy bruising/bleeding, severe headache, or vision changes.  The patient verbalized understanding of the proper use and possible adverse effects of sarecycline.  All of the patient's questions and concerns were addressed. Imiquimod Pregnancy And Lactation Text: This medication is Pregnancy Category C. It is unknown if this medication is excreted in breast milk. Picato Counseling:  I discussed with the patient the risks of Picato including but not limited to erythema, scaling, itching, weeping, crusting, and pain. Tranexamic Acid Counseling:  Patient advised of the small risk of bleeding problems with tranexamic acid. They were also instructed to call if they developed any nausea, vomiting or diarrhea. All of the patient's questions and concerns were addressed. Benzoyl Peroxide Counseling: Patient counseled that medicine may cause skin irritation and bleach clothing.  In the event of skin irritation, the patient was advised to reduce the amount of the drug applied or use it less frequently.   The patient verbalized understanding of the proper use and possible adverse effects of benzoyl peroxide.  All of the patient's questions and concerns were addressed. Thalidomide Counseling: I discussed with the patient the risks of thalidomide including but not limited to birth defects, anxiety, weakness, chest pain, dizziness, cough and severe allergy. Itraconazole Pregnancy And Lactation Text: This medication is Pregnancy Category C and it isn't know if it is safe during pregnancy. It is also excreted in breast milk. Drysol Counseling:  I discussed with the patient the risks of drysol/aluminum chloride including but not limited to skin rash, itching, irritation, burning. Xolair Counseling:  Patient informed of potential adverse effects including but not limited to fever, muscle aches, rash and allergic reactions.  The patient verbalized understanding of the proper use and possible adverse effects of Xolair.  All of the patient's questions and concerns were addressed. Colchicine Pregnancy And Lactation Text: This medication is Pregnancy Category C and isn't considered safe during pregnancy. It is excreted in breast milk. Methotrexate Counseling:  Patient counseled regarding adverse effects of methotrexate including but not limited to nausea, vomiting, abnormalities in liver function tests. Patients may develop mouth sores, rash, diarrhea, and abnormalities in blood counts. The patient understands that monitoring is required including LFT's and blood counts.  There is a rare possibility of scarring of the liver and lung problems that can occur when taking methotrexate. Persistent nausea, loss of appetite, pale stools, dark urine, cough, and shortness of breath should be reported immediately. Patient advised to discontinue methotrexate treatment at least three months before attempting to become pregnant.  I discussed the need for folate supplements while taking methotrexate.  These supplements can decrease side effects during methotrexate treatment. The patient verbalized understanding of the proper use and possible adverse effects of methotrexate.  All of the patient's questions and concerns were addressed. Bactrim Counseling:  I discussed with the patient the risks of sulfa antibiotics including but not limited to GI upset, allergic reaction, drug rash, diarrhea, dizziness, photosensitivity, and yeast infections.  Rarely, more serious reactions can occur including but not limited to aplastic anemia, agranulocytosis, methemoglobinemia, blood dyscrasias, liver or kidney failure, lung infiltrates or desquamative/blistering drug rashes. Topical Clindamycin Pregnancy And Lactation Text: This medication is Pregnancy Category B and is considered safe during pregnancy. It is unknown if it is excreted in breast milk. Isotretinoin Counseling: Patient should get monthly blood tests, not donate blood, not drive at night if vision affected, not share medication, and not undergo elective surgery for 6 months after tx completed. Side effects reviewed, pt to contact office should one occur. Infliximab Counseling:  I discussed with the patient the risks of infliximab including but not limited to myelosuppression, immunosuppression, autoimmune hepatitis, demyelinating diseases, lymphoma, and serious infections.  The patient understands that monitoring is required including a PPD at baseline and must alert us or the primary physician if symptoms of infection or other concerning signs are noted. Erythromycin Pregnancy And Lactation Text: This medication is Pregnancy Category B and is considered safe during pregnancy. It is also excreted in breast milk. Wartpeel Counseling:  I discussed with the patient the risks of Wartpeel including but not limited to erythema, scaling, itching, weeping, crusting, and pain. Dutasteride Female Counseling: Dutasteride Counseling:  I discussed with the patient the risks of use of dutasteride including but not limited to decreased libido and sexual dysfunction. Explained the teratogenic nature of the medication and stressed the importance of not getting pregnant during treatment. All of the patient's questions and concerns were addressed. Sarecycline Pregnancy And Lactation Text: This medication is Pregnancy Category D and not consider safe during pregnancy. It is also excreted in breast milk. Klisyri Counseling:  I discussed with the patient the risks of Klisyri including but not limited to erythema, scaling, itching, weeping, crusting, and pain. Hydroxyzine Counseling: Patient advised that the medication is sedating and not to drive a car after taking this medication.  Patient informed of potential adverse effects including but not limited to dry mouth, urinary retention, and blurry vision.  The patient verbalized understanding of the proper use and possible adverse effects of hydroxyzine.  All of the patient's questions and concerns were addressed. Prednisone Pregnancy And Lactation Text: This medication is Pregnancy Category C and it isn't know if it is safe during pregnancy. This medication is excreted in breast milk. Dupixent Pregnancy And Lactation Text: This medication likely crosses the placenta but the risk for the fetus is uncertain. This medication is excreted in breast milk. Azathioprine Counseling:  I discussed with the patient the risks of azathioprine including but not limited to myelosuppression, immunosuppression, hepatotoxicity, lymphoma, and infections.  The patient understands that monitoring is required including baseline LFTs, Creatinine, possible TPMP genotyping and weekly CBCs for the first month and then every 2 weeks thereafter.  The patient verbalized understanding of the proper use and possible adverse effects of azathioprine.  All of the patient's questions and concerns were addressed. Drysol Pregnancy And Lactation Text: This medication is considered safe during pregnancy and breast feeding. Xolair Pregnancy And Lactation Text: This medication is Pregnancy Category B and is considered safe during pregnancy. This medication is excreted in breast milk. Sotyktu Counseling:  I discussed the most common side effects of Sotyktu including: common cold, sore throat, sinus infections, cold sores, canker sores, folliculitis, and acne.  I also discussed more serious side effects of Sotyktu including but not limited to: serious allergic reactions; increased risk for infections such as TB; cancers such as lymphomas; rhabdomyolysis and elevated CPK; and elevated triglycerides and liver enzymes.  Solaraze Counseling:  I discussed with the patient the risks of Solaraze including but not limited to erythema, scaling, itching, weeping, crusting, and pain. Dapsone Counseling: I discussed with the patient the risks of dapsone including but not limited to hemolytic anemia, agranulocytosis, rashes, methemoglobinemia, kidney failure, peripheral neuropathy, headaches, GI upset, and liver toxicity.  Patients who start dapsone require monitoring including baseline LFTs and weekly CBCs for the first month, then every month thereafter.  The patient verbalized understanding of the proper use and possible adverse effects of dapsone.  All of the patient's questions and concerns were addressed. Low Dose Naltrexone Pregnancy And Lactation Text: Naltrexone is pregnancy category C.  There have been no adequate and well-controlled studies in pregnant women.  It should be used in pregnancy only if the potential benefit justifies the potential risk to the fetus.   Limited data indicates that naltrexone is minimally excreted into breastmilk. Ketoconazole Counseling:   Patient counseled regarding improving absorption with orange juice.  Adverse effects include but are not limited to breast enlargement, headache, diarrhea, nausea, upset stomach, liver function test abnormalities, taste disturbance, and stomach pain.  There is a rare possibility of liver failure that can occur when taking ketoconazole. The patient understands that monitoring of LFTs may be required, especially at baseline. The patient verbalized understanding of the proper use and possible adverse effects of ketoconazole.  All of the patient's questions and concerns were addressed. Infliximab Pregnancy And Lactation Text: This medication is Pregnancy Category B and is considered safe during pregnancy. It is unknown if this medication is excreted in breast milk. Wartpeel Pregnancy And Lactation Text: This medication is Pregnancy Category X and contraindicated in pregnancy and in women who may become pregnant. It is unknown if this medication is excreted in breast milk. Isotretinoin Pregnancy And Lactation Text: This medication is Pregnancy Category X and is considered extremely dangerous during pregnancy. It is unknown if it is excreted in breast milk. Tranexamic Acid Pregnancy And Lactation Text: It is unknown if this medication is safe during pregnancy or breast feeding. Spevigo Counseling: I discussed with the patient the risks of Spevigo including but not limited to fatigue, nasuea, vomiting, headache, pruritus, urinary tract infection, an infusion related reactions.  The patient understands that monitoring is required including screening for tuberculosis at baseline and yearly screening thereafter while continuing Spevigo therapy. They should contact us if symptoms of infection or other concerning signs are noted. Metronidazole Counseling:  I discussed with the patient the risks of metronidazole including but not limited to seizures, nausea/vomiting, a metallic taste in the mouth, nausea/vomiting and severe allergy. Benzoyl Peroxide Pregnancy And Lactation Text: This medication is Pregnancy Category C. It is unknown if benzoyl peroxide is excreted in breast milk. Hydroxyzine Pregnancy And Lactation Text: This medication is not safe during pregnancy and should not be taken. It is also excreted in breast milk and breast feeding isn't recommended. Opioid Counseling: I discussed with the patient the potential side effects of opioids including but not limited to addiction, altered mental status, and depression. I stressed avoiding alcohol, benzodiazepines, muscle relaxants and sleep aids unless specifically okayed by a physician. The patient verbalized understanding of the proper use and possible adverse effects of opioids. All of the patient's questions and concerns were addressed. They were instructed to flush the remaining pills down the toilet if they did not need them for pain. Protopic Counseling: Patient may experience a mild burning sensation during topical application. Protopic is not approved in children less than 2 years of age. There have been case reports of hematologic and skin malignancies in patients using topical calcineurin inhibitors although causality is questionable. Enbrel Counseling:  I discussed with the patient the risks of etanercept including but not limited to myelosuppression, immunosuppression, autoimmune hepatitis, demyelinating diseases, lymphoma, and infections.  The patient understands that monitoring is required including a PPD at baseline and must alert us or the primary physician if symptoms of infection or other concerning signs are noted. Bactrim Pregnancy And Lactation Text: This medication is Pregnancy Category D and is known to cause fetal risk.  It is also excreted in breast milk. Adbry Pregnancy And Lactation Text: It is unknown if this medication will adversely affect pregnancy or breast feeding. Dapsone Pregnancy And Lactation Text: This medication is Pregnancy Category C and is not considered safe during pregnancy or breast feeding. Solaraze Pregnancy And Lactation Text: This medication is Pregnancy Category B and is considered safe. There is some data to suggest avoiding during the third trimester. It is unknown if this medication is excreted in breast milk. Dutasteride Pregnancy And Lactation Text: This medication is absolutely contraindicated in women, especially during pregnancy and breast feeding. Feminization of male fetuses is possible if taking while pregnant. Topical Ketoconazole Counseling: Patient counseled that this medication may cause skin irritation or allergic reactions.  In the event of skin irritation, the patient was advised to reduce the amount of the drug applied or use it less frequently.   The patient verbalized understanding of the proper use and possible adverse effects of ketoconazole.  All of the patient's questions and concerns were addressed. Otezla Counseling: The side effects of Otezla were discussed with the patient, including but not limited to worsening or new depression, weight loss, diarrhea, nausea, upper respiratory tract infection, and headache. Patient instructed to call the office should any adverse effect occur.  The patient verbalized understanding of the proper use and possible adverse effects of Otezla.  All the patient's questions and concerns were addressed. Cibinqo Counseling: I discussed with the patient the risks of Cibinqo therapy including but not limited to common cold, nausea, headache, cold sores, increased blood CPK levels, dizziness, UTIs, fatigue, acne, and vomitting. Live vaccines should be avoided.  This medication has been linked to serious infections; higher rate of mortality; malignancy and lymphoproliferative disorders; major adverse cardiovascular events; thrombosis; thrombocytopenia and lymphopenia; lipid elevations; and retinal detachment. Niacinamide Counseling: I recommended taking niacin or niacinamide, also know as vitamin B3, twice daily. Recent evidence suggests that taking vitamin B3 (500 mg twice daily) can reduce the risk of actinic keratoses and non-melanoma skin cancers. Side effects of vitamin B3 include flushing and headache. Carac Counseling:  I discussed with the patient the risks of Carac including but not limited to erythema, scaling, itching, weeping, crusting, and pain. Elidel Counseling: Patient may experience a mild burning sensation during topical application. Elidel is not approved in children less than 2 years of age. There have been case reports of hematologic and skin malignancies in patients using topical calcineurin inhibitors although causality is questionable. Metronidazole Pregnancy And Lactation Text: This medication is Pregnancy Category B and considered safe during pregnancy.  It is also excreted in breast milk. Spevigo Pregnancy And Lactation Text: The risk during pregnancy and breastfeeding is uncertain with this medication. This medication does cross the placenta. It is unknown if this medication is found in breast milk. Sotyktu Pregnancy And Lactation Text: There is insufficient data to evaluate whether or not Sotyktu is safe to use during pregnancy.   It is not known if Sotyktu passes into breast milk and whether or not it is safe to use when breastfeeding.   Tetracycline Counseling: Patient counseled regarding possible photosensitivity and increased risk for sunburn.  Patient instructed to avoid sunlight, if possible.  When exposed to sunlight, patients should wear protective clothing, sunglasses, and sunscreen.  The patient was instructed to call the office immediately if the following severe adverse effects occur:  hearing changes, easy bruising/bleeding, severe headache, or vision changes.  The patient verbalized understanding of the proper use and possible adverse effects of tetracycline.  All of the patient's questions and concerns were addressed. Patient understands to avoid pregnancy while on therapy due to potential birth defects. Klisyri Pregnancy And Lactation Text: It is unknown if this medication can harm a developing fetus or if it is excreted in breast milk. Valtrex Counseling: I discussed with the patient the risks of valacyclovir including but not limited to kidney damage, nausea, vomiting and severe allergy.  The patient understands that if the infection seems to be worsening or is not improving, they are to call. Rituxan Counseling:  I discussed with the patient the risks of Rituxan infusions. Side effects can include infusion reactions, severe drug rashes including mucocutaneous reactions, reactivation of latent hepatitis and other infections and rarely progressive multifocal leukoencephalopathy.  All of the patient's questions and concerns were addressed. Winlevi Counseling:  I discussed with the patient the risks of topical clascoterone including but not limited to erythema, scaling, itching, and stinging. Patient voiced their understanding. Otezla Pregnancy And Lactation Text: This medication is Pregnancy Category C and it isn't known if it is safe during pregnancy. It is unknown if it is excreted in breast milk. Niacinamide Pregnancy And Lactation Text: These medications are considered safe during pregnancy. Erivedge Counseling- I discussed with the patient the risks of Erivedge including but not limited to nausea, vomiting, diarrhea, constipation, weight loss, changes in the sense of taste, decreased appetite, muscle spasms, and hair loss.  The patient verbalized understanding of the proper use and possible adverse effects of Erivedge.  All of the patient's questions and concerns were addressed. High Dose Vitamin A Counseling: Side effects reviewed, pt to contact office should one occur. Ketoconazole Pregnancy And Lactation Text: This medication is Pregnancy Category C and it isn't know if it is safe during pregnancy. It is also excreted in breast milk and breast feeding isn't recommended. Cephalexin Counseling: I counseled the patient regarding use of cephalexin as an antibiotic for prophylactic and/or therapeutic purposes. Cephalexin (commonly prescribed under brand name Keflex) is a cephalosporin antibiotic which is active against numerous classes of bacteria, including most skin bacteria. Side effects may include nausea, diarrhea, gastrointestinal upset, rash, hives, yeast infections, and in rare cases, hepatitis, kidney disease, seizures, fever, confusion, neurologic symptoms, and others. Patients with severe allergies to penicillin medications are cautioned that there is about a 10% incidence of cross-reactivity with cephalosporins. When possible, patients with penicillin allergies should use alternatives to cephalosporins for antibiotic therapy. Azathioprine Pregnancy And Lactation Text: This medication is Pregnancy Category D and isn't considered safe during pregnancy. It is unknown if this medication is excreted in breast milk. Soolantra Counseling: I discussed with the patients the risks of topial Soolantra. This is a medicine which decreases the number of mites and inflammation in the skin. You experience burning, stinging, eye irritation or allergic reactions.  Please call our office if you develop any problems from using this medication. Opioid Pregnancy And Lactation Text: These medications can lead to premature delivery and should be avoided during pregnancy. These medications are also present in breast milk in small amounts. Protopic Pregnancy And Lactation Text: This medication is Pregnancy Category C. It is unknown if this medication is excreted in breast milk when applied topically. Albendazole Counseling:  I discussed with the patient the risks of albendazole including but not limited to cytopenia, kidney damage, nausea/vomiting and severe allergy.  The patient understands that this medication is being used in an off-label manner. Valtrex Pregnancy And Lactation Text: this medication is Pregnancy Category B and is considered safe during pregnancy. This medication is not directly found in breast milk but it's metabolite acyclovir is present. Gabapentin Counseling: I discussed with the patient the risks of gabapentin including but not limited to dizziness, somnolence, fatigue and ataxia. Cellcept Counseling:  I discussed with the patient the risks of mycophenolate mofetil including but not limited to infection/immunosuppression, GI upset, hypokalemia, hypercholesterolemia, bone marrow suppression, lymphoproliferative disorders, malignancy, GI ulceration/bleed/perforation, colitis, interstitial lung disease, kidney failure, progressive multifocal leukoencephalopathy, and birth defects.  The patient understands that monitoring is required including a baseline creatinine and regular CBC testing. In addition, patient must alert us immediately if symptoms of infection or other concerning signs are noted. Minoxidil Counseling: Minoxidil is a topical medication which can increase blood flow where it is applied. It is uncertain how this medication increases hair growth. Side effects are uncommon and include stinging and allergic reactions. Xeljanz Counseling: I discussed with the patient the risks of Xeljanz therapy including increased risk of infection, liver issues, headache, diarrhea, or cold symptoms. Live vaccines should be avoided. They were instructed to call if they have any problems. Bimzelx Counseling:  I discussed with the patient the risks of Bimzelx including but not limited to depression, immunosuppression, allergic reactions and infections.  The patient understands that monitoring is required including a PPD at baseline and must alert us or the primary physician if symptoms of infection or other concerning signs are noted. Cephalexin Pregnancy And Lactation Text: This medication is Pregnancy Category B and considered safe during pregnancy.  It is also excreted in breast milk but can be used safely for shorter doses. Finasteride Male Counseling: Finasteride Counseling:  I discussed with the patient the risks of use of finasteride including but not limited to decreased libido, decreased ejaculate volume, gynecomastia, and depression. Women should not handle medication.  All of the patient's questions and concerns were addressed. Stelara Counseling:  I discussed with the patient the risks of ustekinumab including but not limited to immunosuppression, malignancy, posterior leukoencephalopathy syndrome, and serious infections.  The patient understands that monitoring is required including a PPD at baseline and must alert us or the primary physician if symptoms of infection or other concerning signs are noted. Terbinafine Counseling: Patient counseling regarding adverse effects of terbinafine including but not limited to headache, diarrhea, rash, upset stomach, liver function test abnormalities, itching, taste/smell disturbance, nausea, abdominal pain, and flatulence.  There is a rare possibility of liver failure that can occur when taking terbinafine.  The patient understands that a baseline LFT and kidney function test may be required. The patient verbalized understanding of the proper use and possible adverse effects of terbinafine.  All of the patient's questions and concerns were addressed. Cibinqo Pregnancy And Lactation Text: It is unknown if this medication will adversely affect pregnancy or breast feeding.  You should not take this medication if you are currently pregnant or planning a pregnancy or while breastfeeding. Minocycline Counseling: Patient advised regarding possible photosensitivity and discoloration of the teeth, skin, lips, tongue and gums.  Patient instructed to avoid sunlight, if possible.  When exposed to sunlight, patients should wear protective clothing, sunglasses, and sunscreen.  The patient was instructed to call the office immediately if the following severe adverse effects occur:  hearing changes, easy bruising/bleeding, severe headache, or vision changes.  The patient verbalized understanding of the proper use and possible adverse effects of minocycline.  All of the patient's questions and concerns were addressed. High Dose Vitamin A Pregnancy And Lactation Text: High dose vitamin A therapy is contraindicated during pregnancy and breast feeding. Humira Counseling:  I discussed with the patient the risks of adalimumab including but not limited to myelosuppression, immunosuppression, autoimmune hepatitis, demyelinating diseases, lymphoma, and serious infections.  The patient understands that monitoring is required including a PPD at baseline and must alert us or the primary physician if symptoms of infection or other concerning signs are noted. Topical Metronidazole Counseling: Metronidazole is a topical antibiotic medication. You may experience burning, stinging, redness, or allergic reactions.  Please call our office if you develop any problems from using this medication. Erivedge Pregnancy And Lactation Text: This medication is Pregnancy Category X and is absolutely contraindicated during pregnancy. It is unknown if it is excreted in breast milk. Finasteride Female Counseling: Finasteride Counseling:  I discussed with the patient the risks of use of finasteride including but not limited to decreased libido and sexual dysfunction. Explained the teratogenic nature of the medication and stressed the importance of not getting pregnant during treatment. All of the patient's questions and concerns were addressed. Rituxan Pregnancy And Lactation Text: This medication is Pregnancy Category C and it isn't know if it is safe during pregnancy. It is unknown if this medication is excreted in breast milk but similar antibodies are known to be excreted. Winlevi Pregnancy And Lactation Text: This medication is considered safe during pregnancy and breastfeeding. Detail Level: Simple Xelkimmyz Pregnancy And Lactation Text: This medication is Pregnancy Category D and is not considered safe during pregnancy.  The risk during breast feeding is also uncertain. Minoxidil Pregnancy And Lactation Text: This medication has not been assigned a Pregnancy Risk Category but animal studies failed to show danger with the topical medication. It is unknown if the medication is excreted in breast milk. Bimzelx Pregnancy And Lactation Text: This medication crosses the placenta and the safety is uncertain during pregnancy. It is unknown if this medication is present in breast milk. Oxybutynin Counseling:  I discussed with the patient the risks of oxybutynin including but not limited to skin rash, drowsiness, dry mouth, difficulty urinating, and blurred vision. Soolantra Pregnancy And Lactation Text: This medication is Pregnancy Category C. This medication is considered safe during breast feeding. Nsaids Counseling: NSAID Counseling: I discussed with the patient that NSAIDs should be taken with food. Prolonged use of NSAIDs can result in the development of stomach ulcers.  Patient advised to stop taking NSAIDs if abdominal pain occurs.  The patient verbalized understanding of the proper use and possible adverse effects of NSAIDs.  All of the patient's questions and concerns were addressed. Qbrexza Counseling:  I discussed with the patient the risks of Qbrexza including but not limited to headache, mydriasis, blurred vision, dry eyes, nasal dryness, dry mouth, dry throat, dry skin, urinary hesitation, and constipation.  Local skin reactions including erythema, burning, stinging, and itching can also occur. Calcipotriene Counseling:  I discussed with the patient the risks of calcipotriene including but not limited to erythema, scaling, itching, and irritation. Arava Counseling:  Patient counseled regarding adverse effects of Arava including but not limited to nausea, vomiting, abnormalities in liver function tests. Patients may develop mouth sores, rash, diarrhea, and abnormalities in blood counts. The patient understands that monitoring is required including LFTs and blood counts.  There is a rare possibility of scarring of the liver and lung problems that can occur when taking methotrexate. Persistent nausea, loss of appetite, pale stools, dark urine, cough, and shortness of breath should be reported immediately. Patient advised to discontinue Arava treatment and consult with a physician prior to attempting conception. The patient will have to undergo a treatment to eliminate Arava from the body prior to conception. Terbinafine Pregnancy And Lactation Text: This medication is Pregnancy Category B and is considered safe during pregnancy. It is also excreted in breast milk and breast feeding isn't recommended. Use Enhanced Medication Counseling?: No Eucrisa Counseling: Patient may experience a mild burning sensation during topical application. Eucrisa is not approved in children less than 2 years of age. Albendazole Pregnancy And Lactation Text: This medication is Pregnancy Category C and it isn't known if it is safe during pregnancy. It is also excreted in breast milk. Litfulo Counseling: I discussed with the patient the risks of Litfulo therapy including but not limited to upper respiratory tract infections, shingles, cold sores, and nausea. Live vaccines should be avoided.  This medication has been linked to serious infections; higher rate of mortality; malignancy and lymphoproliferative disorders; major adverse cardiovascular events; thrombosis; gastrointestinal perforations; neutropenia; lymphopenia; anemia; liver enzyme elevations; and lipid elevations. Clindamycin Counseling: I counseled the patient regarding use of clindamycin as an antibiotic for prophylactic and/or therapeutic purposes. Clindamycin is active against numerous classes of bacteria, including skin bacteria. Side effects may include nausea, diarrhea, gastrointestinal upset, rash, hives, yeast infections, and in rare cases, colitis. Libtayo Counseling- I discussed with the patient the risks of Libtayo including but not limited to nausea, vomiting, diarrhea, and bone or muscle pain.  The patient verbalized understanding of the proper use and possible adverse effects of Libtayo.  All of the patient's questions and concerns were addressed. Topical Metronidazole Pregnancy And Lactation Text: This medication is Pregnancy Category B and considered safe during pregnancy.  It is also considered safe to use while breastfeeding. Siliq Counseling:  I discussed with the patient the risks of Siliq including but not limited to new or worsening depression, suicidal thoughts and behavior, immunosuppression, malignancy, posterior leukoencephalopathy syndrome, and serious infections.  The patient understands that monitoring is required including a PPD at baseline and must alert us or the primary physician if symptoms of infection or other concerning signs are noted. There is also a special program designed to monitor depression which is required with Siliq. Fluconazole Counseling:  Patient counseled regarding adverse effects of fluconazole including but not limited to headache, diarrhea, nausea, upset stomach, liver function test abnormalities, taste disturbance, and stomach pain.  There is a rare possibility of liver failure that can occur when taking fluconazole.  The patient understands that monitoring of LFTs and kidney function test may be required, especially at baseline. The patient verbalized understanding of the proper use and possible adverse effects of fluconazole.  All of the patient's questions and concerns were addressed. VTAMA Counseling: I discussed with the patient that VTAMA is not for use in the eyes, mouth or mouth. They should call the office if they develop any signs of allergic reactions to VTAMA. The patient verbalized understanding of the proper use and possible adverse effects of VTAMA.  All of the patient's questions and concerns were addressed. Cimzia Counseling:  I discussed with the patient the risks of Cimzia including but not limited to immunosuppression, allergic reactions and infections.  The patient understands that monitoring is required including a PPD at baseline and must alert us or the primary physician if symptoms of infection or other concerning signs are noted. Mirvaso Counseling: Mirvaso is a topical medication which can decrease superficial blood flow where applied. Side effects are uncommon and include stinging, redness and allergic reactions. Topical Retinoid counseling:  Patient advised to apply a pea-sized amount only at bedtime and wait 30 minutes after washing their face before applying.  If too drying, patient may add a non-comedogenic moisturizer. The patient verbalized understanding of the proper use and possible adverse effects of retinoids.  All of the patient's questions and concerns were addressed. Qbrexza Pregnancy And Lactation Text: There is no available data on Qbrexza use in pregnant women.  There is no available data on Qbrexza use in lactation. Quinolones Counseling:  I discussed with the patient the risks of fluoroquinolones including but not limited to GI upset, allergic reaction, drug rash, diarrhea, dizziness, photosensitivity, yeast infections, liver function test abnormalities, tendonitis/tendon rupture. Glycopyrrolate Counseling:  I discussed with the patient the risks of glycopyrrolate including but not limited to skin rash, drowsiness, dry mouth, difficulty urinating, and blurred vision. Nsaids Pregnancy And Lactation Text: These medications are considered safe up to 30 weeks gestation. It is excreted in breast milk. Finasteride Pregnancy And Lactation Text: This medication is absolutely contraindicated during pregnancy. It is unknown if it is excreted in breast milk. Vtama Pregnancy And Lactation Text: It is unknown if this medication can cause problems during pregnancy and breastfeeding. Aklief counseling:  Patient advised to apply a pea-sized amount only at bedtime and wait 30 minutes after washing their face before applying.  If too drying, patient may add a non-comedogenic moisturizer.  The most commonly reported side effects including irritation, redness, scaling, dryness, stinging, burning, itching, and increased risk of sunburn.  The patient verbalized understanding of the proper use and possible adverse effects of retinoids.  All of the patient's questions and concerns were addressed. Litfulo Pregnancy And Lactation Text: Based on animal studies, Lifulo may cause embryo-fetal harm when administered to pregnant women.  The medication should not be used in pregnancy.  Breastfeeding is not recommended during treatment. Taltz Counseling: I discussed with the patient the risks of ixekizumab including but not limited to immunosuppression, serious infections, worsening of inflammatory bowel disease and drug reactions.  The patient understands that monitoring is required including a PPD at baseline and must alert us or the primary physician if symptoms of infection or other concerning signs are noted. Ivermectin Counseling:  Patient instructed to take medication on an empty stomach with a full glass of water.  Patient informed of potential adverse effects including but not limited to nausea, diarrhea, dizziness, itching, and swelling of the extremities or lymph nodes.  The patient verbalized understanding of the proper use and possible adverse effects of ivermectin.  All of the patient's questions and concerns were addressed. Calcipotriene Pregnancy And Lactation Text: The use of this medication during pregnancy or lactation is not recommended as there is insufficient data. Cyclophosphamide Counseling:  I discussed with the patient the risks of cyclophosphamide including but not limited to hair loss, hormonal abnormalities, decreased fertility, abdominal pain, diarrhea, nausea and vomiting, bone marrow suppression and infection. The patient understands that monitoring is required while taking this medication. Rhofade Counseling: Rhofade is a topical medication which can decrease superficial blood flow where applied. Side effects are uncommon and include stinging, redness and allergic reactions. Hyrimoz Counseling:  I discussed with the patient the risks of adalimumab including but not limited to myelosuppression, immunosuppression, autoimmune hepatitis, demyelinating diseases, lymphoma, and serious infections.  The patient understands that monitoring is required including a PPD at baseline and must alert us or the primary physician if symptoms of infection or other concerning signs are noted. Topical Steroids Counseling: I discussed with the patient that prolonged use of topical steroids can result in the increased appearance of superficial blood vessels (telangiectasias), lightening (hypopigmentation) and thinning of the skin (atrophy).  Patient understands to avoid using high potency steroids in skin folds, the groin or the face.  The patient verbalized understanding of the proper use and possible adverse effects of topical steroids.  All of the patient's questions and concerns were addressed. Clindamycin Pregnancy And Lactation Text: This medication can be used in pregnancy if certain situations. Clindamycin is also present in breast milk. Libtayo Pregnancy And Lactation Text: This medication is contraindicated in pregnancy and when breast feeding. Acitretin Counseling:  I discussed with the patient the risks of acitretin including but not limited to hair loss, dry lips/skin/eyes, liver damage, hyperlipidemia, depression/suicidal ideation, photosensitivity.  Serious rare side effects can include but are not limited to pancreatitis, pseudotumor cerebri, bony changes, clot formation/stroke/heart attack.  Patient understands that alcohol is contraindicated since it can result in liver toxicity and significantly prolong the elimination of the drug by many years. Hydroquinone Counseling:  Patient advised that medication may result in skin irritation, lightening (hypopigmentation), dryness, and burning.  In the event of skin irritation, the patient was advised to reduce the amount of the drug applied or use it less frequently.  Rarely, spots that are treated with hydroquinone can become darker (pseudoochronosis).  Should this occur, patient instructed to stop medication and call the office. The patient verbalized understanding of the proper use and possible adverse effects of hydroquinone.  All of the patient's questions and concerns were addressed. Glycopyrrolate Pregnancy And Lactation Text: This medication is Pregnancy Category B and is considered safe during pregnancy. It is unknown if it is excreted breast milk. Cyclophosphamide Pregnancy And Lactation Text: This medication is Pregnancy Category D and it isn't considered safe during pregnancy. This medication is excreted in breast milk. Mirvaso Pregnancy And Lactation Text: This medication has not been assigned a Pregnancy Risk Category. It is unknown if the medication is excreted in breast milk. Cimetidine Counseling:  I discussed with the patient the risks of Cimetidine including but not limited to gynecomastia, headache, diarrhea, nausea, drowsiness, arrhythmias, pancreatitis, skin rashes, psychosis, bone marrow suppression and kidney toxicity. Cimzia Pregnancy And Lactation Text: This medication crosses the placenta but can be considered safe in certain situations. Cimzia may be excreted in breast milk. Zoryve Counseling:  I discussed with the patient that Zoryve is not for use in the eyes, mouth or vagina. The most commonly reported side effects include diarrhea, headache, insomnia, application site pain, upper respiratory tract infections, and urinary tract infections.  All of the patient's questions and concerns were addressed. Olanzapine Counseling- I discussed with the patient the common side effects of olanzapine including but are not limited to: lack of energy, dry mouth, increased appetite, sleepiness, tremor, constipation, dizziness, changes in behavior, or restlessness.  Explained that teenagers are more likely to experience headaches, abdominal pain, pain in the arms or legs, tiredness, and sleepiness.  Serious side effects include but are not limited: increased risk of death in elderly patients who are confused, have memory loss, or dementia-related psychosis; hyperglycemia; increased cholesterol and triglycerides; and weight gain. Propranolol Counseling:  I discussed with the patient the risks of propranolol including but not limited to low heart rate, low blood pressure, low blood sugar, restlessness and increased cold sensitivity. They should call the office if they experience any of these side effects. Birth Control Pills Counseling: Birth Control Pill Counseling: I discussed with the patient the potential side effects of OCPs including but not limited to increased risk of stroke, heart attack, thrombophlebitis, deep venous thrombosis, hepatic adenomas, breast changes, GI upset, headaches, and depression.  The patient verbalized understanding of the proper use and possible adverse effects of OCPs. All of the patient's questions and concerns were addressed. Cantharidin Counseling:  I discussed with the patient the risks of Cantharidin including but not limited to pain, redness, burning, itching, and blistering. Olumiant Counseling: I discussed with the patient the risks of Olumiant therapy including but not limited to upper respiratory tract infections, shingles, cold sores, and nausea. Live vaccines should be avoided.  This medication has been linked to serious infections; higher rate of mortality; malignancy and lymphoproliferative disorders; major adverse cardiovascular events; thrombosis; gastrointestinal perforations; neutropenia; lymphopenia; anemia; liver enzyme elevations; and lipid elevations. Clofazimine Counseling:  I discussed with the patient the risks of clofazimine including but not limited to skin and eye pigmentation, liver damage, nausea/vomiting, gastrointestinal bleeding and allergy. Griseofulvin Counseling:  I discussed with the patient the risks of griseofulvin including but not limited to photosensitivity, cytopenia, liver damage, nausea/vomiting and severe allergy.  The patient understands that this medication is best absorbed when taken with a fatty meal (e.g., ice cream or french fries). Odomzo Counseling- I discussed with the patient the risks of Odomzo including but not limited to nausea, vomiting, diarrhea, constipation, weight loss, changes in the sense of taste, decreased appetite, muscle spasms, and hair loss.  The patient verbalized understanding of the proper use and possible adverse effects of Odomzo.  All of the patient's questions and concerns were addressed. Olanzapine Pregnancy And Lactation Text: This medication is pregnancy category C.   There are no adequate and well controlled trials with olanzapine in pregnant females.  Olanzapine should be used during pregnancy only if the potential benefit justifies the potential risk to the fetus.   In a study in lactating healthy women, olanzapine was excreted in breast milk.  It is recommended that women taking olanzapine should not breast feed. Propranolol Pregnancy And Lactation Text: This medication is Pregnancy Category C and it isn't known if it is safe during pregnancy. It is excreted in breast milk. Acitretin Pregnancy And Lactation Text: This medication is Pregnancy Category X and should not be given to women who are pregnant or may become pregnant in the future. This medication is excreted in breast milk. Aklief Pregnancy And Lactation Text: It is unknown if this medication is safe to use during pregnancy.  It is unknown if this medication is excreted in breast milk.  Breastfeeding women should use the topical cream on the smallest area of the skin for the shortest time needed while breastfeeding.  Do not apply to nipple and areola. Simponi Counseling:  I discussed with the patient the risks of golimumab including but not limited to myelosuppression, immunosuppression, autoimmune hepatitis, demyelinating diseases, lymphoma, and serious infections.  The patient understands that monitoring is required including a PPD at baseline and must alert us or the primary physician if symptoms of infection or other concerning signs are noted. Doxycycline Counseling:  Patient counseled regarding possible photosensitivity and increased risk for sunburn.  Patient instructed to avoid sunlight, if possible.  When exposed to sunlight, patients should wear protective clothing, sunglasses, and sunscreen.  The patient was instructed to call the office immediately if the following severe adverse effects occur:  hearing changes, easy bruising/bleeding, severe headache, or vision changes.  The patient verbalized understanding of the proper use and possible adverse effects of doxycycline.  All of the patient's questions and concerns were addressed. Cantharidin Pregnancy And Lactation Text: This medication has not been proven safe during pregnancy. It is unknown if this medication is excreted in breast milk. Topical Steroids Applications Pregnancy And Lactation Text: Most topical steroids are considered safe to use during pregnancy and lactation.  Any topical steroid applied to the breast or nipple should be washed off before breastfeeding. Tremfya Counseling: I discussed with the patient the risks of guselkumab including but not limited to immunosuppression, serious infections, and drug reactions.  The patient understands that monitoring is required including a PPD at baseline and must alert us or the primary physician if symptoms of infection or other concerning signs are noted. Cyclosporine Counseling:  I discussed with the patient the risks of cyclosporine including but not limited to hypertension, gingival hyperplasia,myelosuppression, immunosuppression, liver damage, kidney damage, neurotoxicity, lymphoma, and serious infections. The patient understands that monitoring is required including baseline blood pressure, CBC, CMP, lipid panel and uric acid, and then 1-2 times monthly CMP and blood pressure. Opzelura Counseling:  I discussed with the patient the risks of Opzelura including but not limited to nasopharngitis, bronchitis, ear infection, eosinophila, hives, diarrhea, folliculitis, tonsillitis, and rhinorrhea.  Taken orally, this medication has been linked to serious infections; higher rate of mortality; malignancy and lymphoproliferative disorders; major adverse cardiovascular events; thrombosis; thrombocytopenia, anemia, and neutropenia; and lipid elevations. Cosentyx Counseling:  I discussed with the patient the risks of Cosentyx including but not limited to worsening of Crohn's disease, immunosuppression, allergic reactions and infections.  The patient understands that monitoring is required including a PPD at baseline and must alert us or the primary physician if symptoms of infection or other concerning signs are noted. Tazorac Counseling:  Patient advised that medication is irritating and drying.  Patient may need to apply sparingly and wash off after an hour before eventually leaving it on overnight.  The patient verbalized understanding of the proper use and possible adverse effects of tazorac.  All of the patient's questions and concerns were addressed. Hydroxychloroquine Counseling:  I discussed with the patient that a baseline ophthalmologic exam is needed at the start of therapy and every year thereafter while on therapy. A CBC may also be warranted for monitoring.  The side effects of this medication were discussed with the patient, including but not limited to agranulocytosis, aplastic anemia, seizures, rashes, retinopathy, and liver toxicity. Patient instructed to call the office should any adverse effect occur.  The patient verbalized understanding of the proper use and possible adverse effects of Plaquenil.  All the patient's questions and concerns were addressed. Doxycycline Pregnancy And Lactation Text: This medication is Pregnancy Category D and not consider safe during pregnancy. It is also excreted in breast milk but is considered safe for shorter treatment courses. Birth Control Pills Pregnancy And Lactation Text: This medication should be avoided if pregnant and for the first 30 days post-partum. 5-Fu Counseling: 5-Fluorouracil Counseling:  I discussed with the patient the risks of 5-fluorouracil including but not limited to erythema, scaling, itching, weeping, crusting, and pain. Olumiant Pregnancy And Lactation Text: Based on animal studies, Olumiant may cause embryo-fetal harm when administered to pregnant women.  The medication should not be used in pregnancy.  Breastfeeding is not recommended during treatment. Azelaic Acid Counseling: Patient counseled that medicine may cause skin irritation and to avoid applying near the eyes.  In the event of skin irritation, the patient was advised to reduce the amount of the drug applied or use it less frequently.   The patient verbalized understanding of the proper use and possible adverse effects of azelaic acid.  All of the patient's questions and concerns were addressed. Rifampin Counseling: I discussed with the patient the risks of rifampin including but not limited to liver damage, kidney damage, red-orange body fluids, nausea/vomiting and severe allergy. Ilumya Counseling: I discussed with the patient the risks of tildrakizumab including but not limited to immunosuppression, malignancy, posterior leukoencephalopathy syndrome, and serious infections.  The patient understands that monitoring is required including a PPD at baseline and must alert us or the primary physician if symptoms of infection or other concerning signs are noted. Topical Sulfur Applications Counseling: Topical Sulfur Counseling: Patient counseled that this medication may cause skin irritation or allergic reactions.  In the event of skin irritation, the patient was advised to reduce the amount of the drug applied or use it less frequently.   The patient verbalized understanding of the proper use and possible adverse effects of topical sulfur application.  All of the patient's questions and concerns were addressed. Adbry Counseling: I discussed with the patient the risks of tralokinumab including but not limited to eye infection and irritation, cold sores, injection site reactions, worsening of asthma, allergic reactions and increased risk of parasitic infection.  Live vaccines should be avoided while taking tralokinumab. The patient understands that monitoring is required and they must alert us or the primary physician if symptoms of infection or other concerning signs are noted. Bexarotene Counseling:  I discussed with the patient the risks of bexarotene including but not limited to hair loss, dry lips/skin/eyes, liver abnormalities, hyperlipidemia, pancreatitis, depression/suicidal ideation, photosensitivity, drug rash/allergic reactions, hypothyroidism, anemia, leukopenia, infection, cataracts, and teratogenicity.  Patient understands that they will need regular blood tests to check lipid profile, liver function tests, white blood cell count, thyroid function tests and pregnancy test if applicable. Spironolactone Counseling: Patient advised regarding risks of diarrhea, abdominal pain, hyperkalemia, birth defects (for female patients), liver toxicity and renal toxicity. The patient may need blood work to monitor liver and kidney function and potassium levels while on therapy. The patient verbalized understanding of the proper use and possible adverse effects of spironolactone.  All of the patient's questions and concerns were addressed. Griseofulvin Pregnancy And Lactation Text: This medication is Pregnancy Category X and is known to cause serious birth defects. It is unknown if this medication is excreted in breast milk but breast feeding should be avoided. Azithromycin Counseling:  I discussed with the patient the risks of azithromycin including but not limited to GI upset, allergic reaction, drug rash, diarrhea, and yeast infections. Opzelura Pregnancy And Lactation Text: There is insufficient data to evaluate drug-associated risk for major birth defects, miscarriage, or other adverse maternal or fetal outcomes.  There is a pregnancy registry that monitors pregnancy outcomes in pregnant persons exposed to the medication during pregnancy.  It is unknown if this medication is excreted in breast milk.  Do not breastfeed during treatment and for about 4 weeks after the last dose. Hydroxychloroquine Pregnancy And Lactation Text: This medication has been shown to cause fetal harm but it isn't assigned a Pregnancy Risk Category. There are small amounts excreted in breast milk. Tazorac Pregnancy And Lactation Text: This medication is not safe during pregnancy. It is unknown if this medication is excreted in breast milk. Oral Minoxidil Counseling- I discussed with the patient the risks of oral minoxidil including but not limited to shortness of breath, swelling of the feet or ankles, dizziness, lightheadedness, unwanted hair growth and allergic reaction.  The patient verbalized understanding of the proper use and possible adverse effects of oral minoxidil.  All of the patient's questions and concerns were addressed. SSKI Counseling:  I discussed with the patient the risks of SSKI including but not limited to thyroid abnormalities, metallic taste, GI upset, fever, headache, acne, arthralgias, paraesthesias, lymphadenopathy, easy bleeding, arrhythmias, and allergic reaction. Doxepin Counseling:  Patient advised that the medication is sedating and not to drive a car after taking this medication. Patient informed of potential adverse effects including but not limited to dry mouth, urinary retention, and blurry vision.  The patient verbalized understanding of the proper use and possible adverse effects of doxepin.  All of the patient's questions and concerns were addressed. Methotrexate Pregnancy And Lactation Text: This medication is Pregnancy Category X and is known to cause fetal harm. This medication is excreted in breast milk. Colchicine Counseling:  Patient counseled regarding adverse effects including but not limited to stomach upset (nausea, vomiting, stomach pain, or diarrhea).  Patient instructed to limit alcohol consumption while taking this medication.  Colchicine may reduce blood counts especially with prolonged use.  The patient understands that monitoring of kidney function and blood counts may be required, especially at baseline. The patient verbalized understanding of the proper use and possible adverse effects of colchicine.  All of the patient's questions and concerns were addressed. Erythromycin Counseling:  I discussed with the patient the risks of erythromycin including but not limited to GI upset, allergic reaction, drug rash, diarrhea, increase in liver enzymes, and yeast infections. Rifampin Pregnancy And Lactation Text: This medication is Pregnancy Category C and it isn't know if it is safe during pregnancy. It is also excreted in breast milk and should not be used if you are breast feeding. Imiquimod Counseling:  I discussed with the patient the risks of imiquimod including but not limited to erythema, scaling, itching, weeping, crusting, and pain.  Patient understands that the inflammatory response to imiquimod is variable from person to person and was educated regarded proper titration schedule.  If flu-like symptoms develop, patient knows to discontinue the medication and contact us. Rinvoq Counseling: I discussed with the patient the risks of Rinvoq therapy including but not limited to upper respiratory tract infections, shingles, cold sores, bronchitis, nausea, cough, fever, acne, and headache. Live vaccines should be avoided.  This medication has been linked to serious infections; higher rate of mortality; malignancy and lymphoproliferative disorders; major adverse cardiovascular events; thrombosis; thrombocytopenia, anemia, and neutropenia; lipid elevations; liver enzyme elevations; and gastrointestinal perforations. Azithromycin Pregnancy And Lactation Text: This medication is considered safe during pregnancy and is also secreted in breast milk. Skyrizi Counseling: I discussed with the patient the risks of risankizumab-rzaa including but not limited to immunosuppression, and serious infections.  The patient understands that monitoring is required including a PPD at baseline and must alert us or the primary physician if symptoms of infection or other concerning signs are noted. Zyclara Counseling:  I discussed with the patient the risks of imiquimod including but not limited to erythema, scaling, itching, weeping, crusting, and pain.  Patient understands that the inflammatory response to imiquimod is variable from person to person and was educated regarded proper titration schedule.  If flu-like symptoms develop, patient knows to discontinue the medication and contact us. Itraconazole Counseling:  I discussed with the patient the risks of itraconazole including but not limited to liver damage, nausea/vomiting, neuropathy, and severe allergy.  The patient understands that this medication is best absorbed when taken with acidic beverages such as non-diet cola or ginger ale.  The patient understands that monitoring is required including baseline LFTs and repeat LFTs at intervals.  The patient understands that they are to contact us or the primary physician if concerning signs are noted. Bexarotene Pregnancy And Lactation Text: This medication is Pregnancy Category X and should not be given to women who are pregnant or may become pregnant. This medication should not be used if you are breast feeding.

## 2024-12-23 ENCOUNTER — OFFICE VISIT (OUTPATIENT)
Dept: FAMILY MEDICINE | Facility: CLINIC | Age: 74
End: 2024-12-23
Payer: MEDICARE

## 2024-12-23 VITALS
HEART RATE: 79 BPM | HEIGHT: 60 IN | SYSTOLIC BLOOD PRESSURE: 120 MMHG | WEIGHT: 173.81 LBS | DIASTOLIC BLOOD PRESSURE: 80 MMHG | BODY MASS INDEX: 34.12 KG/M2 | OXYGEN SATURATION: 98 %

## 2024-12-23 DIAGNOSIS — F43.21 SITUATIONAL DEPRESSION: ICD-10-CM

## 2024-12-23 DIAGNOSIS — B96.89 ACUTE BACTERIAL BRONCHITIS: Primary | ICD-10-CM

## 2024-12-23 DIAGNOSIS — J20.8 ACUTE BACTERIAL BRONCHITIS: Primary | ICD-10-CM

## 2024-12-23 DIAGNOSIS — I10 ESSENTIAL HYPERTENSION: ICD-10-CM

## 2024-12-23 PROCEDURE — 1160F RVW MEDS BY RX/DR IN RCRD: CPT | Mod: CPTII,S$GLB,, | Performed by: FAMILY MEDICINE

## 2024-12-23 PROCEDURE — 1101F PT FALLS ASSESS-DOCD LE1/YR: CPT | Mod: CPTII,S$GLB,, | Performed by: FAMILY MEDICINE

## 2024-12-23 PROCEDURE — 3074F SYST BP LT 130 MM HG: CPT | Mod: CPTII,S$GLB,, | Performed by: FAMILY MEDICINE

## 2024-12-23 PROCEDURE — 3008F BODY MASS INDEX DOCD: CPT | Mod: CPTII,S$GLB,, | Performed by: FAMILY MEDICINE

## 2024-12-23 PROCEDURE — 3079F DIAST BP 80-89 MM HG: CPT | Mod: CPTII,S$GLB,, | Performed by: FAMILY MEDICINE

## 2024-12-23 PROCEDURE — 1126F AMNT PAIN NOTED NONE PRSNT: CPT | Mod: CPTII,S$GLB,, | Performed by: FAMILY MEDICINE

## 2024-12-23 PROCEDURE — 3288F FALL RISK ASSESSMENT DOCD: CPT | Mod: CPTII,S$GLB,, | Performed by: FAMILY MEDICINE

## 2024-12-23 PROCEDURE — 99214 OFFICE O/P EST MOD 30 MIN: CPT | Mod: S$GLB,,, | Performed by: FAMILY MEDICINE

## 2024-12-23 PROCEDURE — 3044F HG A1C LEVEL LT 7.0%: CPT | Mod: CPTII,S$GLB,, | Performed by: FAMILY MEDICINE

## 2024-12-23 PROCEDURE — 1159F MED LIST DOCD IN RCRD: CPT | Mod: CPTII,S$GLB,, | Performed by: FAMILY MEDICINE

## 2024-12-23 PROCEDURE — 99999 PR PBB SHADOW E&M-EST. PATIENT-LVL IV: CPT | Mod: PBBFAC,,, | Performed by: FAMILY MEDICINE

## 2024-12-23 PROCEDURE — G2211 COMPLEX E/M VISIT ADD ON: HCPCS | Mod: S$GLB,,, | Performed by: FAMILY MEDICINE

## 2024-12-23 RX ORDER — ALBUTEROL SULFATE 0.83 MG/ML
2.5 SOLUTION RESPIRATORY (INHALATION) EVERY 6 HOURS PRN
Qty: 100 ML | Refills: 3 | Status: SHIPPED | OUTPATIENT
Start: 2024-12-23 | End: 2025-12-23

## 2024-12-23 RX ORDER — BUDESONIDE AND FORMOTEROL FUMARATE DIHYDRATE 80; 4.5 UG/1; UG/1
2 AEROSOL RESPIRATORY (INHALATION) 2 TIMES DAILY
Qty: 10.2 G | Refills: 3 | Status: SHIPPED | OUTPATIENT
Start: 2024-12-23 | End: 2025-12-23

## 2024-12-23 RX ORDER — METHYLPREDNISOLONE 4 MG/1
TABLET ORAL
Qty: 21 TABLET | Refills: 0 | Status: SHIPPED | OUTPATIENT
Start: 2024-12-23

## 2024-12-23 RX ORDER — AMOXICILLIN AND CLAVULANATE POTASSIUM 875; 125 MG/1; MG/1
1 TABLET, FILM COATED ORAL 2 TIMES DAILY
Qty: 14 TABLET | Refills: 0 | Status: SHIPPED | OUTPATIENT
Start: 2024-12-23

## 2024-12-23 NOTE — PROGRESS NOTES
Chief Complaint:  Chief Complaint   Patient presents with    Follow-up     Brochnitis acting up  5 weeks getting worse and inhaler not helper        HPI:  Zayra is a 74 y.o. year old     History of Present Illness    CHIEF COMPLAINT:  Zayra presents today with persistent cough and sinus production.    RESPIRATORY:  She has had a cough for 6-7 weeks with green and yellow sputum production. She experiences shortness of breath when climbing stairs. The cough may be related to silent reflux.    MUSCULOSKELETAL:  She reports bilateral heel pain described as a rock bruise with bone achiness for approximately 7 months, preventing barefoot walking. She experiences sciatica pain that improves with movement. She recently fell, resulting in a nose fracture from hitting her head.    MEDICATIONS:  She currently takes Zyrtec nightly. She has previous history of doxycycline and Medrol use. She is no longer taking antidepressants or Xanax. She recently received a Zometa infusion.    EXERCISE AND WEIGHT:  She reports minimal exercise engagement and recent weight gain.    COVID-19 VACCINE STATUS:  She expresses hesitancy about receiving the latest COVID-19 vaccine due to not feeling well and concerns shared by a pharmacist friend regarding MRI and breast cancer patients.      ROS:  General: +weight gain  Respiratory: +cough, +shortness of breath, +sputum production  Musculoskeletal: +joint pain, +muscle pain           Review of Systems   Constitutional:  Positive for fatigue. Negative for fever.   HENT:  Positive for congestion, postnasal drip and sore throat.    Respiratory:  Positive for cough and chest tightness.    Cardiovascular:  Negative for chest pain and leg swelling.   Gastrointestinal:  Negative for diarrhea and nausea.         Past Medical History:  Past Medical History:   Diagnosis Date    Allergy     Breast cancer 02/2016    left breast, neoadjuvant chemo, lumpectomy, and radiation    Breast cancer 04/2022    left  breast 3:00 invasive ductal carcinoma    Bronchitis     COVID-19 08/2020    HLD (hyperlipidemia)     Hypertension     NOT USING MEDICATIONS CURRENTLY    S/P chemotherapy, time since greater than 12 weeks     Skin cancer     resovled         Vitals:  Vitals:    12/23/24 1122   BP: 120/80   Pulse: 79   SpO2: 98%   Weight: 78.8 kg (173 lb 13.3 oz)   Height: 5' (1.524 m)   PainSc: 0-No pain       BP Readings from Last 5 Encounters:   12/23/24 120/80   08/30/24 122/80   05/06/24 118/82   04/26/24 (!) 150/90   04/16/24 (!) 150/86       The 10-year ASCVD risk score ([place] CASTELLANOS, et al., 2019) is: 13.5%    Values used to calculate the score:      Age: 74 years      Sex: Female      Is Non- : No      Diabetic: No      Tobacco smoker: No      Systolic Blood Pressure: 120 mmHg      Is BP treated: Yes      HDL Cholesterol: 61 mg/dL      Total Cholesterol: 190 mg/dL      Physical Exam:  Physical Exam  Vitals and nursing note reviewed.   Constitutional:       General: She is not in acute distress.     Appearance: Normal appearance. She is well-developed.   HENT:      Head: Normocephalic and atraumatic.      Right Ear: External ear normal. A middle ear effusion is present. Tympanic membrane is not erythematous.      Left Ear: External ear normal. A middle ear effusion is present. Tympanic membrane is not erythematous.      Nose: Mucosal edema and rhinorrhea present.      Mouth/Throat:      Pharynx: Posterior oropharyngeal erythema present. No oropharyngeal exudate.   Eyes:      General:         Right eye: No discharge.         Left eye: No discharge.      Conjunctiva/sclera: Conjunctivae normal.      Pupils: Pupils are equal, round, and reactive to light.   Cardiovascular:      Rate and Rhythm: Normal rate and regular rhythm.   Pulmonary:      Effort: Pulmonary effort is normal. No respiratory distress.      Breath sounds: Normal breath sounds. No wheezing.   Abdominal:      General: Bowel sounds are normal.  There is no distension.      Palpations: Abdomen is soft.      Tenderness: There is no abdominal tenderness.   Musculoskeletal:      Cervical back: Neck supple.   Skin:     General: Skin is warm and dry.   Neurological:      General: No focal deficit present.      Mental Status: She is alert and oriented to person, place, and time.   Psychiatric:         Mood and Affect: Mood normal.         Behavior: Behavior normal.             Assessment & Plan:  Acute bacterial bronchitis    Other orders  -     amoxicillin-clavulanate 875-125mg (AUGMENTIN) 875-125 mg per tablet; Take 1 tablet by mouth 2 (two) times daily.  Dispense: 14 tablet; Refill: 0  -     methylPREDNISolone (MEDROL, BRUCE,) 4 mg tablet; Take as directed  Dispense: 21 tablet; Refill: 0  -     budesonide-formoterol 80-4.5 mcg (SYMBICORT) 80-4.5 mcg/actuation HFAA; Inhale 2 puffs into the lungs 2 (two) times a day. Controller  Dispense: 10.2 g; Refill: 3  -     albuterol (PROVENTIL) 2.5 mg /3 mL (0.083 %) nebulizer solution; Take 3 mLs (2.5 mg total) by nebulization every 6 (six) hours as needed for Wheezing or Shortness of Breath. Rescue  Dispense: 100 mL; Refill: 3         Assessment & Plan    ORDERS:   Provided nebulizer tubing and mask    IMPRESSION:  Patient presents with persistent cough and sinus symptoms; likely reactive airway response to infection  Lungs tight but no crackles or noise detected on exam  Symptoms likely driven by postnasal drip  Considering silent reflux as a potential contributor to cough  Recommend combination of oral antibiotics, oral steroids, and inhaled corticosteroids to address acute symptoms and provide ongoing control    PLAN SUMMARY:   Started albuterol nebulizer, as needed   Continued Zyrtec at night (non-D formulation)   Started Symbicort inhaler, twice daily   Started steroid pack   Started Augmentin   Provided nebulizer tubing and mask   Referred to podiatry for persistent foot pain   Consider starting Pilates for joint  movement and exercise once cough improves   Contact office if nebulizer machine does not work    PATIENT EDUCATION:   Explained the mechanism of inhaled corticosteroids as controller medications for respiratory symptoms   Advised on the importance of rinsing mouth after using Symbicort to prevent thrush   Discussed the potential link between silent reflux and persistent cough    ACTION ITEMS/LIFESTYLE:   Consider starting Pilates for joint movement and exercise once cough improves    MEDICATIONS:   Started Augmentin   Started steroid pack   Started Symbicort inhaler, twice daily. Rinse mouth after use.   Started albuterol for nebulizer, use as needed   Continued Zyrtec at night (non-D formulation)    REFERRALS:   Referred to podiatry for persistent foot pain    FOLLOW UP:   Contact the office if the nebulizer machine does not work         Visit today included increased complexity associated with the care of the episodic problem bronchitis addressed and managing the longitudinal care of the patient due to the serious and/or complex managed problem(s) mdd, immunocompromised.    This note was generated with the assistance of ambient listening technology. Verbal consent was obtained by the patient and accompanying visitor(s) for the recording of patient appointment to facilitate this note. I attest to having reviewed and edited the generated note for accuracy, though some syntax or spelling errors may persist. Please contact the author of this note for any clarification.

## 2025-01-27 ENCOUNTER — PATIENT MESSAGE (OUTPATIENT)
Dept: FAMILY MEDICINE | Facility: CLINIC | Age: 75
End: 2025-01-27
Payer: MEDICARE

## 2025-01-27 DIAGNOSIS — M25.511 RIGHT SHOULDER PAIN, UNSPECIFIED CHRONICITY: Primary | ICD-10-CM

## 2025-02-05 DIAGNOSIS — M25.511 RIGHT SHOULDER PAIN, UNSPECIFIED CHRONICITY: Primary | ICD-10-CM

## 2025-02-12 ENCOUNTER — OFFICE VISIT (OUTPATIENT)
Dept: ORTHOPEDICS | Facility: CLINIC | Age: 75
End: 2025-02-12
Payer: MEDICARE

## 2025-02-12 ENCOUNTER — HOSPITAL ENCOUNTER (OUTPATIENT)
Dept: RADIOLOGY | Facility: HOSPITAL | Age: 75
Discharge: HOME | End: 2025-02-12
Attending: ORTHOPAEDIC SURGERY
Payer: MEDICARE

## 2025-02-12 VITALS — RESPIRATION RATE: 18 BRPM

## 2025-02-12 DIAGNOSIS — M25.511 RIGHT SHOULDER PAIN, UNSPECIFIED CHRONICITY: ICD-10-CM

## 2025-02-12 DIAGNOSIS — M75.100 ROTATOR CUFF SYNDROME, UNSPECIFIED LATERALITY: Primary | ICD-10-CM

## 2025-02-12 PROCEDURE — 99999 PR PBB SHADOW E&M-EST. PATIENT-LVL III: CPT | Mod: PBBFAC,,, | Performed by: ORTHOPAEDIC SURGERY

## 2025-02-12 PROCEDURE — 1159F MED LIST DOCD IN RCRD: CPT | Mod: CPTII,S$GLB,, | Performed by: ORTHOPAEDIC SURGERY

## 2025-02-12 PROCEDURE — 73030 X-RAY EXAM OF SHOULDER: CPT | Mod: TC,PO,RT

## 2025-02-12 PROCEDURE — 1125F AMNT PAIN NOTED PAIN PRSNT: CPT | Mod: CPTII,S$GLB,, | Performed by: ORTHOPAEDIC SURGERY

## 2025-02-12 PROCEDURE — 99204 OFFICE O/P NEW MOD 45 MIN: CPT | Mod: S$GLB,,, | Performed by: ORTHOPAEDIC SURGERY

## 2025-02-12 PROCEDURE — 1160F RVW MEDS BY RX/DR IN RCRD: CPT | Mod: CPTII,S$GLB,, | Performed by: ORTHOPAEDIC SURGERY

## 2025-02-12 PROCEDURE — 73030 X-RAY EXAM OF SHOULDER: CPT | Mod: 26,RT,, | Performed by: RADIOLOGY
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
## 2025-02-13 DIAGNOSIS — M79.671 RIGHT FOOT PAIN: Primary | ICD-10-CM

## 2025-02-18 ENCOUNTER — HOSPITAL ENCOUNTER (OUTPATIENT)
Dept: RADIOLOGY | Facility: HOSPITAL | Age: 75
Discharge: HOME | End: 2025-02-18
Attending: ORTHOPAEDIC SURGERY
Payer: MEDICARE

## 2025-02-18 ENCOUNTER — OFFICE VISIT (OUTPATIENT)
Dept: ORTHOPEDICS | Facility: CLINIC | Age: 75
End: 2025-02-18
Payer: MEDICARE

## 2025-02-18 VITALS — WEIGHT: 173.75 LBS | HEIGHT: 60 IN | BODY MASS INDEX: 34.11 KG/M2

## 2025-02-18 DIAGNOSIS — M79.671 RIGHT FOOT PAIN: ICD-10-CM

## 2025-02-18 DIAGNOSIS — R26.9 GAIT DISTURBANCE: Primary | ICD-10-CM

## 2025-02-18 DIAGNOSIS — M20.11 HALLUX VALGUS, RIGHT: ICD-10-CM

## 2025-02-18 DIAGNOSIS — M20.41 HAMMERTOE OF SECOND TOE OF RIGHT FOOT: ICD-10-CM

## 2025-02-18 PROCEDURE — 73610 X-RAY EXAM OF ANKLE: CPT | Mod: TC,PO,RT

## 2025-02-18 PROCEDURE — 73630 X-RAY EXAM OF FOOT: CPT | Mod: TC,PO,RT
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
## 2025-03-24 DIAGNOSIS — Z00.00 ENCOUNTER FOR MEDICARE ANNUAL WELLNESS EXAM: ICD-10-CM

## 2025-06-02 ENCOUNTER — LAB VISIT (OUTPATIENT)
Dept: LAB | Facility: HOSPITAL | Age: 75
End: 2025-06-02
Attending: INTERNAL MEDICINE
Payer: MEDICARE

## 2025-06-02 DIAGNOSIS — Z13.6 ENCOUNTER FOR LIPID SCREENING FOR CARDIOVASCULAR DISEASE: ICD-10-CM

## 2025-06-02 DIAGNOSIS — Z17.1 MALIGNANT NEOPLASM OF NIPPLE OF LEFT BREAST IN FEMALE, ESTROGEN RECEPTOR NEGATIVE: ICD-10-CM

## 2025-06-02 DIAGNOSIS — Z13.220 ENCOUNTER FOR LIPID SCREENING FOR CARDIOVASCULAR DISEASE: ICD-10-CM

## 2025-06-02 DIAGNOSIS — C50.012 MALIGNANT NEOPLASM OF NIPPLE OF LEFT BREAST IN FEMALE, ESTROGEN RECEPTOR NEGATIVE: ICD-10-CM

## 2025-06-02 DIAGNOSIS — E61.1 IRON DEFICIENCY: ICD-10-CM

## 2025-06-02 LAB
ABSOLUTE EOSINOPHIL (OHS): 0.07 K/UL
ABSOLUTE MONOCYTE (OHS): 0.63 K/UL (ref 0.3–1)
ABSOLUTE NEUTROPHIL COUNT (OHS): 5.63 K/UL (ref 1.8–7.7)
ALBUMIN SERPL BCP-MCNC: 4.2 G/DL (ref 3.5–5.2)
ALP SERPL-CCNC: 39 UNIT/L (ref 40–150)
ALT SERPL W/O P-5'-P-CCNC: 13 UNIT/L (ref 10–44)
ANION GAP (OHS): 10 MMOL/L (ref 8–16)
AST SERPL-CCNC: 19 UNIT/L (ref 11–45)
BASOPHILS # BLD AUTO: 0.04 K/UL
BASOPHILS NFR BLD AUTO: 0.5 %
BILIRUB SERPL-MCNC: 0.5 MG/DL (ref 0.1–1)
BUN SERPL-MCNC: 21 MG/DL (ref 8–23)
CALCIUM SERPL-MCNC: 9.2 MG/DL (ref 8.7–10.5)
CHLORIDE SERPL-SCNC: 103 MMOL/L (ref 95–110)
CHOLEST SERPL-MCNC: 280 MG/DL (ref 120–199)
CHOLEST/HDLC SERPL: 4.8 {RATIO} (ref 2–5)
CO2 SERPL-SCNC: 26 MMOL/L (ref 23–29)
CREAT SERPL-MCNC: 0.8 MG/DL (ref 0.5–1.4)
ERYTHROCYTE [DISTWIDTH] IN BLOOD BY AUTOMATED COUNT: 12.9 % (ref 11.5–14.5)
FERRITIN SERPL-MCNC: 187 NG/ML (ref 20–300)
GFR SERPLBLD CREATININE-BSD FMLA CKD-EPI: >60 ML/MIN/1.73/M2
GLUCOSE SERPL-MCNC: 91 MG/DL (ref 70–110)
HCT VFR BLD AUTO: 45.6 % (ref 37–48.5)
HDLC SERPL-MCNC: 58 MG/DL (ref 40–75)
HDLC SERPL: 20.7 % (ref 20–50)
HGB BLD-MCNC: 14.8 GM/DL (ref 12–16)
IMM GRANULOCYTES # BLD AUTO: 0.02 K/UL (ref 0–0.04)
IMM GRANULOCYTES NFR BLD AUTO: 0.2 % (ref 0–0.5)
IRON SATN MFR SERPL: 28 % (ref 20–50)
IRON SERPL-MCNC: 115 UG/DL (ref 30–160)
LDLC SERPL CALC-MCNC: 198 MG/DL (ref 63–159)
LYMPHOCYTES # BLD AUTO: 1.83 K/UL (ref 1–4.8)
MCH RBC QN AUTO: 30.1 PG (ref 27–31)
MCHC RBC AUTO-ENTMCNC: 32.5 G/DL (ref 32–36)
MCV RBC AUTO: 93 FL (ref 82–98)
NONHDLC SERPL-MCNC: 222 MG/DL
NUCLEATED RBC (/100WBC) (OHS): 0 /100 WBC
PLATELET # BLD AUTO: 252 K/UL (ref 150–450)
PMV BLD AUTO: 11.7 FL (ref 9.2–12.9)
POTASSIUM SERPL-SCNC: 4.4 MMOL/L (ref 3.5–5.1)
PROT SERPL-MCNC: 7.4 GM/DL (ref 6–8.4)
RBC # BLD AUTO: 4.92 M/UL (ref 4–5.4)
RELATIVE EOSINOPHIL (OHS): 0.9 %
RELATIVE LYMPHOCYTE (OHS): 22.3 % (ref 18–48)
RELATIVE MONOCYTE (OHS): 7.7 % (ref 4–15)
RELATIVE NEUTROPHIL (OHS): 68.4 % (ref 38–73)
SODIUM SERPL-SCNC: 139 MMOL/L (ref 136–145)
TIBC SERPL-MCNC: 417 UG/DL (ref 250–450)
TRANSFERRIN SERPL-MCNC: 282 MG/DL (ref 200–375)
TRIGL SERPL-MCNC: 120 MG/DL (ref 30–150)
WBC # BLD AUTO: 8.22 K/UL (ref 3.9–12.7)

## 2025-06-02 PROCEDURE — 36415 COLL VENOUS BLD VENIPUNCTURE: CPT | Mod: PN

## 2025-06-02 PROCEDURE — 80053 COMPREHEN METABOLIC PANEL: CPT

## 2025-06-02 PROCEDURE — 80061 LIPID PANEL: CPT

## 2025-06-02 PROCEDURE — 82728 ASSAY OF FERRITIN: CPT

## 2025-06-02 PROCEDURE — 83540 ASSAY OF IRON: CPT

## 2025-06-02 PROCEDURE — 85025 COMPLETE CBC W/AUTO DIFF WBC: CPT

## 2025-06-09 ENCOUNTER — OFFICE VISIT (OUTPATIENT)
Dept: FAMILY MEDICINE | Facility: CLINIC | Age: 75
End: 2025-06-09
Payer: MEDICARE

## 2025-06-09 VITALS
HEIGHT: 60 IN | SYSTOLIC BLOOD PRESSURE: 110 MMHG | OXYGEN SATURATION: 97 % | WEIGHT: 170.19 LBS | DIASTOLIC BLOOD PRESSURE: 62 MMHG | BODY MASS INDEX: 33.41 KG/M2 | HEART RATE: 81 BPM

## 2025-06-09 DIAGNOSIS — C77.3 MALIGNANT NEOPLASM METASTATIC TO LYMPH NODE OF AXILLA: ICD-10-CM

## 2025-06-09 DIAGNOSIS — K64.5 EXTERNAL THROMBOSED HEMORRHOIDS: ICD-10-CM

## 2025-06-09 DIAGNOSIS — E78.2 MIXED HYPERLIPIDEMIA: ICD-10-CM

## 2025-06-09 DIAGNOSIS — Z79.899 HIGH RISK MEDICATIONS (NOT ANTICOAGULANTS) LONG-TERM USE: ICD-10-CM

## 2025-06-09 DIAGNOSIS — Z12.11 SPECIAL SCREENING FOR MALIGNANT NEOPLASMS, COLON: ICD-10-CM

## 2025-06-09 DIAGNOSIS — R10.32 LEFT LOWER QUADRANT PAIN: ICD-10-CM

## 2025-06-09 DIAGNOSIS — Z09 ENCOUNTER FOR FOLLOW-UP EXAMINATION AFTER COMPLETED TREATMENT FOR CONDITIONS OTHER THAN MALIGNANT NEOPLASM: ICD-10-CM

## 2025-06-09 DIAGNOSIS — Z00.00 ROUTINE GENERAL MEDICAL EXAMINATION AT A HEALTH CARE FACILITY: Primary | ICD-10-CM

## 2025-06-09 PROCEDURE — 3078F DIAST BP <80 MM HG: CPT | Mod: CPTII,S$GLB,, | Performed by: FAMILY MEDICINE

## 2025-06-09 PROCEDURE — 3008F BODY MASS INDEX DOCD: CPT | Mod: CPTII,S$GLB,, | Performed by: FAMILY MEDICINE

## 2025-06-09 PROCEDURE — 3074F SYST BP LT 130 MM HG: CPT | Mod: CPTII,S$GLB,, | Performed by: FAMILY MEDICINE

## 2025-06-09 PROCEDURE — 1160F RVW MEDS BY RX/DR IN RCRD: CPT | Mod: CPTII,S$GLB,, | Performed by: FAMILY MEDICINE

## 2025-06-09 PROCEDURE — 1159F MED LIST DOCD IN RCRD: CPT | Mod: CPTII,S$GLB,, | Performed by: FAMILY MEDICINE

## 2025-06-09 PROCEDURE — 1126F AMNT PAIN NOTED NONE PRSNT: CPT | Mod: CPTII,S$GLB,, | Performed by: FAMILY MEDICINE

## 2025-06-09 PROCEDURE — 3288F FALL RISK ASSESSMENT DOCD: CPT | Mod: CPTII,S$GLB,, | Performed by: FAMILY MEDICINE

## 2025-06-09 PROCEDURE — 99999 PR PBB SHADOW E&M-EST. PATIENT-LVL V: CPT | Mod: PBBFAC,,, | Performed by: FAMILY MEDICINE

## 2025-06-09 PROCEDURE — 1101F PT FALLS ASSESS-DOCD LE1/YR: CPT | Mod: CPTII,S$GLB,, | Performed by: FAMILY MEDICINE

## 2025-06-09 PROCEDURE — 99397 PER PM REEVAL EST PAT 65+ YR: CPT | Mod: 25,S$GLB,, | Performed by: FAMILY MEDICINE

## 2025-06-09 NOTE — PROGRESS NOTES
Chief Complaint:  Chief Complaint   Patient presents with    Annual Exam        HPI:  Zayra is a 75 y.o. year old     History of Present Illness    CHIEF COMPLAINT:  Zayra presents today for follow-up of multiple medical conditions    MENTAL HEALTH:  She started Trintellix antidepressant, initially at 5mg and subsequently increased to 10mg, approaching one month of treatment. She reports initially feeling subdued on the medication and actively declined Xanax prescription due to personal preference.    GI CONCERNS:  She reports recent unusual bowel changes with active hemorrhoids. She experienced a severe diarrhea episode three weeks ago requiring significant Imodium use. Without dietary focus, she develops issues with stool burden, describing dry and difficult to pass stools. Colonoscopy in 2018 with Dr. Hobson revealed two small polyps.    MUSCULOSKELETAL:  She reports shoulder pain evaluated by Dr. AU with X-rays, and was given a band for treatment. She has bunions that are not very painful. 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    RESPIRATORY:  She becomes winded with ambulation.    SLEEP:  She experiences morning fatigue due to disrupted sleep, getting up multiple times (2-3) during the night with her dog. She denies any worrisome symptoms during physical exertion or housework.    MEDICATIONS:  She discontinued Zetia in February.            Review of Systems   Constitutional:  Positive for fatigue (mild, gets up with the dog at night). Negative for unexpected weight change.   HENT:  Negative for congestion, rhinorrhea and trouble swallowing.    Respiratory:  Negative for cough, shortness of breath and wheezing.    Cardiovascular:  Negative for chest pain, palpitations and leg swelling.   Gastrointestinal:  Negative for blood in stool, constipation and diarrhea.        No gerd c/o. No new food intolerances.   Genitourinary:  Negative for difficulty urinating, dysuria and  hematuria.   Musculoskeletal:  Negative for arthralgias and joint swelling.   Neurological:  Negative for dizziness, weakness, light-headedness and headaches.   Psychiatric/Behavioral:  Negative for dysphoric mood and sleep disturbance.          Past Medical History:  Past Medical History:   Diagnosis Date    Allergy     Breast cancer 02/2016    left breast, neoadjuvant chemo, lumpectomy, and radiation    Breast cancer 04/2022    left breast 3:00 invasive ductal carcinoma    Bronchitis     COVID-19 08/2020    HLD (hyperlipidemia)     Hypertension     NOT USING MEDICATIONS CURRENTLY    S/P chemotherapy, time since greater than 12 weeks     Skin cancer     resovled         Vitals:  Vitals:    06/09/25 0924   BP: 110/62   Pulse: 81   SpO2: 97%   Weight: 77.2 kg (170 lb 3.1 oz)   Height: 5' (1.524 m)   PainSc: 0-No pain       BP Readings from Last 5 Encounters:   06/09/25 110/62   12/23/24 120/80   08/30/24 122/80   05/06/24 118/82   04/26/24 (!) 150/90       The 10-year ASCVD risk score ([place]ool CASTELLANOS, et al., 2019) is: 13.7%    Values used to calculate the score:      Age: 75 years      Sex: Female      Is Non- : No      Diabetic: No      Tobacco smoker: No      Systolic Blood Pressure: 110 mmHg      Is BP treated: Yes      HDL Cholesterol: 58 mg/dL      Total Cholesterol: 280 mg/dL      Physical Exam:  Physical Exam  Vitals and nursing note reviewed.   Constitutional:       General: She is not in acute distress.     Appearance: Normal appearance. She is well-developed.   HENT:      Head: Normocephalic and atraumatic.      Right Ear: Tympanic membrane normal.      Left Ear: Tympanic membrane normal.   Eyes:      General: No scleral icterus.        Right eye: No discharge.         Left eye: No discharge.      Conjunctiva/sclera: Conjunctivae normal.   Cardiovascular:      Rate and Rhythm: Normal rate and regular rhythm.   Pulmonary:      Effort: Pulmonary effort is normal. No respiratory distress.       Breath sounds: Normal breath sounds.   Abdominal:      Palpations: Abdomen is soft.      Tenderness: There is abdominal tenderness (llq). There is no guarding.   Musculoskeletal:         General: No deformity or signs of injury.      Cervical back: Neck supple.   Lymphadenopathy:      Cervical: No cervical adenopathy.   Skin:     General: Skin is warm and dry.      Findings: No rash.   Neurological:      General: No focal deficit present.      Mental Status: She is alert and oriented to person, place, and time.   Psychiatric:         Mood and Affect: Mood normal.         Behavior: Behavior normal.             Assessment & Plan:  Routine general medical examination at a health care facility  Comments:  health maintenance reviewed    Malignant neoplasm metastatic to lymph node of axilla  Comments:  cont f/u with oncology, doing well  Orders:  -     US Carotid Bilateral; Future; Expected date: 06/09/2025    Mixed hyperlipidemia  Comments:  restart zetia  Orders:  -     US Carotid Bilateral; Future; Expected date: 06/09/2025    Special screening for malignant neoplasms, colon  -     Case Request Endoscopy: COLONOSCOPY  -     Ambulatory referral/consult to Colorectal Surgery; Future; Expected date: 06/16/2025    External thrombosed hemorrhoids  Comments:  appt with colorectal/guerra and then schedule cscope  Orders:  -     Case Request Endoscopy: COLONOSCOPY  -     Ambulatory referral/consult to Colorectal Surgery; Future; Expected date: 06/16/2025    High risk medications (not anticoagulants) long-term use  Comments:  chemotx completed  Orders:  -     Magnesium; Future; Expected date: 06/09/2025  -     Folate; Future; Expected date: 06/09/2025  -     Vitamin B12; Future; Expected date: 06/09/2025  -     TSH; Future; Expected date: 06/09/2025  -     Lipid Panel; Future; Expected date: 06/09/2025  -     Urinalysis, Reflex to Urine Culture Urine, Clean Catch; Future    Encounter for follow-up examination after  completed treatment for conditions other than malignant neoplasm  -     US Carotid Bilateral; Future; Expected date: 06/09/2025    Left lower quadrant pain  Comments:  incidental finding on exam, increase clear fluids, but update scan if persistent  Orders:  -     CT Abdomen Pelvis  Without Contrast; Future; Expected date: 06/09/2025         Assessment & Plan    ORDERS:   Ordered carotid US.   Ordered abdominal CT if bowel symptoms do not improve in the next couple of days.   Ordered labs to be completed in 3-4 months, including lipid panel, thyroid function, vitamin levels, A1C, and urinalysis.    IMPRESSION:  Assessed response to recently started Trintellix (antidepressant) - feeling more relaxed but subdued initially.  Evaluated lipid panel results after stopping Zetia in February - LDL and total cholesterol elevated, but improved from baseline. Restarted Zetia due to cardiovascular risk profile at age 75.  Considered recent changes in bowel habits possibly related to early diverticulitis.  Evaluated report of fatigue, noting it may be related to disrupted sleep from caring for dog.  BP lower than usual at 110/70.    PLAN SUMMARY:   Restart Zetia for cardiovascular risk management   Refer to Dr. Deluca for hemorrhoid evaluation and treatment   Order abdominal CT if bowel symptoms persist   Order labs in 3-4 months: lipid panel, thyroid function, vitamin levels, A1C, urinalysis   Continue Trintellix 10 mg daily   Order carotid ultrasound   Await GI clinic call for colonoscopy scheduling   Follow up in 3-4 months for labs and cholesterol reassessment   Contact office if bowel symptoms do not improve for potential CT    ACTION ITEMS/LIFESTYLE:   Zayra to increase clear fluid intake.   Recommend gradually increasing workout intensity by adding slight incline or increasing speed on treadmill.   Zayra to focus on maintaining a healthy diet.    MEDICATIONS:   Continued Trintellix 10 mg daily.   Restarted Zetia due to  cardiovascular risk profile at age 75.    REFERRALS:   Referred to Dr. Deluca for hemorrhoid evaluation and treatment prior to colonoscopy.    FOLLOW UP:   Follow up in 3-4 months for labs to reassess cholesterol levels after restarting Zetia.   Contact the office if bowel symptoms do not improve in the next couple of days for potential CT.   Await call from GI clinic to schedule colonoscopy (may take a few weeks).     This note was generated with the assistance of ambient listening technology. Verbal consent was obtained by the patient and accompanying visitor(s) for the recording of patient appointment to facilitate this note. I attest to having reviewed and edited the generated note for accuracy, though some syntax or spelling errors may persist. Please contact the author of this note for any clarification.

## 2025-06-25 ENCOUNTER — TELEPHONE (OUTPATIENT)
Dept: GASTROENTEROLOGY | Facility: CLINIC | Age: 75
End: 2025-06-25
Payer: MEDICARE

## 2025-06-25 NOTE — TELEPHONE ENCOUNTER
Spoke to pt and scheduled scope. Prep instructions placed in mail to pt and sent to pts mychart. Pt verbalized understanding to all   Pt stated her hemorrhoids have subsided and will f/u with colorectal if returns.

## 2025-07-08 DIAGNOSIS — R10.32 ABDOMINAL PAIN, LEFT LOWER QUADRANT: Primary | ICD-10-CM

## 2025-07-09 ENCOUNTER — HOSPITAL ENCOUNTER (OUTPATIENT)
Dept: RADIOLOGY | Facility: HOSPITAL | Age: 75
Discharge: HOME | End: 2025-07-09
Attending: FAMILY MEDICINE
Payer: MEDICARE

## 2025-07-09 DIAGNOSIS — Z09 ENCOUNTER FOR FOLLOW-UP EXAMINATION AFTER COMPLETED TREATMENT FOR CONDITIONS OTHER THAN MALIGNANT NEOPLASM: ICD-10-CM

## 2025-07-09 DIAGNOSIS — C77.3 MALIGNANT NEOPLASM METASTATIC TO LYMPH NODE OF AXILLA: ICD-10-CM

## 2025-07-09 DIAGNOSIS — E78.2 MIXED HYPERLIPIDEMIA: ICD-10-CM

## 2025-07-09 PROCEDURE — 93880 EXTRACRANIAL BILAT STUDY: CPT | Mod: TC,PO

## 2025-07-16 ENCOUNTER — TELEPHONE (OUTPATIENT)
Dept: SURGERY | Facility: HOSPITAL | Age: 75
End: 2025-07-16
Payer: MEDICARE

## 2025-07-16 ENCOUNTER — PATIENT MESSAGE (OUTPATIENT)
Dept: FAMILY MEDICINE | Facility: CLINIC | Age: 75
End: 2025-07-16
Payer: MEDICARE

## 2025-07-16 DIAGNOSIS — E04.1 THYROID NODULE: Primary | ICD-10-CM

## 2025-07-16 NOTE — TELEPHONE ENCOUNTER
"Pt is scheduled for colonoscopy on Weds., 7/23/2025. Pt states "I will have to reschedule because I don't have anyone to drive me on that day". Please call pt to reschedule. Thank you.   "

## 2025-07-24 ENCOUNTER — HOSPITAL ENCOUNTER (OUTPATIENT)
Dept: RADIOLOGY | Facility: HOSPITAL | Age: 75
Discharge: HOME | End: 2025-07-24
Attending: FAMILY MEDICINE
Payer: MEDICARE

## 2025-07-24 DIAGNOSIS — E04.1 THYROID NODULE: ICD-10-CM

## 2025-07-24 PROCEDURE — 76536 US EXAM OF HEAD AND NECK: CPT | Mod: TC,PO

## 2025-07-24 PROCEDURE — 76536 US EXAM OF HEAD AND NECK: CPT | Mod: 26,,, | Performed by: STUDENT IN AN ORGANIZED HEALTH CARE EDUCATION/TRAINING PROGRAM

## 2025-08-03 ENCOUNTER — PATIENT MESSAGE (OUTPATIENT)
Dept: FAMILY MEDICINE | Facility: CLINIC | Age: 75
End: 2025-08-03
Payer: MEDICARE

## 2025-08-08 ENCOUNTER — TELEPHONE (OUTPATIENT)
Dept: FAMILY MEDICINE | Facility: CLINIC | Age: 75
End: 2025-08-08
Payer: MEDICARE

## 2025-08-08 DIAGNOSIS — E04.1 THYROID NODULE: Primary | ICD-10-CM

## 2025-08-08 NOTE — TELEPHONE ENCOUNTER
Copied from CRM #[id number]. Topic: General Inquiry - Patient Advice  >> Aug 8, 2025 10:42 AM Tashia wrote:  Type:  Needs Medical Advice    Who Called: Pt   Would the patient rather a call back or a response via [facility]? Call Back  Best Call Back Number: [phone]   Additional Information: Pt requesting a call back in regards to labs if she can have them done sooner than the 09/10 date. Please call back to advise

## 2025-08-08 NOTE — TELEPHONE ENCOUNTER
Returned pt phone call. Pt is asking if she could have Thyroid levels checked or labs done sooner than September being that small nodules were found. She states that she is feeling tired. I did reiterate her results from 7/24/25 per you but she states that she would like to have labs now instead of Sept. If she can.    Please advise

## 2025-08-11 ENCOUNTER — LAB VISIT (OUTPATIENT)
Dept: LAB | Facility: HOSPITAL | Age: 75
End: 2025-08-11
Attending: FAMILY MEDICINE
Payer: MEDICARE

## 2025-08-11 DIAGNOSIS — E04.1 THYROID NODULE: ICD-10-CM

## 2025-08-11 LAB
T4 FREE SERPL-MCNC: 0.86 NG/DL (ref 0.71–1.51)
TSH SERPL-ACNC: 1.97 UIU/ML (ref 0.4–4)

## 2025-08-11 PROCEDURE — 84443 ASSAY THYROID STIM HORMONE: CPT

## 2025-08-11 PROCEDURE — 36415 COLL VENOUS BLD VENIPUNCTURE: CPT | Mod: PN

## 2025-08-11 PROCEDURE — 84439 ASSAY OF FREE THYROXINE: CPT

## 2025-08-20 ENCOUNTER — PATIENT MESSAGE (OUTPATIENT)
Dept: ADMINISTRATIVE | Facility: HOSPITAL | Age: 75
End: 2025-08-20
Payer: MEDICARE